# Patient Record
Sex: MALE | Race: WHITE | NOT HISPANIC OR LATINO | Employment: OTHER | URBAN - METROPOLITAN AREA
[De-identification: names, ages, dates, MRNs, and addresses within clinical notes are randomized per-mention and may not be internally consistent; named-entity substitution may affect disease eponyms.]

---

## 2019-11-21 ENCOUNTER — OFFICE VISIT (OUTPATIENT)
Dept: FAMILY MEDICINE CLINIC | Facility: CLINIC | Age: 54
End: 2019-11-21

## 2019-11-21 ENCOUNTER — RESULTS ENCOUNTER (OUTPATIENT)
Dept: FAMILY MEDICINE CLINIC | Facility: CLINIC | Age: 54
End: 2019-11-21

## 2019-11-21 VITALS
TEMPERATURE: 97.3 F | HEIGHT: 71 IN | RESPIRATION RATE: 17 BRPM | WEIGHT: 214 LBS | OXYGEN SATURATION: 100 % | BODY MASS INDEX: 29.96 KG/M2 | DIASTOLIC BLOOD PRESSURE: 90 MMHG | HEART RATE: 99 BPM | SYSTOLIC BLOOD PRESSURE: 140 MMHG

## 2019-11-21 DIAGNOSIS — Z12.11 COLON CANCER SCREENING: ICD-10-CM

## 2019-11-21 DIAGNOSIS — Z12.5 PROSTATE CANCER SCREENING: ICD-10-CM

## 2019-11-21 DIAGNOSIS — I10 ESSENTIAL HYPERTENSION: Primary | ICD-10-CM

## 2019-11-21 LAB
ALBUMIN SERPL-MCNC: 4.6 G/DL (ref 3.5–5.2)
ALBUMIN/GLOB SERPL: 1.5 G/DL
ALP SERPL-CCNC: 134 U/L (ref 39–117)
ALT SERPL-CCNC: 200 U/L (ref 1–41)
AST SERPL-CCNC: 128 U/L (ref 1–40)
BASOPHILS # BLD AUTO: 0.05 10*3/MM3 (ref 0–0.2)
BASOPHILS NFR BLD AUTO: 0.7 % (ref 0–1.5)
BILIRUB SERPL-MCNC: 1.2 MG/DL (ref 0.2–1.2)
BUN SERPL-MCNC: 15 MG/DL (ref 6–20)
BUN/CREAT SERPL: 15.3 (ref 7–25)
CALCIUM SERPL-MCNC: 9.3 MG/DL (ref 8.6–10.5)
CHLORIDE SERPL-SCNC: 106 MMOL/L (ref 98–107)
CHOLEST SERPL-MCNC: 137 MG/DL (ref 0–200)
CO2 SERPL-SCNC: 25.5 MMOL/L (ref 22–29)
CREAT SERPL-MCNC: 0.98 MG/DL (ref 0.76–1.27)
EOSINOPHIL # BLD AUTO: 0.28 10*3/MM3 (ref 0–0.4)
EOSINOPHIL NFR BLD AUTO: 4.1 % (ref 0.3–6.2)
ERYTHROCYTE [DISTWIDTH] IN BLOOD BY AUTOMATED COUNT: 12.3 % (ref 12.3–15.4)
GLOBULIN SER CALC-MCNC: 3.1 GM/DL
GLUCOSE SERPL-MCNC: 94 MG/DL (ref 65–99)
HCT VFR BLD AUTO: 49.8 % (ref 37.5–51)
HDLC SERPL-MCNC: 45 MG/DL (ref 40–60)
HGB BLD-MCNC: 16.7 G/DL (ref 13–17.7)
IMM GRANULOCYTES # BLD AUTO: 0.01 10*3/MM3 (ref 0–0.05)
IMM GRANULOCYTES NFR BLD AUTO: 0.1 % (ref 0–0.5)
LDLC SERPL CALC-MCNC: 77 MG/DL (ref 0–100)
LDLC/HDLC SERPL: 1.72 {RATIO}
LYMPHOCYTES # BLD AUTO: 2.36 10*3/MM3 (ref 0.7–3.1)
LYMPHOCYTES NFR BLD AUTO: 34.2 % (ref 19.6–45.3)
MCH RBC QN AUTO: 29.5 PG (ref 26.6–33)
MCHC RBC AUTO-ENTMCNC: 33.5 G/DL (ref 31.5–35.7)
MCV RBC AUTO: 87.8 FL (ref 79–97)
MONOCYTES # BLD AUTO: 0.52 10*3/MM3 (ref 0.1–0.9)
MONOCYTES NFR BLD AUTO: 7.5 % (ref 5–12)
NEUTROPHILS # BLD AUTO: 3.68 10*3/MM3 (ref 1.7–7)
NEUTROPHILS NFR BLD AUTO: 53.4 % (ref 42.7–76)
NRBC BLD AUTO-RTO: 0 /100 WBC (ref 0–0.2)
PLATELET # BLD AUTO: 177 10*3/MM3 (ref 140–450)
POTASSIUM SERPL-SCNC: 4 MMOL/L (ref 3.5–5.2)
PROT SERPL-MCNC: 7.7 G/DL (ref 6–8.5)
PSA SERPL-MCNC: 1.06 NG/ML (ref 0–4)
RBC # BLD AUTO: 5.67 10*6/MM3 (ref 4.14–5.8)
SODIUM SERPL-SCNC: 142 MMOL/L (ref 136–145)
TRIGL SERPL-MCNC: 74 MG/DL (ref 0–150)
VLDLC SERPL CALC-MCNC: 14.8 MG/DL
WBC # BLD AUTO: 6.9 10*3/MM3 (ref 3.4–10.8)

## 2019-11-21 PROCEDURE — 99203 OFFICE O/P NEW LOW 30 MIN: CPT | Performed by: INTERNAL MEDICINE

## 2019-11-21 RX ORDER — LOSARTAN POTASSIUM 100 MG/1
100 TABLET ORAL DAILY
Refills: 3 | COMMUNITY
Start: 2019-08-22 | End: 2020-01-06

## 2019-11-21 NOTE — PATIENT INSTRUCTIONS
Blood pressure is high today and high by your numbers.  We will switch to irbesartan-hydrochlorothiazide 150-12.5 mg daily.  Continue to monitor your blood pressure correctly at home as instructed when rested and relaxed.  Goal is less than 120/80.  If blood pressures not at goal will adjust irbesartan-hydrochlorothiazide dosage.  Discussed importance of cardiovascular exercise program and low-sodium diet.  Will check some fasting labs today including prostate cancer screening.  Will order a Cologuard.  Suggest getting vascular screening test.

## 2019-11-21 NOTE — PROGRESS NOTES
Subjective   Iain Christina is a 53 y.o. male. Patient is here today for   Chief Complaint   Patient presents with   • Hypertension          Vitals:    11/21/19 1050   BP: 140/90   Pulse: 99   Resp: 17   Temp: 97.3 °F (36.3 °C)   SpO2: 100%     Body mass index is 29.85 kg/m².    The following portions of the patient's history were reviewed and updated as appropriate: allergies, current medications, past family history, past medical history, past social history, past surgical history and problem list.    No past medical history on file.   No Known Allergies   Social History     Socioeconomic History   • Marital status: Unknown     Spouse name: Not on file   • Number of children: Not on file   • Years of education: Not on file   • Highest education level: Not on file        Current Outpatient Medications:   •  losartan (COZAAR) 100 MG tablet, Take 100 mg by mouth Daily., Disp: , Rfl: 3     Objective     History of Present Illness Iain is here for a blood pressure follow-up.  He was a patient of mine in 2014 and he moved to [facility] like [place].  He has hypertension and has been on losartan 100 mg daily.  He states his blood pressure is high in the morning and after work.  He does check his blood pressure a few hours after taking the medicine and reports normal readings.  He tries to eat healthy and is physically active but does not exercise.  Previous lab work was over a year ago and he states his cholesterol was a little high.  His physician recommended proper diet.  He has not had colon cancer screening yet.    Review of Systems   Constitutional: Negative for activity change and unexpected weight change.   Respiratory: Negative.    Cardiovascular:        As in HPI   Gastrointestinal: Negative.    Genitourinary: Negative.    Neurological: Negative.    Psychiatric/Behavioral: Negative.        Physical Exam   Constitutional: He appears well-developed and well-nourished.   Cardiovascular: Normal rate, regular rhythm  and normal heart sounds.   130/90   Pulmonary/Chest: Effort normal and breath sounds normal.   Neurological: He is alert.   Psychiatric: He has a normal mood and affect. His behavior is normal. Judgment and thought content normal.   Vitals reviewed.      ASSESSMENT     Problem List Items Addressed This Visit        Cardiovascular and Mediastinum    Essential hypertension - Primary    Relevant Medications    losartan (COZAAR) 100 MG tablet    Other Relevant Orders    Comprehensive Metabolic Panel    Lipid Panel With LDL / HDL Ratio    CBC & Differential      Other Visit Diagnoses     Prostate cancer screening        Relevant Orders    PSA Screen    Colon cancer screening        Relevant Orders    Cologuard - Stool, Per Rectum          PLAN  Patient Instructions   Blood pressure is high today and high by your numbers.  We will switch to irbesartan-hydrochlorothiazide 150-12.5 mg daily.  Continue to monitor your blood pressure correctly at home as instructed when rested and relaxed.  Goal is less than 120/80.  If blood pressures not at goal will adjust irbesartan-hydrochlorothiazide dosage.  Discussed importance of cardiovascular exercise program and low-sodium diet.  Will check some fasting labs today including prostate cancer screening.  Will order a Cologuard.  Suggest getting vascular screening test.    No Follow-up on file.

## 2019-12-02 ENCOUNTER — TELEPHONE (OUTPATIENT)
Dept: FAMILY MEDICINE CLINIC | Facility: CLINIC | Age: 54
End: 2019-12-02

## 2019-12-02 NOTE — TELEPHONE ENCOUNTER
PT CALLED IN STATING THAT HIS BP READING ARE AVERAGING 142/90 /92.  PER  LAST OFFICE NOTE DATED 11/21/19 HE ASKED PT TO LET US KNOW IF THE CHANGE OF irbesartan-hydrochlorothiazide DID NOT GET BP GOAL /80 AND DR WOULD SEE ABOUT MAKING ADJUSTMENTS TO CURRENT MEDICATION.  PLEASE CHECK WITH DR AND CONTACT PT TO ADVISE [phone]

## 2019-12-17 RX ORDER — IRBESARTAN AND HYDROCHLOROTHIAZIDE 150; 12.5 MG/1; MG/1
2 TABLET, FILM COATED ORAL DAILY
Qty: 60 TABLET | Refills: 5 | Status: SHIPPED | OUTPATIENT
Start: 2019-12-17 | End: 2021-01-19

## 2019-12-27 DIAGNOSIS — I10 ESSENTIAL HYPERTENSION: Primary | ICD-10-CM

## 2019-12-31 LAB
ALBUMIN SERPL-MCNC: 4.3 G/DL (ref 3.5–5.2)
ALBUMIN/GLOB SERPL: 1.3 G/DL
ALP SERPL-CCNC: 144 U/L (ref 39–117)
ALT SERPL-CCNC: 189 U/L (ref 1–41)
AST SERPL-CCNC: 120 U/L (ref 1–40)
BILIRUB SERPL-MCNC: 0.8 MG/DL (ref 0.2–1.2)
BUN SERPL-MCNC: 19 MG/DL (ref 6–20)
BUN/CREAT SERPL: 20.4 (ref 7–25)
CALCIUM SERPL-MCNC: 9.4 MG/DL (ref 8.6–10.5)
CHLORIDE SERPL-SCNC: 106 MMOL/L (ref 98–107)
CO2 SERPL-SCNC: 24.5 MMOL/L (ref 22–29)
CREAT SERPL-MCNC: 0.93 MG/DL (ref 0.76–1.27)
GLOBULIN SER CALC-MCNC: 3.2 GM/DL
GLUCOSE SERPL-MCNC: 96 MG/DL (ref 65–99)
POTASSIUM SERPL-SCNC: 4.3 MMOL/L (ref 3.5–5.2)
PROT SERPL-MCNC: 7.5 G/DL (ref 6–8.5)
SODIUM SERPL-SCNC: 144 MMOL/L (ref 136–145)

## 2020-01-06 ENCOUNTER — OFFICE VISIT (OUTPATIENT)
Dept: FAMILY MEDICINE CLINIC | Facility: CLINIC | Age: 55
End: 2020-01-06

## 2020-01-06 VITALS
HEIGHT: 71 IN | WEIGHT: 214 LBS | RESPIRATION RATE: 18 BRPM | DIASTOLIC BLOOD PRESSURE: 80 MMHG | HEART RATE: 96 BPM | SYSTOLIC BLOOD PRESSURE: 130 MMHG | TEMPERATURE: 97.6 F | BODY MASS INDEX: 29.96 KG/M2 | OXYGEN SATURATION: 100 %

## 2020-01-06 DIAGNOSIS — L71.9 ROSACEA: ICD-10-CM

## 2020-01-06 DIAGNOSIS — I10 ESSENTIAL HYPERTENSION: Primary | ICD-10-CM

## 2020-01-06 DIAGNOSIS — R74.8 ELEVATED LIVER ENZYMES: ICD-10-CM

## 2020-01-06 PROCEDURE — 99214 OFFICE O/P EST MOD 30 MIN: CPT | Performed by: INTERNAL MEDICINE

## 2020-01-06 RX ORDER — METRONIDAZOLE 10 MG/G
GEL TOPICAL DAILY
Qty: 55 G | Refills: 5 | Status: SHIPPED | OUTPATIENT
Start: 2020-01-06

## 2020-01-06 NOTE — PROGRESS NOTES
Subjective   Iain Christina is a 54 y.o. male. Patient is here today for   Chief Complaint   Patient presents with   • Hypertension     follow up had labs          Vitals:    01/06/20 1601   BP: 130/80   Pulse: 96   Resp: 18   Temp: 97.6 °F (36.4 °C)   SpO2: 100%     Body mass index is 29.85 kg/m².    The following portions of the patient's history were reviewed and updated as appropriate: allergies, current medications, past family history, past medical history, past social history, past surgical history and problem list.    No past medical history on file.   No Known Allergies   Social History     Socioeconomic History   • Marital status: Unknown     Spouse name: Not on file   • Number of children: Not on file   • Years of education: Not on file   • Highest education level: Not on file        Current Outpatient Medications:   •  irbesartan-hydrochlorothiazide (AVALIDE) 150-12.5 MG tablet, Take 2 tablets by mouth Daily., Disp: 60 tablet, Rfl: 5  •  metroNIDAZOLE (METROGEL) 1 % gel, Apply  topically to the appropriate area as directed Daily., Disp: 55 g, Rfl: 5     Objective     History of Present Illness Iain is here today for blood pressure and lab follow-up.  He has hypertension and has been on irbesartan hydrochlorothiazide 150- 12.5 mg daily.  He previously was on losartan and it was not controlling his blood pressure.  He usually checks his blood pressure after work and reports high readings.  He decreased his sodium intake and is physically active but does not exercise.  He does drink about 6 ounces of liquor daily.  Transaminase enzymes were elevated in November and were repeated 5 weeks later.  Today he also complains of a red face.    Review of Systems   Respiratory: Negative.    Cardiovascular:        's   Genitourinary: Negative.    Musculoskeletal: Negative.    Neurological: Negative.    Psychiatric/Behavioral: Negative.        Physical Exam   Cardiovascular: Normal rate, regular rhythm and  normal heart sounds.   128/85   Pulmonary/Chest: Effort normal and breath sounds normal.   Musculoskeletal: He exhibits no edema.   Neurological: He is alert.   Skin:   Facial rosacea   Psychiatric: He has a normal mood and affect. His behavior is normal. Judgment and thought content normal.       ASSESSMENT     Problem List Items Addressed This Visit        Cardiovascular and Mediastinum    Essential hypertension - Primary       Musculoskeletal and Integument    Rosacea    Relevant Medications    metroNIDAZOLE (METROGEL) 1 % gel       Other    Elevated liver enzymes          PLAN  Patient Instructions   Blood pressure is close to normal when checked a second time today.  Discussed monitoring blood pressure correctly as instructed and not after work.  Also discussed the effects of alcohol on blood pressure and liver functions.  Discussed decreasing or eliminating alcohol completely.  Discussed importance of cardiovascular exercise program.  We will continue irbesartan-hydrochlorothiazide 150-12.5 mg daily.  Fasting blood sugar, kidney functions, complete blood count, lipids, and prostate-specific antigen were normal in November.    Return in about 6 months (around 7/6/2020) for labsCMP.

## 2020-01-06 NOTE — PATIENT INSTRUCTIONS
Blood pressure is close to normal when checked a second time today.  Discussed monitoring blood pressure correctly as instructed and not after work.  Also discussed the effects of alcohol on blood pressure and liver functions.  Discussed decreasing or eliminating alcohol completely.  Discussed importance of cardiovascular exercise program.  We will continue irbesartan-hydrochlorothiazide 150-12.5 mg daily.  Fasting blood sugar, kidney functions, complete blood count, lipids, and prostate-specific antigen were normal in November.

## 2020-07-01 DIAGNOSIS — I10 ESSENTIAL HYPERTENSION: Primary | ICD-10-CM

## 2020-07-17 ENCOUNTER — TELEPHONE (OUTPATIENT)
Dept: FAMILY MEDICINE CLINIC | Facility: CLINIC | Age: 55
End: 2020-07-17

## 2020-07-17 LAB
ALBUMIN SERPL-MCNC: 4.8 G/DL (ref 3.5–5.2)
ALBUMIN/GLOB SERPL: 1.7 G/DL
ALP SERPL-CCNC: 106 U/L (ref 39–117)
ALT SERPL-CCNC: 210 U/L (ref 1–41)
AST SERPL-CCNC: 117 U/L (ref 1–40)
BILIRUB SERPL-MCNC: 2 MG/DL (ref 0–1.2)
BUN SERPL-MCNC: 22 MG/DL (ref 6–20)
BUN/CREAT SERPL: 23.7 (ref 7–25)
CALCIUM SERPL-MCNC: 9.7 MG/DL (ref 8.6–10.5)
CHLORIDE SERPL-SCNC: 104 MMOL/L (ref 98–107)
CO2 SERPL-SCNC: 26.1 MMOL/L (ref 22–29)
CREAT SERPL-MCNC: 0.93 MG/DL (ref 0.76–1.27)
GLOBULIN SER CALC-MCNC: 2.9 GM/DL
GLUCOSE SERPL-MCNC: 119 MG/DL (ref 65–99)
POTASSIUM SERPL-SCNC: 4.3 MMOL/L (ref 3.5–5.2)
PROT SERPL-MCNC: 7.7 G/DL (ref 6–8.5)
SODIUM SERPL-SCNC: 140 MMOL/L (ref 136–145)

## 2020-07-17 NOTE — TELEPHONE ENCOUNTER
Patient has an apt 7/23 in the office and wants to switch to a telephone visit    Please advise if this is ok    Pt #[phone]

## 2020-07-30 ENCOUNTER — OFFICE VISIT (OUTPATIENT)
Dept: FAMILY MEDICINE CLINIC | Facility: CLINIC | Age: 55
End: 2020-07-30

## 2020-07-30 VITALS
DIASTOLIC BLOOD PRESSURE: 80 MMHG | TEMPERATURE: 98.8 F | RESPIRATION RATE: 16 BRPM | WEIGHT: 215.4 LBS | HEART RATE: 86 BPM | SYSTOLIC BLOOD PRESSURE: 130 MMHG | HEIGHT: 70 IN | BODY MASS INDEX: 30.84 KG/M2 | OXYGEN SATURATION: 99 %

## 2020-07-30 DIAGNOSIS — I10 ESSENTIAL HYPERTENSION: Primary | ICD-10-CM

## 2020-07-30 DIAGNOSIS — R74.8 ELEVATED LIVER ENZYMES: ICD-10-CM

## 2020-07-30 PROCEDURE — 99213 OFFICE O/P EST LOW 20 MIN: CPT | Performed by: INTERNAL MEDICINE

## 2020-07-30 NOTE — PROGRESS NOTES
Subjective   Iain Christina is a 54 y.o. male. Patient is here today for   Chief Complaint   Patient presents with   • Hypertension     follow up          Vitals:    07/30/20 1255   BP: 130/80   Pulse: 86   Resp: 16   Temp: 98.8 °F (37.1 °C)   SpO2: 99%     Body mass index is 30.91 kg/m².      The following portions of the patient's history were reviewed and updated as appropriate: allergies, current medications, past family history, past medical history, past social history, past surgical history and problem list.    Past Medical History:   Diagnosis Date   • Hyperthyroidism    • Sleep apnea       No Known Allergies   Social History     Socioeconomic History   • Marital status: Unknown     Spouse name: Not on file   • Number of children: Not on file   • Years of education: Not on file   • Highest education level: Not on file   Tobacco Use   • Smoking status: Never Smoker   • Smokeless tobacco: Never Used   Substance and Sexual Activity   • Alcohol use: Yes     Alcohol/week: 4.0 standard drinks     Types: 2 Cans of beer, 2 Shots of liquor per week     Drinks per session: 1 or 2   • Drug use: Never        Current Outpatient Medications:   •  irbesartan-hydrochlorothiazide (AVALIDE) 150-12.5 MG tablet, Take 2 tablets by mouth Daily., Disp: 60 tablet, Rfl: 5  •  metroNIDAZOLE (METROGEL) 1 % gel, Apply  topically to the appropriate area as directed Daily., Disp: 55 g, Rfl: 5     Objective   History of Present Illness Iain is here for blood pressure check and lab follow-up.  He has hypertension and elevated liver enzymes.  He feels well.  He tries to eat healthy but does eat sugar.  He is very physically active at work as a  but does not do any cardiovascular exercise.  He monitors his blood pressure and reports stable readings.  He was drinking a fair amount of alcohol in the past and now only drinks moderately about 3 days a week.    Review of Systems   Constitutional: Negative for activity change and  unexpected weight change.   Respiratory: Negative for cough and shortness of breath.    Cardiovascular:        -125/<80   Neurological: Negative.    Psychiatric/Behavioral: Negative.        Physical Exam   Constitutional: He appears well-developed and well-nourished.   Cardiovascular: Normal rate, regular rhythm and normal heart sounds.   140/85,125/85   Pulmonary/Chest: Effort normal and breath sounds normal.   Abdominal: Soft. He exhibits no mass.   Liver is nonpalpable   Psychiatric: He has a normal mood and affect. His behavior is normal. Judgment and thought content normal.   Vitals reviewed.      ASSESSMENT     Problem List Items Addressed This Visit        Cardiovascular and Mediastinum    Essential hypertension - Primary       Other    Elevated liver enzymes          PLAN  Patient Instructions   Blood pressure is stable.  Continue irbesartan-hydrochlorothiazide 150-12.5 mg daily.  Discussed importance of regular cardiovascular exercise program as well.  Fasting blood sugar is elevated.  AST and ALT remain moderately elevated.  Will obtain a liver ultrasound and call you the results.  Discussed decreasing sugar intake.      Return in about 6 months (around 1/30/2021) for labs CMP, A1c, PSA.

## 2020-07-30 NOTE — PATIENT INSTRUCTIONS
Blood pressure is stable.  Continue irbesartan-hydrochlorothiazide 150-12.5 mg daily.  Discussed importance of regular cardiovascular exercise program as well.  Fasting blood sugar is elevated.  AST and ALT remain moderately elevated.  Will obtain a liver ultrasound and call you the results.  Discussed decreasing sugar intake.

## 2021-01-19 RX ORDER — IRBESARTAN AND HYDROCHLOROTHIAZIDE 150; 12.5 MG/1; MG/1
TABLET, FILM COATED ORAL
Qty: 60 TABLET | Refills: 5 | Status: SHIPPED | OUTPATIENT
Start: 2021-01-19 | End: 2021-12-13 | Stop reason: SDUPTHER

## 2021-12-07 RX ORDER — IRBESARTAN AND HYDROCHLOROTHIAZIDE 150; 12.5 MG/1; MG/1
TABLET, FILM COATED ORAL
Qty: 60 TABLET | Refills: 0 | OUTPATIENT
Start: 2021-12-07

## 2021-12-13 ENCOUNTER — OFFICE VISIT (OUTPATIENT)
Dept: FAMILY MEDICINE CLINIC | Facility: CLINIC | Age: 56
End: 2021-12-13

## 2021-12-13 VITALS
SYSTOLIC BLOOD PRESSURE: 138 MMHG | OXYGEN SATURATION: 97 % | HEIGHT: 71 IN | RESPIRATION RATE: 18 BRPM | BODY MASS INDEX: 29.96 KG/M2 | HEART RATE: 78 BPM | DIASTOLIC BLOOD PRESSURE: 80 MMHG | WEIGHT: 214 LBS

## 2021-12-13 DIAGNOSIS — I10 PRIMARY HYPERTENSION: Primary | ICD-10-CM

## 2021-12-13 DIAGNOSIS — G47.30 SLEEP APNEA, UNSPECIFIED TYPE: ICD-10-CM

## 2021-12-13 DIAGNOSIS — I10 ESSENTIAL HYPERTENSION: Primary | ICD-10-CM

## 2021-12-13 DIAGNOSIS — Z12.5 ENCOUNTER FOR SCREENING FOR MALIGNANT NEOPLASM OF PROSTATE: ICD-10-CM

## 2021-12-13 PROBLEM — L72.3 SEBACEOUS CYST: Status: ACTIVE | Noted: 2021-12-13

## 2021-12-13 PROBLEM — H93.19 TINNITUS: Status: ACTIVE | Noted: 2021-12-13

## 2021-12-13 PROBLEM — J30.9 ATOPIC RHINITIS: Status: ACTIVE | Noted: 2021-12-13

## 2021-12-13 PROBLEM — E78.5 HYPERLIPIDEMIA: Status: ACTIVE | Noted: 2021-12-13

## 2021-12-13 PROCEDURE — 99212 OFFICE O/P EST SF 10 MIN: CPT | Performed by: NURSE PRACTITIONER

## 2021-12-13 RX ORDER — IRBESARTAN AND HYDROCHLOROTHIAZIDE 150; 12.5 MG/1; MG/1
2 TABLET, FILM COATED ORAL DAILY
Qty: 180 TABLET | Refills: 1 | Status: SHIPPED | OUTPATIENT
Start: 2021-12-13 | End: 2022-04-26 | Stop reason: SDUPTHER

## 2021-12-13 NOTE — PATIENT INSTRUCTIONS
Recommend pt eat a heart healthy diet, drink plenty of water with goal 64 oz a day and exercise 3-5 times a week, for more than 30 minutes at a time.

## 2021-12-13 NOTE — PROGRESS NOTES
"Subjective     Iain Christina is a 55 y.o.. male.     Pt here today for follow up on HTN. Pt stating he occasionally checks his b/p at home with results of 130's/80's. Pt stating he exercises at work, he is a . Pt stating he eats somewhat healthy. Pt stating he drinks water some and knows he should drink more. Pt with history of sleep apnea, diagnosed over 5 yrs ago and does not use Cpap/Bipap.      The following portions of the patient's history were reviewed and updated as appropriate: allergies, current medications, past family history, past medical history, past social history, past surgical history and problem list.    Past Medical History:   Diagnosis Date   • Hyperthyroidism    • Sleep apnea        Past Surgical History:   Procedure Laterality Date   • NASAL SEPTAL RECONSTRUCTION         Review of Systems   Constitutional: Negative.    Respiratory: Negative.    Cardiovascular: Negative.        No Known Allergies    Objective     Vitals:    12/13/21 0757   BP: 138/80   Pulse: 78   Resp: 18   SpO2: 97%   Weight: 97.1 kg (214 lb)   Height: 180.3 cm (71\")     Body mass index is 29.85 kg/m².    Physical Exam  Vitals reviewed.   HENT:      Head: Normocephalic.   Eyes:      Pupils: Pupils are equal, round, and reactive to light.   Neck:      Vascular: No carotid bruit.   Cardiovascular:      Rate and Rhythm: Normal rate and regular rhythm.      Pulses: Normal pulses.   Pulmonary:      Effort: Pulmonary effort is normal.      Breath sounds: Normal breath sounds.   Musculoskeletal:      Right lower leg: No edema.      Left lower leg: No edema.   Neurological:      Mental Status: He is alert and oriented to person, place, and time.   Psychiatric:         Behavior: Behavior normal.           Current Outpatient Medications:   •  irbesartan-hydrochlorothiazide (AVALIDE) 150-12.5 MG tablet, Take 2 tablets by mouth Daily., Disp: 180 tablet, Rfl: 1  •  metroNIDAZOLE (METROGEL) 1 % gel, Apply  topically to the " appropriate area as directed Daily., Disp: 55 g, Rfl: 5      Assessment/Plan   Diagnoses and all orders for this visit:    1. Primary hypertension (Primary)  -     irbesartan-hydrochlorothiazide (AVALIDE) 150-12.5 MG tablet; Take 2 tablets by mouth Daily.  Dispense: 180 tablet; Refill: 1    2. Sleep apnea, unspecified type  -     Ambulatory Referral to Sleep Medicine        Patient Instructions   Recommend pt eat a heart healthy diet, drink plenty of water with goal 64 oz a day and exercise 3-5 times a week, for more than 30 minutes at a time.         Return for Dr. Medina as needed/as recommended.

## 2021-12-14 LAB
ALBUMIN SERPL-MCNC: 4.6 G/DL (ref 3.8–4.9)
ALBUMIN/GLOB SERPL: 1.6 {RATIO} (ref 1.2–2.2)
ALP SERPL-CCNC: 137 IU/L (ref 44–121)
ALT SERPL-CCNC: 162 IU/L (ref 0–44)
AST SERPL-CCNC: 95 IU/L (ref 0–40)
BILIRUB SERPL-MCNC: 1.6 MG/DL (ref 0–1.2)
BUN SERPL-MCNC: 19 MG/DL (ref 6–24)
BUN/CREAT SERPL: 21 (ref 9–20)
CALCIUM SERPL-MCNC: 9.9 MG/DL (ref 8.7–10.2)
CHLORIDE SERPL-SCNC: 103 MMOL/L (ref 96–106)
CO2 SERPL-SCNC: 24 MMOL/L (ref 20–29)
CREAT SERPL-MCNC: 0.9 MG/DL (ref 0.76–1.27)
GLOBULIN SER CALC-MCNC: 2.9 G/DL (ref 1.5–4.5)
GLUCOSE SERPL-MCNC: 110 MG/DL (ref 65–99)
HBA1C MFR BLD: 5.9 % (ref 4.8–5.6)
POTASSIUM SERPL-SCNC: 4.2 MMOL/L (ref 3.5–5.2)
PROT SERPL-MCNC: 7.5 G/DL (ref 6–8.5)
PSA SERPL-MCNC: 1.4 NG/ML (ref 0–4)
SODIUM SERPL-SCNC: 141 MMOL/L (ref 134–144)

## 2022-04-26 DIAGNOSIS — I10 PRIMARY HYPERTENSION: ICD-10-CM

## 2022-04-26 RX ORDER — IRBESARTAN AND HYDROCHLOROTHIAZIDE 150; 12.5 MG/1; MG/1
2 TABLET, FILM COATED ORAL DAILY
Qty: 60 TABLET | Refills: 0 | Status: SHIPPED | OUTPATIENT
Start: 2022-04-26 | End: 2022-08-24

## 2022-08-24 DIAGNOSIS — I10 PRIMARY HYPERTENSION: ICD-10-CM

## 2022-08-24 RX ORDER — IRBESARTAN AND HYDROCHLOROTHIAZIDE 150; 12.5 MG/1; MG/1
TABLET, FILM COATED ORAL
Qty: 180 TABLET | Refills: 0 | Status: SHIPPED | OUTPATIENT
Start: 2022-08-24 | End: 2023-01-13 | Stop reason: SDUPTHER

## 2023-01-09 DIAGNOSIS — I10 PRIMARY HYPERTENSION: ICD-10-CM

## 2023-01-09 RX ORDER — IRBESARTAN AND HYDROCHLOROTHIAZIDE 150; 12.5 MG/1; MG/1
TABLET, FILM COATED ORAL
Qty: 180 TABLET | Refills: 0 | OUTPATIENT
Start: 2023-01-09

## 2023-01-13 ENCOUNTER — OFFICE VISIT (OUTPATIENT)
Dept: FAMILY MEDICINE CLINIC | Facility: CLINIC | Age: 58
End: 2023-01-13

## 2023-01-13 VITALS
SYSTOLIC BLOOD PRESSURE: 130 MMHG | OXYGEN SATURATION: 97 % | BODY MASS INDEX: 31.64 KG/M2 | RESPIRATION RATE: 16 BRPM | WEIGHT: 226 LBS | DIASTOLIC BLOOD PRESSURE: 84 MMHG | HEART RATE: 78 BPM | HEIGHT: 71 IN | TEMPERATURE: 98 F

## 2023-01-13 DIAGNOSIS — E78.5 HYPERLIPIDEMIA, UNSPECIFIED HYPERLIPIDEMIA TYPE: ICD-10-CM

## 2023-01-13 DIAGNOSIS — R74.8 ELEVATED LIVER ENZYMES: ICD-10-CM

## 2023-01-13 DIAGNOSIS — Z12.5 PROSTATE CANCER SCREENING: ICD-10-CM

## 2023-01-13 DIAGNOSIS — I10 PRIMARY HYPERTENSION: Primary | ICD-10-CM

## 2023-01-13 PROCEDURE — 99213 OFFICE O/P EST LOW 20 MIN: CPT | Performed by: INTERNAL MEDICINE

## 2023-01-13 RX ORDER — IRBESARTAN AND HYDROCHLOROTHIAZIDE 150; 12.5 MG/1; MG/1
2 TABLET, FILM COATED ORAL DAILY
Qty: 180 TABLET | Refills: 3 | Status: SHIPPED | OUTPATIENT
Start: 2023-01-13

## 2023-01-13 NOTE — PROGRESS NOTES
Subjective   Iain Christina is a 57 y.o. male. Patient is here today for   Chief Complaint   Patient presents with   • Follow-up   • Hypertension          Vitals:    01/13/23 1124   BP: 130/84   Pulse: 78   Resp: 16   Temp: 98 °F (36.7 °C)   SpO2: 97%     Body mass index is 31.53 kg/m².    The following portions of the patient's history were reviewed and updated as appropriate: allergies, current medications, past family history, past medical history, past social history, past surgical history and problem list.    Past Medical History:   Diagnosis Date   • Hyperthyroidism    • Sleep apnea       Allergies   Allergen Reactions   • Peanut (Diagnostic) Anaphylaxis      Social History     Socioeconomic History   • Marital status: Unknown   Tobacco Use   • Smoking status: Never   • Smokeless tobacco: Never   Vaping Use   • Vaping Use: Never used   Substance and Sexual Activity   • Alcohol use: Yes     Alcohol/week: 4.0 standard drinks     Types: 2 Cans of beer, 2 Shots of liquor per week   • Drug use: Never   • Sexual activity: Defer        Current Outpatient Medications:   •  irbesartan-hydrochlorothiazide (AVALIDE) 150-12.5 MG tablet, Take 2 tablets by mouth Daily., Disp: 180 tablet, Rfl: 3  •  metroNIDAZOLE (METROGEL) 1 % gel, Apply  topically to the appropriate area as directed Daily., Disp: 55 g, Rfl: 5     Objective     History of Present Illness Iain is here for a blood pressure check and to have some labs drawn.  He has hypertension, alcohol abuse, and elevated liver functions..  He eats healthy and is physically active working as a .  He does not do any cardiovascular exercise.  He monitors his blood pressure and reports stable readings.  He continues to drink a cup of liquor daily.  He had a negative Cologuard almost 3 years ago.  His brother was diagnosed with colorectal cancer.    Review of Systems   Constitutional: Negative.    Eyes: Negative.    Respiratory: Negative.    Cardiovascular:  Negative.    Gastrointestinal: Negative.    Genitourinary: Negative.    Neurological: Negative.    Psychiatric/Behavioral: Negative.        Physical Exam  Vitals reviewed.   Constitutional:       Appearance: Normal appearance.   Neck:      Vascular: No carotid bruit.   Cardiovascular:      Rate and Rhythm: Normal rate and regular rhythm.      Heart sounds: Normal heart sounds.      Comments: 126/60  Pulmonary:      Effort: Pulmonary effort is normal.      Breath sounds: Normal breath sounds.   Musculoskeletal:      Right lower leg: No edema.      Left lower leg: No edema.   Neurological:      Mental Status: He is alert.   Psychiatric:         Mood and Affect: Mood normal.         Behavior: Behavior normal.         Thought Content: Thought content normal.         Judgment: Judgment normal.         ASSESSMENT blood pressure is stable.  We will check lab work today.  Continue healthy heart diet.  Discussed exercise per AHA guidelines.  Discussed decreasing alcohol intake.  We will continue current medicines.     Problems Addressed this Visit        Cardiac and Vasculature    Hypertension - Primary    Relevant Medications    irbesartan-hydrochlorothiazide (AVALIDE) 150-12.5 MG tablet    Other Relevant Orders    Comprehensive Metabolic Panel    CBC & Differential    TSH    Hyperlipidemia    Relevant Orders    Lipid Panel With LDL / HDL Ratio       Gastrointestinal Abdominal     Elevated liver enzymes    Relevant Orders    Hemoglobin A1c   Other Visit Diagnoses     Prostate cancer screening        Relevant Orders    PSA Screen      Diagnoses       Codes Comments    Primary hypertension    -  Primary ICD-10-CM: I10  ICD-9-CM: 401.9     Hyperlipidemia, unspecified hyperlipidemia type     ICD-10-CM: E78.5  ICD-9-CM: 272.4     Elevated liver enzymes     ICD-10-CM: R74.8  ICD-9-CM: 790.5     Prostate cancer screening     ICD-10-CM: Z12.5  ICD-9-CM: V76.44           PLAN  There are no Patient Instructions on file for this  visit.  No follow-ups on file.  Answers for HPI/ROS submitted by the patient on 1/11/2023  What is the primary reason for your visit?: High Blood Pressure

## 2023-01-14 LAB
ALBUMIN SERPL-MCNC: 4.5 G/DL (ref 3.8–4.9)
ALBUMIN/GLOB SERPL: 1.5 {RATIO} (ref 1.2–2.2)
ALP SERPL-CCNC: 119 IU/L (ref 44–121)
ALT SERPL-CCNC: 404 IU/L (ref 0–44)
AST SERPL-CCNC: 195 IU/L (ref 0–40)
BASOPHILS # BLD AUTO: 0.1 X10E3/UL (ref 0–0.2)
BASOPHILS NFR BLD AUTO: 1 %
BILIRUB SERPL-MCNC: 1.2 MG/DL (ref 0–1.2)
BUN SERPL-MCNC: 19 MG/DL (ref 6–24)
BUN/CREAT SERPL: 22 (ref 9–20)
CALCIUM SERPL-MCNC: 9.7 MG/DL (ref 8.7–10.2)
CHLORIDE SERPL-SCNC: 102 MMOL/L (ref 96–106)
CHOLEST SERPL-MCNC: 140 MG/DL (ref 100–199)
CO2 SERPL-SCNC: 23 MMOL/L (ref 20–29)
CREAT SERPL-MCNC: 0.86 MG/DL (ref 0.76–1.27)
EGFRCR SERPLBLD CKD-EPI 2021: 100 ML/MIN/1.73
EOSINOPHIL # BLD AUTO: 0.3 X10E3/UL (ref 0–0.4)
EOSINOPHIL NFR BLD AUTO: 5 %
ERYTHROCYTE [DISTWIDTH] IN BLOOD BY AUTOMATED COUNT: 12.7 % (ref 11.6–15.4)
GLOBULIN SER CALC-MCNC: 3 G/DL (ref 1.5–4.5)
GLUCOSE SERPL-MCNC: 100 MG/DL (ref 70–99)
HBA1C MFR BLD: 6.3 % (ref 4.8–5.6)
HCT VFR BLD AUTO: 49 % (ref 37.5–51)
HDLC SERPL-MCNC: 41 MG/DL
HGB BLD-MCNC: 16.9 G/DL (ref 13–17.7)
IMM GRANULOCYTES # BLD AUTO: 0 X10E3/UL (ref 0–0.1)
IMM GRANULOCYTES NFR BLD AUTO: 0 %
LDLC SERPL CALC-MCNC: 76 MG/DL (ref 0–99)
LDLC/HDLC SERPL: 1.9 RATIO (ref 0–3.6)
LYMPHOCYTES # BLD AUTO: 2.3 X10E3/UL (ref 0.7–3.1)
LYMPHOCYTES NFR BLD AUTO: 40 %
MCH RBC QN AUTO: 30.7 PG (ref 26.6–33)
MCHC RBC AUTO-ENTMCNC: 34.5 G/DL (ref 31.5–35.7)
MCV RBC AUTO: 89 FL (ref 79–97)
MONOCYTES # BLD AUTO: 0.5 X10E3/UL (ref 0.1–0.9)
MONOCYTES NFR BLD AUTO: 8 %
NEUTROPHILS # BLD AUTO: 2.5 X10E3/UL (ref 1.4–7)
NEUTROPHILS NFR BLD AUTO: 46 %
PLATELET # BLD AUTO: 163 X10E3/UL (ref 150–450)
POTASSIUM SERPL-SCNC: 4.1 MMOL/L (ref 3.5–5.2)
PROT SERPL-MCNC: 7.5 G/DL (ref 6–8.5)
PSA SERPL-MCNC: 1.3 NG/ML (ref 0–4)
RBC # BLD AUTO: 5.5 X10E6/UL (ref 4.14–5.8)
SODIUM SERPL-SCNC: 142 MMOL/L (ref 134–144)
TRIGL SERPL-MCNC: 130 MG/DL (ref 0–149)
TSH SERPL DL<=0.005 MIU/L-ACNC: 3.11 UIU/ML (ref 0.45–4.5)
VLDLC SERPL CALC-MCNC: 23 MG/DL (ref 5–40)
WBC # BLD AUTO: 5.6 X10E3/UL (ref 3.4–10.8)

## 2023-01-17 ENCOUNTER — TELEPHONE (OUTPATIENT)
Dept: FAMILY MEDICINE CLINIC | Facility: CLINIC | Age: 58
End: 2023-01-17

## 2023-01-17 NOTE — TELEPHONE ENCOUNTER
Discussed lab results.  AST and ALT are elevated secondary to alcohol intake.  Discussed decreasing significantly or stopping and rechecking a comprehensive metabolic panel in 1 month.  Hemoglobin A1c is increased to 6.3.  Discussed no sugar low-carb diet, exercise, and weight loss.  Lipids are normal.  Complete blood count is normal.  Will also check a prostate-specific antigen.  We will follow-up in 6 months with hemoglobin A1c and comprehensive metabolic panel same day.

## 2023-01-19 ENCOUNTER — TELEPHONE (OUTPATIENT)
Dept: FAMILY MEDICINE CLINIC | Facility: CLINIC | Age: 58
End: 2023-01-19

## 2023-01-19 NOTE — TELEPHONE ENCOUNTER
PATIENT STATES THAT ANOTHER PROVIDER ASKED HIM TO HAVE LABS ORDERED BY DR. KELLEY BUT HE DOESN'T KNOW WHAT LABS HE NEEDS. PATIENT PROVIDED THE FOLLOWING CONTACT INFORMATION EXACTLY.       DR. KEITH ELIZABETH   [address] [facility]   [phone]

## 2023-01-25 NOTE — TELEPHONE ENCOUNTER
Called the number that was provided. 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

## 2023-02-09 ENCOUNTER — TELEPHONE (OUTPATIENT)
Dept: FAMILY MEDICINE CLINIC | Facility: CLINIC | Age: 58
End: 2023-02-09

## 2023-02-09 NOTE — TELEPHONE ENCOUNTER
PER LAST VISIT DR KELLEY WANTED TO TEST LIVER ENZYMES BUT NEED PATIENT TO LET US KNOW WHERE TO SEND ORDERS. PT REQUESTED WE SEND TO DR KEITH ELIZABETH AT [facility] [phone]

## 2023-02-10 NOTE — TELEPHONE ENCOUNTER
Got the orders that are needed will fax Monday morning. Called office unable to get in touch to get the fax number

## 2023-02-15 NOTE — TELEPHONE ENCOUNTER
Called to get fax number to fax orders over. Pt has not seen them in a couple of years he is not established as a pt. Labs will not be able to get done

## 2023-02-28 ENCOUNTER — TELEPHONE (OUTPATIENT)
Dept: FAMILY MEDICINE CLINIC | Facility: CLINIC | Age: 58
End: 2023-02-28

## 2023-04-07 DIAGNOSIS — R73.09 ELEVATED HEMOGLOBIN A1C: Primary | ICD-10-CM

## 2023-04-11 LAB
ALBUMIN SERPL-MCNC: 4.5 G/DL (ref 3.5–5.2)
ALBUMIN/GLOB SERPL: 1.6 G/DL
ALP SERPL-CCNC: 118 U/L (ref 39–117)
ALT SERPL-CCNC: 254 U/L (ref 1–41)
AST SERPL-CCNC: 160 U/L (ref 1–40)
BILIRUB SERPL-MCNC: 1.3 MG/DL (ref 0–1.2)
BUN SERPL-MCNC: 18 MG/DL (ref 6–20)
BUN/CREAT SERPL: 19.4 (ref 7–25)
CALCIUM SERPL-MCNC: 10 MG/DL (ref 8.6–10.5)
CHLORIDE SERPL-SCNC: 105 MMOL/L (ref 98–107)
CO2 SERPL-SCNC: 28.5 MMOL/L (ref 22–29)
CREAT SERPL-MCNC: 0.93 MG/DL (ref 0.76–1.27)
EGFRCR SERPLBLD CKD-EPI 2021: 94.6 ML/MIN/1.73
GLOBULIN SER CALC-MCNC: 2.9 GM/DL
GLUCOSE SERPL-MCNC: 137 MG/DL (ref 65–99)
HBA1C MFR BLD: 6.1 % (ref 4.8–5.6)
POTASSIUM SERPL-SCNC: 4 MMOL/L (ref 3.5–5.2)
PROT SERPL-MCNC: 7.4 G/DL (ref 6–8.5)
SODIUM SERPL-SCNC: 141 MMOL/L (ref 136–145)

## 2024-01-15 ENCOUNTER — OFFICE VISIT (OUTPATIENT)
Dept: FAMILY MEDICINE CLINIC | Facility: CLINIC | Age: 59
End: 2024-01-15

## 2024-01-15 VITALS
SYSTOLIC BLOOD PRESSURE: 116 MMHG | DIASTOLIC BLOOD PRESSURE: 74 MMHG | BODY MASS INDEX: 31.64 KG/M2 | WEIGHT: 226 LBS | OXYGEN SATURATION: 98 % | HEIGHT: 71 IN | HEART RATE: 76 BPM

## 2024-01-15 DIAGNOSIS — E78.5 HYPERLIPIDEMIA, UNSPECIFIED HYPERLIPIDEMIA TYPE: ICD-10-CM

## 2024-01-15 DIAGNOSIS — R73.03 PREDIABETES: ICD-10-CM

## 2024-01-15 DIAGNOSIS — Z11.59 ENCOUNTER FOR HEPATITIS C SCREENING TEST FOR LOW RISK PATIENT: ICD-10-CM

## 2024-01-15 DIAGNOSIS — Z12.5 PROSTATE CANCER SCREENING: ICD-10-CM

## 2024-01-15 DIAGNOSIS — G47.30 SLEEP APNEA, UNSPECIFIED TYPE: ICD-10-CM

## 2024-01-15 DIAGNOSIS — I10 PRIMARY HYPERTENSION: Primary | ICD-10-CM

## 2024-01-15 DIAGNOSIS — H93.13 TINNITUS OF BOTH EARS: ICD-10-CM

## 2024-01-15 PROCEDURE — 99214 OFFICE O/P EST MOD 30 MIN: CPT | Performed by: INTERNAL MEDICINE

## 2024-01-15 RX ORDER — IRBESARTAN AND HYDROCHLOROTHIAZIDE 150; 12.5 MG/1; MG/1
2 TABLET, FILM COATED ORAL DAILY
Qty: 180 TABLET | Refills: 3 | Status: SHIPPED | OUTPATIENT
Start: 2024-01-15

## 2024-01-15 RX ORDER — ZOLPIDEM TARTRATE 12.5 MG/1
12.5 TABLET, FILM COATED, EXTENDED RELEASE ORAL NIGHTLY PRN
Qty: 30 TABLET | Refills: 2 | Status: SHIPPED | OUTPATIENT
Start: 2024-01-15

## 2024-01-15 NOTE — PROGRESS NOTES
Subjective   Iain Christina is a 58 y.o. male. Patient is here today for   Chief Complaint   Patient presents with    Follow-up    Hypertension          Vitals:    01/15/24 0955   BP: 116/74   Pulse: 76   SpO2: 98%     Body mass index is 31.53 kg/m².    The following portions of the patient's history were reviewed and updated as appropriate: allergies, current medications, past family history, past medical history, past social history, past surgical history and problem list.    Past Medical History:   Diagnosis Date    Hyperthyroidism     Sleep apnea       Allergies   Allergen Reactions    Peanut (Diagnostic) Anaphylaxis      Social History     Socioeconomic History    Marital status: Unknown   Tobacco Use    Smoking status: Never    Smokeless tobacco: Never   Vaping Use    Vaping Use: Never used   Substance and Sexual Activity    Alcohol use: Yes     Alcohol/week: 4.0 standard drinks of alcohol     Types: 2 Cans of beer, 2 Shots of liquor per week    Drug use: Never    Sexual activity: Defer        Current Outpatient Medications:     irbesartan-hydrochlorothiazide (AVALIDE) 150-12.5 MG tablet, Take 2 tablets by mouth Daily., Disp: 180 tablet, Rfl: 3    metroNIDAZOLE (METROGEL) 1 % gel, Apply  topically to the appropriate area as directed Daily., Disp: 55 g, Rfl: 5    zolpidem CR (Ambien CR) 12.5 MG CR tablet, Take 1 tablet by mouth At Night As Needed for Sleep., Disp: 30 tablet, Rfl: 2     Objective     History of Present Illness Iain is here for an annual exam.  He has hypertension, prediabetes, obstructive sleep apnea, family history of colon cancer.  He generally feels well except for complaints of hand arthritis.  He monitors his blood pressure reports stable readings.  He is physically active working as a .  He complains of insomnia.  He only sleeps 3 hours a night.  He has obstructive sleep apnea and is intolerant of CPAP and has questions about inspire.  He has had negative Cologuard's in  the past and has not scheduled a screening colonoscopy but plans to.    Review of Systems   Constitutional:  Negative for activity change and unexpected weight change.   Eyes:         Needs exam   Respiratory: Negative.     Cardiovascular: Negative.    Gastrointestinal: Negative.    Genitourinary: Negative.    Musculoskeletal:         As in hpi   Skin: Negative.    Neurological: Negative.    Psychiatric/Behavioral: Negative.         Physical Exam  Vitals reviewed.   Constitutional:       Appearance: Normal appearance.   Neck:      Vascular: No carotid bruit.   Cardiovascular:      Rate and Rhythm: Normal rate and regular rhythm.      Heart sounds: Normal heart sounds.   Pulmonary:      Effort: Pulmonary effort is normal.      Breath sounds: Normal breath sounds.   Musculoskeletal:      Right lower leg: No edema.      Left lower leg: No edema.   Neurological:      Mental Status: He is alert.   Psychiatric:         Mood and Affect: Mood normal.         Behavior: Behavior normal.         Thought Content: Thought content normal.         Judgment: Judgment normal.         Assessment blood pressure is well-controlled.  Discussed healthy heart diet and exercise per AHA guidelines.  Will check labs today.  Discussed insomnia.  Will try zolpidem CR to use on a intermittent basis.  Discussed appropriate immunizations.  Will refer to ENT for evaluation of tinnitus and also to discuss inspire.  ASSESSMENT    Problems Addressed this Visit          Cardiac and Vasculature    Hypertension - Primary    Relevant Medications    irbesartan-hydrochlorothiazide (AVALIDE) 150-12.5 MG tablet    Other Relevant Orders    Comprehensive Metabolic Panel    CBC & Differential    TSH    Hyperlipidemia    Relevant Orders    Lipid Panel With LDL / HDL Ratio       ENT    Tinnitus       Sleep    Sleep apnea     Other Visit Diagnoses       Prostate cancer screening        Relevant Orders    PSA Screen    Encounter for hepatitis C screening test for  low risk patient        Relevant Orders    Hepatitis C antibody    Prediabetes        Relevant Orders    Hemoglobin A1c          Diagnoses         Codes Comments    Primary hypertension    -  Primary ICD-10-CM: I10  ICD-9-CM: 401.9     Hyperlipidemia, unspecified hyperlipidemia type     ICD-10-CM: E78.5  ICD-9-CM: 272.4     Sleep apnea, unspecified type     ICD-10-CM: G47.30  ICD-9-CM: 780.57     Tinnitus of both ears     ICD-10-CM: H93.13  ICD-9-CM: 388.30     Prostate cancer screening     ICD-10-CM: Z12.5  ICD-9-CM: V76.44     Encounter for hepatitis C screening test for low risk patient     ICD-10-CM: Z11.59  ICD-9-CM: V73.89     Prediabetes     ICD-10-CM: R73.03  ICD-9-CM: 790.29             PLAN  There are no Patient Instructions on file for this visit.  Return in about 1 year (around 1/15/2025) for Annual physical.

## 2024-01-16 LAB
ALBUMIN SERPL-MCNC: 4.6 G/DL (ref 3.8–4.9)
ALBUMIN/GLOB SERPL: 1.5 {RATIO} (ref 1.2–2.2)
ALP SERPL-CCNC: 107 IU/L (ref 44–121)
ALT SERPL-CCNC: 286 IU/L (ref 0–44)
AST SERPL-CCNC: 157 IU/L (ref 0–40)
BASOPHILS # BLD AUTO: 0 X10E3/UL (ref 0–0.2)
BASOPHILS NFR BLD AUTO: 1 %
BILIRUB SERPL-MCNC: 1.5 MG/DL (ref 0–1.2)
BUN SERPL-MCNC: 24 MG/DL (ref 8–27)
BUN/CREAT SERPL: 24 (ref 10–24)
CALCIUM SERPL-MCNC: 9.7 MG/DL (ref 8.6–10.2)
CHLORIDE SERPL-SCNC: 107 MMOL/L (ref 96–106)
CHOLEST SERPL-MCNC: 133 MG/DL (ref 100–199)
CO2 SERPL-SCNC: 21 MMOL/L (ref 20–29)
CREAT SERPL-MCNC: 1 MG/DL (ref 0.76–1.27)
EGFRCR SERPLBLD CKD-EPI 2021: 86 ML/MIN/1.73
EOSINOPHIL # BLD AUTO: 0.3 X10E3/UL (ref 0–0.4)
EOSINOPHIL NFR BLD AUTO: 5 %
ERYTHROCYTE [DISTWIDTH] IN BLOOD BY AUTOMATED COUNT: 12 % (ref 11.6–15.4)
GLOBULIN SER CALC-MCNC: 3 G/DL (ref 1.5–4.5)
GLUCOSE SERPL-MCNC: 129 MG/DL (ref 70–99)
HBA1C MFR BLD: 6.6 % (ref 4.8–5.6)
HCT VFR BLD AUTO: 47.2 % (ref 37.5–51)
HCV IGG SERPL QL IA: REACTIVE
HDLC SERPL-MCNC: 45 MG/DL
HGB BLD-MCNC: 16.8 G/DL (ref 13–17.7)
IMM GRANULOCYTES # BLD AUTO: 0 X10E3/UL (ref 0–0.1)
IMM GRANULOCYTES NFR BLD AUTO: 0 %
LDLC SERPL CALC-MCNC: 68 MG/DL (ref 0–99)
LDLC/HDLC SERPL: 1.5 RATIO (ref 0–3.6)
LYMPHOCYTES # BLD AUTO: 1.8 X10E3/UL (ref 0.7–3.1)
LYMPHOCYTES NFR BLD AUTO: 31 %
MCH RBC QN AUTO: 31.5 PG (ref 26.6–33)
MCHC RBC AUTO-ENTMCNC: 35.6 G/DL (ref 31.5–35.7)
MCV RBC AUTO: 89 FL (ref 79–97)
MONOCYTES # BLD AUTO: 0.4 X10E3/UL (ref 0.1–0.9)
MONOCYTES NFR BLD AUTO: 7 %
NEUTROPHILS # BLD AUTO: 3.3 X10E3/UL (ref 1.4–7)
NEUTROPHILS NFR BLD AUTO: 56 %
PLATELET # BLD AUTO: 188 X10E3/UL (ref 150–450)
POTASSIUM SERPL-SCNC: 4 MMOL/L (ref 3.5–5.2)
PROT SERPL-MCNC: 7.6 G/DL (ref 6–8.5)
PSA SERPL-MCNC: 1.3 NG/ML (ref 0–4)
RBC # BLD AUTO: 5.33 X10E6/UL (ref 4.14–5.8)
SODIUM SERPL-SCNC: 144 MMOL/L (ref 134–144)
TRIGL SERPL-MCNC: 111 MG/DL (ref 0–149)
TSH SERPL DL<=0.005 MIU/L-ACNC: 3.58 UIU/ML (ref 0.45–4.5)
VLDLC SERPL CALC-MCNC: 20 MG/DL (ref 5–40)
WBC # BLD AUTO: 6 X10E3/UL (ref 3.4–10.8)

## 2024-01-23 ENCOUNTER — TELEPHONE (OUTPATIENT)
Dept: FAMILY MEDICINE CLINIC | Facility: CLINIC | Age: 59
End: 2024-01-23

## 2024-01-23 DIAGNOSIS — R76.8 POSITIVE HEPATITIS C ANTIBODY TEST: Primary | ICD-10-CM

## 2024-01-26 DIAGNOSIS — R73.09 ELEVATED HEMOGLOBIN A1C: ICD-10-CM

## 2024-01-26 DIAGNOSIS — E78.5 HYPERLIPIDEMIA, UNSPECIFIED HYPERLIPIDEMIA TYPE: ICD-10-CM

## 2024-01-26 DIAGNOSIS — I10 PRIMARY HYPERTENSION: Primary | ICD-10-CM

## 2024-02-01 LAB
HCV GENTYP SERPL NAA+PROBE: NORMAL
HCV GENTYP SERPL NAA+PROBE: NORMAL
HCV RNA SERPL NAA+PROBE-ACNC: NORMAL IU/ML
HCV RNA SERPL NAA+PROBE-LOG IU: 6.88 LOG10 IU/ML
LABORATORY COMMENT REPORT: NORMAL
LABORATORY COMMENT REPORT: NORMAL

## 2024-02-05 ENCOUNTER — TELEPHONE (OUTPATIENT)
Dept: FAMILY MEDICINE CLINIC | Facility: CLINIC | Age: 59
End: 2024-02-05

## 2024-02-05 DIAGNOSIS — B18.2 CHRONIC HEPATITIS C WITHOUT HEPATIC COMA: Primary | ICD-10-CM

## 2024-02-05 NOTE — TELEPHONE ENCOUNTER
Discussed lab results.  Will need treatment for hepatitis C.  Discussed with Dr. Leavitt who will see him in this office for further evaluation and treatment.

## 2024-02-09 ENCOUNTER — TELEPHONE (OUTPATIENT)
Dept: FAMILY MEDICINE CLINIC | Facility: CLINIC | Age: 59
End: 2024-02-09

## 2024-02-09 NOTE — TELEPHONE ENCOUNTER
I called pt and got him scheduled for his lab appointment and a 30 min office visit with Dr. Leavitt.

## 2024-02-09 NOTE — TELEPHONE ENCOUNTER
Caller: Iain Christina    Relationship: Self    Best call back number: [phone]*    What is the best time to reach you: ANYTIME    Who are you requesting to speak with (clinical staff, provider,  specific staff member): REESE    What was the call regarding: PATIENT CALLING STATING THAT DR. KELLEY ADVISED HIM THAT SOMEONE FROM THE PRACTICE WOULD BE CALLING HIM TO SCHEDULE AN APPOINTMENT WITH DR. LUCHO SANDOVAL, REGARDING THE HEPATITIS DIAGNOSIS. THE PATIENT STATES THAT NO ONE HAS CONTACTED HIM, AND REQUEST A CALL BACK .    Is it okay if the provider responds through MyChart: EITHER

## 2024-03-06 ENCOUNTER — OFFICE VISIT (OUTPATIENT)
Dept: FAMILY MEDICINE CLINIC | Facility: CLINIC | Age: 59
End: 2024-03-06

## 2024-03-06 VITALS
WEIGHT: 225.4 LBS | HEART RATE: 81 BPM | RESPIRATION RATE: 16 BRPM | BODY MASS INDEX: 31.56 KG/M2 | OXYGEN SATURATION: 97 % | SYSTOLIC BLOOD PRESSURE: 118 MMHG | DIASTOLIC BLOOD PRESSURE: 70 MMHG | HEIGHT: 71 IN

## 2024-03-06 DIAGNOSIS — B18.2 CHRONIC HEPATITIS C WITHOUT HEPATIC COMA: Primary | ICD-10-CM

## 2024-03-06 PROBLEM — R74.8 ELEVATED LIVER ENZYMES: Status: RESOLVED | Noted: 2020-01-06 | Resolved: 2024-03-06

## 2024-03-06 PROCEDURE — 99214 OFFICE O/P EST MOD 30 MIN: CPT | Performed by: INTERNAL MEDICINE

## 2024-03-06 NOTE — PROGRESS NOTES
Jayden Leavitt MD  Internal Medicine  [phone] (office)             03/06/2024    Patient Information  Iain Christina                                                                                          [id number]  [place] [address]  1965        Chief Complaint   Patient presents with    Hepatitis C          History of Present Illness:    Iain Christina is a 58 y.o. male who presents to the office to discuss recently diagnosed HCV. Patient denies IVDU and reports he's decreased alcohol use since diagnosis. He is without complaint today.       Health Maintenance Due   Topic Date Due    BMI FOLLOWUP  12/13/2022    COLORECTAL CANCER SCREENING  02/25/2023    COVID-19 Vaccine (3 - 2023-24 season) 10/27/2023    Hepatitis B (1 of 3 - Risk 3-dose series) Never done    RSV Vaccine - Adults (1 - 1-dose 60+ series) Never done        Allergies   Allergen Reactions    Peanut (Diagnostic) Anaphylaxis           Current Outpatient Medications:     irbesartan-hydrochlorothiazide (AVALIDE) 150-12.5 MG tablet, Take 2 tablets by mouth Daily., Disp: 180 tablet, Rfl: 3    metroNIDAZOLE (METROGEL) 1 % gel, Apply  topically to the appropriate area as directed Daily., Disp: 55 g, Rfl: 5    zolpidem CR (Ambien CR) 12.5 MG CR tablet, Take 1 tablet by mouth At Night As Needed for Sleep., Disp: 30 tablet, Rfl: 2      Social History     Socioeconomic History    Marital status: Unknown   Tobacco Use    Smoking status: Never    Smokeless tobacco: Never   Vaping Use    Vaping status: Never Used   Substance and Sexual Activity    Alcohol use: Yes     Alcohol/week: 4.0 standard drinks of alcohol     Types: 2 Cans of beer, 2 Shots of liquor per week    Drug use: Never    Sexual activity: Defer         Vitals:    03/06/24 1104   BP: 118/70   BP Location: Right arm   Patient Position: Sitting   Cuff Size: Adult   Pulse: 81   Resp: 16   SpO2: 97%   Weight: 102 kg (225 lb 6.4 oz)   Height: 180.3 cm  "(70.98\")      Wt Readings from Last 3 Encounters:   03/06/24 102 kg (225 lb 6.4 oz)   01/15/24 103 kg (226 lb)   01/13/23 103 kg (226 lb)     Body mass index is 31.45 kg/m².      Physical Exam  Vitals reviewed.   Constitutional:       Appearance: Normal appearance. He is not ill-appearing.   Eyes:      General: No scleral icterus.  Skin:     Coloration: Skin is not jaundiced.      Comments: No palmar erythema, no petechiae, no spider telangiectasias, no caput medusa   Neurological:      Mental Status: He is alert and oriented to person, place, and time.      Comments: Speech and thought content normal, no asterixis            Lab/other results:  Common labs          4/10/2023    09:18 1/15/2024    10:31 1/15/2024    10:36   Common Labs   Glucose 137  129     BUN 18  24     Creatinine 0.93  1.00     Sodium 141  144     Potassium 4.0  4.0     Chloride 105  107     Calcium 10.0  9.7     Total Protein 7.4  7.6     Albumin 4.5  4.6     Total Bilirubin 1.3  1.5     Alkaline Phosphatase 118  107     AST (SGOT) 160  157     ALT (SGPT) 254  286     WBC  6.0     Hemoglobin  16.8     Hematocrit  47.2     Platelets  188     Total Cholesterol  133     Triglycerides  111     HDL Cholesterol  45     LDL Cholesterol   68     Hemoglobin A1C 6.10   6.6    PSA  1.3       HCV Ab (1/15/24) - reactive  HCV RNA PCR (1/30/24) - 7,590,00  HIV-1/O/2 Ag/Ab (2/28/24) nonreactive  HAV Ab total - negative  HBsAb NR  HBsAg negative  HBcAb negative  Fibrosure F4A3    Assessment/Plan:    Diagnoses and all orders for this visit:    1. Chronic hepatitis C without hepatic coma (Primary)    Genotype 1a treatment-naive HIV-negative HCV patient with compensated cirrhosis. Discussed diagnosis and treatment plan in detail. Patient will discontinue alcohol use. I advised him to get HAV and HBV immunizations. Will contact [facility] specialty pharmacy to assist to medication procurement/assistance. Plans for Epclusa x 12 weeks or Mavyret x 8 weeks. No " questions at the end of the encounter.

## 2024-03-21 ENCOUNTER — TELEPHONE (OUTPATIENT)
Dept: FAMILY MEDICINE CLINIC | Facility: CLINIC | Age: 59
End: 2024-03-21

## 2024-03-21 NOTE — TELEPHONE ENCOUNTER
Caller: Iain Christina    Relationship: Self    Best call back number:     [phone] (Mobile)       What is the best time to reach you: ANY     Who are you requesting to speak with (clinical staff, provider,  specific staff member): CLINICAL     Do you know the name of the person who called: N/A     What was the call regarding: PATIENT STATES DR. SANDOVAL WAS SUPPOSED TO SEND SOMETHING TO THE PHARMACY FOR HIM FOR HEPATITIS AND HE HASN'T HEARD BACK     Is it okay if the provider responds through MyChart: NO

## 2024-03-27 NOTE — TELEPHONE ENCOUNTER
Caller: Iain Christina    Relationship: Self  Best call back number: [phone]     What is the best time to reach you: ANYTIME    Who are you requesting to speak with (clinical staff, provider,  specific staff member): CLINICAL STAFF  HE WOULD PREFER TO SPEAK TO DR SANTI KELLEY.    What was the call regarding: PATIENT STATS THAT HE NEVER GOT DIRECTION OF WHAT TO DO ABOUT HIS HEPATIS DIAGNOSIS.    SOMEONE WAS TO CALL HIM BACK BUT HE HAS NOT RECEIVED ANY CALLS.    HE REQUEST A RETURN CALL ASA.      THANK YOU

## 2024-03-28 DIAGNOSIS — B18.2 CHRONIC HEPATITIS C WITHOUT HEPATIC COMA: Primary | ICD-10-CM

## 2024-04-24 ENCOUNTER — TELEPHONE (OUTPATIENT)
Dept: FAMILY MEDICINE CLINIC | Facility: CLINIC | Age: 59
End: 2024-04-24

## 2024-04-24 NOTE — TELEPHONE ENCOUNTER
PATIENT CALLED IN REGARDS TO HEP-C MEDICATION.    HE HAS NOT HEARD ANYTHING FROM THE PHARMACY OR FROM DORI SAMAYOA     PLEASE CALL AND ADVISE [phone]

## 2024-04-26 ENCOUNTER — TELEPHONE (OUTPATIENT)
Dept: FAMILY MEDICINE CLINIC | Facility: CLINIC | Age: 59
End: 2024-04-26

## 2024-04-26 DIAGNOSIS — B18.2 CHRONIC HEPATITIS C WITHOUT HEPATIC COMA: Primary | ICD-10-CM

## 2024-04-26 NOTE — TELEPHONE ENCOUNTER
TERRY AND DR KELLEY,    SPOKE TO DORI AT [facility] INFECTIOUS DISEASE AND THEY WON'T SEE PT BECAUSE OF PT'S DIAGNOSIS OF CIRRHOSIS AND THAT PT NEEDS TO BE REFERRED TO [facility]PAT[facility]. PLEASE HAVE DR KELLEY PUT IN A NEW REFERRAL FOR HEPATOLOGY.    THANKS

## 2024-05-01 ENCOUNTER — TELEPHONE (OUTPATIENT)
Dept: FAMILY MEDICINE CLINIC | Facility: CLINIC | Age: 59
End: 2024-05-01

## 2024-05-01 NOTE — TELEPHONE ENCOUNTER
"Patient reports doing much better today, able to move more without as much pain. Tolerating diet no nausea no vomiting. Ostomy with small amount of flatus, no stool this am. RN encouraged patient to ambulate in hallway following meals, ambulated entire hallway with standby assistance. Pain controlled on PO medication. Midline with staples EFREN. Perineal incision with sutures open to air, spandi and dry fluff. MD orders reviewed, all questions answered. BP (!) 94/57   Pulse 79   Temp 37.2 °C (99 °F) (Temporal)   Resp 16   Ht 1.6 m (5' 3\")   Wt 52 kg (114 lb 10.2 oz)   SpO2 95%   BMI 20.31 kg/m²     " "Relay     \"Called to advise patient Dr. Medina is out of the office for this week . Patient needs to come in an see one of the other providers to get urine test for possible UTI. Only provider available today is Dr. Robertson \"                  "

## 2024-05-01 NOTE — TELEPHONE ENCOUNTER
Caller: Iain Christina     Relationship: SELF    Best call back number: [phone] (Mobile)     What is your medical concern? UTI    How long has this issue been going on? ABOUT 3 DAYS     Is your provider already aware of this issue? NO     Have you been treated for this issue? NO

## 2024-05-01 NOTE — TELEPHONE ENCOUNTER
Name: Iain Christina    Relationship: Self    Best Callback Number: [phone]     HUB PROVIDED THE RELAY MESSAGE FROM THE OFFICE   PATIENT HAS FURTHER QUESTIONS AND WOULD LIKE A CALL BACK AT THE FOLLOWING PHONE NUMBER      ADDITIONAL INFORMATION: RELAYED MESSAGE TO PATIENT AND HE STATED HE IS OVER 2 HOURS AWAY AND COULD NOT MAKE AN APPT TODAY.  PLEASE CALL HIM BACK AND LET HIM KNOW WHAT HE NEEDS TO DO.

## 2024-05-01 NOTE — TELEPHONE ENCOUNTER
"Relay     \"We are not able to treat UTI's via telephone. Patient must be seen. If patient is 2 hours away, please advise to seek care at a local urgent care closer to his location. \"                  "

## 2024-06-15 ENCOUNTER — HOSPITAL ENCOUNTER (INPATIENT)
Facility: HOSPITAL | Age: 59
LOS: 9 days | Discharge: HOME | DRG: 820 | End: 2024-06-24
Attending: INTERNAL MEDICINE | Admitting: INTERNAL MEDICINE
Payer: COMMERCIAL

## 2024-06-15 ENCOUNTER — APPOINTMENT (OUTPATIENT)
Dept: MRI IMAGING | Facility: HOSPITAL | Age: 59
DRG: 820 | End: 2024-06-15
Payer: COMMERCIAL

## 2024-06-15 ENCOUNTER — APPOINTMENT (OUTPATIENT)
Dept: CT IMAGING | Facility: HOSPITAL | Age: 59
End: 2024-06-15
Payer: COMMERCIAL

## 2024-06-15 ENCOUNTER — HOSPITAL ENCOUNTER (EMERGENCY)
Facility: HOSPITAL | Age: 59
Discharge: OTHER INSTITUTION | End: 2024-06-15
Attending: EMERGENCY MEDICINE
Payer: COMMERCIAL

## 2024-06-15 VITALS
BODY MASS INDEX: 31.5 KG/M2 | OXYGEN SATURATION: 98 % | TEMPERATURE: 98 F | HEIGHT: 70 IN | DIASTOLIC BLOOD PRESSURE: 98 MMHG | SYSTOLIC BLOOD PRESSURE: 159 MMHG | WEIGHT: 220 LBS | RESPIRATION RATE: 18 BRPM | HEART RATE: 77 BPM

## 2024-06-15 DIAGNOSIS — I87.8 POOR VENOUS ACCESS: ICD-10-CM

## 2024-06-15 DIAGNOSIS — C85.89 PRIMARY CNS LYMPHOMA: ICD-10-CM

## 2024-06-15 DIAGNOSIS — G93.89 BRAIN MASS: Primary | ICD-10-CM

## 2024-06-15 DIAGNOSIS — D49.9 NEOPLASM CAUSING MASS EFFECT AND BRAIN COMPRESSION ON ADJACENT STRUCTURES: ICD-10-CM

## 2024-06-15 DIAGNOSIS — D49.6 BRAIN TUMOR: Primary | ICD-10-CM

## 2024-06-15 DIAGNOSIS — G93.5 NEOPLASM CAUSING MASS EFFECT AND BRAIN COMPRESSION ON ADJACENT STRUCTURES: ICD-10-CM

## 2024-06-15 LAB
ALBUMIN SERPL-MCNC: 4.2 G/DL (ref 3.5–5.2)
ALBUMIN/GLOB SERPL: 1.3 G/DL
ALP SERPL-CCNC: 149 U/L (ref 39–117)
ALT SERPL W P-5'-P-CCNC: 210 U/L (ref 1–41)
AMMONIA BLD-SCNC: 29 UMOL/L (ref 16–60)
AMPHET+METHAMPHET UR QL: NEGATIVE
AMPHETAMINES UR QL: NEGATIVE
ANION GAP SERPL CALCULATED.3IONS-SCNC: 12.7 MMOL/L (ref 5–15)
APAP SERPL-MCNC: <5 MCG/ML (ref 0–30)
APTT PPP: 26.4 SECONDS (ref 24.3–38.1)
AST SERPL-CCNC: 103 U/L (ref 1–40)
BARBITURATES UR QL SCN: NEGATIVE
BASOPHILS # BLD AUTO: 0.05 10*3/MM3 (ref 0–0.2)
BASOPHILS NFR BLD AUTO: 0.8 % (ref 0–1.5)
BENZODIAZ UR QL SCN: NEGATIVE
BILIRUB SERPL-MCNC: 1 MG/DL (ref 0–1.2)
BILIRUB UR QL STRIP: NEGATIVE
BUN SERPL-MCNC: 19 MG/DL (ref 8–23)
BUN/CREAT SERPL: 24.1 (ref 7–25)
BUPRENORPHINE SERPL-MCNC: NEGATIVE NG/ML
CALCIUM SPEC-SCNC: 9.4 MG/DL (ref 8.6–10.5)
CANNABINOIDS SERPL QL: POSITIVE
CHLORIDE SERPL-SCNC: 108 MMOL/L (ref 98–107)
CK SERPL-CCNC: 118 U/L (ref 20–200)
CLARITY UR: CLEAR
CO2 SERPL-SCNC: 24.3 MMOL/L (ref 22–29)
COCAINE UR QL: NEGATIVE
COLOR UR: YELLOW
CREAT SERPL-MCNC: 0.79 MG/DL (ref 0.76–1.27)
D-LACTATE SERPL-SCNC: 1.5 MMOL/L (ref 0.5–2)
DEPRECATED RDW RBC AUTO: 40.6 FL (ref 37–54)
EGFRCR SERPLBLD CKD-EPI 2021: 101.7 ML/MIN/1.73
EOSINOPHIL # BLD AUTO: 0.35 10*3/MM3 (ref 0–0.4)
EOSINOPHIL NFR BLD AUTO: 5.4 % (ref 0.3–6.2)
ERYTHROCYTE [DISTWIDTH] IN BLOOD BY AUTOMATED COUNT: 12.5 % (ref 12.3–15.4)
ETHANOL BLD-MCNC: <10 MG/DL (ref 0–10)
ETHANOL UR QL: <0.01 %
GLOBULIN UR ELPH-MCNC: 3.2 GM/DL
GLUCOSE SERPL-MCNC: 161 MG/DL (ref 65–99)
GLUCOSE UR STRIP-MCNC: NEGATIVE MG/DL
HCT VFR BLD AUTO: 45.1 % (ref 37.5–51)
HGB BLD-MCNC: 15.4 G/DL (ref 13–17.7)
HGB UR QL STRIP.AUTO: NEGATIVE
IMM GRANULOCYTES # BLD AUTO: 0.02 10*3/MM3 (ref 0–0.05)
IMM GRANULOCYTES NFR BLD AUTO: 0.3 % (ref 0–0.5)
INR PPP: 0.97 (ref 0.9–1.1)
KETONES UR QL STRIP: NEGATIVE
LEUKOCYTE ESTERASE UR QL STRIP.AUTO: NEGATIVE
LYMPHOCYTES # BLD AUTO: 2.04 10*3/MM3 (ref 0.7–3.1)
LYMPHOCYTES NFR BLD AUTO: 31.2 % (ref 19.6–45.3)
MAGNESIUM SERPL-MCNC: 2.2 MG/DL (ref 1.6–2.4)
MCH RBC QN AUTO: 30.3 PG (ref 26.6–33)
MCHC RBC AUTO-ENTMCNC: 34.1 G/DL (ref 31.5–35.7)
MCV RBC AUTO: 88.6 FL (ref 79–97)
METHADONE UR QL SCN: NEGATIVE
MONOCYTES # BLD AUTO: 0.49 10*3/MM3 (ref 0.1–0.9)
MONOCYTES NFR BLD AUTO: 7.5 % (ref 5–12)
NEUTROPHILS NFR BLD AUTO: 3.58 10*3/MM3 (ref 1.7–7)
NEUTROPHILS NFR BLD AUTO: 54.8 % (ref 42.7–76)
NITRITE UR QL STRIP: NEGATIVE
NRBC BLD AUTO-RTO: 0 /100 WBC (ref 0–0.2)
OPIATES UR QL: NEGATIVE
OXYCODONE UR QL SCN: NEGATIVE
PCP UR QL SCN: NEGATIVE
PH UR STRIP.AUTO: 6 [PH] (ref 4.5–8)
PLATELET # BLD AUTO: 148 10*3/MM3 (ref 140–450)
PMV BLD AUTO: 11.7 FL (ref 6–12)
POTASSIUM SERPL-SCNC: 3.9 MMOL/L (ref 3.5–5.2)
PROT SERPL-MCNC: 7.4 G/DL (ref 6–8.5)
PROT UR QL STRIP: ABNORMAL
PROTHROMBIN TIME: 12.9 SECONDS (ref 12.1–15)
RBC # BLD AUTO: 5.09 10*6/MM3 (ref 4.14–5.8)
SALICYLATES SERPL-MCNC: <0.5 MG/DL
SODIUM SERPL-SCNC: 145 MMOL/L (ref 136–145)
SP GR UR STRIP: 1.03 (ref 1–1.03)
TRICYCLICS UR QL SCN: NEGATIVE
TSH SERPL DL<=0.05 MIU/L-ACNC: 2.57 UIU/ML (ref 0.27–4.2)
UROBILINOGEN UR QL STRIP: ABNORMAL
WBC NRBC COR # BLD AUTO: 6.53 10*3/MM3 (ref 3.4–10.8)

## 2024-06-15 PROCEDURE — 80306 DRUG TEST PRSMV INSTRMNT: CPT | Performed by: EMERGENCY MEDICINE

## 2024-06-15 PROCEDURE — 83605 ASSAY OF LACTIC ACID: CPT | Performed by: EMERGENCY MEDICINE

## 2024-06-15 PROCEDURE — 80143 DRUG ASSAY ACETAMINOPHEN: CPT | Performed by: EMERGENCY MEDICINE

## 2024-06-15 PROCEDURE — A9577 INJ MULTIHANCE: HCPCS | Performed by: INTERNAL MEDICINE

## 2024-06-15 PROCEDURE — 85610 PROTHROMBIN TIME: CPT | Performed by: EMERGENCY MEDICINE

## 2024-06-15 PROCEDURE — 70450 CT HEAD/BRAIN W/O DYE: CPT

## 2024-06-15 PROCEDURE — 25010000002 THIAMINE HCL 200 MG/2ML SOLUTION: Performed by: INTERNAL MEDICINE

## 2024-06-15 PROCEDURE — 85025 COMPLETE CBC W/AUTO DIFF WBC: CPT | Performed by: EMERGENCY MEDICINE

## 2024-06-15 PROCEDURE — 82550 ASSAY OF CK (CPK): CPT | Performed by: EMERGENCY MEDICINE

## 2024-06-15 PROCEDURE — 90715 TDAP VACCINE 7 YRS/> IM: CPT | Performed by: EMERGENCY MEDICINE

## 2024-06-15 PROCEDURE — 82077 ASSAY SPEC XCP UR&BREATH IA: CPT | Performed by: EMERGENCY MEDICINE

## 2024-06-15 PROCEDURE — 0 GADOBENATE DIMEGLUMINE 529 MG/ML SOLUTION: Performed by: INTERNAL MEDICINE

## 2024-06-15 PROCEDURE — 80053 COMPREHEN METABOLIC PANEL: CPT | Performed by: EMERGENCY MEDICINE

## 2024-06-15 PROCEDURE — 70553 MRI BRAIN STEM W/O & W/DYE: CPT

## 2024-06-15 PROCEDURE — 81003 URINALYSIS AUTO W/O SCOPE: CPT | Performed by: EMERGENCY MEDICINE

## 2024-06-15 PROCEDURE — 25010000002 LEVETRIRACETAM PER 10 MG: Performed by: INTERNAL MEDICINE

## 2024-06-15 PROCEDURE — 96374 THER/PROPH/DIAG INJ IV PUSH: CPT

## 2024-06-15 PROCEDURE — 25010000002 TETANUS-DIPHTH-ACELL PERTUSSIS 5-2.5-18.5 LF-MCG/0.5 SUSPENSION PREFILLED SYRINGE: Performed by: EMERGENCY MEDICINE

## 2024-06-15 PROCEDURE — 82140 ASSAY OF AMMONIA: CPT | Performed by: EMERGENCY MEDICINE

## 2024-06-15 PROCEDURE — 25810000003 SODIUM CHLORIDE 0.9 % SOLUTION: Performed by: EMERGENCY MEDICINE

## 2024-06-15 PROCEDURE — 25010000002 DEXAMETHASONE PER 1 MG: Performed by: EMERGENCY MEDICINE

## 2024-06-15 PROCEDURE — 85730 THROMBOPLASTIN TIME PARTIAL: CPT | Performed by: EMERGENCY MEDICINE

## 2024-06-15 PROCEDURE — 99285 EMERGENCY DEPT VISIT HI MDM: CPT

## 2024-06-15 PROCEDURE — 90471 IMMUNIZATION ADMIN: CPT | Performed by: EMERGENCY MEDICINE

## 2024-06-15 PROCEDURE — 84443 ASSAY THYROID STIM HORMONE: CPT | Performed by: EMERGENCY MEDICINE

## 2024-06-15 PROCEDURE — 83735 ASSAY OF MAGNESIUM: CPT | Performed by: EMERGENCY MEDICINE

## 2024-06-15 PROCEDURE — 80179 DRUG ASSAY SALICYLATE: CPT | Performed by: EMERGENCY MEDICINE

## 2024-06-15 PROCEDURE — 25010000002 DEXAMETHASONE PER 1 MG: Performed by: INTERNAL MEDICINE

## 2024-06-15 RX ORDER — ONDANSETRON 4 MG/1
4 TABLET, ORALLY DISINTEGRATING ORAL EVERY 6 HOURS PRN
Status: DISCONTINUED | OUTPATIENT
Start: 2024-06-15 | End: 2024-06-19 | Stop reason: SDUPTHER

## 2024-06-15 RX ORDER — HYDROCHLOROTHIAZIDE 12.5 MG/1
12.5 TABLET ORAL
Status: DISCONTINUED | OUTPATIENT
Start: 2024-06-16 | End: 2024-06-24 | Stop reason: HOSPADM

## 2024-06-15 RX ORDER — AMOXICILLIN 250 MG
2 CAPSULE ORAL 2 TIMES DAILY PRN
Status: DISCONTINUED | OUTPATIENT
Start: 2024-06-15 | End: 2024-06-19 | Stop reason: SDUPTHER

## 2024-06-15 RX ORDER — ACETAMINOPHEN 325 MG/1
650 TABLET ORAL EVERY 4 HOURS PRN
Status: DISCONTINUED | OUTPATIENT
Start: 2024-06-15 | End: 2024-06-19 | Stop reason: SDUPTHER

## 2024-06-15 RX ORDER — DEXAMETHASONE SODIUM PHOSPHATE 4 MG/ML
4 INJECTION, SOLUTION INTRA-ARTICULAR; INTRALESIONAL; INTRAMUSCULAR; INTRAVENOUS; SOFT TISSUE ONCE
Status: COMPLETED | OUTPATIENT
Start: 2024-06-15 | End: 2024-06-15

## 2024-06-15 RX ORDER — ZOLPIDEM TARTRATE 5 MG/1
10 TABLET ORAL NIGHTLY PRN
Status: DISCONTINUED | OUTPATIENT
Start: 2024-06-15 | End: 2024-06-19

## 2024-06-15 RX ORDER — SODIUM CHLORIDE 0.9 % (FLUSH) 0.9 %
10 SYRINGE (ML) INJECTION AS NEEDED
Status: DISCONTINUED | OUTPATIENT
Start: 2024-06-15 | End: 2024-06-24 | Stop reason: HOSPADM

## 2024-06-15 RX ORDER — POLYETHYLENE GLYCOL 3350 17 G/17G
17 POWDER, FOR SOLUTION ORAL DAILY PRN
Status: DISCONTINUED | OUTPATIENT
Start: 2024-06-15 | End: 2024-06-19 | Stop reason: SDUPTHER

## 2024-06-15 RX ORDER — LEVETIRACETAM 500 MG/5ML
500 INJECTION, SOLUTION, CONCENTRATE INTRAVENOUS EVERY 12 HOURS SCHEDULED
Status: DISCONTINUED | OUTPATIENT
Start: 2024-06-15 | End: 2024-06-21

## 2024-06-15 RX ORDER — ZOLPIDEM TARTRATE 5 MG/1
5 TABLET ORAL NIGHTLY PRN
Status: DISCONTINUED | OUTPATIENT
Start: 2024-06-15 | End: 2024-06-15

## 2024-06-15 RX ORDER — ONDANSETRON 2 MG/ML
4 INJECTION INTRAMUSCULAR; INTRAVENOUS EVERY 6 HOURS PRN
Status: DISCONTINUED | OUTPATIENT
Start: 2024-06-15 | End: 2024-06-19 | Stop reason: SDUPTHER

## 2024-06-15 RX ORDER — THIAMINE HYDROCHLORIDE 100 MG/ML
100 INJECTION, SOLUTION INTRAMUSCULAR; INTRAVENOUS DAILY
Status: DISPENSED | OUTPATIENT
Start: 2024-06-15 | End: 2024-06-20

## 2024-06-15 RX ORDER — LOSARTAN POTASSIUM 100 MG/1
100 TABLET ORAL
Status: DISCONTINUED | OUTPATIENT
Start: 2024-06-16 | End: 2024-06-24 | Stop reason: HOSPADM

## 2024-06-15 RX ORDER — SODIUM CHLORIDE 0.9 % (FLUSH) 0.9 %
10 SYRINGE (ML) INJECTION EVERY 12 HOURS SCHEDULED
Status: DISCONTINUED | OUTPATIENT
Start: 2024-06-15 | End: 2024-06-24 | Stop reason: HOSPADM

## 2024-06-15 RX ORDER — HYDRALAZINE HYDROCHLORIDE 25 MG/1
25 TABLET, FILM COATED ORAL EVERY 6 HOURS PRN
Status: DISCONTINUED | OUTPATIENT
Start: 2024-06-15 | End: 2024-06-17

## 2024-06-15 RX ORDER — SODIUM CHLORIDE 9 MG/ML
40 INJECTION, SOLUTION INTRAVENOUS AS NEEDED
Status: DISCONTINUED | OUTPATIENT
Start: 2024-06-15 | End: 2024-06-24 | Stop reason: HOSPADM

## 2024-06-15 RX ORDER — ACETAMINOPHEN 160 MG/5ML
650 SOLUTION ORAL EVERY 4 HOURS PRN
Status: DISCONTINUED | OUTPATIENT
Start: 2024-06-15 | End: 2024-06-17

## 2024-06-15 RX ORDER — DEXAMETHASONE SODIUM PHOSPHATE 4 MG/ML
4 INJECTION, SOLUTION INTRA-ARTICULAR; INTRALESIONAL; INTRAMUSCULAR; INTRAVENOUS; SOFT TISSUE EVERY 6 HOURS
Status: DISCONTINUED | OUTPATIENT
Start: 2024-06-15 | End: 2024-06-21

## 2024-06-15 RX ORDER — BISACODYL 10 MG
10 SUPPOSITORY, RECTAL RECTAL DAILY PRN
Status: DISCONTINUED | OUTPATIENT
Start: 2024-06-15 | End: 2024-06-19 | Stop reason: SDUPTHER

## 2024-06-15 RX ORDER — LORAZEPAM 1 MG/1
1 TABLET ORAL ONCE AS NEEDED
Status: COMPLETED | OUTPATIENT
Start: 2024-06-15 | End: 2024-06-15

## 2024-06-15 RX ORDER — BISACODYL 5 MG/1
5 TABLET, DELAYED RELEASE ORAL DAILY PRN
Status: DISCONTINUED | OUTPATIENT
Start: 2024-06-15 | End: 2024-06-19 | Stop reason: SDUPTHER

## 2024-06-15 RX ORDER — ACETAMINOPHEN 650 MG/1
650 SUPPOSITORY RECTAL EVERY 4 HOURS PRN
Status: DISCONTINUED | OUTPATIENT
Start: 2024-06-15 | End: 2024-06-17

## 2024-06-15 RX ADMIN — DEXAMETHASONE SODIUM PHOSPHATE 4 MG: 4 INJECTION, SOLUTION INTRA-ARTICULAR; INTRALESIONAL; INTRAMUSCULAR; INTRAVENOUS; SOFT TISSUE at 20:46

## 2024-06-15 RX ADMIN — GADOBENATE DIMEGLUMINE 20 ML: 529 INJECTION, SOLUTION INTRAVENOUS at 21:45

## 2024-06-15 RX ADMIN — THIAMINE HYDROCHLORIDE 100 MG: 100 INJECTION, SOLUTION INTRAMUSCULAR; INTRAVENOUS at 20:46

## 2024-06-15 RX ADMIN — SODIUM CHLORIDE 1000 ML: 9 INJECTION, SOLUTION INTRAVENOUS at 10:34

## 2024-06-15 RX ADMIN — LORAZEPAM 1 MG: 1 TABLET ORAL at 20:48

## 2024-06-15 RX ADMIN — DEXAMETHASONE SODIUM PHOSPHATE 4 MG: 4 INJECTION INTRA-ARTICULAR; INTRALESIONAL; INTRAMUSCULAR; INTRAVENOUS; SOFT TISSUE at 12:47

## 2024-06-15 RX ADMIN — TETANUS TOXOID, REDUCED DIPHTHERIA TOXOID AND ACELLULAR PERTUSSIS VACCINE, ADSORBED 0.5 ML: 5; 2.5; 8; 8; 2.5 SUSPENSION INTRAMUSCULAR at 11:09

## 2024-06-15 RX ADMIN — DEXAMETHASONE SODIUM PHOSPHATE 4 MG: 4 INJECTION, SOLUTION INTRA-ARTICULAR; INTRALESIONAL; INTRAMUSCULAR; INTRAVENOUS; SOFT TISSUE at 17:39

## 2024-06-15 RX ADMIN — Medication 10 ML: at 20:48

## 2024-06-15 RX ADMIN — LEVETIRACETAM 500 MG: 500 INJECTION, SOLUTION INTRAVENOUS at 18:05

## 2024-06-15 NOTE — ED PROVIDER NOTES
Subjective   History of Present Illness  58-year-old male presents emergency room complaining of several complaints.  Family brought patient to the emergency room with concern for confusion and memory loss as well as abrasions and lacerations.  The story is that at some point last night patient fell.  Patient states it was in a garage that had construction equipment in it.  Patient does not know what time it was that this happened and he honestly does not have any memory of it happening he just thinks that that is what happened due to the wounds he had.  Patient is relatively unconcerned about his memory loss.  Patient's family member who came with him states that they have noticed similar behavior for the last weeks to months and that they are concerned.  Patient states that he does not drink but did drink heavily until January of this year when he was given a diagnosis of hepatitis C.  Patient denies drug use.      Review of Systems   Constitutional:  Negative for activity change, appetite change, chills, diaphoresis, fatigue and fever.   HENT:  Negative for congestion, sinus pressure, sneezing and sore throat.    Eyes:  Negative for photophobia and visual disturbance.   Respiratory:  Negative for cough and shortness of breath.    Cardiovascular:  Negative for chest pain, palpitations and leg swelling.   Gastrointestinal:  Negative for abdominal distention, abdominal pain, diarrhea, nausea and vomiting.   Genitourinary:  Negative for dysuria and flank pain.   Musculoskeletal:  Negative for arthralgias, back pain and myalgias.   Skin:  Positive for wound. Negative for rash.   Neurological:  Negative for dizziness, weakness and headaches.   Psychiatric/Behavioral:  Positive for confusion. Negative for behavioral problems.        Past Medical History:   Diagnosis Date    Hepatitis C     Hyperthyroidism     Sleep apnea        Allergies   Allergen Reactions    Peanut (Diagnostic) Anaphylaxis       Past Surgical  History:   Procedure Laterality Date    NASAL SEPTAL RECONSTRUCTION         Family History   Problem Relation Age of Onset    COPD Mother     Cancer Sister     No Known Problems Brother        Social History     Socioeconomic History    Marital status: Unknown   Tobacco Use    Smoking status: Never    Smokeless tobacco: Never   Vaping Use    Vaping status: Never Used   Substance and Sexual Activity    Alcohol use: Yes     Alcohol/week: 4.0 standard drinks of alcohol     Types: 2 Cans of beer, 2 Shots of liquor per week    Drug use: Never    Sexual activity: Defer           Objective   Physical Exam  Vitals and nursing note reviewed.   Constitutional:       General: He is not in acute distress.     Appearance: Normal appearance. He is not toxic-appearing or diaphoretic.      Comments: 58-year-old male who is in no acute distress.  Patient is pleasant but seems relatively unconcerned about his memory loss or other medical problems.   HENT:      Head: Normocephalic and atraumatic.      Mouth/Throat:      Mouth: Mucous membranes are moist.   Eyes:      Conjunctiva/sclera: Conjunctivae normal.   Cardiovascular:      Rate and Rhythm: Normal rate and regular rhythm.      Pulses: Normal pulses.   Pulmonary:      Effort: Pulmonary effort is normal. No respiratory distress.      Breath sounds: Normal breath sounds. No wheezing or rales.   Abdominal:      General: Abdomen is flat. There is no distension.      Tenderness: There is no abdominal tenderness.   Musculoskeletal:         General: Signs of injury present. No swelling. Normal range of motion.      Cervical back: Normal range of motion and neck supple.      Comments: Patient has a foreign superficial laceration of his left thigh as well as a small abrasion to his right calf and shin.  No active bleeding and remainder of lower extremity exam is normal   Skin:     General: Skin is warm and dry.      Findings: No rash.   Neurological:      General: No focal deficit  present.      Mental Status: He is alert and oriented to person, place, and time.      Cranial Nerves: No cranial nerve deficit.      Motor: No weakness.   Psychiatric:         Mood and Affect: Mood normal.         Behavior: Behavior normal.         Procedures           ED Course  ED Course as of 06/15/24 1234   Sat Tucker 15, 2024   1030 Patient's wounds need to be cleaned and dressed but do not need sutures.  Patient needs Tdap as his tetanus status is unknown.  Will proceed with altered mental status workup.  I feel that patient most likely has a metabolic disorder possibly secondary to his hepatitis or possibly alcohol or drug use. []   1135 CT head without contrast:  IMPRESSION:     1. Large mass centered within the right frontal lobe with associated vasogenic edema and mass effect including right to left midline shift as described above. No obvious acute hemorrhage is noted.     Findings discussed with emergency physician, Dr. Recinos at the time of interpretation           Electronically Signed: Pb Wilhelm MD    6/15/2024 11:08 AM EDT    Workstation ID: [id number]   []   1206 Spoke with neurosurgery Dr. Young who after hearing patient's history physical and radiologic findings has asked that patient be admitted to the hospital service and they will consult for brain tumor. []   1232 Spoke with Dr. Mckeon hospitalist at [facility] who accepted patient for transfer to their service. []      ED Course User Index  [] Ran Recinos MD                                             Medical Decision Making  My differential diagnosis for altered mental status includes but is not limited to:  Hypoglycemia, hyperglycemia, DKA, overdose, ethanol intoxication, thiamine deficiency, niacin deficiency, hypothymia, hyperviscosity, Donal's disease, hyponatremia, hypernatremia, liver failure, kidney failure, hyper or hypothyroid, no insufficiency, hypoxia, hypercarbia, carbon monoxide poisoning, postanoxic  encephalopathy, ischemic stroke, intracranial bleed, subarachnoid hemorrhage, brain tumor, closed head injury, epidural hematoma, epidural hematoma, seizure activity, postictal state, syncopal episode, disseminated encephalomyelitis, central pontine myelinolysis, post cardiac arrest, bacterial meningitis, viral meningitis, fungal meningitis, encephalitis, brain abscess, subdural empyema, hysteria, catatonic state, malingering, hypertensive encephalopathy, vasculitis, TTP, DIC         Final diagnoses:   Brain tumor       ED Disposition  ED Disposition       ED Disposition   Transfer to Another Facility     Condition   --    Comment   --               No follow-up provider specified.       Medication List      No changes were made to your prescriptions during this visit.            Ran Recinos MD  06/15/24 5590

## 2024-06-15 NOTE — H&P
Patient Name:  Iain Christina  YOB: 1965  MRN:  [id number]  Admit Date:  6/15/2024  Patient Care Team:  Scott Medina MD as PCP - General (Internal Medicine)      Subjective   History Present Illness     No chief complaint on file.      Mr. Christina is a 58 y.o. with a history of HCV, HTN, MARCIANO, previous alcohol use though stopped drinking 6 months ago. He presents with a few weeks of worsening memory loss as well as a fall. He came to [facility] ER and had brain mass on CT. Given IV steroids.       History of Present Illness  Review of Systems   Constitutional: Negative.  Positive for activity change. Negative for fever.   HENT: Negative.     Eyes: Negative.    Respiratory: Negative.     Cardiovascular: Negative.    Gastrointestinal: Negative.    Endocrine: Negative.    Genitourinary: Negative.    Musculoskeletal: Negative.    Skin: Negative.    Allergic/Immunologic: Negative.    Neurological: Negative.  Positive for weakness. Negative for seizures.   Hematological: Negative.    Psychiatric/Behavioral:  Positive for confusion and sleep disturbance.         Personal History     Past Medical History:   Diagnosis Date    Hepatitis C     Hyperthyroidism     Sleep apnea      Past Surgical History:   Procedure Laterality Date    NASAL SEPTAL RECONSTRUCTION       Family History   Problem Relation Age of Onset    COPD Mother     Cancer Sister     No Known Problems Brother      Social History     Tobacco Use    Smoking status: Never    Smokeless tobacco: Never   Vaping Use    Vaping status: Never Used   Substance Use Topics    Alcohol use: Not Currently     Comment: STATED HE QUIT IN JANUARY    Drug use: Never     Medications Prior to Admission   Medication Sig Dispense Refill Last Dose    irbesartan-hydrochlorothiazide (AVALIDE) 150-12.5 MG tablet Take 2 tablets by mouth Daily. 180 tablet 3 6/14/2024    zolpidem CR (Ambien CR) 12.5 MG CR tablet Take 1 tablet by mouth At Night As Needed  for Sleep. 30 tablet 2 6/14/2024     Allergies:    Allergies   Allergen Reactions    Peanut (Diagnostic) Anaphylaxis       Objective    Objective     Vital Signs  Temp:  [97.7 °F (36.5 °C)-98.4 °F (36.9 °C)] 97.7 °F (36.5 °C)  Heart Rate:  [69-87] 70  Resp:  [18] 18  BP: (137-162)/() 156/92  SpO2:  [97 %-99 %] 99 %  on   ;   Device (Oxygen Therapy): room air  There is no height or weight on file to calculate BMI.    Physical Exam  Vitals and nursing note reviewed.   Constitutional:       General: He is not in acute distress.     Appearance: He is well-developed. He is not diaphoretic.   HENT:      Head: Normocephalic and atraumatic.   Eyes:      General: No scleral icterus.     Conjunctiva/sclera: Conjunctivae normal.   Neck:      Vascular: No JVD.   Cardiovascular:      Rate and Rhythm: Normal rate and regular rhythm.      Heart sounds: Normal heart sounds. No murmur heard.  Pulmonary:      Effort: Pulmonary effort is normal. No respiratory distress.      Breath sounds: Normal breath sounds.   Abdominal:      General: Bowel sounds are normal.      Palpations: Abdomen is soft.      Tenderness: There is no abdominal tenderness.   Musculoskeletal:         General: Normal range of motion.      Cervical back: Normal range of motion and neck supple.   Skin:     General: Skin is warm and dry.   Neurological:      Mental Status: He is alert and oriented to person, place, and time.      Cranial Nerves: No cranial nerve deficit.      Motor: No abnormal muscle tone.   Psychiatric:         Behavior: Behavior normal.         Thought Content: Thought content normal.         Results Review:  I reviewed the patient's new clinical results.  Discussed with ED provider.    Lab Results (last 24 hours)       Procedure Component Value Units Date/Time    CBC & Differential [[id number]]  (Normal) Collected: 06/15/24 1033    Specimen: Blood Updated: 06/15/24 1049    Narrative:      The following orders were created for panel order CBC  & Differential.  Procedure                               Abnormality         Status                     ---------                               -----------         ------                     CBC Auto Differential[[id number]]        Normal              Final result                 Please view results for these tests on the individual orders.    Comprehensive Metabolic Panel [[id number]]  (Abnormal) Collected: 06/15/24 1033    Specimen: Blood Updated: 06/15/24 1122     Glucose 161 mg/dL      BUN 19 mg/dL      Creatinine 0.79 mg/dL      Sodium 145 mmol/L      Potassium 3.9 mmol/L      Chloride 108 mmol/L      CO2 24.3 mmol/L      Calcium 9.4 mg/dL      Total Protein 7.4 g/dL      Albumin 4.2 g/dL      ALT (SGPT) 210 U/L      AST (SGOT) 103 U/L      Alkaline Phosphatase 149 U/L      Total Bilirubin 1.0 mg/dL      Globulin 3.2 gm/dL      A/G Ratio 1.3 g/dL      BUN/Creatinine Ratio 24.1     Anion Gap 12.7 mmol/L      eGFR 101.7 mL/min/1.73     Narrative:      GFR Normal >60  Chronic Kidney Disease <60  Kidney Failure <15      Protime-INR [[id number]]  (Normal) Collected: 06/15/24 1033    Specimen: Blood Updated: 06/15/24 1100     Protime 12.9 Seconds      INR 0.97    Narrative:      Therapeutic Ranges for INR: 2.0-3.0 (PT 20-30)                              2.5-3.5 (PT 25-34)    aPTT [[id number]]  (Normal) Collected: 06/15/24 1033    Specimen: Blood Updated: 06/15/24 1100     PTT 26.4 seconds     Narrative:      PTT = The equivalent PTT values for the therapeutic range of heparin levels at 0.1 to 0.7 U/ml are 53 to 110 seconds.      Lactic Acid, Plasma [[id number]]  (Normal) Collected: 06/15/24 1033    Specimen: Blood Updated: 06/15/24 1107     Lactate 1.5 mmol/L     Ammonia [[id number]]  (Normal) Collected: 06/15/24 1033    Specimen: Blood Updated: 06/15/24 1115     Ammonia 29 umol/L     CK [[id number]]  (Normal) Collected: 06/15/24 1033    Specimen: Blood Updated: 06/15/24 1109     Creatine Kinase 118 U/L     Acetaminophen  Level [[id number]]  (Normal) Collected: 06/15/24 1033    Specimen: Blood Updated: 06/15/24 1151     Acetaminophen <5.0 mcg/mL     Ethanol [[id number]] Collected: 06/15/24 1033    Specimen: Blood Updated: 06/15/24 1137     Ethanol <10 mg/dL      Ethanol % <0.010 %     Salicylate Level [[id number]]  (Normal) Collected: 06/15/24 1033    Specimen: Blood Updated: 06/15/24 1137     Salicylate <0.5 mg/dL     Magnesium [[id number]]  (Normal) Collected: 06/15/24 1033    Specimen: Blood Updated: 06/15/24 1122     Magnesium 2.2 mg/dL     TSH [[id number]]  (Normal) Collected: 06/15/24 1033    Specimen: Blood Updated: 06/15/24 1132     TSH 2.570 uIU/mL     CBC Auto Differential [[id number]]  (Normal) Collected: 06/15/24 1033    Specimen: Blood Updated: 06/15/24 1049     WBC 6.53 10*3/mm3      RBC 5.09 10*6/mm3      Hemoglobin 15.4 g/dL      Hematocrit 45.1 %      MCV 88.6 fL      MCH 30.3 pg      MCHC 34.1 g/dL      RDW 12.5 %      RDW-SD 40.6 fl      MPV 11.7 fL      Platelets 148 10*3/mm3      Neutrophil % 54.8 %      Lymphocyte % 31.2 %      Monocyte % 7.5 %      Eosinophil % 5.4 %      Basophil % 0.8 %      Immature Grans % 0.3 %      Neutrophils, Absolute 3.58 10*3/mm3      Lymphocytes, Absolute 2.04 10*3/mm3      Monocytes, Absolute 0.49 10*3/mm3      Eosinophils, Absolute 0.35 10*3/mm3      Basophils, Absolute 0.05 10*3/mm3      Immature Grans, Absolute 0.02 10*3/mm3      nRBC 0.0 /100 WBC     Urinalysis With Microscopic If Indicated (No Culture) - Urine, Clean Catch [[id number]]  (Abnormal) Collected: 06/15/24 1041    Specimen: Urine, Clean Catch Updated: 06/15/24 1058     Color, UA Yellow     Appearance, UA Clear     pH, UA 6.0     Specific [place], UA 1.026     Comment: Result obtained by Refractometer        Glucose, UA Negative     Ketones, UA Negative     Bilirubin, UA Negative     Blood, UA Negative     Protein, UA Trace     Leuk Esterase, UA Negative     Nitrite, UA Negative     Urobilinogen, UA 0.2 E.U./dL    Narrative:       Urine microscopic not indicated.    Urine Drug Screen - Urine, Clean Catch [[id number]]  (Abnormal) Collected: 06/15/24 1041    Specimen: Urine, Clean Catch Updated: 06/15/24 1113     THC, Screen, Urine Positive     Phencyclidine (PCP), Urine Negative     Cocaine Screen, Urine Negative     Methamphetamine, Ur Negative     Opiate Screen Negative     Amphetamine Screen, Urine Negative     Benzodiazepine Screen, Urine Negative     Tricyclic Antidepressants Screen Negative     Methadone Screen, Urine Negative     Barbiturates Screen, Urine Negative     Oxycodone Screen, Urine Negative     Buprenorphine, Screen, Urine Negative    Narrative:      Cutoff For Drugs Screened:    Amphetamines               500 ng/ml  Barbiturates               200 ng/ml  Benzodiazepines            150 ng/ml  Cocaine                    150 ng/ml  Methadone                  200 ng/ml  Opiates                    100 ng/ml  Phencyclidine               25 ng/ml  THC                         50 ng/ml  Methamphetamine            500 ng/ml  Tricyclic Antidepressants  300 ng/ml  Oxycodone                  100 ng/ml  Buprenorphine               10 ng/ml    The normal value for all drugs tested is negative. This report includes unconfirmed screening results, with the cutoff values listed, to be used for medical treatment purposes only.  Unconfirmed results must not be used for non-medical purposes such as employment or legal testing.  Clinical consideration should be applied to any drug of abuse test, particularly when unconfirmed results are used.              Imaging Results (Last 24 Hours)       ** No results found for the last 24 hours. 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 Christina is a 58 y.o. with a history of HCV, HTN, MARCIANO, previous alcohol use though stopped drinking 6 months ago. He presents with a few weeks of worsening memory loss as well as a fall. He came to [facility] ER and had brain mass on CT. Given IV steroids.     IV dexamethasone as well as start keppra for seizure proph. Will obtain MRI brain as well as CT C/A/P.   Previous alcohol use but not current. Will have on thiamine with memory loss though likely it is related to the brain mass  TAVON consultaiton  Agents for HTN  Monitor LFTs in AM  I discussed the patients findings and my recommendations with patient, family, and nursing staff.    VTE Prophylaxis - SCDs.      Ezekiel Mckeon MD  [facility]  06/15/24  17:58 EDT

## 2024-06-15 NOTE — ED NOTES
"Patient family now at bedside with patient. The patient family reports that they have noticed increasing confusion and memory loss in the patient for months. They state at some point last night the patient fell. The patient now states that he was in a garage with construction equipment in it when he fell vs when the patient initially stated on arrival that he was outside in the dark on a construction site when he fell. Patient cannot tell us what time this was other than it was dark out and does not provide any other details. Patient does not appear concerned that he cannot remember or provide any details from last night, however patient family member at bedside states the family is growing more concerned due to the increasing memory loss over the last few months. The patient states he was recently dx with hepatis C due to his \"drinking\" and stopped drinking alcohol \"heavily\" in January.   "

## 2024-06-15 NOTE — ED NOTES
Nursing report ED to floor  Iain Christina  58 y.o.  male    HPI :   Chief Complaint   Patient presents with    Laceration    Abrasion       Admitting doctor:   No admitting provider for patient encounter.    Admitting diagnosis:   The encounter diagnosis was Brain tumor.    Code status:   Current Code Status       Date Active Code Status Order ID Comments User Context       Not on file            Allergies:   Peanut (diagnostic)    Isolation:   No active isolations    Intake and Output  No intake or output data in the 24 hours ending 06/15/24 1334    Weight:       06/15/24  0930   Weight: 99.8 kg (220 lb)       Most recent vitals:   Vitals:    06/15/24 1108 06/15/24 1200 06/15/24 1300 06/15/24 1330   BP: 147/84 151/97 (!) 162/102 159/97   BP Location:       Patient Position:       Pulse: 71 69 72 77   Resp: 18 18 18 18   Temp:       TempSrc:       SpO2: 97% 98% 98% 98%   Weight:       Height:           Active LDAs/IV Access:   Lines, Drains & Airways       Active LDAs       Name Placement date Placement time Site Days    Peripheral IV 06/15/24 1034 Right Antecubital 06/15/24  1034  Antecubital  less than 1                    Labs (abnormal labs have a star):   Labs Reviewed   COMPREHENSIVE METABOLIC PANEL - Abnormal; Notable for the following components:       Result Value    Glucose 161 (*)     Chloride 108 (*)     ALT (SGPT) 210 (*)     AST (SGOT) 103 (*)     Alkaline Phosphatase 149 (*)     All other components within normal limits    Narrative:     GFR Normal >60  Chronic Kidney Disease <60  Kidney Failure <15     URINALYSIS W/ MICROSCOPIC IF INDICATED (NO CULTURE) - Abnormal; Notable for the following components:    Protein, UA Trace (*)     All other components within normal limits    Narrative:     Urine microscopic not indicated.   URINE DRUG SCREEN - Abnormal; Notable for the following components:    THC, Screen, Urine Positive (*)     All other components within normal limits    Narrative:      Cutoff For Drugs Screened:    Amphetamines               500 ng/ml  Barbiturates               200 ng/ml  Benzodiazepines            150 ng/ml  Cocaine                    150 ng/ml  Methadone                  200 ng/ml  Opiates                    100 ng/ml  Phencyclidine               25 ng/ml  THC                         50 ng/ml  Methamphetamine            500 ng/ml  Tricyclic Antidepressants  300 ng/ml  Oxycodone                  100 ng/ml  Buprenorphine               10 ng/ml    The normal value for all drugs tested is negative. This report includes unconfirmed screening results, with the cutoff values listed, to be used for medical treatment purposes only.  Unconfirmed results must not be used for non-medical purposes such as employment or legal testing.  Clinical consideration should be applied to any drug of abuse test, particularly when unconfirmed results are used.     PROTIME-INR - Normal    Narrative:     Therapeutic Ranges for INR: 2.0-3.0 (PT 20-30)                              2.5-3.5 (PT 25-34)   APTT - Normal    Narrative:     PTT = The equivalent PTT values for the therapeutic range of heparin levels at 0.1 to 0.7 U/ml are 53 to 110 seconds.     LACTIC ACID, PLASMA - Normal   AMMONIA - Normal   CK - Normal   ACETAMINOPHEN LEVEL - Normal   SALICYLATE LEVEL - Normal   MAGNESIUM - Normal   TSH - Normal   CBC WITH AUTO DIFFERENTIAL - Normal   ETHANOL   CBC AND DIFFERENTIAL    Narrative:     The following orders were created for panel order CBC & Differential.  Procedure                               Abnormality         Status                     ---------                               -----------         ------                     CBC Auto Differential[[id number]]        Normal              Final result                 Please view results for these tests on the individual orders.       Results       Procedure Component Value Ref Range Date/Time    Acetaminophen Level [[id number]]  (Normal) Collected:  06/15/24 1033    Order Status: Completed Specimen: Blood Updated: 06/15/24 1151     Acetaminophen <5.0 0.0 - 30.0 mcg/mL     Salicylate Level [[id number]]  (Normal) Collected: 06/15/24 1033    Order Status: Completed Specimen: Blood Updated: 06/15/24 1137     Salicylate <0.5 <=30.0 mg/dL     Ethanol [[id number]] Collected: 06/15/24 1033    Order Status: Completed Specimen: Blood Updated: 06/15/24 1137     Ethanol <10 0 - 10 mg/dL      Ethanol % <0.010 %     TSH [[id number]]  (Normal) Collected: 06/15/24 1033    Order Status: Completed Specimen: Blood Updated: 06/15/24 1132     TSH 2.570 0.270 - 4.200 uIU/mL     Comprehensive Metabolic Panel [[id number]]  (Abnormal) Collected: 06/15/24 1033    Order Status: Completed Specimen: Blood Updated: 06/15/24 1122     Glucose 161 65 - 99 mg/dL      BUN 19 8 - 23 mg/dL      Creatinine 0.79 0.76 - 1.27 mg/dL      Sodium 145 136 - 145 mmol/L      Potassium 3.9 3.5 - 5.2 mmol/L      Chloride 108 98 - 107 mmol/L      CO2 24.3 22.0 - 29.0 mmol/L      Calcium 9.4 8.6 - 10.5 mg/dL      Total Protein 7.4 6.0 - 8.5 g/dL      Albumin 4.2 3.5 - 5.2 g/dL      ALT (SGPT) 210 1 - 41 U/L      AST (SGOT) 103 1 - 40 U/L      Alkaline Phosphatase 149 39 - 117 U/L      Total Bilirubin 1.0 0.0 - 1.2 mg/dL      Globulin 3.2 gm/dL      A/G Ratio 1.3 g/dL      BUN/Creatinine Ratio 24.1 7.0 - 25.0      Anion Gap 12.7 5.0 - 15.0 mmol/L      eGFR 101.7 >60.0 mL/min/1.73     Narrative:      GFR Normal >60  Chronic Kidney Disease <60  Kidney Failure <15      Magnesium [[id number]]  (Normal) Collected: 06/15/24 1033    Order Status: Completed Specimen: Blood Updated: 06/15/24 1122     Magnesium 2.2 1.6 - 2.4 mg/dL     Ammonia [[id number]]  (Normal) Collected: 06/15/24 1033    Order Status: Completed Specimen: Blood Updated: 06/15/24 1115     Ammonia 29 16 - 60 umol/L     Urine Drug Screen - Urine, Clean Catch [[id number]]  (Abnormal) Collected: 06/15/24 1041    Order Status: Completed Specimen: Urine, Clean  Catch Updated: 06/15/24 1113     THC, Screen, Urine Positive Negative      Phencyclidine (PCP), Urine Negative Negative      Cocaine Screen, Urine Negative Negative      Methamphetamine, Ur Negative Negative      Opiate Screen Negative Negative      Amphetamine Screen, Urine Negative Negative      Benzodiazepine Screen, Urine Negative Negative      Tricyclic Antidepressants Screen Negative Negative      Methadone Screen, Urine Negative Negative      Barbiturates Screen, Urine Negative Negative      Oxycodone Screen, Urine Negative Negative      Buprenorphine, Screen, Urine Negative Negative     Narrative:      Cutoff For Drugs Screened:    Amphetamines               500 ng/ml  Barbiturates               200 ng/ml  Benzodiazepines            150 ng/ml  Cocaine                    150 ng/ml  Methadone                  200 ng/ml  Opiates                    100 ng/ml  Phencyclidine               25 ng/ml  THC                         50 ng/ml  Methamphetamine            500 ng/ml  Tricyclic Antidepressants  300 ng/ml  Oxycodone                  100 ng/ml  Buprenorphine               10 ng/ml    The normal value for all drugs tested is negative. This report includes unconfirmed screening results, with the cutoff values listed, to be used for medical treatment purposes only.  Unconfirmed results must not be used for non-medical purposes such as employment or legal testing.  Clinical consideration should be applied to any drug of abuse test, particularly when unconfirmed results are used.      CK [[id number]]  (Normal) Collected: 06/15/24 1033    Order Status: Completed Specimen: Blood Updated: 06/15/24 1109     Creatine Kinase 118 20 - 200 U/L     Lactic Acid, Plasma [[id number]]  (Normal) Collected: 06/15/24 1033    Order Status: Completed Specimen: Blood Updated: 06/15/24 1107     Lactate 1.5 0.5 - 2.0 mmol/L     Protime-INR [[id number]]  (Normal) Collected: 06/15/24 1033    Order Status: Completed Specimen: Blood Updated:  06/15/24 1100     Protime 12.9 12.1 - 15.0 Seconds      INR 0.97 0.90 - 1.10     Narrative:      Therapeutic Ranges for INR: 2.0-3.0 (PT 20-30)                              2.5-3.5 (PT 25-34)    aPTT [[id number]]  (Normal) Collected: 06/15/24 1033    Order Status: Completed Specimen: Blood Updated: 06/15/24 1100     PTT 26.4 24.3 - 38.1 seconds     Narrative:      PTT = The equivalent PTT values for the therapeutic range of heparin levels at 0.1 to 0.7 U/ml are 53 to 110 seconds.      Urinalysis With Microscopic If Indicated (No Culture) - Urine, Clean Catch [[id number]]  (Abnormal) Collected: 06/15/24 1041    Order Status: Completed Specimen: Urine, Clean Catch Updated: 06/15/24 1058     Color, UA Yellow Yellow, Straw      Appearance, UA Clear Clear      pH, UA 6.0 4.5 - 8.0      Specific Gravity, UA 1.026 1.003 - 1.030      Comment: Result obtained by Refractometer        Glucose, UA Negative Negative      Ketones, UA Negative Negative      Bilirubin, UA Negative Negative      Blood, UA Negative Negative      Protein, UA Trace Negative      Leuk Esterase, UA Negative Negative      Nitrite, UA Negative Negative      Urobilinogen, UA 0.2 E.U./dL 0.2 - 1.0 E.U./dL     Narrative:      Urine microscopic not indicated.    CBC Auto Differential [[id number]]  (Normal) Collected: 06/15/24 1033    Order Status: Completed Specimen: Blood Updated: 06/15/24 1049     WBC 6.53 3.40 - 10.80 10*3/mm3      RBC 5.09 4.14 - 5.80 10*6/mm3      Hemoglobin 15.4 13.0 - 17.7 g/dL      Hematocrit 45.1 37.5 - 51.0 %      MCV 88.6 79.0 - 97.0 fL      MCH 30.3 26.6 - 33.0 pg      MCHC 34.1 31.5 - 35.7 g/dL      RDW 12.5 12.3 - 15.4 %      RDW-SD 40.6 37.0 - 54.0 fl      MPV 11.7 6.0 - 12.0 fL      Platelets 148 140 - 450 10*3/mm3      Neutrophil % 54.8 42.7 - 76.0 %      Lymphocyte % 31.2 19.6 - 45.3 %      Monocyte % 7.5 5.0 - 12.0 %      Eosinophil % 5.4 0.3 - 6.2 %      Basophil % 0.8 0.0 - 1.5 %      Immature Grans % 0.3 0.0 - 0.5 %       Neutrophils, Absolute 3.58 1.70 - 7.00 10*3/mm3      Lymphocytes, Absolute 2.04 0.70 - 3.10 10*3/mm3      Monocytes, Absolute 0.49 0.10 - 0.90 10*3/mm3      Eosinophils, Absolute 0.35 0.00 - 0.40 10*3/mm3      Basophils, Absolute 0.05 0.00 - 0.20 10*3/mm3      Immature Grans, Absolute 0.02 0.00 - 0.05 10*3/mm3      nRBC 0.0 0.0 - 0.2 /100 WBC              EKG:   No orders to display       Meds given in ED:   Medications   Tetanus-Diphth-Acell Pertussis (BOOSTRIX) injection 0.5 mL (0.5 mL Intramuscular Given 6/15/24 1109)   sodium chloride 0.9 % bolus 1,000 mL (0 mL Intravenous Stopped 6/15/24 1247)   dexAMETHasone (DECADRON) injection 4 mg (4 mg Intravenous Given 6/15/24 1247)       Imaging results:  CT Head Without Contrast    Result Date: 6/15/2024  1. Large mass centered within the right frontal lobe with associated vasogenic edema and mass effect including right to left midline shift as described above. No obvious acute hemorrhage is noted. Findings discussed with emergency physician, Dr. Recinos at the time of interpretation Electronically Signed: Pb Wilhelm MD  6/15/2024 11:08 AM EDT  Workstation ID: [id number]     Ambulatory status:   - up with assist X 1    Social issues:   Social History     Socioeconomic History    Marital status: Unknown   Tobacco Use    Smoking status: Never    Smokeless tobacco: Never   Vaping Use    Vaping status: Never Used   Substance and Sexual Activity    Alcohol use: Yes     Alcohol/week: 4.0 standard drinks of alcohol     Types: 2 Cans of beer, 2 Shots of liquor per week    Drug use: Never    Sexual activity: Defer       NIH Stroke Scale:         Ella Delatorre RN  06/15/24 13:34 EDT

## 2024-06-16 ENCOUNTER — APPOINTMENT (OUTPATIENT)
Dept: CT IMAGING | Facility: HOSPITAL | Age: 59
DRG: 820 | End: 2024-06-16
Payer: COMMERCIAL

## 2024-06-16 LAB
ALBUMIN SERPL-MCNC: 4 G/DL (ref 3.5–5.2)
ALP SERPL-CCNC: 109 U/L (ref 39–117)
ALT SERPL W P-5'-P-CCNC: 195 U/L (ref 1–41)
ANION GAP SERPL CALCULATED.3IONS-SCNC: 12.4 MMOL/L (ref 5–15)
AST SERPL-CCNC: 71 U/L (ref 1–40)
BASOPHILS # BLD AUTO: 0.01 10*3/MM3 (ref 0–0.2)
BASOPHILS NFR BLD AUTO: 0.1 % (ref 0–1.5)
BILIRUB CONJ SERPL-MCNC: 0.3 MG/DL (ref 0–0.3)
BILIRUB INDIRECT SERPL-MCNC: 0.9 MG/DL
BILIRUB SERPL-MCNC: 1.2 MG/DL (ref 0–1.2)
BUN SERPL-MCNC: 21 MG/DL (ref 8–23)
BUN/CREAT SERPL: 26.9 (ref 7–25)
CALCIUM SPEC-SCNC: 8.9 MG/DL (ref 8.6–10.5)
CHLORIDE SERPL-SCNC: 112 MMOL/L (ref 98–107)
CO2 SERPL-SCNC: 17.6 MMOL/L (ref 22–29)
CREAT SERPL-MCNC: 0.78 MG/DL (ref 0.76–1.27)
DEPRECATED RDW RBC AUTO: 40.3 FL (ref 37–54)
EGFRCR SERPLBLD CKD-EPI 2021: 102.1 ML/MIN/1.73
EOSINOPHIL # BLD AUTO: 0 10*3/MM3 (ref 0–0.4)
EOSINOPHIL NFR BLD AUTO: 0 % (ref 0.3–6.2)
ERYTHROCYTE [DISTWIDTH] IN BLOOD BY AUTOMATED COUNT: 12.2 % (ref 12.3–15.4)
GLUCOSE BLDC GLUCOMTR-MCNC: 272 MG/DL (ref 70–130)
GLUCOSE BLDC GLUCOMTR-MCNC: 309 MG/DL (ref 70–130)
GLUCOSE BLDC GLUCOMTR-MCNC: 357 MG/DL (ref 70–130)
GLUCOSE SERPL-MCNC: 255 MG/DL (ref 65–99)
HBA1C MFR BLD: 6.6 % (ref 4.8–5.6)
HCT VFR BLD AUTO: 46.2 % (ref 37.5–51)
HGB BLD-MCNC: 15.1 G/DL (ref 13–17.7)
IMM GRANULOCYTES # BLD AUTO: 0.03 10*3/MM3 (ref 0–0.05)
IMM GRANULOCYTES NFR BLD AUTO: 0.4 % (ref 0–0.5)
LYMPHOCYTES # BLD AUTO: 0.95 10*3/MM3 (ref 0.7–3.1)
LYMPHOCYTES NFR BLD AUTO: 11.6 % (ref 19.6–45.3)
MCH RBC QN AUTO: 29.1 PG (ref 26.6–33)
MCHC RBC AUTO-ENTMCNC: 32.7 G/DL (ref 31.5–35.7)
MCV RBC AUTO: 89 FL (ref 79–97)
MONOCYTES # BLD AUTO: 0.17 10*3/MM3 (ref 0.1–0.9)
MONOCYTES NFR BLD AUTO: 2.1 % (ref 5–12)
NEUTROPHILS NFR BLD AUTO: 7.04 10*3/MM3 (ref 1.7–7)
NEUTROPHILS NFR BLD AUTO: 85.8 % (ref 42.7–76)
NRBC BLD AUTO-RTO: 0 /100 WBC (ref 0–0.2)
PLATELET # BLD AUTO: 154 10*3/MM3 (ref 140–450)
PMV BLD AUTO: 12 FL (ref 6–12)
POTASSIUM SERPL-SCNC: 3.6 MMOL/L (ref 3.5–5.2)
PROT SERPL-MCNC: 6.8 G/DL (ref 6–8.5)
RBC # BLD AUTO: 5.19 10*6/MM3 (ref 4.14–5.8)
SODIUM SERPL-SCNC: 142 MMOL/L (ref 136–145)
VIT B12 BLD-MCNC: 504 PG/ML (ref 211–946)
WBC NRBC COR # BLD AUTO: 8.2 10*3/MM3 (ref 3.4–10.8)

## 2024-06-16 PROCEDURE — 85025 COMPLETE CBC W/AUTO DIFF WBC: CPT | Performed by: INTERNAL MEDICINE

## 2024-06-16 PROCEDURE — 71260 CT THORAX DX C+: CPT

## 2024-06-16 PROCEDURE — 82948 REAGENT STRIP/BLOOD GLUCOSE: CPT

## 2024-06-16 PROCEDURE — 82607 VITAMIN B-12: CPT | Performed by: INTERNAL MEDICINE

## 2024-06-16 PROCEDURE — 25010000002 THIAMINE HCL 200 MG/2ML SOLUTION: Performed by: INTERNAL MEDICINE

## 2024-06-16 PROCEDURE — 25010000002 LEVETRIRACETAM PER 10 MG: Performed by: INTERNAL MEDICINE

## 2024-06-16 PROCEDURE — 80048 BASIC METABOLIC PNL TOTAL CA: CPT | Performed by: INTERNAL MEDICINE

## 2024-06-16 PROCEDURE — 83036 HEMOGLOBIN GLYCOSYLATED A1C: CPT | Performed by: INTERNAL MEDICINE

## 2024-06-16 PROCEDURE — 80076 HEPATIC FUNCTION PANEL: CPT | Performed by: INTERNAL MEDICINE

## 2024-06-16 PROCEDURE — 25010000002 DEXAMETHASONE PER 1 MG: Performed by: INTERNAL MEDICINE

## 2024-06-16 PROCEDURE — 74178 CT ABD&PLV WO CNTR FLWD CNTR: CPT

## 2024-06-16 PROCEDURE — 97530 THERAPEUTIC ACTIVITIES: CPT

## 2024-06-16 PROCEDURE — 97162 PT EVAL MOD COMPLEX 30 MIN: CPT

## 2024-06-16 PROCEDURE — 99222 1ST HOSP IP/OBS MODERATE 55: CPT

## 2024-06-16 PROCEDURE — 25510000001 IOPAMIDOL 61 % SOLUTION: Performed by: INTERNAL MEDICINE

## 2024-06-16 PROCEDURE — 63710000001 INSULIN LISPRO (HUMAN) PER 5 UNITS: Performed by: INTERNAL MEDICINE

## 2024-06-16 RX ORDER — DEXTROSE MONOHYDRATE 25 G/50ML
25 INJECTION, SOLUTION INTRAVENOUS
Status: DISCONTINUED | OUTPATIENT
Start: 2024-06-16 | End: 2024-06-24 | Stop reason: HOSPADM

## 2024-06-16 RX ORDER — NICOTINE POLACRILEX 4 MG
15 LOZENGE BUCCAL
Status: DISCONTINUED | OUTPATIENT
Start: 2024-06-16 | End: 2024-06-24 | Stop reason: HOSPADM

## 2024-06-16 RX ORDER — SODIUM BICARBONATE 650 MG/1
650 TABLET ORAL 3 TIMES DAILY
Status: DISCONTINUED | OUTPATIENT
Start: 2024-06-16 | End: 2024-06-24 | Stop reason: HOSPADM

## 2024-06-16 RX ORDER — INSULIN LISPRO 100 [IU]/ML
2-9 INJECTION, SOLUTION INTRAVENOUS; SUBCUTANEOUS
Status: DISCONTINUED | OUTPATIENT
Start: 2024-06-16 | End: 2024-06-18

## 2024-06-16 RX ORDER — IBUPROFEN 600 MG/1
1 TABLET ORAL
Status: DISCONTINUED | OUTPATIENT
Start: 2024-06-16 | End: 2024-06-24 | Stop reason: HOSPADM

## 2024-06-16 RX ADMIN — SODIUM BICARBONATE 650 MG: 650 TABLET, ORALLY DISINTEGRATING ORAL at 18:02

## 2024-06-16 RX ADMIN — ZOLPIDEM TARTRATE 10 MG: 5 TABLET, FILM COATED ORAL at 20:16

## 2024-06-16 RX ADMIN — INSULIN LISPRO 6 UNITS: 100 INJECTION, SOLUTION INTRAVENOUS; SUBCUTANEOUS at 08:59

## 2024-06-16 RX ADMIN — IOPAMIDOL 90 ML: 612 INJECTION, SOLUTION INTRAVENOUS at 16:05

## 2024-06-16 RX ADMIN — HYDROCHLOROTHIAZIDE 12.5 MG: 12.5 TABLET ORAL at 08:59

## 2024-06-16 RX ADMIN — LEVETIRACETAM 500 MG: 500 INJECTION, SOLUTION INTRAVENOUS at 20:15

## 2024-06-16 RX ADMIN — SODIUM BICARBONATE 650 MG: 650 TABLET, ORALLY DISINTEGRATING ORAL at 20:16

## 2024-06-16 RX ADMIN — INSULIN LISPRO 8 UNITS: 100 INJECTION, SOLUTION INTRAVENOUS; SUBCUTANEOUS at 20:15

## 2024-06-16 RX ADMIN — DEXAMETHASONE SODIUM PHOSPHATE 4 MG: 4 INJECTION, SOLUTION INTRA-ARTICULAR; INTRALESIONAL; INTRAMUSCULAR; INTRAVENOUS; SOFT TISSUE at 20:16

## 2024-06-16 RX ADMIN — LEVETIRACETAM 500 MG: 500 INJECTION, SOLUTION INTRAVENOUS at 08:58

## 2024-06-16 RX ADMIN — Medication 10 ML: at 08:59

## 2024-06-16 RX ADMIN — DEXAMETHASONE SODIUM PHOSPHATE 4 MG: 4 INJECTION, SOLUTION INTRA-ARTICULAR; INTRALESIONAL; INTRAMUSCULAR; INTRAVENOUS; SOFT TISSUE at 04:59

## 2024-06-16 RX ADMIN — LOSARTAN POTASSIUM 100 MG: 100 TABLET, FILM COATED ORAL at 08:59

## 2024-06-16 RX ADMIN — DEXAMETHASONE SODIUM PHOSPHATE 4 MG: 4 INJECTION, SOLUTION INTRA-ARTICULAR; INTRALESIONAL; INTRAMUSCULAR; INTRAVENOUS; SOFT TISSUE at 08:58

## 2024-06-16 RX ADMIN — INSULIN LISPRO 6 UNITS: 100 INJECTION, SOLUTION INTRAVENOUS; SUBCUTANEOUS at 18:02

## 2024-06-16 RX ADMIN — THIAMINE HYDROCHLORIDE 100 MG: 100 INJECTION, SOLUTION INTRAMUSCULAR; INTRAVENOUS at 08:59

## 2024-06-16 RX ADMIN — DEXAMETHASONE SODIUM PHOSPHATE 4 MG: 4 INJECTION, SOLUTION INTRA-ARTICULAR; INTRALESIONAL; INTRAMUSCULAR; INTRAVENOUS; SOFT TISSUE at 18:02

## 2024-06-16 RX ADMIN — INSULIN LISPRO 7 UNITS: 100 INJECTION, SOLUTION INTRAVENOUS; SUBCUTANEOUS at 12:19

## 2024-06-16 RX ADMIN — Medication 10 ML: at 20:15

## 2024-06-16 NOTE — THERAPY EVALUATION
Patient Name: Iain hCristina  : 1965    MRN: [id number]                              Today's Date: 2024       Admit Date: 6/15/2024    Visit Dx: No diagnosis found.  Patient Active Problem List   Diagnosis    Hypertension    Rosacea    Atopic rhinitis    Hyperlipidemia    Sebaceous cyst    Tinnitus    Hyperthyroidism    Sleep apnea    Chronic hepatitis C without hepatic coma    Brain mass    Neoplasm causing mass effect and brain compression on adjacent structures     Past Medical History:   Diagnosis Date    Hepatitis C     Hyperthyroidism     Sleep apnea      Past Surgical History:   Procedure Laterality Date    NASAL SEPTAL RECONSTRUCTION        General Information       Row Name 24 1356          Physical Therapy Time and Intention    Document Type evaluation  -RS     Mode of Treatment individual therapy;physical therapy  -RS       Row Name 24 1352          General Information    Patient Profile Reviewed yes  -RS     Prior Level of Function independent:  -RS       Row Name 24 1354          Living Environment    People in Home alone  -RS       Row Name 24 1356          Home Main Entrance    Number of Stairs, Main Entrance other (see comments)  full flight to apartment  -RS     Stair Railings, Main Entrance railings safe and in good condition  -RS       Row Name 24 1354          Stairs Within Home, Primary    Number of Stairs, Within Home, Primary none  -RS       Row Name 24 1350          Cognition    Orientation Status (Cognition) oriented x 4  -RS               User Key  (r) = Recorded By, (t) = Taken By, (c) = Cosigned By      Initials Name Provider Type    RS Bina Bloom PT Physical Therapist                   Mobility       Row Name 24 1355          Bed Mobility    Bed Mobility bed mobility (all) activities  -RS     All Activities, [place] (Bed Mobility) standby assist  -RS       Row Name 24 0014          Sit-Stand Transfer     Sit-Stand [place] (Transfers) standby assist  -[facility] Name 06/16/24 1357          Gait/Stairs (Locomotion)    [place] Level (Gait) standby assist  -     Distance in Feet (Gait) 20  -RS     Comment, (Gait/Stairs) no AD, in room  -               User Key  (r) = Recorded By, (t) = Taken By, (c) = Cosigned By      Initials Name Provider Type     Bina Bloom, TERRY Physical Therapist                   Obj/Interventions       Row Name 06/16/24 [facility]          Range of Motion Comprehensive    General Range of Motion no range of motion deficits identified  -RS       Row Name 06/16/24 [facility]          Strength Comprehensive (MMT)    General Manual Muscle Testing (MMT) Assessment no strength deficits identified  -RS       Row Name 06/16/24 [facility]          Sensory Assessment (Somatosensory)    Sensory Assessment (Somatosensory) sensation intact  -               User Key  (r) = Recorded By, (t) = Taken By, (c) = Cosigned By      Initials Name Provider Type    RS Bina Bloom, PT Physical Therapist                   Goals/Plan    No documentation.                  Clinical Impression       Row Name 06/16/24 [facility]          Pain    Pretreatment Pain Rating 0/10 - no pain  -RS       Row Name 06/16/24 [facility]5          Plan of Care Review    Outcome Evaluation Pt is a 58 y.o. male admitted to [facility] with c/o fall and AMS on 6/15/2024. Work up reveals right frontal brain mass. Pt requires SBA for all functional mobility and demonstrates no LOB while ambulating in room without AD. At this time, pt demonstrates no acute PT needs as he Is ambulating in his room without AD without physical assist.   Anticipate pt will D/C to home with assist.  -RS       Row Name 06/16/24 135          Therapy Assessment/Plan (PT)    Criteria for Skilled Interventions Met (PT) no;no problems identified which require skilled intervention  -RS       Row Name 06/16/24 1352          Positioning and Restraints    Pre-Treatment Position in bed   -RS     Post Treatment Position bed  -RS     In Bed supine;call light within reach;encouraged to call for assist;exit alarm on  -RS               User Key  (r) = Recorded By, (t) = Taken By, (c) = Cosigned By      Initials Name Provider Type    Bina Weathers PT Physical Therapist                   Outcome Measures       Row Name 06/16/24 1356          How much help from another person do you currently need...    Turning from your back to your side while in flat bed without using bedrails? 4  -RS     Moving from lying on back to sitting on the side of a flat bed without bedrails? 4  -RS     Moving to and from a bed to a chair (including a wheelchair)? 4  -RS     Standing up from a chair using your arms (e.g., wheelchair, bedside chair)? 4  -RS     Climbing 3-5 steps with a railing? 4  -RS     To walk in hospital room? 4  -RS     AM-PAC 6 Clicks Score (PT) 24  -RS     Highest Level of Mobility Goal 8 --> Walked 250 feet or more  -RS       Row Name 06/16/24 1355          Functional Assessment    Outcome Measure Options AM-PAC 6 Clicks Basic Mobility (PT)  -RS               User Key  (r) = Recorded By, (t) = Taken By, (c) = Cosigned By      Initials Name Provider Type    Bina Weathers PT Physical Therapist                                 Physical Therapy Education       Title: PT OT SLP Therapies (Done)       Topic: Physical Therapy (Done)       Point: Mobility training (Done)       Learning Progress Summary             Patient Acceptance, E,D, VU by RS at 6/16/2024 1358                         Point: Home exercise program (Done)       Learning Progress Summary             Patient Acceptance, E,D, VU by RS at 6/16/2024 1358                         Point: Body mechanics (Done)       Learning Progress Summary             Patient Acceptance, E,D, VU by RS at 6/16/2024 1358                         Point: Precautions (Done)       Learning Progress Summary             Patient Acceptance, E,D, VU by RS at  6/16/2024 1358                                         User Key       Initials Effective Dates Name Provider Type Discipline     06/16/21 -  Bina Bloom, TERRY Physical Therapist PT                  PT Recommendation and Plan     Outcome Evaluation: Pt is a 58 y.o. male admitted to [facility] with c/o fall and AMS on 6/15/2024. Work up reveals right frontal brain mass. Pt requires SBA for all functional mobility and demonstrates no LOB while ambulating in room without AD. At this time, pt demonstrates no acute PT needs as he Is ambulating in his room without AD without physical assist.   Anticipate pt will D/C to home with assist.     Time Calculation:   PT Evaluation Complexity  History, PT Evaluation Complexity: 1-2 personal factors and/or comorbidities  Examination of Body Systems (PT Eval Complexity): total of 3 or more elements  Clinical Presentation (PT Evaluation Complexity): evolving  Clinical Decision Making (PT Evaluation Complexity): moderate complexity  Overall Complexity (PT Evaluation Complexity): moderate complexity     PT Charges       Row Name 06/16/24 1359             Time Calculation    Start Time 1017  -RS      Stop Time 1030  -RS      Time Calculation (min) 13 min  -RS      PT Received On 06/16/24  -RS         Time Calculation- PT    Total Timed Code Minutes- PT 8 minute(s)  -RS         Timed Charges    85618 - PT Therapeutic Activity Minutes 8  -RS         Untimed Charges    PT Eval/Re-eval Minutes 5  -RS         Total Minutes    Timed Charges Total Minutes 8  -RS      Untimed Charges Total Minutes 5  -RS       Total Minutes 13  -RS                User Key  (r) = Recorded By, (t) = Taken By, (c) = Cosigned By      Initials Name Provider Type    RS Bina Bloom, PT Physical Therapist                      PT G-Codes  Outcome Measure Options: AM-PAC 6 Clicks Basic Mobility (PT)  AM-PAC 6 Clicks Score (PT): 24  PT Discharge Summary  Anticipated Discharge Disposition (PT): home with  assist    Bina Bloom, PT  6/16/2024

## 2024-06-16 NOTE — PROGRESS NOTES
" LOS: 1 day     Name: Iain Christina  Age: 58 y.o.  Sex: male  :  1965  MRN: [id number]         Primary Care Physician: Scott Medina MD    Subjective   Subjective  Denies any headache or visual changes.  Has no complaints at present.  No acute overnight events reported by nursing staff    Objective   Vital Signs  Temp:  [97.5 °F (36.4 °C)-98.3 °F (36.8 °C)] 97.5 °F (36.4 °C)  Heart Rate:  [] 75  Resp:  [18] 18  BP: (129-162)/() 133/93  Body mass index is 31.51 kg/m².    Objective:  General Appearance:  Comfortable and in no acute distress.    Vital signs: (most recent): Blood pressure 133/93, pulse 75, temperature 97.5 °F (36.4 °C), temperature source Oral, resp. rate 18, height 177.8 cm (70\"), weight 99.6 kg (219 lb 9.3 oz), SpO2 96%.    Lungs:  Normal effort and normal respiratory rate.    Heart: Normal rate.  Regular rhythm.    Abdomen: Abdomen is soft.  Bowel sounds are normal.   There is no abdominal tenderness.     Extremities: There is no dependent edema or local swelling.    Neurological: Patient is alert and oriented to person, place and time.    Skin:  Warm and dry.                Results Review:       I reviewed the patient's new clinical results.    Results from last 7 days   Lab Units 24  0511 06/15/24  1033   WBC 10*3/mm3 8.20 6.53   HEMOGLOBIN g/dL 15.1 15.4   PLATELETS 10*3/mm3 154 148     Results from last 7 days   Lab Units 24  0511 06/15/24  1033   SODIUM mmol/L 142 145   POTASSIUM mmol/L 3.6 3.9   CHLORIDE mmol/L 112* 108*   CO2 mmol/L 17.6* 24.3   BUN mg/dL 21 19   CREATININE mg/dL 0.78 0.79   CALCIUM mg/dL 8.9 9.4   GLUCOSE mg/dL 255* 161*     Results from last 7 days   Lab Units 06/15/24  1033   INR  0.97             Scheduled Meds:   dexAMETHasone, 4 mg, Intravenous, Q6H  losartan, 100 mg, Oral, Q24H   And  hydroCHLOROthiazide, 12.5 mg, Oral, Q24H  insulin lispro, 2-9 Units, Subcutaneous, 4x Daily AC & at Bedtime  levETIRAcetam, 500 mg, " Intravenous, Q12H  sodium chloride, 10 mL, Intravenous, Q12H  thiamine (B-1) IV, 100 mg, Intravenous, Daily      PRN Meds:     acetaminophen **OR** acetaminophen **OR** acetaminophen    senna-docusate sodium **AND** polyethylene glycol **AND** bisacodyl **AND** bisacodyl    dextrose    dextrose    glucagon (human recombinant)    hydrALAZINE    ondansetron ODT **OR** ondansetron    sodium chloride    sodium chloride    zolpidem  Continuous Infusions:       Assessment & Plan   Active Hospital Problems    Diagnosis  POA    **Brain mass [G93.89]  Yes    Neoplasm causing mass effect and brain compression on adjacent structures [D49.9, G93.5]  Unknown    Chronic hepatitis C without hepatic coma [B18.2]  Yes    Sleep apnea [G47.30]  Yes    Hypertension [I10]  Yes      Resolved Hospital Problems   No resolved problems to display.       Assessment & Plan    -Brain MRI shows right frontal lobe mass and vasogenic edema with mass effect.  CT chest, abdomen, and pelvis has been ordered.  -Neurosurgery has been consulted  -Continue Decadron and Keppra  -Blood pressure reasonably controlled and will continue present antihypertensive regimen  -Acidosis noted on labs this morning we will start oral sodium bicarb  -Add sliding scale at a moderate dose to help temporize his steroid-induced hyperglycemia.  Check hemoglobin A1c        Expected discharge date/ time has not been documented.     Zana Juárez MD  [facility]  06/16/24  09:36 EDT

## 2024-06-16 NOTE — CONSULTS
Neurosurgery Consultation      Patient: Iain Christina    Sex: male    YOB: 1965    Date of Admission: 6/15/2024  2:55 PM    Admitting Dx: Brain mass [G93.89]    Reason for Consult: Brain mass      Subjective     CC: Recent falls    HPI:  Patient is a 58 y.o. male with hepatitis C who presented to the ED yesterday with complaints of a fall the previous night as well as recent cognitive decline over the past month.  Neurosurgery was consulted due to a right frontal brain mass on imaging.      During my evaluation patient is awake and alert and exhibits no overt confusion or cognitive deficit. Patient uncertain what caused him to fall yesterday, but he does report recent imbalance/incoordination.  He denies headache, dizziness, visual disturbance, speech difficulties, and lateralizing weakness.  He does feel he has been having memory problems lately.  Per review of chart, family members also report recent memory loss and confusion.      Has a family history of cancer and multiple first and second-degree relatives, but no personal history of cancer.  Denies chest pain and SOA.      PMH:  Past Medical History:   Diagnosis Date    Hepatitis C     Hyperthyroidism     Sleep apnea          Current Facility-Administered Medications:     acetaminophen (TYLENOL) tablet 650 mg, 650 mg, Oral, Q4H PRN **OR** acetaminophen (TYLENOL) 160 MG/5ML oral solution 650 mg, 650 mg, Oral, Q4H PRN **OR** acetaminophen (TYLENOL) suppository 650 mg, 650 mg, Rectal, Q4H PRN, Ezekiel Mckeon MD    sennosides-docusate (PERICOLACE) 8.6-50 MG per tablet 2 tablet, 2 tablet, Oral, BID PRN **AND** polyethylene glycol (MIRALAX) packet 17 g, 17 g, Oral, Daily PRN **AND** bisacodyl (DULCOLAX) EC tablet 5 mg, 5 mg, Oral, Daily PRN **AND** bisacodyl (DULCOLAX) suppository 10 mg, 10 mg, Rectal, Daily PRN, Ezekiel Mckeon MD    dexAMETHasone (DECADRON) injection 4 mg, 4 mg, Intravenous, Q6H, Ezekiel Mckeon MD, 4 mg at  06/16/24 0858    dextrose (D50W) (25 g/50 mL) IV injection 25 g, 25 g, Intravenous, Q15 Min PRN, Zana Juárez MD    dextrose (GLUTOSE) oral gel 15 g, 15 g, Oral, Q15 Min PRN, Zana Juárez MD    glucagon (GLUCAGEN) injection 1 mg, 1 mg, Intramuscular, Q15 Min PRN, Zana Juárez MD    hydrALAZINE (APRESOLINE) tablet 25 mg, 25 mg, Oral, Q6H PRN, Ezekiel Mckeon MD    losartan (COZAAR) tablet 100 mg, 100 mg, Oral, Q24H, 100 mg at 06/16/24 0859 **AND** hydroCHLOROthiazide tablet 12.5 mg, 12.5 mg, Oral, Q24H, Ezekiel Mckeon MD, 12.5 mg at 06/16/24 0859    insulin lispro (HUMALOG/ADMELOG) injection 2-9 Units, 2-9 Units, Subcutaneous, 4x Daily AC & at Bedtime, Zana Juárez MD, 6 Units at 06/16/24 0859    levETIRAcetam (KEPPRA) injection 500 mg, 500 mg, Intravenous, Q12H, Ezekiel Mckeon MD, 500 mg at 06/16/24 0858    ondansetron ODT (ZOFRAN-ODT) disintegrating tablet 4 mg, 4 mg, Oral, Q6H PRN **OR** ondansetron (ZOFRAN) injection 4 mg, 4 mg, Intravenous, Q6H PRN, Ezekiel Mckeon MD    sodium bicarbonate tablet 650 mg, 650 mg, Oral, TID, Zana Juárez MD    sodium chloride 0.9 % flush 10 mL, 10 mL, Intravenous, Q12H, Ezekiel Mckeon MD, 10 mL at 06/16/24 0859    sodium chloride 0.9 % flush 10 mL, 10 mL, Intravenous, PRN, Ezekiel Mckeon MD    sodium chloride 0.9 % infusion 40 mL, 40 mL, Intravenous, PRN, Ezekiel Mckeon MD    thiamine (B-1) injection 100 mg, 100 mg, Intravenous, Daily, Ezekiel Mckeon MD, 100 mg at 06/16/24 0859    zolpidem (AMBIEN) tablet 10 mg, 10 mg, Oral, Nightly PRN, Ezekiel Mckeon MD    Allergies   Allergen Reactions    Peanut (Diagnostic) Anaphylaxis       Past Surgical History:   Procedure Laterality Date    NASAL SEPTAL RECONSTRUCTION         Social History     Socioeconomic History    Marital status: Unknown   Tobacco Use    Smoking status: Never    Smokeless tobacco: Never   Vaping  Use    Vaping status: Never Used   Substance and Sexual Activity    Alcohol use: Not Currently     Comment: STATED HE QUIT IN JANUARY    Drug use: Never    Sexual activity: Yes       Family History   Problem Relation Age of Onset    COPD Mother     Cancer Sister     No Known Problems Brother          Current Medications:  Scheduled Meds:dexAMETHasone, 4 mg, Intravenous, Q6H  losartan, 100 mg, Oral, Q24H   And  hydroCHLOROthiazide, 12.5 mg, Oral, Q24H  insulin lispro, 2-9 Units, Subcutaneous, 4x Daily AC & at Bedtime  levETIRAcetam, 500 mg, Intravenous, Q12H  sodium bicarbonate, 650 mg, Oral, TID  sodium chloride, 10 mL, Intravenous, Q12H  thiamine (B-1) IV, 100 mg, Intravenous, Daily      Continuous Infusions:   PRN Meds:   acetaminophen **OR** acetaminophen **OR** acetaminophen    senna-docusate sodium **AND** polyethylene glycol **AND** bisacodyl **AND** bisacodyl    dextrose    dextrose    glucagon (human recombinant)    hydrALAZINE    ondansetron ODT **OR** ondansetron    sodium chloride    sodium chloride    zolpidem    ROS: 14 point review of systems was completed and is negative except for what is noted in HPI      Objective     Vitals:    06/16/24 0141 06/16/24 0449 06/16/24 0730 06/16/24 1115   BP: 129/89 134/76 133/93 130/87   BP Location: Left arm Left arm Left arm Left arm   Patient Position: Lying Lying Lying Lying   Pulse: 106 72 75 104   Resp: 18 18 18 18   Temp: 97.7 °F (36.5 °C) 98.3 °F (36.8 °C) 97.5 °F (36.4 °C) 98.1 °F (36.7 °C)   TempSrc: Oral Oral Oral Oral   SpO2: 99% 99% 96% 94%   Weight:       Height:           Physical exam  General  - awake, cooperative, in no acute distress  HEENT  - Normocephalic, atraumatic  - PERRLA, EOM intact  Cardiac  - RRR, no peripheral edema  Respiratory  - Normal respiratory rate and effort      NEUROLOGIC    MENTAL STATUS:  - A/O x3  - GCS 4-6-5  CRANIAL NERVES:  II:      Pupils equal, round, and reactive to light  III, IV, VI: EOM intact, no gaze preference or  deviation  V:      Normal sensation in all 3 segments bilaterally  VII:      No facial asymmetry  VIII:      Normal hearing to speech  IX, X:      Normal palatal elevation, no uvular deviation  XI:      Normal head turn and shoulder shrug bilaterally  XII:      Midline tongue protrusion  MOTOR:  - HERBERT symmetrically w/ good strength  - Negative pronator drift  REFLEXES:  - Brisk and symmetric throughout  - Negative clonus  SENSORY:  - Normal to touch and pinprick throughout  COORDINATION:  - Normal finger-to-nose coordination  GAIT:  - Deferred            RESULTS REVIEW:  Results from last 7 days   Lab Units 06/16/24  0511 06/15/24  1033   WBC 10*3/mm3 8.20 6.53   HEMOGLOBIN g/dL 15.1 15.4   HEMATOCRIT % 46.2 45.1   PLATELETS 10*3/mm3 154 148        Results from last 7 days   Lab Units 06/16/24  0511 06/15/24  1033   SODIUM mmol/L 142 145   POTASSIUM mmol/L 3.6 3.9   CHLORIDE mmol/L 112* 108*   CO2 mmol/L 17.6* 24.3   BUN mg/dL 21 19   CREATININE mg/dL 0.78 0.79   CALCIUM mg/dL 8.9 9.4   BILIRUBIN mg/dL 1.2 1.0   ALK PHOS U/L 109 149*   ALT (SGPT) U/L 195* 210*   AST (SGOT) U/L 71* 103*   GLUCOSE mg/dL 255* 161*                 CT Head Without Contrast    Result Date: 6/15/2024  CT HEAD WO CONTRAST Date of Exam: 6/15/2024 10:34 AM EDT Indication: ams. Comparison: None available. Technique: Axial CT images were obtained of the head without contrast administration.  Coronal reconstructions were performed.  Automated exposure control and iterative reconstruction methods were used. FINDINGS: Brain/Ventricles: Slightly increased density ovoid mass with mild heterogeneity centered in the right frontal lobe. This measures approximately 4.8 x 3.3 transverse plane and 3.9 x 3.4 cm in the coronal plane. There is a large amount of associated vasogenic edema and sulcal effacement. This is approximately 8 mm of right to left midline shift at the level of the lateral ventricle near the third ventricle. Significant focal mass  effect on the right lateral ventricle is noted. Mild degree of suprasellar mass effect noted. No evidence of hydrocephalus. No definite acute intracranial hemorrhage no focal lesion is otherwise noted within the brain parenchyma. Orbits: The visualized portion of the orbits demonstrate no acute abnormality. Sinuses: Mild wall thickening and mucosal thickening right maxillary sinus noted incompletely evaluated. Visualized paranasal sinuses and mastoid air cells are otherwise clear Soft Tissues/Skull: No acute abnormality of the visualized skull or soft tissues.     Impression: 1. Large mass centered within the right frontal lobe with associated vasogenic edema and mass effect including right to left midline shift as described above. No obvious acute hemorrhage is noted. Findings discussed with emergency physician, Dr. Recinos at the time of interpretation Electronically Signed: Pb Wilhelm MD  6/15/2024 11:08 AM EDT  Workstation ID: [id number]   MRI Brain With & Without Contrast    Result Date: 6/16/2024  Patient: HARMEET STANTON  Time Out: 01:45 Exam(s): MRI HEAD W WO Contrast EXAM: MR Head Without and With Intravenous Contrast CLINICAL HISTORY: Reason for exam: brain mass. TECHNIQUE: Magnetic resonance images of the head brain without and with intravenous contrast in multiple planes. COMPARISON: CT head 6 15 24 FINDINGS: Enhancing 4.0 x 4.0 x 4.0 cm intra-axial mass in the deep white matter of the right frontal lobe, demonstrating internal regions of cystic change.  Foci of susceptibility artifact are compatible with intratumoral microhemorrhage.  There is extensive surrounding vasogenic edema which extends across the midline at the corpus callosum.  Mass-effect results in effacement of the frontal horn of the right lateral ventricle, right frontal lobe sulcal effacement, and leftward midline shift within the anterior cranial fossa measuring 14 mm. No additional enhancing brain lesions are identified. There is  no diffusion restriction to suggest acute cerebral ischemia.  There is no abnormal extra-axial fluid collection.  There is no hydrocephalus.  Basal cisterns are patent.  Intracranial flow-voids appear normal as visualized. There is mucosal thickening in the maxillary sinuses and ethmoid air cells.  Mastoid air cells are clear. There is a chronic left lamina papyracea fracture. IMPRESSION:   1.  Enhancing intra-axial mass within the deep white matter of the right frontal lobe measuring 4 x 4 x 4.  There are multiple small foci of intratumoral hemorrhage. 2.  Surrounding right frontal lobe vasogenic edema extending across the midline along the anterior corpus callosum. 3.  Mass-effect results in right frontal sulcal effacement, effacement of the frontal horn of the right lateral ventricle, and 14 mm leftward shift within the anterior cranial fossa. 4.  No additional enhancing brain lesions. 5.  No infarct.     Impression: Electronically signed by Tania Miller M.D. on 06-16-24 at 0145         Assessment     MDM: This is a 58 y.o. male presenting with recent cognitive decline as well as balance issues and a fall last night.  There is no family present at bedside during my evaluation, however he appears cognitively intact.  No focal sensorimotor deficits on exam.    MRI shows a large enhancing mass in the right frontal lobe with surrounding edema, mass effect, and 1.4 cm leftward midline shift.  No obstructive hydrocephalus.  No other significant findings.    Initial appearance is concerning for a primary brain tumor, though metastatic lesion has not been ruled out.  Recommend further metastatic workup with contrast CT chest abdomen pelvis as well as oncology consult.  Patient may be a surgical candidate for tumor resection, possibly later this week; however, will defer to the primary neurosurgical team, returning tomorrow, for further neurosurgical recommendations.         Plan     Right frontal lobe brain mass  - CT  chest, abdomen, and pelvis with contrast for metastatic evaluation  - Oncology consult  - Continue IV Decadron 4 mg every 6 hours  - Continue Keppra for now; consider discontinuing based on recent data regarding seizure prophylaxis in patients with new brain tumors and no seizure history  - Medical management per primary team    Neurosurgery will continue to follow.  Call with any questions or concerns.    I discussed my assessment and recommendations with Dr. Hector Doty PA-C  6/16/2024  11:28 EDT      Part of this note may be an electronic transcription/translation of spoken language to printed text using the Dragon Dictation System.

## 2024-06-16 NOTE — PLAN OF CARE
Problem: Adult Inpatient Plan of Care  Goal: Plan of Care Review  Outcome: Ongoing, Progressing  Flowsheets (Taken 6/16/2024 0218)  Plan of Care Reviewed With: patient     Problem: Fall Injury Risk  Goal: Absence of Fall and Fall-Related Injury  Outcome: Ongoing, Progressing  Intervention: Identify and Manage Contributors  Recent Flowsheet Documentation  Taken 6/16/2024 0151 by Julieta Khan RN  Medication Review/Management: medications reviewed  Taken 6/15/2024 2348 by Julieta Khan RN  Medication Review/Management: medications reviewed  Taken 6/15/2024 2000 by Julieta Khan RN  Medication Review/Management: medications reviewed  Intervention: Promote Injury-Free Environment  Recent Flowsheet Documentation  Taken 6/16/2024 0151 by Julieta Khan RN  Safety Promotion/Fall Prevention:   activity supervised   assistive device/personal items within reach   clutter free environment maintained   safety round/check completed  Taken 6/15/2024 2348 by Julieta Khan RN  Safety Promotion/Fall Prevention:   activity supervised   assistive device/personal items within reach   clutter free environment maintained   safety round/check completed  Taken 6/15/2024 2204 by Julieta Khan RN  Safety Promotion/Fall Prevention: patient off unit  Taken 6/15/2024 2000 by Julieta Khan RN  Safety Promotion/Fall Prevention: safety round/check completed     Problem: Coping Ineffective (Oncology Care)  Goal: Effective Coping  Outcome: Ongoing, Progressing  Intervention: Support and Enhance Coping Strategies  Recent Flowsheet Documentation  Taken 6/15/2024 2000 by Julieta Khan RN  Supportive Measures: active listening utilized  Environmental Support: calm environment promoted  Family/Support System Care: self-care encouraged     Problem: Fatigue (Oncology Care)  Goal: Improved Activity Tolerance  Outcome: Ongoing, Progressing  Intervention: Promote Improved Energy  Recent Flowsheet Documentation  Taken 6/15/2024 2000 by Erin  Julieta, RN  Sleep/Rest Enhancement: awakenings minimized     Problem: Oral Intake Altered (Oncology Care)  Goal: Optimal Oral Intake  Outcome: Ongoing, Progressing     Problem: Oral Mucositis (Oncology Care)  Goal: Improved Oral Mucous Membrane Integrity  Outcome: Ongoing, Progressing     Problem: Pain Acute (Oncology Care)  Goal: Optimal Pain Control  Outcome: Ongoing, Progressing  Intervention: Prevent or Manage Pain  Recent Flowsheet Documentation  Taken 6/16/2024 0151 by Julieta Khan RN  Medication Review/Management: medications reviewed  Taken 6/15/2024 2348 by Julieta Khan RN  Medication Review/Management: medications reviewed  Taken 6/15/2024 2000 by Julieta Khan RN  Sensory Stimulation Regulation: care clustered  Sleep/Rest Enhancement: awakenings minimized  Medication Review/Management: medications reviewed  Intervention: Optimize Psychosocial Wellbeing  Recent Flowsheet Documentation  Taken 6/15/2024 2000 by Julieta Khan RN  Supportive Measures: active listening utilized  Diversional Activities: television   Goal Outcome Evaluation:  Plan of Care Reviewed With: patient

## 2024-06-16 NOTE — PLAN OF CARE
Goal Outcome Evaluation:              Outcome Evaluation: Pt is a 58 y.o. male admitted to [facility] with c/o fall and AMS on 6/15/2024. Work up reveals right frontal brain mass. Pt requires SBA for all functional mobility and demonstrates no LOB while ambulating in room without AD. At this time, pt demonstrates no acute PT needs as he Is ambulating in his room without AD without physical assist.   Anticipate pt will D/C to home with assist.      Anticipated Discharge Disposition (PT): home with assist

## 2024-06-17 PROBLEM — R73.9 STEROID-INDUCED HYPERGLYCEMIA: Status: ACTIVE | Noted: 2024-06-17

## 2024-06-17 PROBLEM — T38.0X5A STEROID-INDUCED HYPERGLYCEMIA: Status: ACTIVE | Noted: 2024-06-17

## 2024-06-17 PROBLEM — R79.89 ABNORMAL LIVER FUNCTION TESTS: Status: ACTIVE | Noted: 2024-06-17

## 2024-06-17 LAB
ANION GAP SERPL CALCULATED.3IONS-SCNC: 10 MMOL/L (ref 5–15)
BUN SERPL-MCNC: 20 MG/DL (ref 8–23)
BUN/CREAT SERPL: 22.5 (ref 7–25)
CALCIUM SPEC-SCNC: 8.9 MG/DL (ref 8.6–10.5)
CHLORIDE SERPL-SCNC: 112 MMOL/L (ref 98–107)
CO2 SERPL-SCNC: 20 MMOL/L (ref 22–29)
CREAT SERPL-MCNC: 0.89 MG/DL (ref 0.76–1.27)
DEPRECATED RDW RBC AUTO: 41 FL (ref 37–54)
EGFRCR SERPLBLD CKD-EPI 2021: 98.1 ML/MIN/1.73
ERYTHROCYTE [DISTWIDTH] IN BLOOD BY AUTOMATED COUNT: 12.6 % (ref 12.3–15.4)
GLUCOSE BLDC GLUCOMTR-MCNC: 236 MG/DL (ref 70–130)
GLUCOSE BLDC GLUCOMTR-MCNC: 253 MG/DL (ref 70–130)
GLUCOSE BLDC GLUCOMTR-MCNC: 285 MG/DL (ref 70–130)
GLUCOSE BLDC GLUCOMTR-MCNC: 361 MG/DL (ref 70–130)
GLUCOSE SERPL-MCNC: 300 MG/DL (ref 65–99)
HCT VFR BLD AUTO: 47.2 % (ref 37.5–51)
HGB BLD-MCNC: 15.5 G/DL (ref 13–17.7)
MCH RBC QN AUTO: 29.6 PG (ref 26.6–33)
MCHC RBC AUTO-ENTMCNC: 32.8 G/DL (ref 31.5–35.7)
MCV RBC AUTO: 90.1 FL (ref 79–97)
PLATELET # BLD AUTO: 156 10*3/MM3 (ref 140–450)
PMV BLD AUTO: 12 FL (ref 6–12)
POTASSIUM SERPL-SCNC: 3.8 MMOL/L (ref 3.5–5.2)
RBC # BLD AUTO: 5.24 10*6/MM3 (ref 4.14–5.8)
SODIUM SERPL-SCNC: 142 MMOL/L (ref 136–145)
WBC NRBC COR # BLD AUTO: 13.92 10*3/MM3 (ref 3.4–10.8)

## 2024-06-17 PROCEDURE — 25010000002 THIAMINE HCL 200 MG/2ML SOLUTION: Performed by: INTERNAL MEDICINE

## 2024-06-17 PROCEDURE — 85027 COMPLETE CBC AUTOMATED: CPT | Performed by: INTERNAL MEDICINE

## 2024-06-17 PROCEDURE — 94799 UNLISTED PULMONARY SVC/PX: CPT

## 2024-06-17 PROCEDURE — 63710000001 INSULIN LISPRO (HUMAN) PER 5 UNITS: Performed by: INTERNAL MEDICINE

## 2024-06-17 PROCEDURE — 25010000002 DEXAMETHASONE PER 1 MG: Performed by: INTERNAL MEDICINE

## 2024-06-17 PROCEDURE — 99232 SBSQ HOSP IP/OBS MODERATE 35: CPT | Performed by: NURSE PRACTITIONER

## 2024-06-17 PROCEDURE — 25010000002 LEVETRIRACETAM PER 10 MG: Performed by: INTERNAL MEDICINE

## 2024-06-17 PROCEDURE — 80048 BASIC METABOLIC PNL TOTAL CA: CPT | Performed by: INTERNAL MEDICINE

## 2024-06-17 PROCEDURE — 82948 REAGENT STRIP/BLOOD GLUCOSE: CPT

## 2024-06-17 PROCEDURE — 63710000001 INSULIN GLARGINE PER 5 UNITS: Performed by: HOSPITALIST

## 2024-06-17 RX ORDER — INSULIN GLARGINE 100 [IU]/ML
15 INJECTION, SOLUTION SUBCUTANEOUS DAILY
Status: DISCONTINUED | OUTPATIENT
Start: 2024-06-17 | End: 2024-06-19

## 2024-06-17 RX ADMIN — DEXAMETHASONE SODIUM PHOSPHATE 4 MG: 4 INJECTION, SOLUTION INTRA-ARTICULAR; INTRALESIONAL; INTRAMUSCULAR; INTRAVENOUS; SOFT TISSUE at 17:39

## 2024-06-17 RX ADMIN — LEVETIRACETAM 500 MG: 500 INJECTION, SOLUTION INTRAVENOUS at 08:22

## 2024-06-17 RX ADMIN — DEXAMETHASONE SODIUM PHOSPHATE 4 MG: 4 INJECTION, SOLUTION INTRA-ARTICULAR; INTRALESIONAL; INTRAMUSCULAR; INTRAVENOUS; SOFT TISSUE at 11:58

## 2024-06-17 RX ADMIN — HYDROCHLOROTHIAZIDE 12.5 MG: 12.5 TABLET ORAL at 08:22

## 2024-06-17 RX ADMIN — LOSARTAN POTASSIUM 100 MG: 100 TABLET, FILM COATED ORAL at 08:22

## 2024-06-17 RX ADMIN — SODIUM BICARBONATE 650 MG: 650 TABLET, ORALLY DISINTEGRATING ORAL at 17:39

## 2024-06-17 RX ADMIN — INSULIN LISPRO 8 UNITS: 100 INJECTION, SOLUTION INTRAVENOUS; SUBCUTANEOUS at 20:00

## 2024-06-17 RX ADMIN — SODIUM BICARBONATE 650 MG: 650 TABLET, ORALLY DISINTEGRATING ORAL at 08:22

## 2024-06-17 RX ADMIN — THIAMINE HYDROCHLORIDE 100 MG: 100 INJECTION, SOLUTION INTRAMUSCULAR; INTRAVENOUS at 08:22

## 2024-06-17 RX ADMIN — LEVETIRACETAM 500 MG: 500 INJECTION, SOLUTION INTRAVENOUS at 20:00

## 2024-06-17 RX ADMIN — Medication 10 ML: at 08:24

## 2024-06-17 RX ADMIN — ZOLPIDEM TARTRATE 10 MG: 5 TABLET, FILM COATED ORAL at 20:00

## 2024-06-17 RX ADMIN — SODIUM BICARBONATE 650 MG: 650 TABLET, ORALLY DISINTEGRATING ORAL at 20:00

## 2024-06-17 RX ADMIN — DEXAMETHASONE SODIUM PHOSPHATE 4 MG: 4 INJECTION, SOLUTION INTRA-ARTICULAR; INTRALESIONAL; INTRAMUSCULAR; INTRAVENOUS; SOFT TISSUE at 20:00

## 2024-06-17 RX ADMIN — INSULIN LISPRO 4 UNITS: 100 INJECTION, SOLUTION INTRAVENOUS; SUBCUTANEOUS at 17:39

## 2024-06-17 RX ADMIN — INSULIN LISPRO 6 UNITS: 100 INJECTION, SOLUTION INTRAVENOUS; SUBCUTANEOUS at 11:58

## 2024-06-17 RX ADMIN — INSULIN GLARGINE 15 UNITS: 100 INJECTION, SOLUTION SUBCUTANEOUS at 17:39

## 2024-06-17 RX ADMIN — Medication 10 ML: at 20:00

## 2024-06-17 RX ADMIN — DEXAMETHASONE SODIUM PHOSPHATE 4 MG: 4 INJECTION, SOLUTION INTRA-ARTICULAR; INTRALESIONAL; INTRAMUSCULAR; INTRAVENOUS; SOFT TISSUE at 03:46

## 2024-06-17 RX ADMIN — INSULIN LISPRO 6 UNITS: 100 INJECTION, SOLUTION INTRAVENOUS; SUBCUTANEOUS at 08:22

## 2024-06-17 NOTE — PROGRESS NOTES
[facility] NEUROSURGERY INTRACRANIAL PROGRESS NOTE    PATIENT IDENTIFICATION:   Name:  Iain Christina      MRN:  [id number]     58 y.o.  male               CC: Large right frontal brain lesion with midline shift      Subjective     Interval History: No events overnight    ROS:  Constitutional: No fever, chills  HEENT: No headache, no vision changes, no tinnitus, no hearing difficulties  Neck: no stiffness  GI: No nausea, vomiting, no swallow difficulties  Neuro: No numbness, tingling, or weakness, no speech difficulties, no balance difficulties    Objective     Vital signs in last 24 hours:  Temp:  [97.2 °F (36.2 °C)-98.3 °F (36.8 °C)] 97.5 °F (36.4 °C)  Heart Rate:  [69-92] 72  Resp:  [18] 18  BP: (128-141)/(86-91) 141/91      Intake/Output this shift:  I/O this shift:  In: 240 [P.O.:240]  Out: -     Intake/Output last 3 shifts:  I/O last 3 completed shifts:  In: -   Out: 1400 [Urine:1400]    LABS:  Results from last 7 days   Lab Units 06/17/24  0719 06/16/24  0511 06/15/24  1033   WBC 10*3/mm3 13.92* 8.20 6.53   HEMOGLOBIN g/dL 15.5 15.1 15.4   HEMATOCRIT % 47.2 46.2 45.1   PLATELETS 10*3/mm3 156 154 148       Results from last 7 days   Lab Units 06/17/24  0719 06/16/24  0511 06/15/24  1033   SODIUM mmol/L 142 142 145   POTASSIUM mmol/L 3.8 3.6 3.9   CHLORIDE mmol/L 112* 112* 108*   CO2 mmol/L 20.0* 17.6* 24.3   BUN mg/dL 20 21 19   CREATININE mg/dL 0.89 0.78 0.79   GLUCOSE mg/dL 300* 255* 161*   CALCIUM mg/dL 8.9 8.9 9.4          IMAGING STUDIES:  CT Chest With Contrast Diagnostic    Result Date: 6/16/2024  Impression: 1.  Borderline enlarged carley hepatis nodes. Given patient history of brain mass, findings are indeterminant and continued close interval follow-up with CT abdomen and 3 months is recommended to ensure stability and exclude possibility of metastatic disease. 2.  Other findings as above    This report was finalized on 6/16/2024 7:10 PM by Dr. Kingsley Salazar M.D on Workstation: [id number]       CT Abdomen Pelvis With & Without Contrast    Result Date: 6/16/2024  Impression: 1.  Borderline enlarged carley hepatis nodes. Given patient history of brain mass, findings are indeterminant and continued close interval follow-up with CT abdomen and 3 months is recommended to ensure stability and exclude possibility of metastatic disease. 2.  Other findings as above    This report was finalized on 6/16/2024 7:10 PM by Dr. Kingsley Salazar M.D on Workstation: [id number]      MRI Brain With & Without Contrast    Result Date: 6/16/2024  Electronically signed by Tania Miller M.D. on 06-16-24 at 0145       I personally viewed and interpreted the patient's chart.    Meds reviewed/changed: Yes  Scheduled Meds:dexAMETHasone, 4 mg, Intravenous, Q6H  losartan, 100 mg, Oral, Q24H   And  hydroCHLOROthiazide, 12.5 mg, Oral, Q24H  insulin lispro, 2-9 Units, Subcutaneous, 4x Daily AC & at Bedtime  levETIRAcetam, 500 mg, Intravenous, Q12H  sodium bicarbonate, 650 mg, Oral, TID  sodium chloride, 10 mL, Intravenous, Q12H  thiamine (B-1) IV, 100 mg, Intravenous, Daily      Continuous Infusions:   PRN Meds:.  acetaminophen **OR** acetaminophen **OR** acetaminophen    senna-docusate sodium **AND** polyethylene glycol **AND** bisacodyl **AND** bisacodyl    dextrose    dextrose    glucagon (human recombinant)    hydrALAZINE    ondansetron ODT **OR** ondansetron    sodium chloride    sodium chloride    zolpidem      Physical exam  Awake, alert, oriented x3  Pupils equal round reactive to light  Extraocular muscles intact  Face symmetric  Speech is fluent and clear  No pronator drift  Motor exam  Bilateral deltoids 5/5, bilateral biceps 5/5, bilateral triceps 5/5, bilateral wrist extension 5/5 bilateral hand  5/5  Bilateral hip flexion 5/5, bilateral knee extension 5/5, bilateral DF/PF 5/5  gait independent   Able to detect light touch in all 4 extremities    Assessment & Plan     ASSESSMENT:      Brain mass    Hypertension    Sleep  apnea    Chronic hepatitis C without hepatic coma    Neoplasm causing mass effect and brain compression on adjacent structures    58-year-old male with large right frontal lobe lesion.    PLAN:     Plan OR on Wednesday for tumor resection and biopsy.  Dr. Bello to discuss with patient.  CT head with Stealth protocol    I discussed the patient's findings and my recommendations with patient and nursing staff    I spent 35 minutes caring for Iain Christina on this date of service. This time includes time spent by me in the following activities: preparing for the visit, reviewing tests, obtaining and/or reviewing a separately obtained history, performing a medically appropriate examination and/or evaluation, counseling and educating the patient/family/caregiver, ordering medications, tests, or procedures, referring and communicating with other health care professionals, documenting information in the medical record, independently interpreting results and communicating that information with the patient/family/caregiver, and care coordination       This part is from Dr. Bello.  I had a long discussion with the patient this afternoon.  I explained that there is a mass in the right frontal region of his brain that approaches the internal capsule and basal ganglia.  I told him the only way to find out what this is for sure is with an excisional biopsy.  I would not recommend a stereotactic biopsy simply because of the midline shift.  I think he needs to have the pressure removed.  I explained that the patient has about a 20 or 25% chance of being left with some left-sided weakness after the surgery but other risk of the surgery such as infection, bleeding, CSF leak, anesthetic risk, coma and death are less than 1 or 2%.  I discussed the postoperative hospital and home course.  Patient did ask what would happen if he did nothing.  I explained that if this is indeed a tumor it will continue to grow and will ultimately  "kill him.  Is difficult to say what the time.  Review but I believe it is fairly short considering there is already midline shift.  We cannot do the surgery until the day after tomorrow and so I recommended that he talk to his family and think about it.  We will check back with him tomorrow and find out what he wants to do.     LOS: 2 days       Shirley Manning, APRN  6/17/2024  13:18 EDT    \"Dictated utilizing Dragon dictation\".      "

## 2024-06-17 NOTE — CASE MANAGEMENT/SOCIAL WORK
Discharge Planning Assessment  [facility]     Patient Name: Iain Christina  MRN: [id number]  Today's Date: 6/17/2024    Admit Date: 6/15/2024    Plan: Home with family   Discharge Needs Assessment       Row Name 06/17/24 1628       Living Environment    People in Home alone    Current Living Arrangements apartment    Potentially Unsafe Housing Conditions none    In the past 12 months has the electric, gas, oil, or water company threatened to shut off services in your home? No    Primary Care Provided by self    Provides Primary Care For no one    Family Caregiver if Needed none    Quality of Family Relationships helpful;involved;supportive    Able to Return to Prior Arrangements yes       Resource/Environmental Concerns    Resource/Environmental Concerns none    Transportation Concerns none       Transportation Needs    In the past 12 months, has lack of transportation kept you from medical appointments or from getting medications? no    In the past 12 months, has lack of transportation kept you from meetings, work, or from getting things needed for daily living? No       Food Insecurity    Within the past 12 months, you worried that your food would run out before you got the money to buy more. Never true    Within the past 12 months, the food you bought just didn't last and you didn't have money to get more. Never true       Transition Planning    Patient/Family Anticipates Transition to home with family    Patient/Family Anticipated Services at Transition none    Transportation Anticipated family or friend will provide       Discharge Needs Assessment    Readmission Within the Last 30 Days no previous admission in last 30 days    Equipment Currently Used at Home none    Anticipated Changes Related to Illness none    Equipment Needed After Discharge none    Provided Post Acute Provider List? N/A    Provided Post Acute Provider Quality & Resource List? N/A                   Discharge Plan       Row Name  06/17/24 1628       Plan    Plan Home with family    Plan Comments CCP met with patient at bedside. Introduced self and explained role of CCP. Patient confirmed the information on his face sheet is accurate. Patients PCP is Scott Matias. Patient uses [facility]. Patient is independent at home. He lives alone. No history of HH or SNF. No DME used. Patient plans home with his sister Mariel at discharge. Family can transport. CCP following.                  Continued Care and Services - Admitted Since 6/15/2024    No active coordination exists for this encounter.          Demographic Summary       Row Name 06/17/24 1627       General Information    Admission Type inpatient    Arrived From emergency department    Referral Source admission list    Reason for Consult discharge planning    Preferred Language English                   Functional Status       Row Name 06/17/24 1627       Functional Status    Usual Activity Tolerance good    Current Activity Tolerance good       Functional Status, IADL    Medications independent    Meal Preparation independent    Housekeeping independent    Laundry independent    Shopping independent       Mental Status Summary    Recent Changes in Mental Status/Cognitive Functioning no changes       Employment/    Employment Status self-employed                   Psychosocial    No documentation.                  Abuse/Neglect    No documentation.                  Legal    No documentation.                  Substance Abuse    No documentation.                  Patient Forms    No documentation.

## 2024-06-17 NOTE — PLAN OF CARE
Problem: Adult Inpatient Plan of Care  Goal: Plan of Care Review  Outcome: Ongoing, Progressing  Flowsheets (Taken 6/17/2024 0423)  Plan of Care Reviewed With: patient     Problem: Fall Injury Risk  Goal: Absence of Fall and Fall-Related Injury  Outcome: Ongoing, Progressing  Intervention: Identify and Manage Contributors  Recent Flowsheet Documentation  Taken 6/17/2024 0356 by Julieta Khan RN  Medication Review/Management: medications reviewed  Taken 6/17/2024 0152 by Julieta Khan RN  Medication Review/Management: medications reviewed  Taken 6/17/2024 0000 by Julieta Khan RN  Medication Review/Management: medications reviewed  Taken 6/16/2024 2200 by Julieta Khan RN  Medication Review/Management: medications reviewed  Taken 6/16/2024 1945 by Julieta Khan RN  Medication Review/Management: medications reviewed  Intervention: Promote Injury-Free Environment  Recent Flowsheet Documentation  Taken 6/17/2024 0356 by Julieta Khan RN  Safety Promotion/Fall Prevention:   activity supervised   assistive device/personal items within reach   clutter free environment maintained   safety round/check completed  Taken 6/17/2024 0152 by Julieta Khan RN  Safety Promotion/Fall Prevention:   activity supervised   assistive device/personal items within reach   clutter free environment maintained   safety round/check completed  Taken 6/17/2024 0000 by Julieta Khan RN  Safety Promotion/Fall Prevention:   activity supervised   assistive device/personal items within reach   clutter free environment maintained   safety round/check completed  Taken 6/16/2024 2200 by Julieta Khan RN  Safety Promotion/Fall Prevention:   activity supervised   assistive device/personal items within reach   clutter free environment maintained   safety round/check completed  Taken 6/16/2024 1945 by Julieta Khan RN  Safety Promotion/Fall Prevention: safety round/check completed     Problem: Coping Ineffective (Oncology Care)  Goal:  Effective Coping  Outcome: Ongoing, Progressing  Intervention: Support and Enhance Coping Strategies  Recent Flowsheet Documentation  Taken 6/16/2024 1945 by Julieta Khan RN  Supportive Measures: active listening utilized  Environmental Support: calm environment promoted  Family/Support System Care: self-care encouraged     Problem: Fatigue (Oncology Care)  Goal: Improved Activity Tolerance  Outcome: Ongoing, Progressing  Intervention: Promote Improved Energy  Recent Flowsheet Documentation  Taken 6/16/2024 1945 by Julieta Khan RN  Sleep/Rest Enhancement: awakenings minimized     Problem: Oral Intake Altered (Oncology Care)  Goal: Optimal Oral Intake  Outcome: Ongoing, Progressing     Problem: Oral Mucositis (Oncology Care)  Goal: Improved Oral Mucous Membrane Integrity  Outcome: Ongoing, Progressing     Problem: Pain Acute (Oncology Care)  Goal: Optimal Pain Control  Outcome: Ongoing, Progressing  Intervention: Prevent or Manage Pain  Recent Flowsheet Documentation  Taken 6/17/2024 0356 by Julieta Khan RN  Medication Review/Management: medications reviewed  Taken 6/17/2024 0152 by Julieta Khan RN  Medication Review/Management: medications reviewed  Taken 6/17/2024 0000 by Julieta Khan RN  Medication Review/Management: medications reviewed  Taken 6/16/2024 2200 by Julieta Khan RN  Medication Review/Management: medications reviewed  Taken 6/16/2024 1945 by Julieta Khan RN  Sensory Stimulation Regulation: care clustered  Sleep/Rest Enhancement: awakenings minimized  Medication Review/Management: medications reviewed  Intervention: Optimize Psychosocial Wellbeing  Recent Flowsheet Documentation  Taken 6/16/2024 1945 by Julieta Khan RN  Supportive Measures: active listening utilized  Diversional Activities: television   Goal Outcome Evaluation:  Plan of Care Reviewed With: patient

## 2024-06-17 NOTE — PROGRESS NOTES
Name: Iain Christina ADMIT: 6/15/2024   : 1965  PCP: Scott Medina MD    MRN: [id number] LOS: 2 days   AGE/SEX: 58 y.o. male  ROOM: [facility]     Subjective   Subjective   No new complaints.  Sleeping relatively well.  Appetite good.       Objective   Objective   Vital Signs  Temp:  [97.2 °F (36.2 °C)-98.3 °F (36.8 °C)] 97.5 °F (36.4 °C)  Heart Rate:  [69-92] 72  Resp:  [18] 18  BP: (128-141)/(86-91) 141/91  SpO2:  [96 %-100 %] 97 %  on   ;   Device (Oxygen Therapy): room air  Body mass index is 31.51 kg/m².  Physical Exam  Vitals and nursing note reviewed.   Constitutional:       General: He is not in acute distress.  Cardiovascular:      Rate and Rhythm: Normal rate and regular rhythm.   Pulmonary:      Effort: Pulmonary effort is normal.      Breath sounds: Normal breath sounds.   Abdominal:      General: Bowel sounds are normal.      Palpations: Abdomen is soft.      Tenderness: There is no abdominal tenderness.   Musculoskeletal:         General: No swelling.   Skin:     General: Skin is warm and dry.   Neurological:      Mental Status: He is alert. Mental status is at baseline.       Results Review     I reviewed the patient's new clinical results.  Results from last 7 days   Lab Units 24  0719 24  0511 06/15/24  1033   WBC 10*3/mm3 13.92* 8.20 6.53   HEMOGLOBIN g/dL 15.5 15.1 15.4   PLATELETS 10*3/mm3 156 154 148     Results from last 7 days   Lab Units 24  0719 24  0511 06/15/24  1033   SODIUM mmol/L 142 142 145   POTASSIUM mmol/L 3.8 3.6 3.9   CHLORIDE mmol/L 112* 112* 108*   CO2 mmol/L 20.0* 17.6* 24.3   BUN mg/dL 20 21 19   CREATININE mg/dL 0.89 0.78 0.79   GLUCOSE mg/dL 300* 255* 161*   EGFR mL/min/1.73 98.1 102.1 101.7     Results from last 7 days   Lab Units 24  0511 06/15/24  1033   ALBUMIN g/dL 4.0 4.2   BILIRUBIN mg/dL 1.2 1.0   ALK PHOS U/L 109 149*   AST (SGOT) U/L 71* 103*   ALT (SGPT) U/L 195* 210*     Results from last 7 days   Lab Units  06/17/24  0719 06/16/24  0511 06/15/24  1033   CALCIUM mg/dL 8.9 8.9 9.4   ALBUMIN g/dL  --  4.0 4.2   MAGNESIUM mg/dL  --   --  2.2     Results from last 7 days   Lab Units 06/15/24  1033   LACTATE mmol/L 1.5     Hemoglobin A1C   Date/Time Value Ref Range Status   06/16/2024 0511 6.60 (H) 4.80 - 5.60 % Final     Glucose   Date/Time Value Ref Range Status   06/17/2024 1557 236 (H) 70 - 130 mg/dL Final   06/17/2024 1112 253 (H) 70 - 130 mg/dL Final   06/17/2024 0719 285 (H) 70 - 130 mg/dL Final   06/16/2024 1939 357 (H) 70 - 130 mg/dL Final   06/16/2024 1633 272 (H) 70 - 130 mg/dL Final   06/16/2024 1204 309 (H) 70 - 130 mg/dL Final       CT Chest With Contrast Diagnostic    Result Date: 6/16/2024  Impression: 1.  Borderline enlarged carley hepatis nodes. Given patient history of brain mass, findings are indeterminant and continued close interval follow-up with CT abdomen and 3 months is recommended to ensure stability and exclude possibility of metastatic disease. 2.  Other findings as above    This report was finalized on 6/16/2024 7:10 PM by Dr. Kingsley Salazar M.D on Workstation: [id number]      CT Abdomen Pelvis With & Without Contrast    Result Date: 6/16/2024  Impression: 1.  Borderline enlarged carley hepatis nodes. Given patient history of brain mass, findings are indeterminant and continued close interval follow-up with CT abdomen and 3 months is recommended to ensure stability and exclude possibility of metastatic disease. 2.  Other findings as above    This report was finalized on 6/16/2024 7:10 PM by Dr. Kingsley Salazar M.D on Workstation: [id number]      MRI Brain With & Without Contrast    Result Date: 6/16/2024  Electronically signed by Tania Miller M.D. on 06-16-24 at 0145     I have personally reviewed all medications:  Scheduled Medications  dexAMETHasone, 4 mg, Intravenous, Q6H  losartan, 100 mg, Oral, Q24H   And  hydroCHLOROthiazide, 12.5 mg, Oral, Q24H  insulin glargine, 15 Units, Subcutaneous,  Daily  insulin lispro, 2-9 Units, Subcutaneous, 4x Daily AC & at Bedtime  levETIRAcetam, 500 mg, Intravenous, Q12H  sodium bicarbonate, 650 mg, Oral, TID  sodium chloride, 10 mL, Intravenous, Q12H  thiamine (B-1) IV, 100 mg, Intravenous, Daily    Infusions   Diet  Diet: Regular/House, Diabetic; Consistent Carbohydrate; Fluid Consistency: Thin (IDDSI 0)    I have personally reviewed:  [x]  Laboratory   [x]  Microbiology   [x]  Radiology   [x]  EKG/Telemetry  [x]  Cardiology/Vascular   []  Pathology    []  Records       Assessment/Plan     Active Hospital Problems    Diagnosis  POA    **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       58 y.o. 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    Note plans from neurosurgery for possible OR on Wednesday for tumor resection and biopsy.  CT scan chest, abdomen and pelvis did not identify alternative primary source nor obvious metastatic disease.  Borderline abnormal lymph nodes carley hepatis that needs repeat imaging in about 3 months.  Steroids are causing significant hyperglycemia and will add basal insulin to SSI.  Otherwise vitals and labs stable.  Started on bicarb which has improved.  Will reassess in a.m.  Patient wants to think about it further and discuss with family before consenting to surgery on Wednesday.  Will follow-up tomorrow.       SCDs for DVT prophylaxis.  Full code.  Discussed with patient.  Anticipate discharge home with family timing yet to be determined.  Expected discharge date/ time has not been documented.      Pb Gerber MD  [facility]  06/17/24  16:10 EDT

## 2024-06-17 NOTE — PLAN OF CARE
Problem: Adult Inpatient Plan of Care  Goal: Absence of Hospital-Acquired Illness or Injury  Intervention: Identify and Manage Fall Risk  Recent Flowsheet Documentation  Taken 6/17/2024 1800 by Kaylee Pa RN  Safety Promotion/Fall Prevention: safety round/check completed  Taken 6/17/2024 1600 by Kaylee Pa RN  Safety Promotion/Fall Prevention: safety round/check completed  Taken 6/17/2024 1400 by Kaylee Pa RN  Safety Promotion/Fall Prevention: safety round/check completed  Taken 6/17/2024 1200 by Kaylee Pa RN  Safety Promotion/Fall Prevention: safety round/check completed  Taken 6/17/2024 1000 by Kaylee Pa RN  Safety Promotion/Fall Prevention: safety round/check completed  Taken 6/17/2024 0800 by Kaylee Pa RN  Safety Promotion/Fall Prevention:   safety round/check completed   nonskid shoes/slippers when out of bed   fall prevention program maintained   clutter free environment maintained   assistive device/personal items within reach  Intervention: Prevent Skin Injury  Recent Flowsheet Documentation  Taken 6/17/2024 1800 by Kaylee Pa RN  Body Position: position changed independently  Taken 6/17/2024 1200 by Kaylee Pa RN  Body Position: position changed independently  Taken 6/17/2024 1000 by Kaylee Pa RN  Body Position: position changed independently  Taken 6/17/2024 0800 by Kaylee Pa RN  Body Position:   position changed independently   supine, legs elevated  Intervention: Prevent and Manage VTE (Venous Thromboembolism) Risk  Recent Flowsheet Documentation  Taken 6/17/2024 0800 by Kaylee Pa RN  Activity Management: activity minimized  VTE Prevention/Management:   bilateral   sequential compression devices on  Range of Motion: active ROM (range of motion) encouraged  Intervention: Prevent Infection  Recent Flowsheet Documentation  Taken 6/17/2024 0800 by Kaylee Pa RN  Infection Prevention:   single patient room provided   hand hygiene promoted  Goal: Optimal Comfort and  Wellbeing  Intervention: Provide Person-Centered Care  Recent Flowsheet Documentation  Taken 6/17/2024 0800 by Kaylee Pa RN  Trust Relationship/Rapport:   care explained   questions answered     Problem: Fall Injury Risk  Goal: Absence of Fall and Fall-Related Injury  Intervention: Identify and Manage Contributors  Recent Flowsheet Documentation  Taken 6/17/2024 0800 by Kaylee Pa RN  Medication Review/Management: medications reviewed  Intervention: Promote Injury-Free Environment  Recent Flowsheet Documentation  Taken 6/17/2024 1800 by Kaylee Pa RN  Safety Promotion/Fall Prevention: safety round/check completed  Taken 6/17/2024 1600 by Kaylee Pa RN  Safety Promotion/Fall Prevention: safety round/check completed  Taken 6/17/2024 1400 by Kaylee Pa RN  Safety Promotion/Fall Prevention: safety round/check completed  Taken 6/17/2024 1200 by Kaylee Pa RN  Safety Promotion/Fall Prevention: safety round/check completed  Taken 6/17/2024 1000 by Kaylee Pa RN  Safety Promotion/Fall Prevention: safety round/check completed  Taken 6/17/2024 0800 by Kaylee Pa RN  Safety Promotion/Fall Prevention:   safety round/check completed   nonskid shoes/slippers when out of bed   fall prevention program maintained   clutter free environment maintained   assistive device/personal items within reach     Problem: Coping Ineffective (Oncology Care)  Goal: Effective Coping  Intervention: Support and Enhance Coping Strategies  Recent Flowsheet Documentation  Taken 6/17/2024 0800 by Kaylee Pa RN  Supportive Measures: active listening utilized  Family/Support System Care: self-care encouraged     Problem: Fatigue (Oncology Care)  Goal: Improved Activity Tolerance  Intervention: Promote Improved Energy  Recent Flowsheet Documentation  Taken 6/17/2024 0800 by Kaylee Pa RN  Activity Management: activity minimized     Problem: Pain Acute (Oncology Care)  Goal: Optimal Pain Control  Intervention: Prevent or Manage  Pain  Recent Flowsheet Documentation  Taken 6/17/2024 0800 by Kaylee Pa, RN  Medication Review/Management: medications reviewed  Intervention: Optimize Psychosocial Wellbeing  Recent Flowsheet Documentation  Taken 6/17/2024 0800 by Kaylee Pa, RN  Supportive Measures: active listening utilized  Diversional Activities:   television   smartphone   Goal Outcome Evaluation:   VSS throughout shift, PERRLA, no acute neuro changes noted, BS elevated throughout shift, insulin admin as directed. Call light and personal items in reach.

## 2024-06-18 ENCOUNTER — APPOINTMENT (OUTPATIENT)
Dept: CT IMAGING | Facility: HOSPITAL | Age: 59
DRG: 820 | End: 2024-06-18
Payer: COMMERCIAL

## 2024-06-18 LAB
ANION GAP SERPL CALCULATED.3IONS-SCNC: 11.7 MMOL/L (ref 5–15)
BUN SERPL-MCNC: 23 MG/DL (ref 8–23)
BUN/CREAT SERPL: 26.4 (ref 7–25)
CALCIUM SPEC-SCNC: 8.7 MG/DL (ref 8.6–10.5)
CHLORIDE SERPL-SCNC: 108 MMOL/L (ref 98–107)
CO2 SERPL-SCNC: 21.3 MMOL/L (ref 22–29)
CREAT SERPL-MCNC: 0.87 MG/DL (ref 0.76–1.27)
EGFRCR SERPLBLD CKD-EPI 2021: 98.8 ML/MIN/1.73
GLUCOSE BLDC GLUCOMTR-MCNC: 271 MG/DL (ref 70–130)
GLUCOSE BLDC GLUCOMTR-MCNC: 290 MG/DL (ref 70–130)
GLUCOSE BLDC GLUCOMTR-MCNC: 339 MG/DL (ref 70–130)
GLUCOSE BLDC GLUCOMTR-MCNC: 393 MG/DL (ref 70–130)
GLUCOSE SERPL-MCNC: 306 MG/DL (ref 65–99)
POTASSIUM SERPL-SCNC: 3.8 MMOL/L (ref 3.5–5.2)
SODIUM SERPL-SCNC: 141 MMOL/L (ref 136–145)

## 2024-06-18 PROCEDURE — 70450 CT HEAD/BRAIN W/O DYE: CPT

## 2024-06-18 PROCEDURE — 63710000001 INSULIN LISPRO (HUMAN) PER 5 UNITS: Performed by: HOSPITALIST

## 2024-06-18 PROCEDURE — 63710000001 INSULIN GLARGINE PER 5 UNITS: Performed by: HOSPITALIST

## 2024-06-18 PROCEDURE — 63710000001 INSULIN LISPRO (HUMAN) PER 5 UNITS: Performed by: INTERNAL MEDICINE

## 2024-06-18 PROCEDURE — 82948 REAGENT STRIP/BLOOD GLUCOSE: CPT

## 2024-06-18 PROCEDURE — 80048 BASIC METABOLIC PNL TOTAL CA: CPT | Performed by: HOSPITALIST

## 2024-06-18 PROCEDURE — 99231 SBSQ HOSP IP/OBS SF/LOW 25: CPT | Performed by: NURSE PRACTITIONER

## 2024-06-18 PROCEDURE — 25010000002 DEXAMETHASONE PER 1 MG: Performed by: INTERNAL MEDICINE

## 2024-06-18 PROCEDURE — 25010000002 THIAMINE HCL 200 MG/2ML SOLUTION: Performed by: INTERNAL MEDICINE

## 2024-06-18 PROCEDURE — 25010000002 LEVETRIRACETAM PER 10 MG: Performed by: INTERNAL MEDICINE

## 2024-06-18 RX ORDER — HYDROCODONE BITARTRATE AND ACETAMINOPHEN 5; 325 MG/1; MG/1
1 TABLET ORAL EVERY 4 HOURS PRN
Status: DISCONTINUED | OUTPATIENT
Start: 2024-06-18 | End: 2024-06-19 | Stop reason: SDUPTHER

## 2024-06-18 RX ORDER — INSULIN LISPRO 100 [IU]/ML
3-14 INJECTION, SOLUTION INTRAVENOUS; SUBCUTANEOUS
Status: DISCONTINUED | OUTPATIENT
Start: 2024-06-18 | End: 2024-06-24 | Stop reason: HOSPADM

## 2024-06-18 RX ORDER — INSULIN LISPRO 100 [IU]/ML
5 INJECTION, SOLUTION INTRAVENOUS; SUBCUTANEOUS
Status: DISCONTINUED | OUTPATIENT
Start: 2024-06-18 | End: 2024-06-19

## 2024-06-18 RX ADMIN — INSULIN LISPRO 8 UNITS: 100 INJECTION, SOLUTION INTRAVENOUS; SUBCUTANEOUS at 12:18

## 2024-06-18 RX ADMIN — SODIUM BICARBONATE 650 MG: 650 TABLET, ORALLY DISINTEGRATING ORAL at 08:19

## 2024-06-18 RX ADMIN — HYDROCODONE BITARTRATE AND ACETAMINOPHEN 1 TABLET: 5; 325 TABLET ORAL at 10:48

## 2024-06-18 RX ADMIN — DEXAMETHASONE SODIUM PHOSPHATE 4 MG: 4 INJECTION, SOLUTION INTRA-ARTICULAR; INTRALESIONAL; INTRAMUSCULAR; INTRAVENOUS; SOFT TISSUE at 03:39

## 2024-06-18 RX ADMIN — SODIUM BICARBONATE 650 MG: 650 TABLET, ORALLY DISINTEGRATING ORAL at 20:25

## 2024-06-18 RX ADMIN — LEVETIRACETAM 500 MG: 500 INJECTION, SOLUTION INTRAVENOUS at 20:24

## 2024-06-18 RX ADMIN — SODIUM BICARBONATE 650 MG: 650 TABLET, ORALLY DISINTEGRATING ORAL at 17:43

## 2024-06-18 RX ADMIN — INSULIN GLARGINE 15 UNITS: 100 INJECTION, SOLUTION SUBCUTANEOUS at 08:19

## 2024-06-18 RX ADMIN — HYDROCHLOROTHIAZIDE 12.5 MG: 12.5 TABLET ORAL at 08:19

## 2024-06-18 RX ADMIN — Medication 10 ML: at 20:26

## 2024-06-18 RX ADMIN — DEXAMETHASONE SODIUM PHOSPHATE 4 MG: 4 INJECTION, SOLUTION INTRA-ARTICULAR; INTRALESIONAL; INTRAMUSCULAR; INTRAVENOUS; SOFT TISSUE at 21:02

## 2024-06-18 RX ADMIN — INSULIN LISPRO 6 UNITS: 100 INJECTION, SOLUTION INTRAVENOUS; SUBCUTANEOUS at 08:19

## 2024-06-18 RX ADMIN — INSULIN LISPRO 10 UNITS: 100 INJECTION, SOLUTION INTRAVENOUS; SUBCUTANEOUS at 20:24

## 2024-06-18 RX ADMIN — ACETAMINOPHEN 325MG 650 MG: 325 TABLET ORAL at 08:26

## 2024-06-18 RX ADMIN — INSULIN LISPRO 12 UNITS: 100 INJECTION, SOLUTION INTRAVENOUS; SUBCUTANEOUS at 17:43

## 2024-06-18 RX ADMIN — HYDROCODONE BITARTRATE AND ACETAMINOPHEN 1 TABLET: 5; 325 TABLET ORAL at 20:25

## 2024-06-18 RX ADMIN — INSULIN LISPRO 5 UNITS: 100 INJECTION, SOLUTION INTRAVENOUS; SUBCUTANEOUS at 17:43

## 2024-06-18 RX ADMIN — THIAMINE HYDROCHLORIDE 100 MG: 100 INJECTION, SOLUTION INTRAMUSCULAR; INTRAVENOUS at 08:19

## 2024-06-18 RX ADMIN — INSULIN LISPRO 5 UNITS: 100 INJECTION, SOLUTION INTRAVENOUS; SUBCUTANEOUS at 12:17

## 2024-06-18 RX ADMIN — DEXAMETHASONE SODIUM PHOSPHATE 4 MG: 4 INJECTION, SOLUTION INTRA-ARTICULAR; INTRALESIONAL; INTRAMUSCULAR; INTRAVENOUS; SOFT TISSUE at 09:05

## 2024-06-18 RX ADMIN — Medication 10 ML: at 08:20

## 2024-06-18 RX ADMIN — DEXAMETHASONE SODIUM PHOSPHATE 4 MG: 4 INJECTION, SOLUTION INTRA-ARTICULAR; INTRALESIONAL; INTRAMUSCULAR; INTRAVENOUS; SOFT TISSUE at 17:44

## 2024-06-18 RX ADMIN — ZOLPIDEM TARTRATE 10 MG: 5 TABLET, FILM COATED ORAL at 20:25

## 2024-06-18 RX ADMIN — LOSARTAN POTASSIUM 100 MG: 100 TABLET, FILM COATED ORAL at 08:19

## 2024-06-18 RX ADMIN — LEVETIRACETAM 500 MG: 500 INJECTION, SOLUTION INTRAVENOUS at 08:19

## 2024-06-18 NOTE — PROGRESS NOTES
[facility] NEUROSURGERY INTRACRANIAL PROGRESS NOTE    PATIENT IDENTIFICATION:   Name:  Iain Christina      MRN:  [id number]     58 y.o.  male               Cc: Large right frontal brain lesion with midline shift      Subjective     Interval History: No significant overnight events.  Patient complaining of headache.      Objective     Vital signs in last 24 hours:  Temp:  [97.2 °F (36.2 °C)-98.5 °F (36.9 °C)] 97.2 °F (36.2 °C)  Heart Rate:  [67-73] 68  Resp:  [18] 18  BP: (132-144)/(72-96) 132/72      Intake/Output this shift:  No intake/output data recorded.      Intake/Output last 3 shifts:  I/O last 3 completed shifts:  In: 240 [P.O.:240]  Out: 600 [Urine:600]    LABS:  Results from last 7 days   Lab Units 06/17/24  0719 06/16/24  0511 06/15/24  1033   WBC 10*3/mm3 13.92* 8.20 6.53   HEMOGLOBIN g/dL 15.5 15.1 15.4   HEMATOCRIT % 47.2 46.2 45.1   PLATELETS 10*3/mm3 156 154 148     Results from last 7 days   Lab Units 06/18/24  0443 06/17/24  0719 06/16/24  0511 06/15/24  1033   SODIUM mmol/L 141 142 142 145   POTASSIUM mmol/L 3.8 3.8 3.6 3.9   CHLORIDE mmol/L 108* 112* 112* 108*   CO2 mmol/L 21.3* 20.0* 17.6* 24.3   BUN mg/dL 23 20 21 19   CREATININE mg/dL 0.87 0.89 0.78 0.79   CALCIUM mg/dL 8.7 8.9 8.9 9.4   BILIRUBIN mg/dL  --   --  1.2 1.0   ALK PHOS U/L  --   --  109 149*   ALT (SGPT) U/L  --   --  195* 210*   AST (SGOT) U/L  --   --  71* 103*   GLUCOSE mg/dL 306* 300* 255* 161*          IMAGING STUDIES:  CT head:  Large right frontal brain mass with small areas of internal cystic change moderately large amount of surrounding edema and midline shift to the left.  Scan unchanged from 6/15/2024    I personally viewed the patient's CT head, it was also reviewed by and discussed with Dr Ace Patel reviewed/changed: Yes  Decadron 4 mg IV every 6 hours  Cozaar 100 mg p.o. every 24 hours  Hydrochlorothiazide 12.5 mg p.o. every 24 hours  Lantus 15 units SQ daily  Humalog 2-9 units SQ 4 times daily  Keppra  500 mg IV every 12 hours  Sodium bicarbonate 350 mg p.o. 3 times daily  Tylenol 650 mg p.o. every 4 hours as needed  Ambien 10 mg p.o. nightly as needed he has hydrocodone ordered      Physical Exam:    General:  Awake, alert & oriented x 3.  Speech clear with no aphasia  CN II:     Visual fileds full to confrontation  CN III, IV, VI:  EOM's intact, PERRL.  CN VII:  Facial movements are symmetric, no weakness  Motor:  Normal muscle strength, bulk and tone in bilateral upper and lower extremities.  No fasciculations, rigidity, spasticity, or abnormal movements  Sensation:  Normal to light touch bilaterally   Station & Gait: Patient up ad brandy.  Coordination:  Finger to nose intact bilaterally.  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  Dr Bello had discussion with patient yesterday regarding treatment options including excisional biopsy versus no treatment at all.  Pt has decided that he would like to move forward with surgery.  Dr Bello will take patient to OR 6/19 for right frontal crani for tumor biopsy.    PLAN:   -NPO p MN 6/19  -OR 6/19 for right frontal crani for tumor biopsy  -Continue Decardon  -Medical management per admitting service      I discussed the patient's findings and my recommendations with patient and Dr Bello    During patient visit, I utilized appropriate personal protective equipment including  gloves.  Appropriate PPE was worn during the entire visit.  Hand hygiene was completed before and after.      LOS: 3 days       Becka Mueller,  "APRN  6/18/2024  09:29 EDT    \"Dictated utilizing Dragon dictation\".      "

## 2024-06-18 NOTE — PLAN OF CARE
Problem: Adult Inpatient Plan of Care  Goal: Absence of Hospital-Acquired Illness or Injury  Intervention: Identify and Manage Fall Risk  Recent Flowsheet Documentation  Taken 6/18/2024 1800 by Kaylee Pa RN  Safety Promotion/Fall Prevention: safety round/check completed  Taken 6/18/2024 1600 by Kaylee Pa RN  Safety Promotion/Fall Prevention: safety round/check completed  Taken 6/18/2024 1400 by Kaylee Pa RN  Safety Promotion/Fall Prevention: safety round/check completed  Taken 6/18/2024 1200 by Kaylee Pa RN  Safety Promotion/Fall Prevention: safety round/check completed  Taken 6/18/2024 1000 by Kaylee Pa RN  Safety Promotion/Fall Prevention: safety round/check completed  Taken 6/18/2024 0819 by Kaylee Pa RN  Safety Promotion/Fall Prevention:   safety round/check completed   nonskid shoes/slippers when out of bed   fall prevention program maintained   clutter free environment maintained   assistive device/personal items within reach  Intervention: Prevent Skin Injury  Recent Flowsheet Documentation  Taken 6/18/2024 1800 by Kaylee Pa RN  Body Position: position changed independently  Taken 6/18/2024 0819 by Kaylee Pa RN  Body Position:   position changed independently   sitting up in bed  Skin Protection:   adhesive use limited   transparent dressing maintained  Intervention: Prevent and Manage VTE (Venous Thromboembolism) Risk  Recent Flowsheet Documentation  Taken 6/18/2024 0819 by Kaylee Pa RN  Activity Management: activity minimized  VTE Prevention/Management:   bilateral   sequential compression devices on  Range of Motion: active ROM (range of motion) encouraged  Intervention: Prevent Infection  Recent Flowsheet Documentation  Taken 6/18/2024 0819 by Kaylee Pa RN  Infection Prevention:   single patient room provided   hand hygiene promoted  Goal: Optimal Comfort and Wellbeing  Intervention: Monitor Pain and Promote Comfort  Recent Flowsheet Documentation  Taken 6/18/2024 0819 by  Kaylee Pa RN  Pain Management Interventions: see MAR  Intervention: Provide Person-Centered Care  Recent Flowsheet Documentation  Taken 6/18/2024 0819 by Kaylee Pa RN  Trust Relationship/Rapport:   care explained   questions answered   thoughts/feelings acknowledged     Problem: Fall Injury Risk  Goal: Absence of Fall and Fall-Related Injury  Intervention: Identify and Manage Contributors  Recent Flowsheet Documentation  Taken 6/18/2024 0819 by Kaylee Pa RN  Medication Review/Management: medications reviewed  Intervention: Promote Injury-Free Environment  Recent Flowsheet Documentation  Taken 6/18/2024 1800 by Kaylee Pa RN  Safety Promotion/Fall Prevention: safety round/check completed  Taken 6/18/2024 1600 by Kaylee Pa RN  Safety Promotion/Fall Prevention: safety round/check completed  Taken 6/18/2024 1400 by Kaylee Pa RN  Safety Promotion/Fall Prevention: safety round/check completed  Taken 6/18/2024 1200 by Kaylee Pa RN  Safety Promotion/Fall Prevention: safety round/check completed  Taken 6/18/2024 1000 by Kaylee Pa RN  Safety Promotion/Fall Prevention: safety round/check completed  Taken 6/18/2024 0819 by Kaylee Pa RN  Safety Promotion/Fall Prevention:   safety round/check completed   nonskid shoes/slippers when out of bed   fall prevention program maintained   clutter free environment maintained   assistive device/personal items within reach     Problem: Coping Ineffective (Oncology Care)  Goal: Effective Coping  Intervention: Support and Enhance Coping Strategies  Recent Flowsheet Documentation  Taken 6/18/2024 0819 by Kaylee Pa RN  Supportive Measures: active listening utilized  Environmental Support: calm environment promoted  Family/Support System Care: self-care encouraged     Problem: Fatigue (Oncology Care)  Goal: Improved Activity Tolerance  Intervention: Promote Improved Energy  Recent Flowsheet Documentation  Taken 6/18/2024 0819 by Kaylee Pa RN  Activity Management:  activity minimized     Problem: Pain Acute (Oncology Care)  Goal: Optimal Pain Control  Intervention: Prevent or Manage Pain  Recent Flowsheet Documentation  Taken 6/18/2024 0819 by Kaylee Pa RN  Medication Review/Management: medications reviewed  Intervention: Develop Pain Management Plan  Recent Flowsheet Documentation  Taken 6/18/2024 0819 by Kaylee Pa RN  Pain Management Interventions: see MAR  Intervention: Optimize Psychosocial Wellbeing  Recent Flowsheet Documentation  Taken 6/18/2024 0819 by Kaylee Pa RN  Supportive Measures: active listening utilized  Diversional Activities:   television   smartphone   Goal Outcome Evaluation:   No changes noted this shift. PRN pain meds admin as directed.

## 2024-06-18 NOTE — PLAN OF CARE
Problem: Adult Inpatient Plan of Care  Goal: Plan of Care Review  Outcome: Ongoing, Progressing  Flowsheets (Taken 6/18/2024 0219)  Plan of Care Reviewed With: patient     Problem: Fall Injury Risk  Goal: Absence of Fall and Fall-Related Injury  Outcome: Ongoing, Progressing  Intervention: Identify and Manage Contributors  Recent Flowsheet Documentation  Taken 6/18/2024 0149 by Julieta Khan RN  Medication Review/Management: medications reviewed  Taken 6/17/2024 2351 by Julieta Khan RN  Medication Review/Management: medications reviewed  Taken 6/17/2024 2200 by Julieta Khan RN  Medication Review/Management: medications reviewed  Taken 6/17/2024 1929 by Julieta Khan RN  Medication Review/Management: medications reviewed  Intervention: Promote Injury-Free Environment  Recent Flowsheet Documentation  Taken 6/18/2024 0149 by Julieta Khan RN  Safety Promotion/Fall Prevention:   activity supervised   assistive device/personal items within reach   clutter free environment maintained   safety round/check completed  Taken 6/17/2024 2351 by Julieta Khan RN  Safety Promotion/Fall Prevention:   activity supervised   assistive device/personal items within reach   clutter free environment maintained   safety round/check completed  Taken 6/17/2024 2200 by Julieta Khan RN  Safety Promotion/Fall Prevention:   activity supervised   assistive device/personal items within reach   clutter free environment maintained   safety round/check completed  Taken 6/17/2024 1929 by Julieta Khan RN  Safety Promotion/Fall Prevention: safety round/check completed     Problem: Coping Ineffective (Oncology Care)  Goal: Effective Coping  Outcome: Ongoing, Progressing  Intervention: Support and Enhance Coping Strategies  Recent Flowsheet Documentation  Taken 6/17/2024 1929 by Julieta Khan RN  Supportive Measures: active listening utilized  Environmental Support: calm environment promoted  Family/Support System Care: self-care  encouraged     Problem: Fatigue (Oncology Care)  Goal: Improved Activity Tolerance  Outcome: Ongoing, Progressing  Intervention: Promote Improved Energy  Recent Flowsheet Documentation  Taken 6/17/2024 1929 by Julieta Khan RN  Sleep/Rest Enhancement: awakenings minimized     Problem: Oral Intake Altered (Oncology Care)  Goal: Optimal Oral Intake  Outcome: Ongoing, Progressing     Problem: Oral Mucositis (Oncology Care)  Goal: Improved Oral Mucous Membrane Integrity  Outcome: Ongoing, Progressing     Problem: Pain Acute (Oncology Care)  Goal: Optimal Pain Control  Outcome: Ongoing, Progressing  Intervention: Prevent or Manage Pain  Recent Flowsheet Documentation  Taken 6/18/2024 0149 by Julieta Khan RN  Medication Review/Management: medications reviewed  Taken 6/17/2024 2351 by Julieta Khan RN  Medication Review/Management: medications reviewed  Taken 6/17/2024 2200 by Julieta Khan RN  Medication Review/Management: medications reviewed  Taken 6/17/2024 1929 by Julieta Khan RN  Sensory Stimulation Regulation: care clustered  Sleep/Rest Enhancement: awakenings minimized  Medication Review/Management: medications reviewed  Intervention: Optimize Psychosocial Wellbeing  Recent Flowsheet Documentation  Taken 6/17/2024 1929 by Julieta Khan RN  Supportive Measures: active listening utilized  Diversional Activities: television   Goal Outcome Evaluation:  Plan of Care Reviewed With: patient

## 2024-06-18 NOTE — PROGRESS NOTES
Name: Iain Christina ADMIT: 6/15/2024   : 1965  PCP: Scott Medina MD    MRN: [id number] LOS: 3 days   AGE/SEX: 58 y.o. male  ROOM: [facility]     Subjective   Subjective   Slept well.  Planes of headache.       Objective   Objective   Vital Signs  Temp:  [97.2 °F (36.2 °C)-98.5 °F (36.9 °C)] 97.2 °F (36.2 °C)  Heart Rate:  [67-73] 68  Resp:  [18] 18  BP: (132-144)/(72-96) 132/72  SpO2:  [95 %-100 %] 97 %  on   ;   Device (Oxygen Therapy): room air  Body mass index is 31.51 kg/m².  Physical Exam  Vitals and nursing note reviewed.   Constitutional:       General: He is not in acute distress.  Cardiovascular:      Rate and Rhythm: Normal rate and regular rhythm.   Pulmonary:      Effort: Pulmonary effort is normal.      Breath sounds: Normal breath sounds.   Abdominal:      General: Bowel sounds are normal.      Palpations: Abdomen is soft.      Tenderness: There is no abdominal tenderness.   Musculoskeletal:         General: No swelling.   Skin:     General: Skin is warm and dry.   Neurological:      Mental Status: He is alert. Mental status is at baseline.       Results Review     I reviewed the patient's new clinical results.  Results from last 7 days   Lab Units 24  0719 24  0511 06/15/24  1033   WBC 10*3/mm3 13.92* 8.20 6.53   HEMOGLOBIN g/dL 15.5 15.1 15.4   PLATELETS 10*3/mm3 156 154 148     Results from last 7 days   Lab Units 24  0443 24  0719 24  0511 06/15/24  1033   SODIUM mmol/L 141 142 142 145   POTASSIUM mmol/L 3.8 3.8 3.6 3.9   CHLORIDE mmol/L 108* 112* 112* 108*   CO2 mmol/L 21.3* 20.0* 17.6* 24.3   BUN mg/dL 23 20 21 19   CREATININE mg/dL 0.87 0.89 0.78 0.79   GLUCOSE mg/dL 306* 300* 255* 161*   EGFR mL/min/1.73 98.8 98.1 102.1 101.7     Results from last 7 days   Lab Units 24  0511 06/15/24  1033   ALBUMIN g/dL 4.0 4.2   BILIRUBIN mg/dL 1.2 1.0   ALK PHOS U/L 109 149*   AST (SGOT) U/L 71* 103*   ALT (SGPT) U/L 195* 210*     Results from last  7 days   Lab Units 06/18/24  0443 06/17/24  0719 06/16/24  0511 06/15/24  1033   CALCIUM mg/dL 8.7 8.9 8.9 9.4   ALBUMIN g/dL  --   --  4.0 4.2   MAGNESIUM mg/dL  --   --   --  2.2     Results from last 7 days   Lab Units 06/15/24  1033   LACTATE mmol/L 1.5     Hemoglobin A1C   Date/Time Value Ref Range Status   06/16/2024 0511 6.60 (H) 4.80 - 5.60 % Final     Glucose   Date/Time Value Ref Range Status   06/18/2024 0706 271 (H) 70 - 130 mg/dL Final   06/17/2024 1906 361 (H) 70 - 130 mg/dL Final   06/17/2024 1557 236 (H) 70 - 130 mg/dL Final   06/17/2024 1112 253 (H) 70 - 130 mg/dL Final   06/17/2024 0719 285 (H) 70 - 130 mg/dL Final   06/16/2024 1939 357 (H) 70 - 130 mg/dL Final   06/16/2024 1633 272 (H) 70 - 130 mg/dL Final       CT Head Without Contrast    Result Date: 6/18/2024  1. Moderately large right frontal brain mass demonstrating some small areas of internal cystic change and moderately large amount of surrounding edema. There is midline shift to the left. 2. Overall the brain appears largely unchanged from the 06/15/2024 study. 3. No intracranial hemorrhage is seen.  Radiation dose reduction techniques were utilized, including automated exposure control and exposure modulation based on body size.       CT Chest With Contrast Diagnostic    Result Date: 6/16/2024  Impression: 1.  Borderline enlarged carley hepatis nodes. Given patient history of brain mass, findings are indeterminant and continued close interval follow-up with CT abdomen and 3 months is recommended to ensure stability and exclude possibility of metastatic disease. 2.  Other findings as above    This report was finalized on 6/16/2024 7:10 PM by Dr. Kingsley Salazar M.D on Workstation: [id number]      CT Abdomen Pelvis With & Without Contrast    Result Date: 6/16/2024  Impression: 1.  Borderline enlarged carley hepatis nodes. Given patient history of brain mass, findings are indeterminant and continued close interval follow-up with CT abdomen and 3  months is recommended to ensure stability and exclude possibility of metastatic disease. 2.  Other findings as above    This report was finalized on 6/16/2024 7:10 PM by Dr. Kingsley Salazar M.D on Workstation: [id number]       I have personally reviewed all medications:  Scheduled Medications  dexAMETHasone, 4 mg, Intravenous, Q6H  losartan, 100 mg, Oral, Q24H   And  hydroCHLOROthiazide, 12.5 mg, Oral, Q24H  insulin glargine, 15 Units, Subcutaneous, Daily  insulin lispro, 2-9 Units, Subcutaneous, 4x Daily AC & at Bedtime  levETIRAcetam, 500 mg, Intravenous, Q12H  sodium bicarbonate, 650 mg, Oral, TID  sodium chloride, 10 mL, Intravenous, Q12H  thiamine (B-1) IV, 100 mg, Intravenous, Daily    Infusions   Diet  Diet: Regular/House, Diabetic; Consistent Carbohydrate; Fluid Consistency: Thin (IDDSI 0)    I have personally reviewed:  [x]  Laboratory   [x]  Microbiology   [x]  Radiology   [x]  EKG/Telemetry  [x]  Cardiology/Vascular   []  Pathology    []  Records       Assessment/Plan     Active Hospital Problems    Diagnosis  POA    **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       58 y.o. 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    Note plans for over or tomorrow.  Blood sugars still uncontrolled and will increase insulin further.  For management Keppra to neurology.     Reassess LFTs in a.m.      SCDs for DVT prophylaxis.  Full code.  Discussed with patient.  Anticipate discharge home with family timing yet to be determined.  Expected Discharge Date: 6/21/2024; Expected Discharge Time:       Pb Gerber MD  [facility]  06/18/24  09:45 EDT

## 2024-06-19 ENCOUNTER — ANESTHESIA (OUTPATIENT)
Dept: PERIOP | Facility: HOSPITAL | Age: 59
End: 2024-06-19
Payer: COMMERCIAL

## 2024-06-19 ENCOUNTER — ANESTHESIA EVENT (OUTPATIENT)
Dept: PERIOP | Facility: HOSPITAL | Age: 59
End: 2024-06-19
Payer: COMMERCIAL

## 2024-06-19 ENCOUNTER — APPOINTMENT (OUTPATIENT)
Dept: CT IMAGING | Facility: HOSPITAL | Age: 59
DRG: 820 | End: 2024-06-19
Payer: COMMERCIAL

## 2024-06-19 LAB
ABO GROUP BLD: NORMAL
ALBUMIN SERPL-MCNC: 3.9 G/DL (ref 3.5–5.2)
ALBUMIN/GLOB SERPL: 1.3 G/DL
ALP SERPL-CCNC: 130 U/L (ref 39–117)
ALT SERPL W P-5'-P-CCNC: 221 U/L (ref 1–41)
ANION GAP SERPL CALCULATED.3IONS-SCNC: 11 MMOL/L (ref 5–15)
ANION GAP SERPL CALCULATED.3IONS-SCNC: 9 MMOL/L (ref 5–15)
AST SERPL-CCNC: 79 U/L (ref 1–40)
BASOPHILS # BLD AUTO: 0.03 10*3/MM3 (ref 0–0.2)
BASOPHILS NFR BLD AUTO: 0.3 % (ref 0–1.5)
BILIRUB SERPL-MCNC: 0.8 MG/DL (ref 0–1.2)
BLD GP AB SCN SERPL QL: NEGATIVE
BUN SERPL-MCNC: 22 MG/DL (ref 8–23)
BUN SERPL-MCNC: 23 MG/DL (ref 8–23)
BUN/CREAT SERPL: 26.8 (ref 7–25)
BUN/CREAT SERPL: 31.9 (ref 7–25)
CALCIUM SPEC-SCNC: 8.3 MG/DL (ref 8.6–10.5)
CALCIUM SPEC-SCNC: 8.5 MG/DL (ref 8.6–10.5)
CHLORIDE SERPL-SCNC: 108 MMOL/L (ref 98–107)
CHLORIDE SERPL-SCNC: 109 MMOL/L (ref 98–107)
CO2 SERPL-SCNC: 23 MMOL/L (ref 22–29)
CO2 SERPL-SCNC: 24 MMOL/L (ref 22–29)
CREAT SERPL-MCNC: 0.72 MG/DL (ref 0.76–1.27)
CREAT SERPL-MCNC: 0.82 MG/DL (ref 0.76–1.27)
DEPRECATED RDW RBC AUTO: 41.5 FL (ref 37–54)
EGFRCR SERPLBLD CKD-EPI 2021: 100.6 ML/MIN/1.73
EGFRCR SERPLBLD CKD-EPI 2021: 104.6 ML/MIN/1.73
EOSINOPHIL # BLD AUTO: 0 10*3/MM3 (ref 0–0.4)
EOSINOPHIL NFR BLD AUTO: 0 % (ref 0.3–6.2)
ERYTHROCYTE [DISTWIDTH] IN BLOOD BY AUTOMATED COUNT: 12.6 % (ref 12.3–15.4)
GLOBULIN UR ELPH-MCNC: 3 GM/DL
GLUCOSE BLDC GLUCOMTR-MCNC: 170 MG/DL (ref 70–130)
GLUCOSE BLDC GLUCOMTR-MCNC: 199 MG/DL (ref 70–130)
GLUCOSE BLDC GLUCOMTR-MCNC: 226 MG/DL (ref 70–130)
GLUCOSE BLDC GLUCOMTR-MCNC: 242 MG/DL (ref 70–130)
GLUCOSE SERPL-MCNC: 212 MG/DL (ref 65–99)
GLUCOSE SERPL-MCNC: 224 MG/DL (ref 65–99)
HCT VFR BLD AUTO: 46.2 % (ref 37.5–51)
HGB BLD-MCNC: 15.3 G/DL (ref 13–17.7)
IMM GRANULOCYTES # BLD AUTO: 0.25 10*3/MM3 (ref 0–0.05)
IMM GRANULOCYTES NFR BLD AUTO: 2.2 % (ref 0–0.5)
LYMPHOCYTES # BLD AUTO: 1.44 10*3/MM3 (ref 0.7–3.1)
LYMPHOCYTES NFR BLD AUTO: 12.8 % (ref 19.6–45.3)
MCH RBC QN AUTO: 29.5 PG (ref 26.6–33)
MCHC RBC AUTO-ENTMCNC: 33.1 G/DL (ref 31.5–35.7)
MCV RBC AUTO: 89.2 FL (ref 79–97)
MONOCYTES # BLD AUTO: 0.48 10*3/MM3 (ref 0.1–0.9)
MONOCYTES NFR BLD AUTO: 4.3 % (ref 5–12)
NEUTROPHILS NFR BLD AUTO: 80.4 % (ref 42.7–76)
NEUTROPHILS NFR BLD AUTO: 9.06 10*3/MM3 (ref 1.7–7)
NRBC BLD AUTO-RTO: 0 /100 WBC (ref 0–0.2)
PLATELET # BLD AUTO: 157 10*3/MM3 (ref 140–450)
PMV BLD AUTO: 11.5 FL (ref 6–12)
POTASSIUM SERPL-SCNC: 3.7 MMOL/L (ref 3.5–5.2)
POTASSIUM SERPL-SCNC: 4 MMOL/L (ref 3.5–5.2)
PROT SERPL-MCNC: 6.9 G/DL (ref 6–8.5)
RBC # BLD AUTO: 5.18 10*6/MM3 (ref 4.14–5.8)
RH BLD: POSITIVE
SODIUM SERPL-SCNC: 141 MMOL/L (ref 136–145)
SODIUM SERPL-SCNC: 143 MMOL/L (ref 136–145)
T&S EXPIRATION DATE: NORMAL
WBC NRBC COR # BLD AUTO: 11.26 10*3/MM3 (ref 3.4–10.8)

## 2024-06-19 PROCEDURE — 25010000002 ONDANSETRON PER 1 MG: Performed by: NURSE ANESTHETIST, CERTIFIED REGISTERED

## 2024-06-19 PROCEDURE — 61781 SCAN PROC CRANIAL INTRA: CPT | Performed by: NEUROLOGICAL SURGERY

## 2024-06-19 PROCEDURE — 88331 PATH CONSLTJ SURG 1 BLK 1SPC: CPT | Performed by: NEUROLOGICAL SURGERY

## 2024-06-19 PROCEDURE — 25010000002 VANCOMYCIN 1 G RECONSTITUTED SOLUTION 1 EACH VIAL: Performed by: NEUROLOGICAL SURGERY

## 2024-06-19 PROCEDURE — 25810000003 SODIUM CHLORIDE 0.9 % SOLUTION: Performed by: NURSE ANESTHETIST, CERTIFIED REGISTERED

## 2024-06-19 PROCEDURE — 25810000003 LACTATED RINGERS PER 1000 ML: Performed by: ANESTHESIOLOGY

## 2024-06-19 PROCEDURE — 61510 CRNEC TREPH EXC BRN TUM STTL: CPT | Performed by: SPECIALIST/TECHNOLOGIST, OTHER

## 2024-06-19 PROCEDURE — C1713 ANCHOR/SCREW BN/BN,TIS/BN: HCPCS | Performed by: NEUROLOGICAL SURGERY

## 2024-06-19 PROCEDURE — 88334 PATH CONSLTJ SURG CYTO XM EA: CPT | Performed by: NEUROLOGICAL SURGERY

## 2024-06-19 PROCEDURE — 86900 BLOOD TYPING SEROLOGIC ABO: CPT | Performed by: NURSE ANESTHETIST, CERTIFIED REGISTERED

## 2024-06-19 PROCEDURE — 61510 CRNEC TREPH EXC BRN TUM STTL: CPT | Performed by: NEUROLOGICAL SURGERY

## 2024-06-19 PROCEDURE — 86901 BLOOD TYPING SEROLOGIC RH(D): CPT | Performed by: NURSE ANESTHETIST, CERTIFIED REGISTERED

## 2024-06-19 PROCEDURE — 25010000002 HYDROMORPHONE 1 MG/ML SOLUTION: Performed by: NURSE ANESTHETIST, CERTIFIED REGISTERED

## 2024-06-19 PROCEDURE — 00B80ZZ EXCISION OF BASAL GANGLIA, OPEN APPROACH: ICD-10-PCS | Performed by: NEUROLOGICAL SURGERY

## 2024-06-19 PROCEDURE — 63710000001 INSULIN LISPRO (HUMAN) PER 5 UNITS: Performed by: NEUROLOGICAL SURGERY

## 2024-06-19 PROCEDURE — 25010000002 LEVETRIRACETAM PER 10 MG: Performed by: NEUROLOGICAL SURGERY

## 2024-06-19 PROCEDURE — 25010000002 FENTANYL CITRATE (PF) 50 MCG/ML SOLUTION: Performed by: NURSE ANESTHETIST, CERTIFIED REGISTERED

## 2024-06-19 PROCEDURE — 25010000002 LEVETRIRACETAM PER 10 MG: Performed by: INTERNAL MEDICINE

## 2024-06-19 PROCEDURE — 25010000002 SUGAMMADEX 200 MG/2ML SOLUTION: Performed by: NURSE ANESTHETIST, CERTIFIED REGISTERED

## 2024-06-19 PROCEDURE — 25010000002 DEXAMETHASONE PER 1 MG: Performed by: INTERNAL MEDICINE

## 2024-06-19 PROCEDURE — 25010000002 NICARDIPINE 2.5 MG/ML SOLUTION: Performed by: NURSE ANESTHETIST, CERTIFIED REGISTERED

## 2024-06-19 PROCEDURE — 25010000002 NICARDIPINE 2.5 MG/ML SOLUTION

## 2024-06-19 PROCEDURE — 25010000002 SODIUM CHLORIDE 0.9 % WITH KCL 20 MEQ 20-0.9 MEQ/L-% SOLUTION: Performed by: NEUROLOGICAL SURGERY

## 2024-06-19 PROCEDURE — 25010000002 MIDAZOLAM PER 1 MG: Performed by: ANESTHESIOLOGY

## 2024-06-19 PROCEDURE — 70450 CT HEAD/BRAIN W/O DYE: CPT

## 2024-06-19 PROCEDURE — 82948 REAGENT STRIP/BLOOD GLUCOSE: CPT

## 2024-06-19 PROCEDURE — 88360 TUMOR IMMUNOHISTOCHEM/MANUAL: CPT | Performed by: NEUROLOGICAL SURGERY

## 2024-06-19 PROCEDURE — 86850 RBC ANTIBODY SCREEN: CPT | Performed by: NURSE ANESTHETIST, CERTIFIED REGISTERED

## 2024-06-19 PROCEDURE — 88307 TISSUE EXAM BY PATHOLOGIST: CPT | Performed by: NEUROLOGICAL SURGERY

## 2024-06-19 PROCEDURE — 69990 MICROSURGERY ADD-ON: CPT | Performed by: NEUROLOGICAL SURGERY

## 2024-06-19 PROCEDURE — 25810000003 SODIUM CHLORIDE 0.9 % SOLUTION

## 2024-06-19 PROCEDURE — 25010000002 CEFAZOLIN PER 500 MG: Performed by: NEUROLOGICAL SURGERY

## 2024-06-19 PROCEDURE — 85025 COMPLETE CBC W/AUTO DIFF WBC: CPT | Performed by: NEUROLOGICAL SURGERY

## 2024-06-19 PROCEDURE — 25010000002 FENTANYL CITRATE (PF) 50 MCG/ML SOLUTION: Performed by: ANESTHESIOLOGY

## 2024-06-19 PROCEDURE — 25010000002 MORPHINE PER 10 MG: Performed by: NEUROLOGICAL SURGERY

## 2024-06-19 PROCEDURE — 25010000002 DEXAMETHASONE PER 1 MG: Performed by: NEUROLOGICAL SURGERY

## 2024-06-19 PROCEDURE — 88341 IMHCHEM/IMCYTCHM EA ADD ANTB: CPT | Performed by: NEUROLOGICAL SURGERY

## 2024-06-19 PROCEDURE — C1889 IMPLANT/INSERT DEVICE, NOC: HCPCS | Performed by: NEUROLOGICAL SURGERY

## 2024-06-19 PROCEDURE — 25010000002 HYDRALAZINE PER 20 MG: Performed by: NURSE ANESTHETIST, CERTIFIED REGISTERED

## 2024-06-19 PROCEDURE — 25010000002 PROPOFOL 200 MG/20ML EMULSION: Performed by: NURSE ANESTHETIST, CERTIFIED REGISTERED

## 2024-06-19 PROCEDURE — 88342 IMHCHEM/IMCYTCHM 1ST ANTB: CPT | Performed by: NEUROLOGICAL SURGERY

## 2024-06-19 PROCEDURE — 80053 COMPREHEN METABOLIC PANEL: CPT | Performed by: HOSPITALIST

## 2024-06-19 DEVICE — SSC BONE WAX
Type: IMPLANTABLE DEVICE | Site: BRAIN | Status: FUNCTIONAL
Brand: SSC BONE WAX

## 2024-06-19 DEVICE — SCRW CRS/DRV DRL FREE MICRO TI 1.5X4MM: Type: IMPLANTABLE DEVICE | Site: BRAIN | Status: FUNCTIONAL

## 2024-06-19 DEVICE — FLOSEAL WITH RECOTHROM - 5ML
Type: IMPLANTABLE DEVICE | Site: BRAIN | Status: FUNCTIONAL
Brand: FLOSEAL HEMOSTATIC MATRIX

## 2024-06-19 DEVICE — PLT CVR LEVELONE BURHL LP CONTRL .3X12X1.5MM: Type: IMPLANTABLE DEVICE | Site: BRAIN | Status: FUNCTIONAL

## 2024-06-19 DEVICE — IMPLANTABLE DEVICE
Type: IMPLANTABLE DEVICE | Site: BRAIN | Status: FUNCTIONAL
Brand: SURGIFOAM® ABSORBABLE GELATIN SPONGE, U.S.P.

## 2024-06-19 DEVICE — DURAGEN® PLUS DURAL REGENERATION MATRIX, 2 IN X 2 IN (5 CM X 5 CM)
Type: IMPLANTABLE DEVICE | Site: BRAIN | Status: FUNCTIONAL
Brand: DURAGEN® PLUS

## 2024-06-19 DEVICE — ABSORBABLE HEMOSTAT (OXIDIZED REGENERATED CELLULOSE, U.S.P.)
Type: IMPLANTABLE DEVICE | Site: BRAIN | Status: FUNCTIONAL
Brand: SURGICEL FIBRILLAR

## 2024-06-19 RX ORDER — IPRATROPIUM BROMIDE AND ALBUTEROL SULFATE 2.5; .5 MG/3ML; MG/3ML
3 SOLUTION RESPIRATORY (INHALATION) ONCE AS NEEDED
Status: DISCONTINUED | OUTPATIENT
Start: 2024-06-19 | End: 2024-06-19 | Stop reason: HOSPADM

## 2024-06-19 RX ORDER — PROMETHAZINE HYDROCHLORIDE 25 MG/1
25 TABLET ORAL ONCE AS NEEDED
Status: DISCONTINUED | OUTPATIENT
Start: 2024-06-19 | End: 2024-06-19 | Stop reason: HOSPADM

## 2024-06-19 RX ORDER — ONDANSETRON 2 MG/ML
4 INJECTION INTRAMUSCULAR; INTRAVENOUS EVERY 6 HOURS PRN
Status: DISCONTINUED | OUTPATIENT
Start: 2024-06-19 | End: 2024-06-24 | Stop reason: HOSPADM

## 2024-06-19 RX ORDER — POLYETHYLENE GLYCOL 3350 17 G/17G
17 POWDER, FOR SOLUTION ORAL DAILY PRN
Status: DISCONTINUED | OUTPATIENT
Start: 2024-06-19 | End: 2024-06-24 | Stop reason: HOSPADM

## 2024-06-19 RX ORDER — HYDROMORPHONE HYDROCHLORIDE 1 MG/ML
0.5 INJECTION, SOLUTION INTRAMUSCULAR; INTRAVENOUS; SUBCUTANEOUS
Status: DISCONTINUED | OUTPATIENT
Start: 2024-06-19 | End: 2024-06-19 | Stop reason: HOSPADM

## 2024-06-19 RX ORDER — PROPOFOL 10 MG/ML
INJECTION, EMULSION INTRAVENOUS AS NEEDED
Status: DISCONTINUED | OUTPATIENT
Start: 2024-06-19 | End: 2024-06-19 | Stop reason: SURG

## 2024-06-19 RX ORDER — HYDRALAZINE HYDROCHLORIDE 20 MG/ML
5 INJECTION INTRAMUSCULAR; INTRAVENOUS
Status: DISCONTINUED | OUTPATIENT
Start: 2024-06-19 | End: 2024-06-19 | Stop reason: HOSPADM

## 2024-06-19 RX ORDER — FENTANYL CITRATE 50 UG/ML
50 INJECTION, SOLUTION INTRAMUSCULAR; INTRAVENOUS ONCE AS NEEDED
Status: COMPLETED | OUTPATIENT
Start: 2024-06-19 | End: 2024-06-19

## 2024-06-19 RX ORDER — HYDRALAZINE HYDROCHLORIDE 20 MG/ML
INJECTION INTRAMUSCULAR; INTRAVENOUS AS NEEDED
Status: DISCONTINUED | OUTPATIENT
Start: 2024-06-19 | End: 2024-06-19 | Stop reason: SURG

## 2024-06-19 RX ORDER — OXYCODONE AND ACETAMINOPHEN 7.5; 325 MG/1; MG/1
1 TABLET ORAL EVERY 4 HOURS PRN
Status: DISCONTINUED | OUTPATIENT
Start: 2024-06-19 | End: 2024-06-19 | Stop reason: HOSPADM

## 2024-06-19 RX ORDER — SODIUM CHLORIDE 0.9 % (FLUSH) 0.9 %
3-10 SYRINGE (ML) INJECTION AS NEEDED
Status: DISCONTINUED | OUTPATIENT
Start: 2024-06-19 | End: 2024-06-19 | Stop reason: HOSPADM

## 2024-06-19 RX ORDER — BISACODYL 10 MG
10 SUPPOSITORY, RECTAL RECTAL DAILY PRN
Status: DISCONTINUED | OUTPATIENT
Start: 2024-06-19 | End: 2024-06-24 | Stop reason: HOSPADM

## 2024-06-19 RX ORDER — ACETAMINOPHEN 325 MG/1
650 TABLET ORAL EVERY 4 HOURS PRN
Status: DISCONTINUED | OUTPATIENT
Start: 2024-06-19 | End: 2024-06-24 | Stop reason: HOSPADM

## 2024-06-19 RX ORDER — INSULIN GLARGINE 100 [IU]/ML
30 INJECTION, SOLUTION SUBCUTANEOUS DAILY
Status: DISCONTINUED | OUTPATIENT
Start: 2024-06-19 | End: 2024-06-24 | Stop reason: HOSPADM

## 2024-06-19 RX ORDER — DROPERIDOL 2.5 MG/ML
0.62 INJECTION, SOLUTION INTRAMUSCULAR; INTRAVENOUS
Status: DISCONTINUED | OUTPATIENT
Start: 2024-06-19 | End: 2024-06-19 | Stop reason: HOSPADM

## 2024-06-19 RX ORDER — ROCURONIUM BROMIDE 10 MG/ML
INJECTION, SOLUTION INTRAVENOUS AS NEEDED
Status: DISCONTINUED | OUTPATIENT
Start: 2024-06-19 | End: 2024-06-19 | Stop reason: SURG

## 2024-06-19 RX ORDER — DIPHENHYDRAMINE HYDROCHLORIDE 50 MG/ML
12.5 INJECTION INTRAMUSCULAR; INTRAVENOUS
Status: DISCONTINUED | OUTPATIENT
Start: 2024-06-19 | End: 2024-06-19 | Stop reason: HOSPADM

## 2024-06-19 RX ORDER — ONDANSETRON 2 MG/ML
4 INJECTION INTRAMUSCULAR; INTRAVENOUS ONCE AS NEEDED
Status: DISCONTINUED | OUTPATIENT
Start: 2024-06-19 | End: 2024-06-19 | Stop reason: HOSPADM

## 2024-06-19 RX ORDER — NICARDIPINE HYDROCHLORIDE 2.5 MG/ML
INJECTION INTRAVENOUS CONTINUOUS PRN
Status: DISCONTINUED | OUTPATIENT
Start: 2024-06-19 | End: 2024-06-19 | Stop reason: SURG

## 2024-06-19 RX ORDER — SODIUM CHLORIDE, SODIUM LACTATE, POTASSIUM CHLORIDE, CALCIUM CHLORIDE 600; 310; 30; 20 MG/100ML; MG/100ML; MG/100ML; MG/100ML
9 INJECTION, SOLUTION INTRAVENOUS CONTINUOUS
Status: DISCONTINUED | OUTPATIENT
Start: 2024-06-19 | End: 2024-06-19

## 2024-06-19 RX ORDER — FENTANYL CITRATE 50 UG/ML
INJECTION, SOLUTION INTRAMUSCULAR; INTRAVENOUS AS NEEDED
Status: DISCONTINUED | OUTPATIENT
Start: 2024-06-19 | End: 2024-06-19 | Stop reason: SURG

## 2024-06-19 RX ORDER — DEXMEDETOMIDINE HYDROCHLORIDE 4 UG/ML
INJECTION, SOLUTION INTRAVENOUS CONTINUOUS PRN
Status: DISCONTINUED | OUTPATIENT
Start: 2024-06-19 | End: 2024-06-19 | Stop reason: SURG

## 2024-06-19 RX ORDER — ACETAMINOPHEN 160 MG/5ML
650 SOLUTION ORAL EVERY 4 HOURS PRN
Status: DISCONTINUED | OUTPATIENT
Start: 2024-06-19 | End: 2024-06-24 | Stop reason: HOSPADM

## 2024-06-19 RX ORDER — MIDAZOLAM HYDROCHLORIDE 1 MG/ML
1 INJECTION INTRAMUSCULAR; INTRAVENOUS
Status: COMPLETED | OUTPATIENT
Start: 2024-06-19 | End: 2024-06-19

## 2024-06-19 RX ORDER — NALOXONE HCL 0.4 MG/ML
0.2 VIAL (ML) INJECTION AS NEEDED
Status: DISCONTINUED | OUTPATIENT
Start: 2024-06-19 | End: 2024-06-19 | Stop reason: HOSPADM

## 2024-06-19 RX ORDER — NALOXONE HCL 0.4 MG/ML
0.4 VIAL (ML) INJECTION
Status: DISCONTINUED | OUTPATIENT
Start: 2024-06-19 | End: 2024-06-24 | Stop reason: HOSPADM

## 2024-06-19 RX ORDER — MORPHINE SULFATE 2 MG/ML
1 INJECTION, SOLUTION INTRAMUSCULAR; INTRAVENOUS
Status: DISCONTINUED | OUTPATIENT
Start: 2024-06-19 | End: 2024-06-24 | Stop reason: HOSPADM

## 2024-06-19 RX ORDER — HYDROCODONE BITARTRATE AND ACETAMINOPHEN 5; 325 MG/1; MG/1
1 TABLET ORAL ONCE AS NEEDED
Status: COMPLETED | OUTPATIENT
Start: 2024-06-19 | End: 2024-06-19

## 2024-06-19 RX ORDER — HYDROMORPHONE HYDROCHLORIDE 1 MG/ML
0.25 INJECTION, SOLUTION INTRAMUSCULAR; INTRAVENOUS; SUBCUTANEOUS
Status: DISCONTINUED | OUTPATIENT
Start: 2024-06-19 | End: 2024-06-19 | Stop reason: HOSPADM

## 2024-06-19 RX ORDER — AMOXICILLIN 250 MG
2 CAPSULE ORAL 2 TIMES DAILY PRN
Status: DISCONTINUED | OUTPATIENT
Start: 2024-06-19 | End: 2024-06-24 | Stop reason: HOSPADM

## 2024-06-19 RX ORDER — BISACODYL 5 MG/1
5 TABLET, DELAYED RELEASE ORAL DAILY PRN
Status: DISCONTINUED | OUTPATIENT
Start: 2024-06-19 | End: 2024-06-24 | Stop reason: HOSPADM

## 2024-06-19 RX ORDER — MAGNESIUM HYDROXIDE 1200 MG/15ML
LIQUID ORAL AS NEEDED
Status: DISCONTINUED | OUTPATIENT
Start: 2024-06-19 | End: 2024-06-19 | Stop reason: HOSPADM

## 2024-06-19 RX ORDER — INSULIN LISPRO 100 [IU]/ML
10 INJECTION, SOLUTION INTRAVENOUS; SUBCUTANEOUS
Status: DISCONTINUED | OUTPATIENT
Start: 2024-06-19 | End: 2024-06-24 | Stop reason: HOSPADM

## 2024-06-19 RX ORDER — ACETAMINOPHEN 650 MG/1
650 SUPPOSITORY RECTAL EVERY 4 HOURS PRN
Status: DISCONTINUED | OUTPATIENT
Start: 2024-06-19 | End: 2024-06-24 | Stop reason: HOSPADM

## 2024-06-19 RX ORDER — SODIUM CHLORIDE, SODIUM ACETATE ANHYDROUS, SODIUM GLUCONATE, POTASSIUM CHLORIDE, AND MAGNESIUM CHLORIDE 526; 222; 502; 37; 30 MG/100ML; MG/100ML; MG/100ML; MG/100ML; MG/100ML
IRRIGANT IRRIGATION AS NEEDED
Status: DISCONTINUED | OUTPATIENT
Start: 2024-06-19 | End: 2024-06-19 | Stop reason: HOSPADM

## 2024-06-19 RX ORDER — LABETALOL HYDROCHLORIDE 5 MG/ML
5 INJECTION, SOLUTION INTRAVENOUS
Status: DISCONTINUED | OUTPATIENT
Start: 2024-06-19 | End: 2024-06-19 | Stop reason: HOSPADM

## 2024-06-19 RX ORDER — EPHEDRINE SULFATE 50 MG/ML
5 INJECTION, SOLUTION INTRAVENOUS ONCE AS NEEDED
Status: DISCONTINUED | OUTPATIENT
Start: 2024-06-19 | End: 2024-06-19 | Stop reason: HOSPADM

## 2024-06-19 RX ORDER — ONDANSETRON 4 MG/1
4 TABLET, ORALLY DISINTEGRATING ORAL EVERY 6 HOURS PRN
Status: DISCONTINUED | OUTPATIENT
Start: 2024-06-19 | End: 2024-06-24 | Stop reason: HOSPADM

## 2024-06-19 RX ORDER — LIDOCAINE HYDROCHLORIDE 10 MG/ML
0.5 INJECTION, SOLUTION INFILTRATION; PERINEURAL ONCE AS NEEDED
Status: DISCONTINUED | OUTPATIENT
Start: 2024-06-19 | End: 2024-06-19 | Stop reason: HOSPADM

## 2024-06-19 RX ORDER — PROMETHAZINE HYDROCHLORIDE 25 MG/1
25 SUPPOSITORY RECTAL ONCE AS NEEDED
Status: DISCONTINUED | OUTPATIENT
Start: 2024-06-19 | End: 2024-06-19 | Stop reason: HOSPADM

## 2024-06-19 RX ORDER — SODIUM CHLORIDE AND POTASSIUM CHLORIDE 150; 900 MG/100ML; MG/100ML
100 INJECTION, SOLUTION INTRAVENOUS CONTINUOUS
Status: DISCONTINUED | OUTPATIENT
Start: 2024-06-19 | End: 2024-06-22

## 2024-06-19 RX ORDER — HYDROCODONE BITARTRATE AND ACETAMINOPHEN 5; 325 MG/1; MG/1
1 TABLET ORAL EVERY 4 HOURS PRN
Status: DISCONTINUED | OUTPATIENT
Start: 2024-06-19 | End: 2024-06-24 | Stop reason: HOSPADM

## 2024-06-19 RX ORDER — FLUMAZENIL 0.1 MG/ML
0.2 INJECTION INTRAVENOUS AS NEEDED
Status: DISCONTINUED | OUTPATIENT
Start: 2024-06-19 | End: 2024-06-19 | Stop reason: HOSPADM

## 2024-06-19 RX ORDER — ONDANSETRON 2 MG/ML
INJECTION INTRAMUSCULAR; INTRAVENOUS AS NEEDED
Status: DISCONTINUED | OUTPATIENT
Start: 2024-06-19 | End: 2024-06-19 | Stop reason: SURG

## 2024-06-19 RX ORDER — FAMOTIDINE 10 MG/ML
20 INJECTION, SOLUTION INTRAVENOUS ONCE
Status: COMPLETED | OUTPATIENT
Start: 2024-06-19 | End: 2024-06-19

## 2024-06-19 RX ORDER — SODIUM CHLORIDE 0.9 % (FLUSH) 0.9 %
3 SYRINGE (ML) INJECTION EVERY 12 HOURS SCHEDULED
Status: DISCONTINUED | OUTPATIENT
Start: 2024-06-19 | End: 2024-06-19 | Stop reason: HOSPADM

## 2024-06-19 RX ADMIN — NICARDIPINE HYDROCHLORIDE 2.5 MG/HR: 2.5 INJECTION INTRAVENOUS at 12:08

## 2024-06-19 RX ADMIN — LEVETIRACETAM 500 MG: 500 INJECTION, SOLUTION INTRAVENOUS at 21:25

## 2024-06-19 RX ADMIN — HYDRALAZINE HYDROCHLORIDE 2 MG: 20 INJECTION, SOLUTION INTRAMUSCULAR; INTRAVENOUS at 12:19

## 2024-06-19 RX ADMIN — DEXAMETHASONE SODIUM PHOSPHATE 4 MG: 4 INJECTION, SOLUTION INTRA-ARTICULAR; INTRALESIONAL; INTRAMUSCULAR; INTRAVENOUS; SOFT TISSUE at 04:12

## 2024-06-19 RX ADMIN — DEXAMETHASONE SODIUM PHOSPHATE 4 MG: 4 INJECTION, SOLUTION INTRA-ARTICULAR; INTRALESIONAL; INTRAMUSCULAR; INTRAVENOUS; SOFT TISSUE at 21:25

## 2024-06-19 RX ADMIN — MIDAZOLAM 1 MG: 1 INJECTION INTRAMUSCULAR; INTRAVENOUS at 09:05

## 2024-06-19 RX ADMIN — Medication 10 ML: at 21:25

## 2024-06-19 RX ADMIN — HYDRALAZINE HYDROCHLORIDE 2 MG: 20 INJECTION, SOLUTION INTRAMUSCULAR; INTRAVENOUS at 12:26

## 2024-06-19 RX ADMIN — ROCURONIUM BROMIDE 30 MG: 10 INJECTION, SOLUTION INTRAVENOUS at 10:40

## 2024-06-19 RX ADMIN — SODIUM CHLORIDE, POTASSIUM CHLORIDE, SODIUM LACTATE AND CALCIUM CHLORIDE 9 ML/HR: 600; 310; 30; 20 INJECTION, SOLUTION INTRAVENOUS at 09:09

## 2024-06-19 RX ADMIN — MORPHINE SULFATE 1 MG: 2 INJECTION, SOLUTION INTRAMUSCULAR; INTRAVENOUS at 16:36

## 2024-06-19 RX ADMIN — DEXMEDETOMIDINE HYDROCHLORIDE 0.3 MCG/KG/HR: 4 INJECTION, SOLUTION INTRAVENOUS at 11:05

## 2024-06-19 RX ADMIN — PROPOFOL 50 MG: 10 INJECTION, EMULSION INTRAVENOUS at 10:14

## 2024-06-19 RX ADMIN — FENTANYL CITRATE 50 MCG: 50 INJECTION, SOLUTION INTRAMUSCULAR; INTRAVENOUS at 09:06

## 2024-06-19 RX ADMIN — INSULIN LISPRO 5 UNITS: 100 INJECTION, SOLUTION INTRAVENOUS; SUBCUTANEOUS at 17:45

## 2024-06-19 RX ADMIN — ROCURONIUM BROMIDE 10 MG: 10 INJECTION, SOLUTION INTRAVENOUS at 11:11

## 2024-06-19 RX ADMIN — SODIUM CHLORIDE 5 MG/HR: 9 INJECTION, SOLUTION INTRAVENOUS at 20:19

## 2024-06-19 RX ADMIN — FENTANYL CITRATE 50 MCG: 50 INJECTION, SOLUTION INTRAMUSCULAR; INTRAVENOUS at 10:35

## 2024-06-19 RX ADMIN — MORPHINE SULFATE 1 MG: 2 INJECTION, SOLUTION INTRAMUSCULAR; INTRAVENOUS at 23:08

## 2024-06-19 RX ADMIN — CEFAZOLIN 2000 MG: 2 INJECTION, POWDER, FOR SOLUTION INTRAVENOUS at 09:21

## 2024-06-19 RX ADMIN — ONDANSETRON 4 MG: 2 INJECTION INTRAMUSCULAR; INTRAVENOUS at 12:36

## 2024-06-19 RX ADMIN — MIDAZOLAM 1 MG: 1 INJECTION INTRAMUSCULAR; INTRAVENOUS at 09:10

## 2024-06-19 RX ADMIN — POTASSIUM CHLORIDE AND SODIUM CHLORIDE 100 ML/HR: 900; 150 INJECTION, SOLUTION INTRAVENOUS at 17:47

## 2024-06-19 RX ADMIN — LEVETIRACETAM 500 MG: 500 INJECTION, SOLUTION INTRAVENOUS at 09:13

## 2024-06-19 RX ADMIN — HYDRALAZINE HYDROCHLORIDE 2 MG: 20 INJECTION, SOLUTION INTRAMUSCULAR; INTRAVENOUS at 12:16

## 2024-06-19 RX ADMIN — PROPOFOL 50 MG: 10 INJECTION, EMULSION INTRAVENOUS at 10:12

## 2024-06-19 RX ADMIN — HYDROCODONE BITARTRATE AND ACETAMINOPHEN 1 TABLET: 5; 325 TABLET ORAL at 14:27

## 2024-06-19 RX ADMIN — INSULIN LISPRO 3 UNITS: 100 INJECTION, SOLUTION INTRAVENOUS; SUBCUTANEOUS at 21:25

## 2024-06-19 RX ADMIN — SODIUM CHLORIDE, POTASSIUM CHLORIDE, SODIUM LACTATE AND CALCIUM CHLORIDE: 600; 310; 30; 20 INJECTION, SOLUTION INTRAVENOUS at 12:38

## 2024-06-19 RX ADMIN — SUGAMMADEX 200 MG: 100 INJECTION, SOLUTION INTRAVENOUS at 12:36

## 2024-06-19 RX ADMIN — SODIUM CHLORIDE 5 MG/HR: 9 INJECTION, SOLUTION INTRAVENOUS at 13:20

## 2024-06-19 RX ADMIN — SODIUM CHLORIDE 2000 MG: 900 INJECTION INTRAVENOUS at 17:55

## 2024-06-19 RX ADMIN — HYDROCODONE BITARTRATE AND ACETAMINOPHEN 1 TABLET: 5; 325 TABLET ORAL at 21:25

## 2024-06-19 RX ADMIN — PROPOFOL 100 MG: 10 INJECTION, EMULSION INTRAVENOUS at 10:11

## 2024-06-19 RX ADMIN — FAMOTIDINE 20 MG: 10 INJECTION INTRAVENOUS at 09:06

## 2024-06-19 RX ADMIN — ROCURONIUM BROMIDE 50 MG: 10 INJECTION, SOLUTION INTRAVENOUS at 10:11

## 2024-06-19 RX ADMIN — ROCURONIUM BROMIDE 10 MG: 10 INJECTION, SOLUTION INTRAVENOUS at 11:37

## 2024-06-19 RX ADMIN — DEXAMETHASONE SODIUM PHOSPHATE 4 MG: 4 INJECTION, SOLUTION INTRA-ARTICULAR; INTRALESIONAL; INTRAMUSCULAR; INTRAVENOUS; SOFT TISSUE at 10:15

## 2024-06-19 RX ADMIN — HYDROMORPHONE HYDROCHLORIDE 0.5 MG: 1 INJECTION, SOLUTION INTRAMUSCULAR; INTRAVENOUS; SUBCUTANEOUS at 12:08

## 2024-06-19 NOTE — ANESTHESIA POSTPROCEDURE EVALUATION
"Patient: Iain Christina    Procedure Summary       Date: 06/19/24 Room / Location: [facility] OR [address] [place] [address] MAIN OR    Anesthesia Start: 1002 Anesthesia Stop: 1252    Procedure: Right frontal craniotomy for tumor (Right: Head) Diagnosis:       Brain mass      (Brain mass [G93.89])    Surgeons: Rosendo Bello MD Provider: Wm Gaona MD    Anesthesia Type: general ASA Status: 3            Anesthesia Type: general    Vitals  Vitals Value Taken Time   /74 06/19/24 1415   Temp 36.3 °C (97.4 °F) 06/19/24 1246   Pulse 88 06/19/24 1425   Resp 12 06/19/24 1400   SpO2 94 % 06/19/24 1425   Vitals shown include unfiled device data.        Post Anesthesia Care and Evaluation    Patient location during evaluation: PACU  Level of consciousness: awake  Pain management: adequate    Airway patency: patent  Anesthetic complications: No anesthetic complications  PONV Status: controlled  Cardiovascular status: acceptable  Respiratory status: acceptable  Hydration status: acceptable    Comments: /77   Pulse 80   Temp 36.3 °C (97.4 °F) (Oral)   Resp 12   Ht 177.8 cm (70\")   Wt 99.6 kg (219 lb 9.3 oz)   SpO2 95%   BMI 31.51 kg/m²       "

## 2024-06-19 NOTE — ANESTHESIA PROCEDURE NOTES
Arterial Line      Patient location during procedure: holding area  Start time: 6/19/2024 9:05 AM  Stop Time:6/19/2024 9:15 AM       Line placed for hemodynamic monitoring.  Performed By   Anesthesiologist: German Stringer MD   Preanesthetic Checklist  Completed: patient identified, IV checked, site marked, risks and benefits discussed, surgical consent, monitors and equipment checked, pre-op evaluation and timeout performed  Arterial Line Prep    Sterile Tech: gloves and cap  Prep: ChloraPrep  Patient monitoring: blood pressure monitoring, continuous pulse oximetry and EKG  Arterial Line Procedure   Location:  radial artery  Catheter size: 20 G   Guidance: ultrasound guided  PROCEDURE NOTE/ULTRASOUND INTERPRETATION.  Using ultrasound guidance the potential vascular sites for insertion of the catheter were visualized to determine the patency of the vessel to be used for vascular access.  After selecting the appropriate site for insertion, the needle was visualized under ultrasound being inserted into the radial artery, followed by ultrasound confirmation of wire and catheter placement. There were no abnormalities seen on ultrasound; an image was taken; and the patient tolerated the procedure with no complications.   Number of attempts: 1  Successful placement: yes Images: still images obtained, printed/placed on the chart  Post Assessment   Dressing Type: occlusive dressing applied, secured with tape and wrist guard applied.   Complications no  Circ/Move/Sens Assessment: normal and unchanged.   Patient Tolerance: patient tolerated the procedure well with no apparent complications  Additional Notes  US guidance used for needle visualization and catheter placement.

## 2024-06-19 NOTE — BRIEF OP NOTE
CRANIOTOMY FOR TUMOR STEREOTACTIC  Progress Note    Iain Christina  6/19/2024    Pre-op Diagnosis:   Brain mass [G93.89]       Post-Op Diagnosis Codes:     * Brain mass [G93.89]    Procedure/CPT® Codes:        Procedure(s):  Right frontal craniotomy for tumor              Surgeon(s):  Rosendo Bello MD    Anesthesia: General    Staff:   Circulator: Almaz Russell RN  Assistant: Rabia Nguyen CSA  Other: Pamela Grewal RN  Assistant: Rabia Nguyen CSA      Estimated Blood Loss: 150 mL    Urine Voided: 285 mL    Specimens:                Specimens       ID Source Type Tests Collected By Collected At Frozen?    A Brain Tissue TISSUE PATHOLOGY EXAM   Rosendo Bello MD 6/19/24 1117 Yes    Description: Right frontal brain mass- frozen call back main OR 28    B Brain Tissue TISSUE PATHOLOGY EXAM   Rosendo Bello MD 6/19/24 1144 No    Description: Right frontal brain mass tissue-fresh    This specimen was not marked as sent.                  Drains:   Closed/Suction Drain Right Scalp Bulb 10 Fr. (Active)       Urethral Catheter Silicone 16 Fr. (Active)       Findings: Tumor        Complications: None      Rosendo Bello MD     Date: 6/19/2024  Time: 12:21 EDT

## 2024-06-19 NOTE — PLAN OF CARE
Goal Outcome Evaluation:      VSS, Pt Alert and oriented, given pain medication 1x this shift per protocol. Pt NPO since MN for procedure planned later this morning. New PIV placed.  Pt resting and appears to be sleeping. Will continue to monitor the remainder of this shift.   Problem: Fall Injury Risk  Goal: Absence of Fall and Fall-Related Injury  Outcome: Ongoing, Progressing  Intervention: Identify and Manage Contributors  Recent Flowsheet Documentation  Taken 6/18/2024 2015 by Tereza Joshi RN  Medication Review/Management: medications reviewed  Intervention: Promote Injury-Free Environment  Recent Flowsheet Documentation  Taken 6/18/2024 2015 by Tereza Joshi, RN  Safety Promotion/Fall Prevention:   assistive device/personal items within reach   clutter free environment maintained   room organization consistent   safety round/check completed     Problem: Coping Ineffective (Oncology Care)  Goal: Effective Coping  Outcome: Ongoing, Progressing  Intervention: Support and Enhance Coping Strategies  Recent Flowsheet Documentation  Taken 6/18/2024 2015 by Tereza Joshi, RN  Supportive Measures:   active listening utilized   positive reinforcement provided   relaxation techniques promoted  Environmental Support: calm environment promoted  Family/Support System Care: self-care encouraged     Problem: Adult Inpatient Plan of Care  Goal: Optimal Comfort and Wellbeing  Intervention: Monitor Pain and Promote Comfort  Recent Flowsheet Documentation  Taken 6/18/2024 2015 by Tereza Joshi, RN  Pain Management Interventions:   relaxation techniques promoted   see MAR   quiet environment facilitated  Intervention: Provide Person-Centered Care  Recent Flowsheet Documentation  Taken 6/18/2024 2015 by Tereza Joshi RN  Trust Relationship/Rapport:   care explained   reassurance provided   thoughts/feelings acknowledged   questions answered   empathic listening provided     Problem: Adult Inpatient Plan of  Care  Goal: Optimal Comfort and Wellbeing  Intervention: Provide Person-Centered Care  Recent Flowsheet Documentation  Taken 6/18/2024 2015 by Tereza Joshi RN  Trust Relationship/Rapport:   care explained   reassurance provided   thoughts/feelings acknowledged   questions answered   empathic listening provided     Problem: Fall Injury Risk  Goal: Absence of Fall and Fall-Related Injury  Intervention: Promote Injury-Free Environment  Recent Flowsheet Documentation  Taken 6/18/2024 2015 by Tereza Joshi, RN  Safety Promotion/Fall Prevention:   assistive device/personal items within reach   clutter free environment maintained   room organization consistent   safety round/check completed

## 2024-06-19 NOTE — OP NOTE
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    Asst.: Rabia Nguyen CFA who was instrumental in helping with hemostasis, visualization of neural structures and retraction of neural structures.  Her skilled assistance was necessary for the success of this case    Estimated blood loss, crystalloid, colloid, blood: Please see anesthesia record    Material to lab:   A frozen section biopsy was taken at the beginning of the procedure and sent for frozen section.  All resected specimen was captured and sent for permanent section in a fresh state at the request of the pathologist.    Drains: 1 subgaleal drain    Complications: None    Indications for the procedure: This is a man who presented with a mass in his right basal ganglia particularly in the head of the caudate and abutting the internal capsule.  Metastatic workup was negative and so we elected to proceed with a excisional biopsy.    Operative summary:  The patient was brought into the operating room and placed under general endotracheal anesthesia using intravenous and inhalational agents.  The patient was then positioned on the operating table in the supine position.  All pressure points were padded including peripheral points of entrapment.  His head was secured in the Brian headrest and then turned slightly to the left side.  Right frontal region was shaved free of hair near the midline and then he was referenced the Stealth navigation system which was used to plan out the incision.  A linear incision was outlined in the right frontal region and prepped with ChloraPrep.  He was injected with 10 cc of quarter percent Marcaine with epinephrine.  He was then draped with Ioban, towels, half sheets and a cranial drape.    An  incision was then made in the above location.  This was carried down to the outer table of the skull.  The scalp edges were held back with cerebellar retractors and then bur holes were placed over the sagittal sinus and more lateral.  They were connected around the perimeter and then the bone flap was elevated and set aside in antibiotic solution.  The dura was opened in a cruciate fashion and the 4 quadrants of dura were tacked back with traction sutures.  The cortex in that area was coagulated along a sulcus and then the brain path was advanced through the brain and through the tumor down to the distal part of the tumor.  The obturator was then withdrawn and the cannula was secured in place.  The remainder the operation was done under the high-power magnification the operating microscope using microsurgical technique microsurgical instrumentation.  Upon withdrawal of the obturator there was obvious tumor at the bottom of the cannula.  This was biopsied with pituitary forceps and sent for frozen section.  This did return an undifferentiated malignancy possible lymphoma and the pathologist requested we send further specimen fresh.    Once the frozen section had been obtained and sent we began to evacuate the tumor using the Reji myriad.  The tumor was fairly soft and not terribly vascular.  Will to control bleeding with bipolar cautery.  Both side evacuated the tumor inferiorly as far as I felt I could go I began to withdraw the cannula and evacuate more proximal tumor.  Ultimately I felt like I got the vast majority of the tumor out but once I got the diagnosis from the pathologist I backed off on a more aggressive resection since a lymphoma should be fairly radiation sensitive.  Bleeding was controlled with a combination of bipolar cautery thrombin Gelfoam and Floseal.  Some Floseal was left in the operative bed but the cavity was completely dry when he began to withdraw the cannula the remainder of the way.  Once  the cannula have been withdrawn there was still no evidence of bleeding and I was able to tack the dural edges together and then overlaid them with DuraGen.  The bone flap was put back into place using the KLS system and a drain was placed in the subgaleal space.  The incision was closed in layers dressed and the patient was taken the recovery room in stable condition.  There were no apparent complications and the sponge, instrument and needle counts were correct at the end of the procedure.

## 2024-06-19 NOTE — ANESTHESIA PREPROCEDURE EVALUATION
Anesthesia Evaluation     Patient summary reviewed and Nursing notes reviewed   NPO Solid Status: > 8 hours             Airway   Mallampati: II  TM distance: >3 FB  Neck ROM: full  no difficulty expected  Dental - normal exam     Pulmonary - normal exam   (+) ,sleep apnea  Cardiovascular - normal exam    (+) hypertension, hyperlipidemia      Neuro/Psych  GI/Hepatic/Renal/Endo    (+) obesity, hepatitis C, liver disease, diabetes mellitus type 2, thyroid problem     Musculoskeletal     Abdominal  - normal exam   Substance History      OB/GYN          Other                    Anesthesia Plan    ASA 3     general     (A LINE)  intravenous induction     Anesthetic plan, risks, benefits, and alternatives have been provided, discussed and informed consent has been obtained with: patient.    CODE STATUS:    Code Status (Patient has no pulse and is not breathing): CPR (Attempt to Resuscitate)  Medical Interventions (Patient has pulse or is breathing): Full Support

## 2024-06-19 NOTE — ANESTHESIA PROCEDURE NOTES
Airway  Urgency: elective    Date/Time: 6/19/2024 10:14 AM  End Time:6/19/2024 10:14 AM  Airway not difficult    General Information and Staff    Patient location during procedure: OB  CRNA/CAA: Abhishek James CRNA    Indications and Patient Condition  Indications for airway management: airway protection    Preoxygenated: yes  MILS maintained throughout  Mask difficulty assessment: 1 - vent by mask    Final Airway Details  Final airway type: endotracheal airway      Successful airway: ETT  Cuffed: yes   Successful intubation technique: direct laryngoscopy  Facilitating devices/methods: intubating stylet  Endotracheal tube insertion site: oral  Blade: Geiger  Blade size: 2  ETT size (mm): 7.5  Cormack-Lehane Classification: grade IIa - partial view of glottis  Placement verified by: chest auscultation and capnometry   Cuff volume (mL): 7  Measured from: lips  ETT/EBT  to lips (cm): 24  Number of attempts at approach: 1  Assessment: lips, teeth, and gum same as pre-op and atraumatic intubation

## 2024-06-20 ENCOUNTER — APPOINTMENT (OUTPATIENT)
Dept: MRI IMAGING | Facility: HOSPITAL | Age: 59
DRG: 820 | End: 2024-06-20
Payer: COMMERCIAL

## 2024-06-20 LAB
ALBUMIN SERPL-MCNC: 3.5 G/DL (ref 3.5–5.2)
ALBUMIN/GLOB SERPL: 1.3 G/DL
ALP SERPL-CCNC: 97 U/L (ref 39–117)
ALT SERPL W P-5'-P-CCNC: 223 U/L (ref 1–41)
ANION GAP SERPL CALCULATED.3IONS-SCNC: 8.9 MMOL/L (ref 5–15)
APTT PPP: 23 SECONDS (ref 22.7–35.4)
AST SERPL-CCNC: 93 U/L (ref 1–40)
BASOPHILS # BLD AUTO: 0.01 10*3/MM3 (ref 0–0.2)
BASOPHILS NFR BLD AUTO: 0.1 % (ref 0–1.5)
BILIRUB SERPL-MCNC: 1.1 MG/DL (ref 0–1.2)
BUN SERPL-MCNC: 27 MG/DL (ref 8–23)
BUN/CREAT SERPL: 32.1 (ref 7–25)
CALCIUM SPEC-SCNC: 8.3 MG/DL (ref 8.6–10.5)
CHLORIDE SERPL-SCNC: 110 MMOL/L (ref 98–107)
CO2 SERPL-SCNC: 21.1 MMOL/L (ref 22–29)
CREAT SERPL-MCNC: 0.84 MG/DL (ref 0.76–1.27)
DEPRECATED RDW RBC AUTO: 40.6 FL (ref 37–54)
EGFRCR SERPLBLD CKD-EPI 2021: 99.8 ML/MIN/1.73
EOSINOPHIL # BLD AUTO: 0 10*3/MM3 (ref 0–0.4)
EOSINOPHIL NFR BLD AUTO: 0 % (ref 0.3–6.2)
ERYTHROCYTE [DISTWIDTH] IN BLOOD BY AUTOMATED COUNT: 12.6 % (ref 12.3–15.4)
GLOBULIN UR ELPH-MCNC: 2.6 GM/DL
GLUCOSE BLDC GLUCOMTR-MCNC: 178 MG/DL (ref 70–130)
GLUCOSE BLDC GLUCOMTR-MCNC: 189 MG/DL (ref 70–130)
GLUCOSE BLDC GLUCOMTR-MCNC: 215 MG/DL (ref 70–130)
GLUCOSE SERPL-MCNC: 227 MG/DL (ref 65–99)
HCT VFR BLD AUTO: 44.2 % (ref 37.5–51)
HGB BLD-MCNC: 15.2 G/DL (ref 13–17.7)
IMM GRANULOCYTES # BLD AUTO: 0.13 10*3/MM3 (ref 0–0.05)
IMM GRANULOCYTES NFR BLD AUTO: 1 % (ref 0–0.5)
INR PPP: 1.14 (ref 0.9–1.1)
LYMPHOCYTES # BLD AUTO: 1.42 10*3/MM3 (ref 0.7–3.1)
LYMPHOCYTES NFR BLD AUTO: 11.4 % (ref 19.6–45.3)
MCH RBC QN AUTO: 30.5 PG (ref 26.6–33)
MCHC RBC AUTO-ENTMCNC: 34.4 G/DL (ref 31.5–35.7)
MCV RBC AUTO: 88.8 FL (ref 79–97)
MONOCYTES # BLD AUTO: 0.88 10*3/MM3 (ref 0.1–0.9)
MONOCYTES NFR BLD AUTO: 7.1 % (ref 5–12)
NEUTROPHILS NFR BLD AUTO: 80.4 % (ref 42.7–76)
NEUTROPHILS NFR BLD AUTO: 9.97 10*3/MM3 (ref 1.7–7)
NRBC BLD AUTO-RTO: 0 /100 WBC (ref 0–0.2)
PLATELET # BLD AUTO: 173 10*3/MM3 (ref 140–450)
PMV BLD AUTO: 11.3 FL (ref 6–12)
POTASSIUM SERPL-SCNC: 4.1 MMOL/L (ref 3.5–5.2)
PROT SERPL-MCNC: 6.1 G/DL (ref 6–8.5)
PROTHROMBIN TIME: 14.8 SECONDS (ref 11.7–14.2)
RBC # BLD AUTO: 4.98 10*6/MM3 (ref 4.14–5.8)
SODIUM SERPL-SCNC: 140 MMOL/L (ref 136–145)
WBC NRBC COR # BLD AUTO: 12.41 10*3/MM3 (ref 3.4–10.8)

## 2024-06-20 PROCEDURE — 99024 POSTOP FOLLOW-UP VISIT: CPT | Performed by: NURSE PRACTITIONER

## 2024-06-20 PROCEDURE — 0 GADOBENATE DIMEGLUMINE 529 MG/ML SOLUTION: Performed by: HOSPITALIST

## 2024-06-20 PROCEDURE — 70553 MRI BRAIN STEM W/O & W/DYE: CPT

## 2024-06-20 PROCEDURE — 63710000001 INSULIN LISPRO (HUMAN) PER 5 UNITS: Performed by: NEUROLOGICAL SURGERY

## 2024-06-20 PROCEDURE — A9577 INJ MULTIHANCE: HCPCS | Performed by: HOSPITALIST

## 2024-06-20 PROCEDURE — 85025 COMPLETE CBC W/AUTO DIFF WBC: CPT | Performed by: NEUROLOGICAL SURGERY

## 2024-06-20 PROCEDURE — 25010000002 CEFAZOLIN PER 500 MG: Performed by: NEUROLOGICAL SURGERY

## 2024-06-20 PROCEDURE — 85610 PROTHROMBIN TIME: CPT | Performed by: NEUROLOGICAL SURGERY

## 2024-06-20 PROCEDURE — 25010000002 DEXAMETHASONE PER 1 MG: Performed by: NEUROLOGICAL SURGERY

## 2024-06-20 PROCEDURE — 25010000002 SODIUM CHLORIDE 0.9 % WITH KCL 20 MEQ 20-0.9 MEQ/L-% SOLUTION: Performed by: NEUROLOGICAL SURGERY

## 2024-06-20 PROCEDURE — 63710000001 INSULIN GLARGINE PER 5 UNITS: Performed by: NEUROLOGICAL SURGERY

## 2024-06-20 PROCEDURE — 82948 REAGENT STRIP/BLOOD GLUCOSE: CPT

## 2024-06-20 PROCEDURE — 25810000003 SODIUM CHLORIDE 0.9 % SOLUTION

## 2024-06-20 PROCEDURE — 25010000002 LEVETRIRACETAM PER 10 MG: Performed by: NEUROLOGICAL SURGERY

## 2024-06-20 PROCEDURE — 80053 COMPREHEN METABOLIC PANEL: CPT

## 2024-06-20 PROCEDURE — 25010000002 NICARDIPINE 2.5 MG/ML SOLUTION

## 2024-06-20 PROCEDURE — 85730 THROMBOPLASTIN TIME PARTIAL: CPT | Performed by: NEUROLOGICAL SURGERY

## 2024-06-20 RX ORDER — UREA 10 %
5 LOTION (ML) TOPICAL NIGHTLY PRN
Status: DISCONTINUED | OUTPATIENT
Start: 2024-06-20 | End: 2024-06-24 | Stop reason: HOSPADM

## 2024-06-20 RX ORDER — HYDRALAZINE HYDROCHLORIDE 20 MG/ML
10 INJECTION INTRAMUSCULAR; INTRAVENOUS EVERY 6 HOURS PRN
Status: DISCONTINUED | OUTPATIENT
Start: 2024-06-20 | End: 2024-06-24 | Stop reason: HOSPADM

## 2024-06-20 RX ADMIN — DEXAMETHASONE SODIUM PHOSPHATE 4 MG: 4 INJECTION, SOLUTION INTRA-ARTICULAR; INTRALESIONAL; INTRAMUSCULAR; INTRAVENOUS; SOFT TISSUE at 18:54

## 2024-06-20 RX ADMIN — SODIUM BICARBONATE 650 MG: 650 TABLET, ORALLY DISINTEGRATING ORAL at 19:09

## 2024-06-20 RX ADMIN — DEXAMETHASONE SODIUM PHOSPHATE 4 MG: 4 INJECTION, SOLUTION INTRA-ARTICULAR; INTRALESIONAL; INTRAMUSCULAR; INTRAVENOUS; SOFT TISSUE at 09:32

## 2024-06-20 RX ADMIN — NICARDIPINE HYDROCHLORIDE 5 MG/HR: 25 INJECTION, SOLUTION INTRAVENOUS at 07:37

## 2024-06-20 RX ADMIN — GADOBENATE DIMEGLUMINE 20 ML: 529 INJECTION, SOLUTION INTRAVENOUS at 17:24

## 2024-06-20 RX ADMIN — INSULIN LISPRO 10 UNITS: 100 INJECTION, SOLUTION INTRAVENOUS; SUBCUTANEOUS at 09:30

## 2024-06-20 RX ADMIN — HYDROCODONE BITARTRATE AND ACETAMINOPHEN 1 TABLET: 5; 325 TABLET ORAL at 03:29

## 2024-06-20 RX ADMIN — LEVETIRACETAM 500 MG: 500 INJECTION, SOLUTION INTRAVENOUS at 21:39

## 2024-06-20 RX ADMIN — DEXAMETHASONE SODIUM PHOSPHATE 4 MG: 4 INJECTION, SOLUTION INTRA-ARTICULAR; INTRALESIONAL; INTRAMUSCULAR; INTRAVENOUS; SOFT TISSUE at 03:29

## 2024-06-20 RX ADMIN — SODIUM BICARBONATE 650 MG: 650 TABLET, ORALLY DISINTEGRATING ORAL at 09:31

## 2024-06-20 RX ADMIN — SODIUM CHLORIDE 2000 MG: 900 INJECTION INTRAVENOUS at 09:40

## 2024-06-20 RX ADMIN — INSULIN LISPRO 3 UNITS: 100 INJECTION, SOLUTION INTRAVENOUS; SUBCUTANEOUS at 14:03

## 2024-06-20 RX ADMIN — DEXAMETHASONE SODIUM PHOSPHATE 4 MG: 4 INJECTION, SOLUTION INTRA-ARTICULAR; INTRALESIONAL; INTRAMUSCULAR; INTRAVENOUS; SOFT TISSUE at 21:39

## 2024-06-20 RX ADMIN — INSULIN GLARGINE 30 UNITS: 100 INJECTION, SOLUTION SUBCUTANEOUS at 09:30

## 2024-06-20 RX ADMIN — INSULIN LISPRO 3 UNITS: 100 INJECTION, SOLUTION INTRAVENOUS; SUBCUTANEOUS at 21:39

## 2024-06-20 RX ADMIN — LEVETIRACETAM 500 MG: 500 INJECTION, SOLUTION INTRAVENOUS at 09:31

## 2024-06-20 RX ADMIN — SODIUM CHLORIDE 2000 MG: 900 INJECTION INTRAVENOUS at 18:55

## 2024-06-20 RX ADMIN — SODIUM BICARBONATE 650 MG: 650 TABLET, ORALLY DISINTEGRATING ORAL at 21:39

## 2024-06-20 RX ADMIN — HYDROCHLOROTHIAZIDE 12.5 MG: 12.5 TABLET ORAL at 09:31

## 2024-06-20 RX ADMIN — INSULIN LISPRO 10 UNITS: 100 INJECTION, SOLUTION INTRAVENOUS; SUBCUTANEOUS at 14:02

## 2024-06-20 RX ADMIN — INSULIN LISPRO 10 UNITS: 100 INJECTION, SOLUTION INTRAVENOUS; SUBCUTANEOUS at 18:54

## 2024-06-20 RX ADMIN — Medication 10 ML: at 21:46

## 2024-06-20 RX ADMIN — SODIUM CHLORIDE 2000 MG: 900 INJECTION INTRAVENOUS at 03:29

## 2024-06-20 RX ADMIN — POTASSIUM CHLORIDE AND SODIUM CHLORIDE 100 ML/HR: 900; 150 INJECTION, SOLUTION INTRAVENOUS at 04:00

## 2024-06-20 RX ADMIN — HYDROCODONE BITARTRATE AND ACETAMINOPHEN 1 TABLET: 5; 325 TABLET ORAL at 14:02

## 2024-06-20 RX ADMIN — LOSARTAN POTASSIUM 100 MG: 100 TABLET, FILM COATED ORAL at 09:31

## 2024-06-20 NOTE — CONSULTS
Group: [facility]         Consult Note    Patient Identification:  Iain Christina  58 y.o.  male  1965  [id number]            Requesting physician: Dr Bello    Reason for Consultation: ICU admission     CC: Right basal ganglia mass, s/p right frontal craniotomy for evacuation of mass    History of Present Illness:  Iain Christina is a 58 year old male with past medical history diabetes mellitus, hepatitis C, hypothyroidism, sleep apnea, HTN, abnormal LFTs who presented to [facility] on 6/15/2024 from [facility] ED. initially presented with several complaints, confusion, memory loss, abrasions and lacerations.  Frequent falls at home, has history of alcohol abuse.  He was found to have a large mass on head CT, right basal ganglia.  He was transferred to [facility] and has been managed on telemetry per [facility].  Patient underwent metastatic workup prior to surgery which was negative with the exception of a borderline abnormal lymph nodes carley hepatis that will need repeat imaging in 3 months.  Neurosurgery consulted, Dr. Bello took to the operating room today and patient underwent right frontal craniotomy with evacuation of mass.  .  Biopsies sent for frozen section in the OR consistent with undifferentiated malignancy, possible lymphoma.  Patient has been admitted to ICU postoperatively for further management.    Review of Systems  CONSTITUTIONAL:  Denies fevers or chills  EYE:  No new vision changes  EAR:  No change in hearing  CARDIAC:  No chest pain  PULMONARY:  No productive cough or shortness of breath  GI:  No diarrhea, hematemesis or hematochezia  RENAL:  No dysuria or urinary frequency  MUSCULOSKELETAL:  No musculoskeletal complaints  ENDOCRINE:  No heat or cold intolerance  INTEGUMENTARY: No skin rashes  NEUROLOGICAL:  Positive for headache  PSYCHIATRIC:  No new anxiety or depression  12 system review of systems performed and all else negative     Past Medical History:  Past  "Medical History:   Diagnosis Date    Diabetes mellitus     Hepatitis C     Hyperthyroidism     Sleep apnea        Past Surgical History:  Past Surgical History:   Procedure Laterality Date    NASAL SEPTAL RECONSTRUCTION          Home Meds:  Medications Prior to Admission   Medication Sig Dispense Refill Last Dose    irbesartan-hydrochlorothiazide (AVALIDE) 150-12.5 MG tablet Take 2 tablets by mouth Daily. 180 tablet 3 6/14/2024    zolpidem CR (Ambien CR) 12.5 MG CR tablet Take 1 tablet by mouth At Night As Needed for Sleep. 30 tablet 2 6/14/2024       Allergies:  Allergies   Allergen Reactions    Peanut (Diagnostic) Anaphylaxis       Social History:   Social History     Socioeconomic History    Marital status: Unknown   Tobacco Use    Smoking status: Never    Smokeless tobacco: Never   Vaping Use    Vaping status: Never Used   Substance and Sexual Activity    Alcohol use: Not Currently     Comment: STATED HE QUIT IN JANUARY    Drug use: Never    Sexual activity: Yes       Family History:  Family History   Problem Relation Age of Onset    COPD Mother     Cancer Sister     No Known Problems Brother        Physical Exam:  /82   Pulse 75   Temp 97.5 °F (36.4 °C) (Oral)   Resp 12   Ht 177.8 cm (70\")   Wt 99.6 kg (219 lb 9.3 oz)   SpO2 94%   BMI 31.51 kg/m²  Body mass index is 31.51 kg/m². 94% 99.6 kg (219 lb 9.3 oz)    Physical Exam  Constitutional: Middle aged pt in bed, spO2 94% 2L NC, in no acute distress  Head: Op dressing c/d, TERESA to bulb  Eyes: No pallor, anicteric conjunctiva, EOMI. PERRLA.  ENMT:  No oral thrush. Moist MM.   NECK: Trachea midline, no thyromegaly  Heart: RRR, no pedal edema  Lungs: ISABEL +, clear to auscultation throughout, no wheezing or crackles  Abdomen: Soft, nondistended.  No tenderness, guarding or rigidity. No palpable masses.  Indwelling zamora catheter to bsdg, clear yellow urine in bag.  Extremities: Extremities warm and well perfused. No cyanosis/ clubbing.  All pulses " "palpable.  Neuro: Alert, oriented, questions appropriately, follows commands, moves all extremities without issue, no gross deficits   Psych: Mood and affect appropriate    PPE recommended per [facility] infectious disease Isolation protocol for the current clinical scenario(as mentioned below) was followed.    LABS:  No results found for: \"COVID19\"    Lab Results   Component Value Date    CALCIUM 8.3 (L) 06/19/2024     Results from last 7 days   Lab Units 06/19/24  1257 06/19/24  0448 06/18/24  0443 06/17/24  0719 06/16/24  0511 06/15/24  1033   MAGNESIUM mg/dL  --   --   --   --   --  2.2   SODIUM mmol/L 143 141 141 142 142 145   POTASSIUM mmol/L 4.0 3.7 3.8 3.8 3.6 3.9   CHLORIDE mmol/L 109* 108* 108* 112* 112* 108*   CO2 mmol/L 23.0 24.0 21.3* 20.0* 17.6* 24.3   BUN mg/dL 22 23 23 20 21 19   CREATININE mg/dL 0.82 0.72* 0.87 0.89 0.78 0.79   GLUCOSE mg/dL 224* 212* 306* 300* 255* 161*   CALCIUM mg/dL 8.3* 8.5* 8.7 8.9 8.9 9.4   WBC 10*3/mm3 11.26*  --   --  13.92* 8.20 6.53   HEMOGLOBIN g/dL 15.3  --   --  15.5 15.1 15.4   PLATELETS 10*3/mm3 157  --   --  156 154 148   ALT (SGPT) U/L  --  221*  --   --  195* 210*   AST (SGOT) U/L  --  79*  --   --  71* 103*     Lab Results   Component Value Date    CKTOTAL 118 06/15/2024     Results from last 7 days   Lab Units 06/15/24  1033   CK TOTAL U/L 118         Results from last 7 days   Lab Units 06/15/24  1033   LACTATE mmol/L 1.5             Results from last 7 days   Lab Units 06/15/24  1033   INR  0.97         Lab Results   Component Value Date    TSH 2.570 06/15/2024     Estimated Creatinine Clearance: 113.3 mL/min (by C-G formula based on SCr of 0.82 mg/dL).  Results from last 7 days   Lab Units 06/15/24  1041   NITRITE UA  Negative        Imaging: I personally visualized the images of scans/x-rays performed within last 3 days.      Assessment:  Right basal ganglia mass, s/p right craniotomy for resection POD 0  Diabetes mellitus  Steroid-induced " hyperglycemia  Metabolic acidosis  Hypertension  Chronic hepatitis C  Transaminitis  Hx alcohol use  MARCIANO    Recommendations:  1.  Right basal ganglia mass, status post right craniotomy  -Monitoring in ICU per neurosurgery  -Neurochecks hourly.  Continue Keppra, Decadron  -Repeat CT head in the morning  -Keep SBP<150, on nicardipine drip  -Appears to be doing very well postop with exception of headache, as needed Norco and morphine available  -Clear liquid diet, IVF per neurosurgery    2.  Diabetes mellitus, steroid-induced hyperglycemia  -A1c 6.60, continue to monitor blood glucose closely and adjust regimen as needed  -Continue glargine 30 units daily, this was increased yesterday   -Lispro 10 units 3 times daily with meals   -Moderate-high dose SSI    3.  Metabolic acidosis  -6/16 labs with serum bicarb 17.6, LHA started patient on oral bicarb with improvement, 23 on today's labs.    4.  Hypertension  -Continue losartan, HCTZ.  Nicardipine drip added    5.  Chronic hepatitis C, transaminitis, hx alcohol use  -Patient states quit drinking in January 2024, no s/s Dts from admission  -On thiamine  -AST much improved since 2019 however ALT with persistent elevation.  No abdominal complaints.  Will continue to trend    6.  MARCIANO  -Does not wear CPAP at home, refuses to wear here.  Continue supplemental nasal cannula oxygen as needed to keep SpO2 greater than 92%    Indwelling Perry catheter  Dinorah Latham, ILYA  6/19/2024  20:24 EDT      Much of this encounter note is an electronic transcription/translation of spoken language to printed text using Dragon Software.

## 2024-06-20 NOTE — H&P
Group: [facility]         History and Physical    Patient Identification:  Iain Christina  58 y.o.  male  1965  [id number]            Requesting physician: Dr Bello    Reason for Consultation: ICU admission     CC: Right basal ganglia mass, s/p right frontal craniotomy for evacuation of mass    History of Present Illness:  Iain Christina is a 58 year old male with past medical history diabetes mellitus, hepatitis C, hypothyroidism, sleep apnea, HTN, abnormal LFTs who presented to [facility] on 6/15/2024 from [facility] ED. initially presented with several complaints, confusion, memory loss, abrasions and lacerations.  Frequent falls at home, has history of alcohol abuse.  He was found to have a large mass on head CT, right basal ganglia.  He was transferred to [facility] and has been managed on telemetry per [facility]  Patient underwent metastatic workup prior to surgery which was negative with the exception of a borderline abnormal lymph nodes carley hepatis that will need repeat imaging in 3 months.  Neurosurgery consulted, Dr. Bello took to the operating room today and patient underwent right frontal craniotomy with evacuation of mass.  .  Biopsies sent for frozen section in the OR consistent with undifferentiated malignancy, possible lymphoma.  Patient has been admitted to ICU postoperatively for further management.    Review of Systems  CONSTITUTIONAL:  Denies fevers or chills  EYE:  No new vision changes  EAR:  No change in hearing  CARDIAC:  No chest pain  PULMONARY:  No productive cough or shortness of breath  GI:  No diarrhea, hematemesis or hematochezia  RENAL:  No dysuria or urinary frequency  MUSCULOSKELETAL:  No musculoskeletal complaints  ENDOCRINE:  No heat or cold intolerance  INTEGUMENTARY: No skin rashes  NEUROLOGICAL:  Positive for headache  PSYCHIATRIC:  No new anxiety or depression  12 system review of systems performed and all else negative     Past Medical  "History:  Past Medical History:   Diagnosis Date    Diabetes mellitus     Hepatitis C     Hyperthyroidism     Sleep apnea        Past Surgical History:  Past Surgical History:   Procedure Laterality Date    NASAL SEPTAL RECONSTRUCTION          Home Meds:  Medications Prior to Admission   Medication Sig Dispense Refill Last Dose    irbesartan-hydrochlorothiazide (AVALIDE) 150-12.5 MG tablet Take 2 tablets by mouth Daily. 180 tablet 3 6/14/2024    zolpidem CR (Ambien CR) 12.5 MG CR tablet Take 1 tablet by mouth At Night As Needed for Sleep. 30 tablet 2 6/14/2024       Allergies:  Allergies   Allergen Reactions    Peanut (Diagnostic) Anaphylaxis       Social History:   Social History     Socioeconomic History    Marital status: Unknown   Tobacco Use    Smoking status: Never    Smokeless tobacco: Never   Vaping Use    Vaping status: Never Used   Substance and Sexual Activity    Alcohol use: Not Currently     Comment: STATED HE QUIT IN JANUARY    Drug use: Never    Sexual activity: Yes       Family History:  Family History   Problem Relation Age of Onset    COPD Mother     Cancer Sister     No Known Problems Brother        Physical Exam:  /82   Pulse 75   Temp 97.5 °F (36.4 °C) (Oral)   Resp 12   Ht 177.8 cm (70\")   Wt 99.6 kg (219 lb 9.3 oz)   SpO2 94%   BMI 31.51 kg/m²  Body mass index is 31.51 kg/m². 94% 99.6 kg (219 lb 9.3 oz)    Physical Exam  Constitutional: Middle aged pt in bed, spO2 94% 2L NC, in no acute distress  Head: Op dressing c/d, TERESA to bulb  Eyes: No pallor, anicteric conjunctiva, EOMI. PERRLA.  ENMT:  No oral thrush. Moist MM.   NECK: Trachea midline, no thyromegaly  Heart: RRR, no pedal edema  Lungs: ISABEL +, clear to auscultation throughout, no wheezing or crackles  Abdomen: Soft, nondistended.  No tenderness, guarding or rigidity. No palpable masses.  Indwelling zamora catheter to bsdg, clear yellow urine in bag.  Extremities: Extremities warm and well perfused. No cyanosis/ clubbing.  All " "pulses palpable.  Neuro: Alert, oriented, questions appropriately, follows commands, moves all extremities without issue, no gross deficits   Psych: Mood and affect appropriate    PPE recommended per [facility] infectious disease Isolation protocol for the current clinical scenario(as mentioned below) was followed.    LABS:  No results found for: \"COVID19\"    Lab Results   Component Value Date    CALCIUM 8.3 (L) 06/19/2024     Results from last 7 days   Lab Units 06/19/24  1257 06/19/24  0448 06/18/24  0443 06/17/24  0719 06/16/24  0511 06/15/24  1033   MAGNESIUM mg/dL  --   --   --   --   --  2.2   SODIUM mmol/L 143 141 141 142 142 145   POTASSIUM mmol/L 4.0 3.7 3.8 3.8 3.6 3.9   CHLORIDE mmol/L 109* 108* 108* 112* 112* 108*   CO2 mmol/L 23.0 24.0 21.3* 20.0* 17.6* 24.3   BUN mg/dL 22 23 23 20 21 19   CREATININE mg/dL 0.82 0.72* 0.87 0.89 0.78 0.79   GLUCOSE mg/dL 224* 212* 306* 300* 255* 161*   CALCIUM mg/dL 8.3* 8.5* 8.7 8.9 8.9 9.4   WBC 10*3/mm3 11.26*  --   --  13.92* 8.20 6.53   HEMOGLOBIN g/dL 15.3  --   --  15.5 15.1 15.4   PLATELETS 10*3/mm3 157  --   --  156 154 148   ALT (SGPT) U/L  --  221*  --   --  195* 210*   AST (SGOT) U/L  --  79*  --   --  71* 103*     Lab Results   Component Value Date    CKTOTAL 118 06/15/2024     Results from last 7 days   Lab Units 06/15/24  1033   CK TOTAL U/L 118         Results from last 7 days   Lab Units 06/15/24  1033   LACTATE mmol/L 1.5             Results from last 7 days   Lab Units 06/15/24  1033   INR  0.97         Lab Results   Component Value Date    TSH 2.570 06/15/2024     Estimated Creatinine Clearance: 113.3 mL/min (by C-G formula based on SCr of 0.82 mg/dL).  Results from last 7 days   Lab Units 06/15/24  1041   NITRITE UA  Negative        Imaging: I personally visualized the images of scans/x-rays performed within last 3 days.      Assessment:  Right basal ganglia mass, s/p right craniotomy for resection POD 0  Diabetes mellitus  Steroid-induced " hyperglycemia  Metabolic acidosis  Hypertension  Chronic hepatitis C  Transaminitis  Hx alcohol use  MARCIANO    Recommendations:  1.  Right basal ganglia mass, status post right craniotomy  -Monitoring in ICU per neurosurgery  -Neurochecks hourly.  Continue Keppra, Decadron  -Repeat CT head in the morning  -Keep SBP<150, on nicardipine drip  -Appears to be doing very well postop with exception of headache, as needed Norco and morphine available  -Clear liquid diet, IVF per neurosurgery    2.  Diabetes mellitus, steroid-induced hyperglycemia  -A1c 6.60, continue to monitor blood glucose closely and adjust regimen as needed  -Continue glargine 30 units daily, this was increased yesterday   -Lispro 10 units 3 times daily with meals   -Moderate-high dose SSI    3.  Metabolic acidosis  -6/16 labs with serum bicarb 17.6, LHA started patient on oral bicarb with improvement, 23 on today's labs.    4.  Hypertension  -Continue losartan, HCTZ.  Nicardipine drip added    5.  Chronic hepatitis C, transaminitis, hx alcohol use  -Patient states quit drinking in January 2024, no s/s Dts from admission  -On thiamine  -AST much improved since 2019 however ALT with persistent elevation.  No abdominal complaints.  Will continue to trend    6.  MARCIANO  -Does not wear CPAP at home, refuses to wear here.  Continue supplemental nasal cannula oxygen as needed to keep SpO2 greater than 92%    Indwelling Perry catheter  Dinorah Latham, ILYA  6/19/2024  20:24 EDT      Much of this encounter note is an electronic transcription/translation of spoken language to printed text using Dragon Software.

## 2024-06-20 NOTE — PLAN OF CARE
Goal Outcome Evaluation:              Outcome Evaluation: At the beginning of shift patient hypertensive, clarified with NSGY BP parameters - SBP <150 goal per ANNETTE Doty with NSGY. Cardene gtt on to maintain SBP goals and stopped at 0030 6/20. Arterial line initially was not correlating with cuff pressure - Pulm/CC noified and wanted to go on a-line pressures for monitoring/managment/treatment. Patient c/o of head pain throughout shift - treated with PRNs - see MAR. MRI/radiology unable to obtain MRI on this shift per radiology - deferred to day shift per MRI/radiology. Q1H neuro checks per NSGY, neuro assessments intact - no issues/no changes.

## 2024-06-20 NOTE — PROGRESS NOTES
[facility] INPATIENT PROGRESS NOTE         [facility] INTENSIVE CARE    2024      PATIENT IDENTIFICATION:  Name: Iain Christina ADMIT: 6/15/2024   : 1965  PCP: Scott Medina MD    MRN: [id number] LOS: 5 days   AGE/SEX: 58 y.o. male  ROOM: [facility]                     LOS 5    Reason for visit: ICU medical management postoperatively from craniotomy      SUBJECTIVE:      Resting comfortably.  No shortness of breath, nausea or chest pain.  No vision changes or weakness.  On nicardipine drip. I am seeing the patient for the first time today.  All patient problems are new to me.      Objective   OBJECTIVE:    Vital Sign Min/Max for last 24 hours  Temp  Min: 97.5 °F (36.4 °C)  Max: 98.9 °F (37.2 °C)   BP  Min: 102/68  Max: 141/83   Pulse  Min: 66  Max: 107   Resp  Min: 12  Max: 14   SpO2  Min: 90 %  Max: 95 %   No data recorded   No data recorded    Vitals:    24 0900 24 1000 24 1100 24 1139   BP: 112/66 117/83 128/81    BP Location:       Patient Position:       Pulse: 78 74 71 71   Resp:       Temp:    97.9 °F (36.6 °C)   TempSrc:    Oral   SpO2: 91% 91% 91% 91%   Weight:       Height:                06/15/24  1445 24  0008 24  0007   Weight: 99.8 kg (220 lb) 99.6 kg (219 lb 9.3 oz) 99.6 kg (219 lb 9.3 oz)       Body mass index is 31.51 kg/m².                          Body mass index is 31.51 kg/m².    Intake/Output Summary (Last 24 hours) at 2024 1321  Last data filed at 2024 0650  Gross per 24 hour   Intake 3894 ml   Output 260 ml   Net 3634 ml         Exam:  GEN:  No distress, appears stated age  EYES:   PERRL, anicteric sclerae  ENT:    External ears/nose normal, OP clear  NECK:  No adenopathy, midline trachea  LUNGS: Normal chest on inspection, palpation and auscultation  CV:  Normal S1S2, without murmur  ABD:  Nontender, nondistended, no hepatosplenomegaly, +BS  EXT:  No edema.  No cyanosis or clubbing.  No mottling and normal cap  refill.    Assessment     Scheduled meds:  ceFAZolin, 2,000 mg, Intravenous, Q8H  dexAMETHasone, 4 mg, Intravenous, Q6H  losartan, 100 mg, Oral, Q24H   And  hydroCHLOROthiazide, 12.5 mg, Oral, Q24H  insulin glargine, 30 Units, Subcutaneous, Daily  insulin lispro, 10 Units, Subcutaneous, TID With Meals  insulin lispro, 3-14 Units, Subcutaneous, 4x Daily AC & at Bedtime  levETIRAcetam, 500 mg, Intravenous, Q12H  sodium bicarbonate, 650 mg, Oral, TID  sodium chloride, 10 mL, Intravenous, Q12H      IV meds:                      niCARdipine, 5-15 mg/hr, Last Rate: 5 mg/hr (06/20/24 0737)  sodium chloride 0.9 % with KCl 20 mEq, 100 mL/hr, Last Rate: 100 mL/hr (06/20/24 0400)      Data Review:  Results from last 7 days   Lab Units 06/20/24  0336 06/19/24  1257 06/19/24  0448 06/18/24  0443 06/17/24  0719   SODIUM mmol/L 140 143 141 141 142   POTASSIUM mmol/L 4.1 4.0 3.7 3.8 3.8   CHLORIDE mmol/L 110* 109* 108* 108* 112*   CO2 mmol/L 21.1* 23.0 24.0 21.3* 20.0*   BUN mg/dL 27* 22 23 23 20   CREATININE mg/dL 0.84 0.82 0.72* 0.87 0.89   GLUCOSE mg/dL 227* 224* 212* 306* 300*   CALCIUM mg/dL 8.3* 8.3* 8.5* 8.7 8.9         Estimated Creatinine Clearance: 110.6 mL/min (by C-G formula based on SCr of 0.84 mg/dL).  Results from last 7 days   Lab Units 06/20/24  0336 06/19/24  1257 06/17/24  0719 06/16/24  0511 06/15/24  1033   WBC 10*3/mm3 12.41* 11.26* 13.92* 8.20 6.53   HEMOGLOBIN g/dL 15.2 15.3 15.5 15.1 15.4   PLATELETS 10*3/mm3 173 157 156 154 148     Results from last 7 days   Lab Units 06/20/24  0336 06/15/24  1033   INR  1.14* 0.97     Results from last 7 days   Lab Units 06/20/24  0336 06/19/24  0448 06/16/24  0511 06/15/24  1033   ALT (SGPT) U/L 223* 221* 195* 210*   AST (SGOT) U/L 93* 79* 71* 103*         Results from last 7 days   Lab Units 06/15/24  1033   LACTATE mmol/L 1.5         Glucose   Date/Time Value Ref Range Status   06/20/2024 1140 189 (H) 70 - 130 mg/dL Final   06/20/2024 0759 215 (H) 70 - 130 mg/dL Final    06/19/2024 2002 170 (H) 70 - 130 mg/dL Final   06/19/2024 1703 242 (H) 70 - 130 mg/dL Final   06/19/2024 1248 199 (H) 70 - 130 mg/dL Final   06/19/2024 0723 226 (H) 70 - 130 mg/dL Final   06/18/2024 1944 339 (H) 70 - 130 mg/dL Final         Imaging reviewed  CT head 6/19 reviewed                Active Hospital Problems    Diagnosis  POA    **Neoplasm causing mass effect and brain compression on adjacent structures [D49.9, G93.5]  Yes    Abnormal liver function tests [R79.89]  Yes    Steroid-induced hyperglycemia [R73.9, T38.0X5A]  Yes    Brain mass [G93.89]  Unknown    Chronic hepatitis C without hepatic coma [B18.2]  Yes    Sleep apnea [G47.30]  Yes    Hypertension [I10]  Yes      Resolved Hospital Problems   No resolved problems to display.         ASSESSMENT:  Right basal ganglia mass, s/p right craniotomy for resection POD 0  Diabetes mellitus  Steroid-induced hyperglycemia  Metabolic acidosis  Hypertension  Chronic hepatitis C  Transaminitis  Hx alcohol use  MARCIANO: Noncompliant with positive airway pressure      PLAN:  Neurochecks per protocol.  Pain control.  Control blood pressure.  On Cardene drip.  Adding back home blood pressure medicines.  Goal systolic less than 150.  Decadron per neurosurgery.  Control glucose.  Correct electrolytes as needed.  Mechanical DVT prophylaxis.    Discussed with multidisciplinary ICU team on rounds this morning.        CCT: 32 min    Dave Vargas MD  Pulmonary and Critical Care Medicine  [facility], [facility]  6/20/2024    13:21 EDT

## 2024-06-20 NOTE — PROGRESS NOTES
[facility] NEUROSURGERY INTRACRANIAL PROGRESS NOTE    PATIENT IDENTIFICATION:   Name:  Iain Christina      MRN:  [id number]     58 y.o.  male               Cc: POD #1 s/p R frontal craniotomy w/evacuation of right basal ganglia mass showing undifferentiated malignancy on frozen section-possible lymphoma      Subjective     Interval History: No significant overnight events.  Patient does complain of headache today.        Objective     Vital signs in last 24 hours:  Temp:  [97.4 °F (36.3 °C)-98.9 °F (37.2 °C)] 97.9 °F (36.6 °C)  Heart Rate:  [] 71  Resp:  [12-16] 14  BP: (102-141)/() 128/81  Arterial Line BP: ()/(47-71) 79/71      Intake/Output this shift:  No intake/output data recorded.      Intake/Output last 3 shifts:  I/O last 3 completed shifts:  In: 4994 [P.O.:510; I.V.:2909; Other:1575]  Out: 695 [Urine:485; Drains:60; Blood:150]  Subgaleal- 40 cc/12 hrs      LABS:  Results from last 7 days   Lab Units 06/20/24  0336 06/19/24  1257 06/17/24  0719   WBC 10*3/mm3 12.41* 11.26* 13.92*   HEMOGLOBIN g/dL 15.2 15.3 15.5   HEMATOCRIT % 44.2 46.2 47.2   PLATELETS 10*3/mm3 173 157 156     Results from last 7 days   Lab Units 06/20/24  0336 06/19/24  1257 06/19/24  0448 06/17/24  0719 06/16/24  0511   SODIUM mmol/L 140 143 141   < > 142   POTASSIUM mmol/L 4.1 4.0 3.7   < > 3.6   CHLORIDE mmol/L 110* 109* 108*   < > 112*   CO2 mmol/L 21.1* 23.0 24.0   < > 17.6*   BUN mg/dL 27* 22 23   < > 21   CREATININE mg/dL 0.84 0.82 0.72*   < > 0.78   CALCIUM mg/dL 8.3* 8.3* 8.5*   < > 8.9   BILIRUBIN mg/dL 1.1  --  0.8  --  1.2   ALK PHOS U/L 97  --  130*  --  109   ALT (SGPT) U/L 223*  --  221*  --  195*   AST (SGOT) U/L 93*  --  79*  --  71*   GLUCOSE mg/dL 227* 224* 212*   < > 255*    < > = values in this interval not displayed.     Tissue Pathology pending    IMAGING STUDIES:  CT head:  patient is status post right frontal craniotomy, right frontal lobe mass seen with large amount of vasogenic edema in  the right frontal lobe.  I personally viewed the patient's CT head, it was also reviewed by and discussed with Dr Ace Patel reviewed/changed: Yes  Cefazolin 2 g IV every 8 hours  Decadron 4 mg IV every 6 hours  Cozaar 100 mg p.o. daily  Chlorothiazide 12.5 mg p.o. daily  Lantus 30 units SQ daily  Humalog 10 units SQ 3 times daily  Humalog 3-14 units SQ 4 times daily  Keppra 500 mg IV every 12 hours  Sodium bicarbonate 650 mg 3 times daily  Cardene drip  Tylenol 650 mg p.o. every 4 hours as needed  Morphine 1 mg IV every 2 hours as needed      Physical Exam:    General:  Awake, alert & oriented x 3.  Speech clear with no aphasia  HEENT: Right frontal crani incision well-appearing, well approximated, no surrounding erythema, swelling or drainage.  Subgaleal drain in place with small amount of bloody drainage  Neck:  Supple  CN II:     Visual fileds full to confrontation  CN III, IV, VI:  EOM's intact, PERRL.  CN VII:  Facial movements are symmetric, no weakness  Motor:  Normal muscle strength, bulk and tone in bilateral upper and lower extremities.  No fasciculations, rigidity, spasticity, or abnormal movements  Sensation:  Normal to light touch bilaterally   Station & Gait:  Not tested/bedrest  Coordination:  Finger to nose intact bilaterally.  Heel to shin intact in bilateral lower extremities  Extremities:  Wearing SCD's        Assessment & Plan     ASSESSMENT:      Neoplasm causing mass effect and brain compression on adjacent structures    Hypertension    Sleep apnea    Chronic hepatitis C without hepatic coma    Brain mass    Abnormal liver function tests    Steroid-induced hyperglycemia      POD #1 s/p R frontal craniotomy w/evacuation of right basal ganglia mass showing undifferentiated malignancy on frozen section-possible lymphoma.  Patient doing well this morning, does complain of a mild headache.  Subgaleal drain removed, CT head this morning does show some residual tumor that was expected to be seen.   "Awaiting tissue pathology.  MRI of the brain with and without contrast has been ordered but not performed yet.    PLAN:   -Okay to transfer to floor from neurosurgery standpoint when cleared by intensivist  -MRI brain with and without  -Keep systolic blood pressure less than 140  -Tissue pathology pending  -Continue Decadron      I discussed the patient's findings and my recommendations with patient, nursing staff, and Dr Bello    During patient visit, I utilized appropriate personal protective equipment including  gloves.  Appropriate PPE was worn during the entire visit.  Hand hygiene was completed before and after.      LOS: 5 days       Becka Mueller, APRN  6/20/2024  12:01 EDT    \"Dictated utilizing Dragon dictation\".      "

## 2024-06-21 ENCOUNTER — TREATMENT (OUTPATIENT)
Dept: RADIATION ONCOLOGY | Facility: HOSPITAL | Age: 59
End: 2024-06-21
Payer: COMMERCIAL

## 2024-06-21 PROBLEM — C83.390 PRIMARY CNS LYMPHOMA: Status: ACTIVE | Noted: 2024-06-21

## 2024-06-21 PROBLEM — C85.89 PRIMARY CNS LYMPHOMA: Status: ACTIVE | Noted: 2024-06-21

## 2024-06-21 LAB
ANION GAP SERPL CALCULATED.3IONS-SCNC: 11.2 MMOL/L (ref 5–15)
BASOPHILS # BLD AUTO: 0.02 10*3/MM3 (ref 0–0.2)
BASOPHILS NFR BLD AUTO: 0.2 % (ref 0–1.5)
BUN SERPL-MCNC: 24 MG/DL (ref 8–23)
BUN/CREAT SERPL: 30 (ref 7–25)
CALCIUM SPEC-SCNC: 8.6 MG/DL (ref 8.6–10.5)
CHLORIDE SERPL-SCNC: 105 MMOL/L (ref 98–107)
CO2 SERPL-SCNC: 20.8 MMOL/L (ref 22–29)
CREAT SERPL-MCNC: 0.8 MG/DL (ref 0.76–1.27)
DEPRECATED RDW RBC AUTO: 39.3 FL (ref 37–54)
EGFRCR SERPLBLD CKD-EPI 2021: 101.3 ML/MIN/1.73
EOSINOPHIL # BLD AUTO: 0 10*3/MM3 (ref 0–0.4)
EOSINOPHIL NFR BLD AUTO: 0 % (ref 0.3–6.2)
ERYTHROCYTE [DISTWIDTH] IN BLOOD BY AUTOMATED COUNT: 12.3 % (ref 12.3–15.4)
GLUCOSE BLDC GLUCOMTR-MCNC: 174 MG/DL (ref 70–130)
GLUCOSE BLDC GLUCOMTR-MCNC: 175 MG/DL (ref 70–130)
GLUCOSE BLDC GLUCOMTR-MCNC: 201 MG/DL (ref 70–130)
GLUCOSE BLDC GLUCOMTR-MCNC: 295 MG/DL (ref 70–130)
GLUCOSE SERPL-MCNC: 206 MG/DL (ref 65–99)
HCT VFR BLD AUTO: 46.6 % (ref 37.5–51)
HGB BLD-MCNC: 16.1 G/DL (ref 13–17.7)
IMM GRANULOCYTES # BLD AUTO: 0.2 10*3/MM3 (ref 0–0.05)
IMM GRANULOCYTES NFR BLD AUTO: 1.8 % (ref 0–0.5)
LYMPHOCYTES # BLD AUTO: 1.21 10*3/MM3 (ref 0.7–3.1)
LYMPHOCYTES NFR BLD AUTO: 10.7 % (ref 19.6–45.3)
MCH RBC QN AUTO: 30.2 PG (ref 26.6–33)
MCHC RBC AUTO-ENTMCNC: 34.5 G/DL (ref 31.5–35.7)
MCV RBC AUTO: 87.4 FL (ref 79–97)
MONOCYTES # BLD AUTO: 0.48 10*3/MM3 (ref 0.1–0.9)
MONOCYTES NFR BLD AUTO: 4.3 % (ref 5–12)
NEUTROPHILS NFR BLD AUTO: 83 % (ref 42.7–76)
NEUTROPHILS NFR BLD AUTO: 9.37 10*3/MM3 (ref 1.7–7)
NRBC BLD AUTO-RTO: 0 /100 WBC (ref 0–0.2)
PLATELET # BLD AUTO: 154 10*3/MM3 (ref 140–450)
PMV BLD AUTO: 11.4 FL (ref 6–12)
POTASSIUM SERPL-SCNC: 3.8 MMOL/L (ref 3.5–5.2)
RBC # BLD AUTO: 5.33 10*6/MM3 (ref 4.14–5.8)
SODIUM SERPL-SCNC: 137 MMOL/L (ref 136–145)
WBC NRBC COR # BLD AUTO: 11.28 10*3/MM3 (ref 3.4–10.8)

## 2024-06-21 PROCEDURE — 63710000001 INSULIN LISPRO (HUMAN) PER 5 UNITS: Performed by: NEUROLOGICAL SURGERY

## 2024-06-21 PROCEDURE — 25010000002 DEXAMETHASONE PER 1 MG: Performed by: NEUROLOGICAL SURGERY

## 2024-06-21 PROCEDURE — 97164 PT RE-EVAL EST PLAN CARE: CPT

## 2024-06-21 PROCEDURE — 97530 THERAPEUTIC ACTIVITIES: CPT

## 2024-06-21 PROCEDURE — 25010000002 LEVETRIRACETAM PER 10 MG: Performed by: NEUROLOGICAL SURGERY

## 2024-06-21 PROCEDURE — 99024 POSTOP FOLLOW-UP VISIT: CPT

## 2024-06-21 PROCEDURE — 80048 BASIC METABOLIC PNL TOTAL CA: CPT

## 2024-06-21 PROCEDURE — 25010000002 CEFAZOLIN PER 500 MG: Performed by: NEUROLOGICAL SURGERY

## 2024-06-21 PROCEDURE — 63710000001 DEXAMETHASONE PER 0.25 MG: Performed by: INTERNAL MEDICINE

## 2024-06-21 PROCEDURE — 82948 REAGENT STRIP/BLOOD GLUCOSE: CPT

## 2024-06-21 PROCEDURE — 99255 IP/OBS CONSLTJ NEW/EST HI 80: CPT | Performed by: INTERNAL MEDICINE

## 2024-06-21 PROCEDURE — 63710000001 INSULIN GLARGINE PER 5 UNITS: Performed by: NEUROLOGICAL SURGERY

## 2024-06-21 PROCEDURE — 85025 COMPLETE CBC W/AUTO DIFF WBC: CPT | Performed by: NEUROLOGICAL SURGERY

## 2024-06-21 PROCEDURE — 25010000002 HYDRALAZINE PER 20 MG

## 2024-06-21 RX ORDER — LEVETIRACETAM 500 MG/1
500 TABLET ORAL EVERY 12 HOURS SCHEDULED
Status: DISCONTINUED | OUTPATIENT
Start: 2024-06-21 | End: 2024-06-24 | Stop reason: HOSPADM

## 2024-06-21 RX ORDER — DEXAMETHASONE 4 MG/1
4 TABLET ORAL EVERY 6 HOURS SCHEDULED
Status: DISCONTINUED | OUTPATIENT
Start: 2024-06-21 | End: 2024-06-23

## 2024-06-21 RX ADMIN — SODIUM CHLORIDE 2000 MG: 900 INJECTION INTRAVENOUS at 03:25

## 2024-06-21 RX ADMIN — SODIUM BICARBONATE 650 MG: 650 TABLET, ORALLY DISINTEGRATING ORAL at 21:19

## 2024-06-21 RX ADMIN — SODIUM BICARBONATE 650 MG: 650 TABLET, ORALLY DISINTEGRATING ORAL at 08:24

## 2024-06-21 RX ADMIN — LEVETIRACETAM 500 MG: 500 INJECTION, SOLUTION INTRAVENOUS at 08:24

## 2024-06-21 RX ADMIN — Medication 10 ML: at 08:25

## 2024-06-21 RX ADMIN — INSULIN LISPRO 3 UNITS: 100 INJECTION, SOLUTION INTRAVENOUS; SUBCUTANEOUS at 11:57

## 2024-06-21 RX ADMIN — INSULIN LISPRO 10 UNITS: 100 INJECTION, SOLUTION INTRAVENOUS; SUBCUTANEOUS at 17:40

## 2024-06-21 RX ADMIN — SODIUM BICARBONATE 650 MG: 650 TABLET, ORALLY DISINTEGRATING ORAL at 16:17

## 2024-06-21 RX ADMIN — Medication 10 ML: at 21:20

## 2024-06-21 RX ADMIN — Medication 5 MG: at 01:20

## 2024-06-21 RX ADMIN — INSULIN LISPRO 8 UNITS: 100 INJECTION, SOLUTION INTRAVENOUS; SUBCUTANEOUS at 17:39

## 2024-06-21 RX ADMIN — INSULIN GLARGINE 30 UNITS: 100 INJECTION, SOLUTION SUBCUTANEOUS at 08:23

## 2024-06-21 RX ADMIN — HYDRALAZINE HYDROCHLORIDE 10 MG: 20 INJECTION INTRAMUSCULAR; INTRAVENOUS at 01:20

## 2024-06-21 RX ADMIN — INSULIN LISPRO 5 UNITS: 100 INJECTION, SOLUTION INTRAVENOUS; SUBCUTANEOUS at 21:20

## 2024-06-21 RX ADMIN — SODIUM CHLORIDE 2000 MG: 900 INJECTION INTRAVENOUS at 11:02

## 2024-06-21 RX ADMIN — LEVETIRACETAM 500 MG: 500 TABLET, FILM COATED ORAL at 21:19

## 2024-06-21 RX ADMIN — DEXAMETHASONE SODIUM PHOSPHATE 4 MG: 4 INJECTION, SOLUTION INTRA-ARTICULAR; INTRALESIONAL; INTRAMUSCULAR; INTRAVENOUS; SOFT TISSUE at 03:25

## 2024-06-21 RX ADMIN — DEXAMETHASONE 4 MG: 4 TABLET ORAL at 11:02

## 2024-06-21 RX ADMIN — HYDROCHLOROTHIAZIDE 12.5 MG: 12.5 TABLET ORAL at 08:24

## 2024-06-21 RX ADMIN — INSULIN LISPRO 3 UNITS: 100 INJECTION, SOLUTION INTRAVENOUS; SUBCUTANEOUS at 08:01

## 2024-06-21 RX ADMIN — INSULIN LISPRO 10 UNITS: 100 INJECTION, SOLUTION INTRAVENOUS; SUBCUTANEOUS at 08:23

## 2024-06-21 RX ADMIN — DEXAMETHASONE 4 MG: 4 TABLET ORAL at 23:47

## 2024-06-21 RX ADMIN — INSULIN LISPRO 10 UNITS: 100 INJECTION, SOLUTION INTRAVENOUS; SUBCUTANEOUS at 11:58

## 2024-06-21 RX ADMIN — DEXAMETHASONE 4 MG: 4 TABLET ORAL at 17:40

## 2024-06-21 NOTE — CONSULTS
.     REASON FOR CONSULTATION:     Provide an opinion on any further workup or treatment CNS lymphoma.                             REQUESTING PHYSICIAN: ILYA Vicente      RECORDS OBTAINED:  Records of the patient's history including those obtained from the referring provider were reviewed and summarized in detail.    HISTORY OF PRESENT ILLNESS:  The patient is a 58 y.o. year old male whom we are consulted for evaluation and management of newly diagnosed CNS diffuse large B-cell lymphoma.    This patient has hypertension, hepatitis C, sleep apnea, however he denies diabetes, also previous history of alcohol use who stopped drinking 6 months ago, who presented to [facility] emergency room on 06/15/2024 because of worsening memory as well as episodes of fall. This patient is a long term . He initially came to [facility] ER and CT scan shows brain mets. He was given IV steroids and brought to [facility] for further evaluation and management. The patient reports no fever, sweating, chills, no weight loss. He does not smoke cigarettes.     The CT scan of the head without contrast on 06/15/2024 at [facility] reported a right frontal lobe mass measuring 4.8 x 3.3 cm with large amount of associated vasogenic edema and so called effacement. There is 8 mm right to left midline shift. There was significant mass effect.    After he was admitted to [facility] further imaging studies with the brain MRI with and without contrast on 06/15/2024 confirmed an enhancing mass 4 x 4 x 4 cm in the deep white matter of the right frontal lobe and with internal region of cystic changes. There was intramural micro hemorrhage. There was extensive surrounding vasogenic edema extends across the midline to the corpus collosum. There is leftward midline shift measuring 14 mm.     The patient further had workup with imaging studies for chest, abdomen  and pelvis with IV contrast obtained on 06/16/2024 and this study reported no mediastinal adenopathy in the chest. There was trace pericardial effusion. No pulmonary lesions, pleural effusions or pneumothorax. In the abdomen there was chronic diverticulosis, otherwise unremarkable. There was borderline enlarged carley hepatic lymph nodes 1.2 cm. Otherwise unremarkable. No bone lesions.     This patient was seen by, Rosendo Bello MD, Neurosurgeon, who performed right frontal craniotomy and the intraoperative frozen section reported undifferentiated malignancy and the possibility of lymphoma. The patient has excisional biopsy for the right frontal lobe mass.    Pathology evaluation reported atypical lymphoproliferative consistent with diffuse large B-cell lymphoma pending outside consultation. The gross description reported specimen measuring 1.4 x 1.0 x 0.4 cm. The 2nd specimen consisted of 3 x 2 x 1 cm rubbery soft tissue.         Past Medical History:   Diagnosis Date    Diabetes mellitus     Hepatitis C     Hyperthyroidism     Sleep apnea      Past Surgical History:   Procedure Laterality Date    CRANIOTOMY FOR TUMOR Right 6/19/2024    Procedure: Right frontal craniotomy for tumor;  Surgeon: Rosendo Bello MD;  Location: [facility];  Service: Neurosurgery;  Laterality: Right;    NASAL SEPTAL RECONSTRUCTION         MEDICATIONS    Current Facility-Administered Medications:     acetaminophen (TYLENOL) tablet 650 mg, 650 mg, Oral, Q4H PRN **OR** acetaminophen (TYLENOL) 160 MG/5ML oral solution 650 mg, 650 mg, Oral, Q4H PRN **OR** acetaminophen (TYLENOL) suppository 650 mg, 650 mg, Rectal, Q4H PRN, Rosendo Bello MD    sennosides-docusate (PERICOLACE) 8.6-50 MG per tablet 2 tablet, 2 tablet, Oral, BID PRN **AND** polyethylene glycol (MIRALAX) packet 17 g, 17 g, Oral, Daily PRN **AND** bisacodyl (DULCOLAX) EC tablet 5 mg, 5 mg, Oral, Daily PRN **AND** bisacodyl (DULCOLAX) suppository 10 mg, 10 mg, Rectal, Daily  PRN, Rosendo Bello MD    dexAMETHasone (DECADRON) tablet 4 mg, 4 mg, Oral, Q6H, Dave Vargas MD, 4 mg at 06/21/24 1102    dextrose (D50W) (25 g/50 mL) IV injection 25 g, 25 g, Intravenous, Q15 Min PRN, Rosendo Bello MD    dextrose (GLUTOSE) oral gel 15 g, 15 g, Oral, Q15 Min PRN, Rosendo Bello MD    glucagon (GLUCAGEN) injection 1 mg, 1 mg, Intramuscular, Q15 Min PRN, Rosendo Bello MD    hydrALAZINE (APRESOLINE) injection 10 mg, 10 mg, Intravenous, Q6H PRN, Ania Lahtam APRN, 10 mg at 06/21/24 0120    losartan (COZAAR) tablet 100 mg, 100 mg, Oral, Q24H, 100 mg at 06/20/24 0931 **AND** hydroCHLOROthiazide tablet 12.5 mg, 12.5 mg, Oral, Q24H, Rosendo Bello MD, 12.5 mg at 06/21/24 0824    HYDROcodone-acetaminophen (NORCO) 5-325 MG per tablet 1 tablet, 1 tablet, Oral, Q4H PRN, Rosendo Bello MD, 1 tablet at 06/20/24 1402    insulin glargine (LANTUS, SEMGLEE) injection 30 Units, 30 Units, Subcutaneous, Daily, Rosendo Bello MD, 30 Units at 06/21/24 0823    insulin lispro (HUMALOG/ADMELOG) injection 10 Units, 10 Units, Subcutaneous, TID With Meals, Rosendo Bello MD, 10 Units at 06/21/24 1158    insulin lispro (HUMALOG/ADMELOG) injection 3-14 Units, 3-14 Units, Subcutaneous, 4x Daily AC & at Bedtime, Rosendo Bello MD, 3 Units at 06/21/24 1157    levETIRAcetam (KEPPRA) tablet 500 mg, 500 mg, Oral, Q12H, Dave Vargas MD    melatonin tablet 5 mg, 5 mg, Oral, Nightly PRN, Lilia Ennis, APRN, 5 mg at 06/21/24 0120    morphine injection 1 mg, 1 mg, Intravenous, Q2H PRN, 1 mg at 06/19/24 2308 **AND** naloxone (NARCAN) injection 0.4 mg, 0.4 mg, Intravenous, Q5 Min PRN, Rosendo Bello MD    niCARdipine (CARDENE) 25 mg in 250 mL NS infusion kit, 5-15 mg/hr, Intravenous, Titrated, Jaida Garzon, ILYA, Last Rate: 50 mL/hr at 06/20/24 0737, 5 mg/hr at 06/20/24 0737    ondansetron ODT (ZOFRAN-ODT) disintegrating tablet 4 mg, 4 mg, Oral, Q6H PRN **OR** ondansetron (ZOFRAN)  injection 4 mg, 4 mg, Intravenous, Q6H PRN, Rosendo Bello MD    sodium bicarbonate tablet 650 mg, 650 mg, Oral, TID, Rosendo Bello MD, 650 mg at 06/21/24 1617    sodium chloride 0.9 % flush 10 mL, 10 mL, Intravenous, Q12H, Rosendo Bello MD, 10 mL at 06/21/24 0825    sodium chloride 0.9 % flush 10 mL, 10 mL, Intravenous, PRN, Rosendo Bello MD    sodium chloride 0.9 % infusion 40 mL, 40 mL, Intravenous, PRN, Rosendo Bello MD    sodium chloride 0.9 % with KCl 20 mEq/L infusion, 100 mL/hr, Intravenous, Continuous, Rosendo Bello MD, Last Rate: 100 mL/hr at 06/20/24 0400, 100 mL/hr at 06/20/24 0400    ALLERGIES:     Allergies   Allergen Reactions    Peanut (Diagnostic) Anaphylaxis       SOCIAL HISTORY:       Social History     Socioeconomic History    Marital status: Unknown   Tobacco Use    Smoking status: Never    Smokeless tobacco: Never   Vaping Use    Vaping status: Never Used   Substance and Sexual Activity    Alcohol use: Not Currently     Comment: STATED HE QUIT IN JANUARY    Drug use: Never    Sexual activity: Yes   Patient is not .  No children.  He lives alone.  He is a professional .  No cigarette smoking.      FAMILY HISTORY:  Family History   Problem Relation Age of Onset    COPD Mother     Cancer Sister     No Known Problems Brother      One follow-up with sister had a breast cancer which later had a metastatic disease into the bone.  She passed away.    Another sister had a cancer in the liver.    His brother had a colon cancer.    His father also had a some kind of cancer, bone cancer?       REVIEW OF SYSTEMS:  Review of Systems   Constitutional:  Negative for appetite change, diaphoresis, fatigue, fever and unexpected weight change.   HENT:  Negative for nosebleeds, sore throat and trouble swallowing.    Respiratory:  Negative for cough and shortness of breath.    Cardiovascular:  Negative for chest pain and leg swelling.   Gastrointestinal:  Negative for abdominal pain,  anal bleeding and blood in stool.   Genitourinary:  Negative for dysuria and hematuria.   Allergic/Immunologic: Negative for immunocompromised state.   Neurological:  Negative for seizures and headaches.        Episodes of fall   Hematological:  Negative for adenopathy.   Psychiatric/Behavioral:  Positive for decreased concentration.               Vitals:    06/21/24 1200 06/21/24 1234 06/21/24 1424 06/21/24 1600   BP: 133/88 116/77 114/75 128/99   BP Location:       Patient Position:       Pulse: 86 90     Resp:       Temp:       TempSrc:       SpO2: 93% 92%     Weight:       Height:              No data to display               PHYSICAL EXAM:      CONSTITUTIONAL:  Vital signs reviewed.  Well-developed well-nourished male sitting in chair.  Status post right frontal craniotomy.  No distress, looks comfortable.  EYES:  Conjunctivae and lids unremarkable.   EARS,NOSE,MOUTH,THROAT:  Ears and nose appear unremarkable.  Lips appear unremarkable.  RESPIRATORY:  Normal respiratory effort.  Lungs clear to auscultation bilaterally.  CARDIOVASCULAR:  Normal S1, S2.  No Murmurs.  No significant lower extremity edema.  GASTROINTESTINAL: Abdomen appears unremarkable.  Nontender.    LYMPHATIC:  No cervical lymphadenopathy.  MUSCULOSKELETAL: Dictated.  Unremarkable digits/nails.  No cyanosis or clubbing.  SKIN:  Warm.  No rashes.  PSYCHIATRIC:  Normal judgment and insight.  Normal mood and affect.      RECENT LABS:  CBC:      Lab 06/21/24  0334 06/20/24  0336 06/19/24  1257 06/17/24  0719 06/16/24  0511 06/15/24  1033   WBC 11.28* 12.41* 11.26* 13.92* 8.20 6.53   HEMOGLOBIN 16.1 15.2 15.3 15.5 15.1 15.4   HEMATOCRIT 46.6 44.2 46.2 47.2 46.2 45.1   PLATELETS 154 173 157 156 154 148   NEUTROS ABS 9.37* 9.97* 9.06*  --  7.04* 3.58   IMMATURE GRANS (ABS) 0.20* 0.13* 0.25*  --  0.03 0.02   LYMPHS ABS 1.21 1.42 1.44  --  0.95 2.04   MONOS ABS 0.48 0.88 0.48  --  0.17 0.49   EOS ABS 0.00 0.00 0.00  --  0.00 0.35   MCV 87.4 88.8 89.2  "90.1 89.0 88.6     CMP:        Lab 06/21/24  0334 06/20/24  0336 06/19/24  1257 06/19/24  0448 06/18/24  0443 06/17/24  0719 06/16/24  0511 06/15/24  1033   SODIUM 137 140 143 141 141   < > 142 145   POTASSIUM 3.8 4.1 4.0 3.7 3.8   < > 3.6 3.9   CHLORIDE 105 110* 109* 108* 108*   < > 112* 108*   CO2 20.8* 21.1* 23.0 24.0 21.3*   < > 17.6* 24.3   ANION GAP 11.2 8.9 11.0 9.0 11.7   < > 12.4 12.7   BUN 24* 27* 22 23 23   < > 21 19   CREATININE 0.80 0.84 0.82 0.72* 0.87   < > 0.78 0.79   EGFR 101.3 99.8 100.6 104.6 98.8   < > 102.1 101.7   GLUCOSE 206* 227* 224* 212* 306*   < > 255* 161*   CALCIUM 8.6 8.3* 8.3* 8.5* 8.7   < > 8.9 9.4   MAGNESIUM  --   --   --   --   --   --   --  2.2   TOTAL PROTEIN  --  6.1  --  6.9  --   --  6.8 7.4   ALBUMIN  --  3.5  --  3.9  --   --  4.0 4.2   GLOBULIN  --  2.6  --  3.0  --   --   --  3.2   ALT (SGPT)  --  223*  --  221*  --   --  195* 210*   AST (SGOT)  --  93*  --  79*  --   --  71* 103*   BILIRUBIN  --  1.1  --  0.8  --   --  1.2 1.0   INDIRECT BILIRUBIN  --   --   --   --   --   --  0.9  --    BILIRUBIN DIRECT  --   --   --   --   --   --  0.3  --    ALK PHOS  --  97  --  130*  --   --  109 149*    < > = values in this interval not displayed.     No results found for: \"LDH\", \"URICACID\"      Tissue Pathology Exam: [id number]  Order: [id number]  Status: Preliminary result       Visible to patient: No (not released)       Next appt: 01/16/2025 at 11:15 AM in Family Medicine (Scott Medina MD)       Dx: Brain mass    Specimen Information: A: Brain; Tissue    B: Brain; Tissue   0 Result Notes      Component    Case Report   Surgical Pathology Report                         Case: [id number]                                   Authorizing Provider:  Rosendo Bello MD        Collected:           06/19/2024 11:17 AM           Ordering Location:     [facility]  Received:            06/19/2024 11:29 AM                                  MAIN OR                              "                                          Pathologist:           Maria Eugenia Grajeda MD                                                     Specimens:   1) - Brain, Right frontal brain mass- frozen call back main OR 28                                    2) - Brain, Right frontal brain mass tissue-fresh                                          Preliminary Diagnosis   1-2.  Brain, right frontal lobe, biopsy resection:               -Atypical lymphoproliferation consistent with diffuse large B cell lymphoma.               -Pending outside consultation.     SWM   Preliminary result electronically signed by Maria Eugenia Grajeda MD on 6/21/2024 at 0917  Preliminary result electronically signed by Maria Eugenia Grajeda MD on 6/20/2024 at 1459   Comment P   This case will be sent to [facility] lab for additional consultation, with final diagnosis to follow.   Intraoperative Consultation P   Crush prep/frozen section diagnosis: Right frontal brain mass: Poorly differentiated malignancy. SWM to Dr Bello 1144        IMAGING:  MRI Brain With & Without Contrast (06/15/2024 22:02)   CT Chest With Contrast Diagnostic (06/16/2024 15:58)     CT Abdomen Pelvis With & Without Contrast (06/16/2024 15:58)     Assessment & Plan     ASSESSMENT:     *. Primary CNS lymphoma, diffuse large B-cell lymphoma. Final pathology study is pending at outside facility.     This patient was forgetful and episodes of falling but otherwise asymptomatic. No weight loss, no vision changes.    Discussed with patient today on 06/21/2024 that he would need chemotherapy with high dose methotrexate. He would need Port-A-Catheter for the treatment. Prior to treatment we do need to have full body PET scan for evaluation. I also recommended to obtain echocardiogram study as prechemotherapy baseline.     I encouraged the patient to consider chemotherapy. He is way too young to not have systemic therapy. I proposed 6 cycles high dose methotrexate. After that the  patient will likely need consolidation chemotherapy and I do think he would benefit from evaluation from stem cell transplant team at [facility]. This disease does have high chance of recurrence after chemotherapy.    PLAN:  1. We will request echocardiogram study.  2. We will consult General Surgeon for evaluation for Port-A-Catheter placement.   3. We will arrange outpatient whole body PET scan examination.  4. This patient will need health care plan consult as he reports no commercial insurance, he does have some sort of payment mechanism through the e[employer]channel.  5. I will see the patient in about 10-14 days for reevaluation ideally after the whole body PET scan examination and in preparation for his chemotherapy.   6.  This patient will need outpatient ophthalmology evaluation.    7.  This patient also will need genetic counseling due to multiple family members had malignancy..      TRINO SMILEY M.D., Ph.D.

## 2024-06-21 NOTE — PROGRESS NOTES
[facility] INPATIENT PROGRESS NOTE         [facility] INTENSIVE CARE    2024      PATIENT IDENTIFICATION:  Name: Iain Christina ADMIT: 6/15/2024   : 1965  PCP: Scott Medina MD    MRN: [id number] LOS: 6 days   AGE/SEX: 58 y.o. male  ROOM: [facility]                     LOS 6    Reason for visit: ICU medical management postoperatively from craniotomy      SUBJECTIVE:      Resting comfortably.  Off Cardene drip and can transfer out of intensive care.  Discussed with nursing staff at bedside.  No new issues overnight.      Objective   OBJECTIVE:    Vital Sign Min/Max for last 24 hours  Temp  Min: 97.9 °F (36.6 °C)  Max: 98.4 °F (36.9 °C)   BP  Min: 92/64  Max: 157/101   Pulse  Min: 50  Max: 79   Resp  Min: 14  Max: 14   SpO2  Min: 91 %  Max: 94 %   No data recorded   Weight  Min: 105 kg (231 lb 0.7 oz)  Max: 105 kg (231 lb 0.7 oz)    Vitals:    24 0725 24 0800 24 0824 24 0900   BP:  134/78 134/78 152/87   BP Location:       Patient Position:       Pulse:  58 70 72   Resp:       Temp: 98.1 °F (36.7 °C)      TempSrc: Oral      SpO2:  94% 92% 92%   Weight:       Height:                24  0008 24  0007 24  0600   Weight: 99.6 kg (219 lb 9.3 oz) 99.6 kg (219 lb 9.3 oz) 105 kg (231 lb 0.7 oz)       Body mass index is 33.15 kg/m².                          Body mass index is 33.15 kg/m².    Intake/Output Summary (Last 24 hours) at 2024 0945  Last data filed at 2024 0900  Gross per 24 hour   Intake 673 ml   Output 4025 ml   Net -3352 ml         Exam:  GEN:  No distress, appears stated age  EYES:   PERRL, anicteric sclerae  ENT:    External ears/nose normal, OP clear  NECK:  No adenopathy, midline trachea  LUNGS: Normal chest on inspection, palpation and auscultation  CV:  Normal S1S2, without murmur  ABD:  Nontender, nondistended, no hepatosplenomegaly, +BS  EXT:  No edema.  No cyanosis or clubbing.  No mottling and normal cap  refill.    Assessment     Scheduled meds:  ceFAZolin, 2,000 mg, Intravenous, Q8H  dexAMETHasone, 4 mg, Intravenous, Q6H  losartan, 100 mg, Oral, Q24H   And  hydroCHLOROthiazide, 12.5 mg, Oral, Q24H  insulin glargine, 30 Units, Subcutaneous, Daily  insulin lispro, 10 Units, Subcutaneous, TID With Meals  insulin lispro, 3-14 Units, Subcutaneous, 4x Daily AC & at Bedtime  levETIRAcetam, 500 mg, Intravenous, Q12H  sodium bicarbonate, 650 mg, Oral, TID  sodium chloride, 10 mL, Intravenous, Q12H      IV meds:                      niCARdipine, 5-15 mg/hr, Last Rate: 5 mg/hr (06/20/24 0737)  sodium chloride 0.9 % with KCl 20 mEq, 100 mL/hr, Last Rate: 100 mL/hr (06/20/24 0400)      Data Review:  Results from last 7 days   Lab Units 06/21/24  0334 06/20/24  0336 06/19/24  1257 06/19/24  0448 06/18/24  0443   SODIUM mmol/L 137 140 143 141 141   POTASSIUM mmol/L 3.8 4.1 4.0 3.7 3.8   CHLORIDE mmol/L 105 110* 109* 108* 108*   CO2 mmol/L 20.8* 21.1* 23.0 24.0 21.3*   BUN mg/dL 24* 27* 22 23 23   CREATININE mg/dL 0.80 0.84 0.82 0.72* 0.87   GLUCOSE mg/dL 206* 227* 224* 212* 306*   CALCIUM mg/dL 8.6 8.3* 8.3* 8.5* 8.7         Estimated Creatinine Clearance: 119.2 mL/min (by C-G formula based on SCr of 0.8 mg/dL).  Results from last 7 days   Lab Units 06/21/24  0334 06/20/24  0336 06/19/24  1257 06/17/24  0719 06/16/24  0511   WBC 10*3/mm3 11.28* 12.41* 11.26* 13.92* 8.20   HEMOGLOBIN g/dL 16.1 15.2 15.3 15.5 15.1   PLATELETS 10*3/mm3 154 173 157 156 154     Results from last 7 days   Lab Units 06/20/24  0336 06/15/24  1033   INR  1.14* 0.97     Results from last 7 days   Lab Units 06/20/24  0336 06/19/24  0448 06/16/24  0511 06/15/24  1033   ALT (SGPT) U/L 223* 221* 195* 210*   AST (SGOT) U/L 93* 79* 71* 103*         Results from last 7 days   Lab Units 06/15/24  1033   LACTATE mmol/L 1.5         Glucose   Date/Time Value Ref Range Status   06/21/2024 0728 175 (H) 70 - 130 mg/dL Final   06/20/2024 2117 178 (H) 70 - 130 mg/dL Final    06/20/2024 1140 189 (H) 70 - 130 mg/dL Final   06/20/2024 0759 215 (H) 70 - 130 mg/dL Final   06/19/2024 2002 170 (H) 70 - 130 mg/dL Final   06/19/2024 1703 242 (H) 70 - 130 mg/dL Final   06/19/2024 1248 199 (H) 70 - 130 mg/dL Final         Imaging reviewed  CT head 6/19 reviewed                Active Hospital Problems    Diagnosis  POA    **Neoplasm causing mass effect and brain compression on adjacent structures [D49.9, G93.5]  Yes    Abnormal liver function tests [R79.89]  Yes    Steroid-induced hyperglycemia [R73.9, T38.0X5A]  Yes    Brain mass [G93.89]  Unknown    Chronic hepatitis C without hepatic coma [B18.2]  Yes    Sleep apnea [G47.30]  Yes    Hypertension [I10]  Yes      Resolved Hospital Problems   No resolved problems to display.         ASSESSMENT:  Right basal ganglia mass, s/p right craniotomy for resection  Diabetes mellitus  Steroid-induced hyperglycemia  Metabolic acidosis  Hypertension  Chronic hepatitis C  Transaminitis  Hx alcohol use  MARCIANO: Noncompliant with positive airway pressure      PLAN:  Neurochecks per protocol.  Pain control.  Control blood pressure.  Off Cardene drip. Goal systolic less than 150.  Decadron per neurosurgery.  Control glucose.  Correct electrolytes as needed.  Mechanical DVT prophylaxis.    Discussed with multidisciplinary ICU team on rounds this morning.    Transfer out of intensive care.        Dave Vargas MD  Pulmonary and Critical Care Medicine  [facility], [facility]  6/21/2024    09:45 EDT

## 2024-06-21 NOTE — PLAN OF CARE
Goal Outcome Evaluation:  Plan of Care Reviewed With: patient           Outcome Evaluation: Pt is 59 yo male re-evaluated for PT after R frontal craniotomy with evacuation of R basal ganglia mass. PMH significant for Dm, hep C, MARCIANO, HTN, ETOH abuse. Pt lives alone, reports full flight of stairs to enter, no use of AD prior. Patient agreeable to therapy, no c/o pain. patient performed bed mobility with supervision, sit to stand with SBA, ambulated 2 laps around nurses unit with CGA, pt has some occassional unsteadiness but able to self correct and no overt LOB. Pt has impairments consisting of generalized post op weakness, decreased balance and would benefit from skilled PT. Pt reports d/c plan is home with some family assist.      Anticipated Discharge Disposition (PT): home with assist

## 2024-06-21 NOTE — PLAN OF CARE
Goal Outcome Evaluation:              Outcome Evaluation: Patient neuro assessments intact throughout shift - no issues/concerns/changes. No complaints of pain. Patient c/o lack of rest/sleep and requested home medication ambien - NSGY notified, per NSGY holding d/t disease process and neuro status - PRN melatonin added per NSGY to help rest/sleep - given to patient overnight/see MAR. SBP <150 goal maintained - PRN medications given to maintain goal - see MAR.

## 2024-06-21 NOTE — THERAPY EVALUATION
Patient Name: Iain Christina  : 1965    MRN: [id number]                              Today's Date: 2024       Admit Date: 6/15/2024    Visit Dx:     ICD-10-CM ICD-9-CM   1. Brain mass  G93.89 348.89     Patient Active Problem List   Diagnosis    Hypertension    Rosacea    Atopic rhinitis    Hyperlipidemia    Sebaceous cyst    Tinnitus    Hyperthyroidism    Sleep apnea    Chronic hepatitis C without hepatic coma    Brain mass    Neoplasm causing mass effect and brain compression on adjacent structures    Abnormal liver function tests    Steroid-induced hyperglycemia     Past Medical History:   Diagnosis Date    Diabetes mellitus     Hepatitis C     Hyperthyroidism     Sleep apnea      Past Surgical History:   Procedure Laterality Date    CRANIOTOMY FOR TUMOR Right 2024    Procedure: Right frontal craniotomy for tumor;  Surgeon: Rosendo Bello MD;  Location: [facility];  Service: Neurosurgery;  Laterality: Right;    NASAL SEPTAL RECONSTRUCTION        General Information       Row Name 24 1615          Physical Therapy Time and Intention    Document Type re-evaluation  -EM     Mode of Treatment individual therapy;physical therapy  -EM       Row Name 24 1615          General Information    Patient Profile Reviewed yes  -EM     Prior Level of Function independent:  -EM       Row Name 24 1615          Living Environment    People in Home alone  -EM       Row Name 24 1615          Home Main Entrance    Number of Stairs, Main Entrance other (see comments)  full flight of stairs to enter apartment  -EM       Row Name 24 1615          Cognition    Orientation Status (Cognition) oriented x 4  -EM       Row Name 24 1615          Safety Issues, Functional Mobility    Impairments Affecting Function (Mobility) balance;strength  -EM               User Key  (r) = Recorded By, (t) = Taken By, (c) = Cosigned By      Initials Name Provider Type    EM Elizabet,  Desi WITT, PT Physical Therapist                   Mobility       Row Name 06/21/24 1617          Bed Mobility    Bed Mobility supine-sit  -EM     All Activities, [place] (Bed Mobility) supervision  -EM     Assistive Device (Bed Mobility) head of bed elevated  -EM       Row Name 06/21/24 1617          Sit-Stand Transfer    Sit-Stand [place] (Transfers) standby assist  -EM       Row Name 06/21/24 1617          Gait/Stairs (Locomotion)    [place] Level (Gait) contact guard  -EM     Distance in Feet (Gait) 300  -EM     Comment, (Gait/Stairs) slightly unsteady but no gross LOB  -EM               User Key  (r) = Recorded By, (t) = Taken By, (c) = Cosigned By      Initials Name Provider Type    EM Desi Mcneal PT Physical Therapist                   Obj/Interventions       Row Name 06/21/24 1618          Range of Motion Comprehensive    General Range of Motion no range of motion deficits identified  -EM       Row Name 06/21/24 1618          Strength Comprehensive (MMT)    General Manual Muscle Testing (MMT) Assessment other (see comments)  -EM     Comment, General Manual Muscle Testing (MMT) Assessment no focal deficits identified  -EM       Row Name 06/21/24 1618          Balance    Balance Assessment sitting static balance;sitting dynamic balance;standing static balance;standing dynamic balance  -EM     Static Sitting Balance supervision  -EM     Dynamic Sitting Balance supervision  -EM     Position, Sitting Balance sitting edge of bed  -EM     Static Standing Balance standby assist  -EM     Dynamic Standing Balance contact guard  -EM       Row Name 06/21/24 1618          Sensory Assessment (Somatosensory)    Sensory Assessment (Somatosensory) sensation intact  -EM               User Key  (r) = Recorded By, (t) = Taken By, (c) = Cosigned By      Initials Name Provider Type    Desi Parker PT Physical Therapist                   Goals/Plan       Row Name 06/21/24 1630          Bed  Mobility Goal 1 (PT)    Activity/Assistive Device (Bed Mobility Goal 1, PT) bed mobility activities, all  -EM     [place] Level/Cues Needed (Bed Mobility Goal 1, PT) independent  -EM     Time Frame (Bed Mobility Goal 1, PT) 1 week  -EM       Row Name 06/21/24 1630          Transfer Goal 1 (PT)    Activity/Assistive Device (Transfer Goal 1, PT) transfers, all  -EM     [place] Level/Cues Needed (Transfer Goal 1, PT) supervision required  -EM     Time Frame (Transfer Goal 1, PT) 1 week  -EM       Row Name 06/21/24 1630          Gait Training Goal 1 (PT)    Activity/Assistive Device (Gait Training Goal 1, PT) gait (walking locomotion);improve balance and speed  -EM     [place] Level (Gait Training Goal 1, PT) standby assist  -EM     Distance (Gait Training Goal 1, PT) 300  -EM     Time Frame (Gait Training Goal 1, PT) 1 week  -EM       Row Name 06/21/24 1630          Therapy Assessment/Plan (PT)    Planned Therapy Interventions (PT) gait training;patient/family education;transfer training;strengthening;balance training  -EM               User Key  (r) = Recorded By, (t) = Taken By, (c) = Cosigned By      Initials Name Provider Type    EM Desi Mcneal, PT Physical Therapist                   Clinical Impression       Row Name 06/21/24 1621          Pain    Pretreatment Pain Rating 0/10 - no pain  -EM       Row Name 06/21/24 1621          Plan of Care Review    Plan of Care Reviewed With patient  -EM     Outcome Evaluation Pt is 59 yo male re-evaluated for PT after R frontal craniotomy with evacuation of R basal ganglia mass. PMH significant for Dm, hep C, MARCIANO, HTN, ETOH abuse. Pt lives alone, reports full flight of stairs to enter, no use of AD prior. Patient agreeable to therapy, no c/o pain. patient performed bed mobility with supervision, sit to stand with SBA, ambulated 2 laps around nurses unit with CGA, pt has some occassional unsteadiness but able to self correct and no overt LOB. Pt has  impairments consisting of generalized post op weakness, decreased balance and would benefit from skilled PT. Pt reports d/c plan is home with some family assist.  -EM       Row Name 06/21/24 1621          Therapy Assessment/Plan (PT)    Patient/Family Therapy Goals Statement (PT) go home  -EM     Rehab Potential (PT) good, to achieve stated therapy goals  -EM     Criteria for Skilled Interventions Met (PT) yes;skilled treatment is necessary  -EM     Therapy Frequency (PT) 3 times/wk  -EM       Row Name 06/21/24 1621          Positioning and Restraints    Pre-Treatment Position in bed  -EM     Post Treatment Position chair  -EM     In Chair sitting;call light within reach;notified nsg  -EM               User Key  (r) = Recorded By, (t) = Taken By, (c) = Cosigned By      Initials Name Provider Type    Desi Parker PT Physical Therapist                   Outcome Measures       Row Name 06/21/24 1630 06/21/24 0800       How much help from another person do you currently need...    Turning from your back to your side while in flat bed without using bedrails? 4  -EM 4  -EG    Moving from lying on back to sitting on the side of a flat bed without bedrails? 3  -EM 4  -EG    Moving to and from a bed to a chair (including a wheelchair)? 3  -EM 3  -EG    Standing up from a chair using your arms (e.g., wheelchair, bedside chair)? 3  -EM 3  -EG    Climbing 3-5 steps with a railing? 3  -EM 3  -EG    To walk in hospital room? 3  -EM 3  -EG    AM-PAC 6 Clicks Score (PT) 19  -EM 20  -EG    Highest Level of Mobility Goal 6 --> Walk 10 steps or more  -EM 6 --> Walk 10 steps or more  -EG              User Key  (r) = Recorded By, (t) = Taken By, (c) = Cosigned By      Initials Name Provider Type    Desi Parker PT Physical Therapist    Velma Agustin RN Registered Nurse                                 Physical Therapy Education       Title: PT OT SLP Therapies (Done)       Topic: Physical Therapy (Done)        Point: Mobility training (Done)       Learning Progress Summary             Patient Acceptance, E, VU by EM at 6/21/2024 1631    Acceptance, E,D, VU by RS at 6/16/2024 1358                         Point: Home exercise program (Done)       Learning Progress Summary             Patient Acceptance, E,D, VU by RS at 6/16/2024 1358                         Point: Body mechanics (Done)       Learning Progress Summary             Patient Acceptance, E,D, VU by RS at 6/16/2024 1358                         Point: Precautions (Done)       Learning Progress Summary             Patient Acceptance, E,D, VU by RS at 6/16/2024 1358                                         User Key       Initials Effective Dates Name Provider Type Discipline    EM 06/16/21 -  Desi Mcneal PT Physical Therapist PT    RS 06/16/21 -  Bina Bloom PT Physical Therapist PT                  PT Recommendation and Plan  Planned Therapy Interventions (PT): gait training, patient/family education, transfer training, strengthening, balance training  Plan of Care Reviewed With: patient  Outcome Evaluation: Pt is 59 yo male re-evaluated for PT after R frontal craniotomy with evacuation of R basal ganglia mass. PMH significant for Dm, hep C, MARCIANO, HTN, ETOH abuse. Pt lives alone, reports full flight of stairs to enter, no use of AD prior. Patient agreeable to therapy, no c/o pain. patient performed bed mobility with supervision, sit to stand with SBA, ambulated 2 laps around nurses unit with CGA, pt has some occassional unsteadiness but able to self correct and no overt LOB. Pt has impairments consisting of generalized post op weakness, decreased balance and would benefit from skilled PT. Pt reports d/c plan is home with some family assist.     Time Calculation:         PT Charges       Row Name 06/21/24 1631             Time Calculation    Start Time 1555  -EM      Stop Time 1610  -EM      Time Calculation (min) 15 min  -EM      PT Received On  06/21/24  -EM      PT - Next Appointment 06/24/24  -EM      PT Goal Re-Cert Due Date 06/28/24  -EM         Time Calculation- PT    Total Timed Code Minutes- PT 10 minute(s)  -EM         Timed Charges    56708 - PT Therapeutic Activity Minutes 10  -EM         Total Minutes    Timed Charges Total Minutes 10  -EM       Total Minutes 10  -EM                User Key  (r) = Recorded By, (t) = Taken By, (c) = Cosigned By      Initials Name Provider Type    EM Desi Mcneal, PT Physical Therapist                  Therapy Charges for Today       Code Description Service Date Service Provider Modifiers Qty    [id number]  PT THERAPEUTIC ACT EA 15 MIN 6/21/2024 Desi Mcneal, PT GP 1    [id number]  PT RE-EVAL ESTABLISHED PLAN 2 6/21/2024 Desi Mcneal, PT GP 1            PT G-Codes  Outcome Measure Options: AM-PAC 6 Clicks Basic Mobility (PT)  AM-PAC 6 Clicks Score (PT): 19  PT Discharge Summary  Anticipated Discharge Disposition (PT): home with assist    Desi Mcneal PT  6/21/2024

## 2024-06-21 NOTE — PROGRESS NOTES
Radiation Oncology Consult Note    Name: Iain Christina  YOB: 1965  MRN #: [id number]  Date of Service: 6/21/2024  Referring Provider: No referring provider defined for this encounter.  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    HISTORY OF PRESENT ILLNESS:  Iain Christina is a 58 y.o. male who is a  by profession.  He has a history of hepatitis C and heavy alcohol use but stopped drinking some 6 months ago.  Patient was initially evaluated at [facility] where a CT scan of the head demonstrated a right frontal lobe mass measuring some 4.8 x 3.3 cm with associated vasogenic edema.  There is an 8 mm midline shift.  This was obtained 6/15/2024.  Patient was subsequently transferred to [facility] where an MRI with and without contrast of the brain was obtained 6/15/2024 demonstrating a 4 x 4 x 4 cm mass located in the right frontal lobe.  There is evidence of internal hemorrhage and extensive vasogenic edema extending across the midline to the corpus callosum.  Midline shift on the MRI was 14 mm.  Staging workup including CT scans chest abdomen and pelvis performed 6/16/2024 demonstrated no evidence of primary or metastatic disease.    Patient was seen by Dr. Blane Mayfield who performed right frontal craniotomy with biopsy of the right frontal lobe mass.  Pathologic evaluation demonstrated this as a primary CNS lymphoma consistent with diffuse B-cell lymphoma.  This will need to be reviewed at an outside facility looking specifically at tumor classification such as CD20  positivity.    Patient has been seen in consultation by .  He has discussed with the patient the possibility of high-dose methotrexate as initial therapy.  Patient will require full body PET/CT imaging as well as echocardiogram prior to high-dose therapy.    We have been asked to see patient regarding the possibility of radiotherapy as part of the treatment management.        The following portions of the patient's history were reviewed and updated as appropriate: allergies, current medications, past family history, past medical history, past social history, past surgical history and problem list. Reviewed with the patient and remain unchanged.    PAST MEDICAL HISTORY:  he  has a past medical history of Diabetes mellitus, Hepatitis C, Hyperthyroidism, and Sleep apnea.  MEDICATIONS: No current facility-administered medications for this visit.  No current outpatient medications on file.    Facility-Administered Medications Ordered in Other Visits:     acetaminophen (TYLENOL) tablet 650 mg, 650 mg, Oral, Q4H PRN **OR** acetaminophen (TYLENOL) 160 MG/5ML oral solution 650 mg, 650 mg, Oral, Q4H PRN **OR** acetaminophen (TYLENOL) suppository 650 mg, 650 mg, Rectal, Q4H PRN, Rosendo Bello MD    sennosides-docusate (PERICOLACE) 8.6-50 MG per tablet 2 tablet, 2 tablet, Oral, BID PRN **AND** polyethylene glycol (MIRALAX) packet 17 g, 17 g, Oral, Daily PRN **AND** bisacodyl (DULCOLAX) EC tablet 5 mg, 5 mg, Oral, Daily PRN **AND** bisacodyl (DULCOLAX) suppository 10 mg, 10 mg, Rectal, Daily PRN, Rosendo Bello MD    dexAMETHasone (DECADRON) tablet 4 mg, 4 mg, Oral, Q6H, Dave Vargas MD, 4 mg at 06/21/24 1740    dextrose (D50W) (25 g/50 mL) IV injection 25 g, 25 g, Intravenous, Q15 Min PRN, Rosendo Bello MD    dextrose (GLUTOSE) oral gel 15 g, 15 g, Oral, Q15 Min PRN, Rosendo Bello MD    glucagon (GLUCAGEN) injection 1 mg, 1 mg, Intramuscular, Q15 Min PRN, Rosendo Bello MD    hydrALAZINE  (APRESOLINE) injection 10 mg, 10 mg, Intravenous, Q6H PRN, Ania Latham, APRN, 10 mg at 06/21/24 0120    losartan (COZAAR) tablet 100 mg, 100 mg, Oral, Q24H, 100 mg at 06/20/24 0931 **AND** hydroCHLOROthiazide tablet 12.5 mg, 12.5 mg, Oral, Q24H, Rosendo Bello MD, 12.5 mg at 06/21/24 0824    HYDROcodone-acetaminophen (NORCO) 5-325 MG per tablet 1 tablet, 1 tablet, Oral, Q4H PRN, Rosendo Bello MD, 1 tablet at 06/20/24 1402    insulin glargine (LANTUS, SEMGLEE) injection 30 Units, 30 Units, Subcutaneous, Daily, Rosendo Bello MD, 30 Units at 06/21/24 0823    insulin lispro (HUMALOG/ADMELOG) injection 10 Units, 10 Units, Subcutaneous, TID With Meals, Rosendo Bello MD, 10 Units at 06/21/24 1740    insulin lispro (HUMALOG/ADMELOG) injection 3-14 Units, 3-14 Units, Subcutaneous, 4x Daily AC & at Bedtime, Rosendo Bello MD, 5 Units at 06/21/24 2120    levETIRAcetam (KEPPRA) tablet 500 mg, 500 mg, Oral, Q12H, Dave Vargas MD, 500 mg at 06/21/24 2119    melatonin tablet 5 mg, 5 mg, Oral, Nightly PRN, Lilia Ennis, APRN, 5 mg at 06/21/24 0120    morphine injection 1 mg, 1 mg, Intravenous, Q2H PRN, 1 mg at 06/19/24 2308 **AND** naloxone (NARCAN) injection 0.4 mg, 0.4 mg, Intravenous, Q5 Min PRN, Rosendo Bello MD    niCARdipine (CARDENE) 25 mg in 250 mL NS infusion kit, 5-15 mg/hr, Intravenous, Titrated, Jaida Garzon, APRONDINA, Last Rate: 50 mL/hr at 06/20/24 0737, 5 mg/hr at 06/20/24 0737    ondansetron ODT (ZOFRAN-ODT) disintegrating tablet 4 mg, 4 mg, Oral, Q6H PRN **OR** ondansetron (ZOFRAN) injection 4 mg, 4 mg, Intravenous, Q6H PRN, Rosendo Bello MD    sodium bicarbonate tablet 650 mg, 650 mg, Oral, TID, Rosendo Bello MD, 650 mg at 06/21/24 2119    sodium chloride 0.9 % flush 10 mL, 10 mL, Intravenous, Q12H, Rosendo Bello MD, 10 mL at 06/21/24 2120    sodium chloride 0.9 % flush 10 mL, 10 mL, Intravenous, PRN, Rosendo Bello MD    sodium chloride 0.9 % infusion 40 mL, 40  mL, Intravenous, PRN, Rosendo Bello MD    sodium chloride 0.9 % with KCl 20 mEq/L infusion, 100 mL/hr, Intravenous, Continuous, Rosendo Bello MD, Last Rate: 100 mL/hr at 06/20/24 0400, 100 mL/hr at 06/20/24 0400  ALLERGIES:   Allergies   Allergen Reactions    Peanut (Diagnostic) Anaphylaxis     PAST SURGICAL HISTORY: he has a past surgical history that includes Nasal Septal Reconstruction and Craniotomy for Tumor (Right, 6/19/2024).  PREVIOUS RADIOTHERAPY OR CHEMOTHERAPY: No  FAMILY HISTORY: his family history includes COPD in his mother; Cancer in his sister; No Known Problems in his brother.  SOCIAL HISTORY: he  reports that he has never smoked. He has never used smokeless tobacco. He reports that he does not currently use alcohol. He reports that he does not use drugs.  PAIN AND PAIN MANAGEMENT: There were no vitals filed for this visit.  NUTRITIONAL STATUS:  Otherwise no issues  KPS: 90:  Minor signs or symptoms  ECOG: (1) Restricted in physically strenuous activity, ambulatory and able to do work of light nature       PHQ-9 Total Score:       Review of Systems:   Review of Systems   Constitutional: Negative.    HENT: Negative.     Eyes: Negative.    Respiratory: Negative.     Cardiovascular: Negative.    Gastrointestinal: Negative.    Endocrine: Negative.    Genitourinary: Negative.    Musculoskeletal: Negative.    Skin: Negative.    Neurological:  Positive for dizziness.        Iain has experienced some mental confusion.  He has also experienced a fall which prompted his evaluation at [facility]   Hematological: Negative.    Psychiatric/Behavioral:          Iain is rather depressed at this time.  He is very concerned about monetary issues as well as the seriousness of his newly diagnosed condition          Objective     Vitals:  There were no vitals filed for this visit.      PHYSICAL EXAM:  Physical Exam  Constitutional:       Appearance: Normal appearance. He is normal weight.   HENT:       Head: Normocephalic.      Ears:      Comments: Iain has a frontal craniotomy incision which is well-healed without evidence of drainage or bleeding  Eyes:      Extraocular Movements: Extraocular movements intact.      Pupils: Pupils are equal, round, and reactive to light.   Cardiovascular:      Rate and Rhythm: Normal rate and regular rhythm.   Pulmonary:      Effort: Pulmonary effort is normal.      Breath sounds: Normal breath sounds.   Abdominal:      General: Abdomen is flat.      Palpations: Abdomen is soft.   Musculoskeletal:         General: Normal range of motion.      Cervical back: Normal range of motion.   Skin:     General: Skin is warm.   Neurological:      Mental Status: He is alert.      Comments: Somewhat surprisingly, patient demonstrates no obvious focal neurological deficit.  His speech is completely normal and he demonstrates no significant localizing signs   Psychiatric:      Comments: Patient is obviously depressed.  He is somewhat tearful at time of examination.  He is grateful for the information that I was able to share with him.          PERTINENT IMAGING/PATHOLOGY/LABS (Medical Decision Making):     COORDINATION OF CARE: A copy of this note is sent to the referring provider.    PATHOLOGY (Reviewed):   Tissue Pathology Exam: [id number]  Order: [id number]  Status: Preliminary result       Visible to patient: No (not released)       Next appt: 01/16/2025 at 11:15 AM in Family Medicine (Scott Medina MD)       Dx: Brain mass    Specimen Information: A: Brain; Tissue    B: Brain; Tissue   0 Result Notes      Component    Case Report   Surgical Pathology Report                         Case: [id number]                                   Authorizing Provider:  Rosendo Bello MD        Collected:           06/19/2024 11:17 AM           Ordering Location:     [facility]  Received:            06/19/2024 11:29 AM                                  MAIN OR                            "                                            Pathologist:           Maria Eugenia Grajeda MD                                                     Specimens:   1) - Brain, Right frontal brain mass- frozen call back main OR 28                                    2) - Brain, Right frontal brain mass tissue-fresh                                          Preliminary Diagnosis   1-2.  Brain, right frontal lobe, biopsy resection:               -Atypical lymphoproliferation consistent with diffuse large B cell lymphoma.               -Pending outside consultation.     SWM   Preliminary result electronically signed by Maria Eugenia Grajeda MD on 6/21/2024 at 0917  Preliminary result electronically signed by Maria Eugenia Grajeda MD on 6/20/2024 at 1459   Comment P   This case will be sent to [facility] lab for additional consultation, with final diagnosis to follow.   Intraoperative Consultation P   Crush prep/frozen section diagnosis: Right frontal brain mass: Poorly differentiated malignancy. SWM to Dr Bello 1144   Gross Description P   1. Brain.  The specimen is received fresh, labeled with the patient's barcode and \"right frontal brain mass\" and consists of 1.4 x 1.0 x 0.4 cm aggregate of soft tan to pink rubbery tissue.  Crush preps are made.  One half of the specimen is submitted for frozen section and subsequently for permanents in cassette FS A.  The remaining tissue is entirely submitted for flow cytometry.     LMP/uso/swm  2. Brain.  The specimen is received fresh in two containers, each, labeled with the patient's barcode and \"right frontal brain mass\" and consists of a 3 x 2 x 1 cm aggregate of soft tan-pink to white rubbery tissue and blood.  Approximately 50% is submitted in four cassettes.              LMP/uso/swm   Special Stains P   Utilizing appropriate controls, multiple immunohistochemical stains are performed on block 2A including AE1/AE3, Bcl-2, BCL6, cyclin D1, CD10, CD3, CD5, CD20, Mum 1P and Ki-67.  The " lymphoid cells are positive for Mum 1P, CD20, BCL 6 (weak) and Bcl-2.  The overall immunoprofile supports a diffuse large B-cell lymphoma with an elevated Ki-67 approximately 80%.   Flow Cytometry Summary P   [id number]: MONOCLONAL B-CELL POPULATION IDENTIFIED (71.6% OF TOTAL EVENTS).   Resulting Agency [facility]              Specimen Collected: 06/19/24 11:17 EDT Last Resulted: 06/21/24 09:17 EDT               IMAGING (Reviewed):     Patient: HARMEET STANTON  Time Out: 01:45  Exam(s): MRI HEAD W WO Contrast      EXAM:  MR Head Without and With Intravenous Contrast     CLINICAL HISTORY:  Reason for exam: brain mass.     TECHNIQUE:  Magnetic resonance images of the head brain without and with intravenous   contrast in multiple planes.     COMPARISON:  CT head 6 15 24     FINDINGS:  Enhancing 4.0 x 4.0 x 4.0 cm intra-axial mass in the deep white matter of   the right frontal lobe, demonstrating internal regions of cystic change.    Foci of susceptibility artifact are compatible with intratumoral   microhemorrhage.  There is extensive surrounding vasogenic edema which   extends across the midline at the corpus callosum.  Mass-effect results   in effacement of the frontal horn of the right lateral ventricle, right   frontal lobe sulcal effacement, and leftward midline shift within the   anterior cranial fossa measuring 14 mm.     No additional enhancing brain lesions are identified.     There is no diffusion restriction to suggest acute cerebral ischemia.    There is no abnormal extra-axial fluid collection.  There is no   hydrocephalus.  Basal cisterns are patent.  Intracranial flow-voids   appear normal as visualized.     There is mucosal thickening in the maxillary sinuses and ethmoid air   cells.  Mastoid air cells are clear.     There is a chronic left lamina papyracea fracture.     IMPRESSION:     1.  Enhancing intra-axial mass within the deep white matter of the right   frontal lobe measuring 4 x 4 x 4.  There  are multiple small foci of   intratumoral hemorrhage.  2.  Surrounding right frontal lobe vasogenic edema extending across the   midline along the anterior corpus callosum.  3.  Mass-effect results in right frontal sulcal effacement, effacement of   the frontal horn of the right lateral ventricle, and 14 mm leftward shift   within the anterior cranial fossa.  4.  No additional enhancing brain lesions.  5.  No infarct.    Patient: HARMEET STANTON  Time Out: 00:16  Exam(s): MRI HEAD W WO Contrast      EXAM:    MR Head Without and With Intravenous Contrast     CLINICAL HISTORY:     Reason for exam: post-op.     TECHNIQUE:    Magnetic resonance images of the head brain without and with   intravenous contrast in multiple planes.     COMPARISON:  Comparison made to prior brain MRI from [facility] 224.     FINDINGS:    Brain: Status post biopsy of heterogeneously enhancing right frontal   lobe tumor.  There is extensive vasogenic edema around the tumor with   moderate regional mass-effect causing 12 mm of midline shift.  The flow   voids at the base of the brain are intact.  No evidence of acute ischemic   injury.  The dural venous sinuses are patent.    Ventricles:  Unremarkable.  No ventriculomegaly.    Bones joints: Status post right frontal craniotomy.  No acute fracture.    Sinuses: Chronic obstructive right maxillary sinusitis.  Chronic   ethmoid sinusitis.  No acute sinusitis.    Mastoid air cells:  Unremarkable as visualized.  No mastoid effusion.    Orbits:  Unremarkable as visualized.     IMPRESSION:       Status post biopsy of right frontal lobe tumor with expected postsurgical   changes.     IMPRESSION:        Electronically signed by Lavern Armstrong MD on 06-21-24 at 0016  LABS (Reviewed):  Hematology WBC   Date Value Ref Range Status   06/21/2024 11.28 (H) 3.40 - 10.80 10*3/mm3 Final   01/15/2024 6.0 3.4 - 10.8 x10E3/uL Final     RBC   Date Value Ref Range Status   06/21/2024 5.33 4.14 - 5.80 10*6/mm3 Final    01/15/2024 5.33 4.14 - 5.80 x10E6/uL Final     Hemoglobin   Date Value Ref Range Status   06/21/2024 16.1 13.0 - 17.7 g/dL Final     Hematocrit   Date Value Ref Range Status   06/21/2024 46.6 37.5 - 51.0 % Final     Platelets   Date Value Ref Range Status   06/21/2024 154 140 - 450 10*3/mm3 Final      Chemistry   Lab Results   Component Value Date    GLUCOSE 206 (H) 06/21/2024    BUN 24 (H) 06/21/2024    CREATININE 0.80 06/21/2024    EGFRIFNONA 94 12/13/2021    EGFRIFAFRI 109 12/13/2021    BCR 30.0 (H) 06/21/2024    K 3.8 06/21/2024    CO2 20.8 (L) 06/21/2024    CALCIUM 8.6 06/21/2024    PROTENTOTREF 7.6 01/15/2024    ALBUMIN 3.5 06/20/2024    LABIL2 1.5 01/15/2024    AST 93 (H) 06/20/2024     (H) 06/20/2024       Assessment & Plan     ASSESSMENT :  1.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 Christina will need to complete his staging workup including PET/CT scanning and serum chemistries including LDH.  Further workup will also include HIV testing and ophthalmologic evaluation.  I would also recommend pneumocystis prophylaxis.    Mr. Christina is about 58 years of age and should seriously consider high-dose methotrexate based combination chemotherapy if he is deemed an appropriate candidate.    External beam radiotherapy can be used as a means of effective palliation if high-dose chemotherapy is not possible.  Response rates are on the order of 75% but the incidence of local recurrence is high and treatment is associated with neurocognitive deficits.  As such, I will await 's recommendations at this time.    I spent 45 minutes caring for Iain on this date of service. This time includes time spent by me in the following activities:preparing for the visit, reviewing tests, obtaining and/or reviewing a separately obtained history, performing a medically appropriate examination and/or evaluation , ordering medications, tests, or procedures,  referring and communicating with other health care professionals , and independently interpreting results and communicating that information with the patient/family/caregiver       CC: No ref. provider found Scott Medina MD Mark R. Jones, MD  6/21/2024  7:46 PM EDT

## 2024-06-21 NOTE — PLAN OF CARE
Goal Outcome Evaluation:      Pt has done well this day. His surgical site looks clean and without redness or drainage. He did receive his sliding scale insulin this shift at each meal. He was able to get out of bed and walk around the unit with PT. After PT he has been sitting up in a chair and tolerating it well. Oncology did come in this afternoon and gave the pt the results of his patho results and the pending tx for his dx. Pt did express concerns for being able to pay for tx a consult was put in  to come in to discuss with pt different options. Pt will be transferred to another unit once a bed is available.

## 2024-06-21 NOTE — CONSULTS
Met patient at bedside, patient's brother-in-law also present in room.     Patient expressed he is working on recovery after brain surgery for a tumor.  voiced hope for progress in healing journey. Patient said  could pray for him-- promised to carry prayer in her heart for his health. Patient educated on how to reach out for chaplains for emotional support during hospitalization. Consulted with RN before and after visit. Pastoral care remains available.

## 2024-06-21 NOTE — PROGRESS NOTES
[facility] NEUROSURGERY INTRACRANIAL POSTOP note    PATIENT IDENTIFICATION:   Name:  Iain Christina      MRN:  [id number]     58 y.o.  male               CC:POD 2 s/p R frontal craniotomy w/evacuation of right basal ganglia mass showing undifferentiated malignancy on frozen section-possible lymphoma       Subjective     Interval History:  Off cardene. Complains of mild intermittent headache. Denies vision changes, N/V. Plans to transfer out of ICU    Objective     Vital signs in last 24 hours:  Temp:  [97.9 °F (36.6 °C)-98.4 °F (36.9 °C)] 98.1 °F (36.7 °C)  Heart Rate:  [50-79] 72  Resp:  [14] 14  BP: ()/() 152/87      Intake/Output this shift:  No intake/output data recorded.    Intake/Output last 3 shifts:  I/O last 3 completed shifts:  In: 4537 [P.O.:720; I.V.:2242; Other:1575]  Out: 3340 [Urine:3300; Drains:40]        LABS:  Results from last 7 days   Lab Units 06/21/24  0334 06/20/24  0336 06/19/24  1257   WBC 10*3/mm3 11.28* 12.41* 11.26*   HEMOGLOBIN g/dL 16.1 15.2 15.3   HEMATOCRIT % 46.6 44.2 46.2   PLATELETS 10*3/mm3 154 173 157      Results from last 7 days   Lab Units 06/21/24  0334 06/20/24  0336 06/19/24  1257   SODIUM mmol/L 137 140 143   POTASSIUM mmol/L 3.8 4.1 4.0   CHLORIDE mmol/L 105 110* 109*   CO2 mmol/L 20.8* 21.1* 23.0   BUN mg/dL 24* 27* 22   CREATININE mg/dL 0.80 0.84 0.82   GLUCOSE mg/dL 206* 227* 224*   CALCIUM mg/dL 8.6 8.3* 8.3*         IMAGING STUDIES:  MRI brain w/ & w/o:  Status post biopsy of heterogeneously enhancing right frontal   lobe tumor.  There is extensive vasogenic edema around the tumor with   moderate regional mass-effect causing 12 mm of midline shift.  The flow   voids at the base of the brain are intact.  No evidence of acute ischemic   injury.  The dural venous sinuses are patent. No ventriculomegaly.     I personally viewed and interpreted the patient's Chart. Imaging reviewed and discussed w/ Dr. Bello.    Meds reviewed/changed: Yes  Decadron 4  mg p.o. every 6 hours  Cefazolin 2 g IV every 8 hours  Lantus 30 units subcu daily  Humalog 10 units subcu 3 times daily with meals and 3 to 14 units sliding scale subcu 4 times daily before meals and nightly  Keppra 500 mg p.o. every 12 hours  Sodium bicarb 650 mg p.o. 3 times daily  Tylenol 6 and 50 mg p.o. every 4 hours as needed x 0  Hydralazine 10 mg IV every 6 hours as needed x1  Norco 5 p.o. every 4 hours as needed x 0  Morphine 1 mg IV every 2 hours as needed x 0    Physical Exam:    General:   Awake, alert, oriented x3. Speech clear with no aphasia  HEENT:    Right frontal craniotomy incision well-appearing and approximated with skin glue.  No redness drainage or swelling.  CN II:    Visual fields full to confrontation  CN III IV VI:   Extraocular movements are full , PERRL   CN VII:   Facial movements are symmetric. No weakness.  Motor:    Normal muscle strength, bulk and tone in upper and lower extremities.  No fasciculations, rigidity, spasticity, or abnormal movements.  Sensation:   Normal to light touch; no extinction  Station and Gait:  Has not yet worked w/ PT  Coordination:   Finger-to-nose shows no dysmetria.  Rapid alternating movements are normal.   Extremities:   Wearing SCD    Assessment & Plan     ASSESSMENT:      Neoplasm causing mass effect and brain compression on adjacent structures    Hypertension    Sleep apnea    Chronic hepatitis C without hepatic coma    Brain mass    Abnormal liver function tests    Steroid-induced hyperglycemia    POD 2 s/p R frontal craniotomy w/ evacuation of right basal ganglia mass showing undifferentiated malignancy on frozen section-concern for lymphoma.  Outside pathology pending.  MRI w/ and w/o shows residual tumor as expected, and stable midline shift. He is doing well postoperatively and only complains of mild intermittent headache. He is off Cardene and should be able to transfer out of the ICU.  Will have him work with PT and continue steroids for now.   "Will also have radiation and medical oncology see him to assess for further treatment.     PLAN:     -medical and radiation oncology consult  -continue steroids  -Keep SBP < 140  -PT  -OK to TTF        I discussed the patient's findings and my recommendations with patient and Dr. Bello    During patient visit, I utilized appropriate personal protective equipment including mask and gloves.  Mask used was standard procedure mask. Appropriate PPE was worn during the entire visit.  Hand hygiene was completed before and after.      LOS: 6 days       Jaida Garzon, APRN  6/21/2024  09:21 EDT    \"Dictated utilizing Dragon dictation\".     "

## 2024-06-22 ENCOUNTER — APPOINTMENT (OUTPATIENT)
Dept: CARDIOLOGY | Facility: HOSPITAL | Age: 59
DRG: 820 | End: 2024-06-22
Payer: COMMERCIAL

## 2024-06-22 PROBLEM — I87.8 POOR VENOUS ACCESS: Status: ACTIVE | Noted: 2024-06-22

## 2024-06-22 LAB
AORTIC ARCH: 3.4 CM
AORTIC DIMENSIONLESS INDEX: 0.6 (DI)
ASCENDING AORTA: 3.2 CM
BASOPHILS # BLD AUTO: 0.02 10*3/MM3 (ref 0–0.2)
BASOPHILS NFR BLD AUTO: 0.2 % (ref 0–1.5)
BH CV ECHO LEFT VENTRICLE GLOBAL LONGITUDINAL STRAIN: -22.7 %
BH CV ECHO MEAS - ACS: 1.9 CM
BH CV ECHO MEAS - AO MAX PG: 6 MMHG
BH CV ECHO MEAS - AO MEAN PG: 3.4 MMHG
BH CV ECHO MEAS - AO ROOT DIAM: 3.2 CM
BH CV ECHO MEAS - AO V2 MAX: 122.6 CM/SEC
BH CV ECHO MEAS - AO V2 VTI: 24.1 CM
BH CV ECHO MEAS - AVA(I,D): 2.28 CM2
BH CV ECHO MEAS - EDV(CUBED): 80.6 ML
BH CV ECHO MEAS - EDV(MOD-SP2): 133 ML
BH CV ECHO MEAS - EDV(MOD-SP4): 138 ML
BH CV ECHO MEAS - EF(MOD-BP): 60.6 %
BH CV ECHO MEAS - EF(MOD-SP2): 57.1 %
BH CV ECHO MEAS - EF(MOD-SP4): 63.8 %
BH CV ECHO MEAS - ESV(CUBED): 21.8 ML
BH CV ECHO MEAS - ESV(MOD-SP2): 57 ML
BH CV ECHO MEAS - ESV(MOD-SP4): 50 ML
BH CV ECHO MEAS - FS: 35.4 %
BH CV ECHO MEAS - IVS/LVPW: 1.03 CM
BH CV ECHO MEAS - IVSD: 1.12 CM
BH CV ECHO MEAS - LAT PEAK E' VEL: 7.5 CM/SEC
BH CV ECHO MEAS - LV MASS(C)D: 165.2 GRAMS
BH CV ECHO MEAS - LV MAX PG: 2.21 MMHG
BH CV ECHO MEAS - LV MEAN PG: 1.31 MMHG
BH CV ECHO MEAS - LV V1 MAX: 74.4 CM/SEC
BH CV ECHO MEAS - LV V1 VTI: 15.8 CM
BH CV ECHO MEAS - LVIDD: 4.3 CM
BH CV ECHO MEAS - LVIDS: 2.8 CM
BH CV ECHO MEAS - LVOT AREA: 3.5 CM2
BH CV ECHO MEAS - LVOT DIAM: 2.1 CM
BH CV ECHO MEAS - LVPWD: 1.09 CM
BH CV ECHO MEAS - MED PEAK E' VEL: 6.2 CM/SEC
BH CV ECHO MEAS - MV A DUR: 0.11 SEC
BH CV ECHO MEAS - MV A MAX VEL: 78.2 CM/SEC
BH CV ECHO MEAS - MV DEC SLOPE: 268.3 CM/SEC2
BH CV ECHO MEAS - MV DEC TIME: 0.3 SEC
BH CV ECHO MEAS - MV E MAX VEL: 74.9 CM/SEC
BH CV ECHO MEAS - MV E/A: 0.96
BH CV ECHO MEAS - MV MAX PG: 5 MMHG
BH CV ECHO MEAS - MV MEAN PG: 1.52 MMHG
BH CV ECHO MEAS - MV P1/2T: 88.9 MSEC
BH CV ECHO MEAS - MV V2 VTI: 33.9 CM
BH CV ECHO MEAS - MVA(P1/2T): 2.47 CM2
BH CV ECHO MEAS - MVA(VTI): 1.62 CM2
BH CV ECHO MEAS - PA ACC TIME: 0.12 SEC
BH CV ECHO MEAS - PA V2 MAX: 141.2 CM/SEC
BH CV ECHO MEAS - PULM A REVS DUR: 0.09 SEC
BH CV ECHO MEAS - PULM A REVS VEL: 36.5 CM/SEC
BH CV ECHO MEAS - PULM DIAS VEL: 40.2 CM/SEC
BH CV ECHO MEAS - PULM S/D: 1.02
BH CV ECHO MEAS - PULM SYS VEL: 41.1 CM/SEC
BH CV ECHO MEAS - RAP SYSTOLE: 3 MMHG
BH CV ECHO MEAS - RV MAX PG: 1.32 MMHG
BH CV ECHO MEAS - RV V1 MAX: 57.4 CM/SEC
BH CV ECHO MEAS - RV V1 VTI: 12.4 CM
BH CV ECHO MEAS - RVSP: 16 MMHG
BH CV ECHO MEAS - SV(LVOT): 55 ML
BH CV ECHO MEAS - SV(MOD-SP2): 76 ML
BH CV ECHO MEAS - SV(MOD-SP4): 88 ML
BH CV ECHO MEAS - TAPSE (>1.6): 2.19 CM
BH CV ECHO MEAS - TR MAX PG: 12.5 MMHG
BH CV ECHO MEAS - TR MAX VEL: 176.5 CM/SEC
BH CV ECHO MEASUREMENTS AVERAGE E/E' RATIO: 10.93
BH CV ECHO SHUNT ASSESSMENT PERFORMED (HIDDEN SCRIPTING): 1
BH CV XLRA - TDI S': 19.8 CM/SEC
DEPRECATED RDW RBC AUTO: 39.7 FL (ref 37–54)
EOSINOPHIL # BLD AUTO: 0 10*3/MM3 (ref 0–0.4)
EOSINOPHIL NFR BLD AUTO: 0 % (ref 0.3–6.2)
ERYTHROCYTE [DISTWIDTH] IN BLOOD BY AUTOMATED COUNT: 12.4 % (ref 12.3–15.4)
GLUCOSE BLDC GLUCOMTR-MCNC: 189 MG/DL (ref 70–130)
GLUCOSE BLDC GLUCOMTR-MCNC: 214 MG/DL (ref 70–130)
GLUCOSE BLDC GLUCOMTR-MCNC: 228 MG/DL (ref 70–130)
GLUCOSE BLDC GLUCOMTR-MCNC: 273 MG/DL (ref 70–130)
HCT VFR BLD AUTO: 47.9 % (ref 37.5–51)
HGB BLD-MCNC: 16.1 G/DL (ref 13–17.7)
IMM GRANULOCYTES # BLD AUTO: 0.23 10*3/MM3 (ref 0–0.05)
IMM GRANULOCYTES NFR BLD AUTO: 1.9 % (ref 0–0.5)
LDH SERPL-CCNC: 305 U/L (ref 135–225)
LEFT ATRIUM VOLUME INDEX: 16 ML/M2
LYMPHOCYTES # BLD AUTO: 1.08 10*3/MM3 (ref 0.7–3.1)
LYMPHOCYTES NFR BLD AUTO: 8.9 % (ref 19.6–45.3)
MCH RBC QN AUTO: 29.5 PG (ref 26.6–33)
MCHC RBC AUTO-ENTMCNC: 33.6 G/DL (ref 31.5–35.7)
MCV RBC AUTO: 87.9 FL (ref 79–97)
MONOCYTES # BLD AUTO: 0.62 10*3/MM3 (ref 0.1–0.9)
MONOCYTES NFR BLD AUTO: 5.1 % (ref 5–12)
NEUTROPHILS NFR BLD AUTO: 10.19 10*3/MM3 (ref 1.7–7)
NEUTROPHILS NFR BLD AUTO: 83.9 % (ref 42.7–76)
NRBC BLD AUTO-RTO: 0 /100 WBC (ref 0–0.2)
PLATELET # BLD AUTO: 187 10*3/MM3 (ref 140–450)
PMV BLD AUTO: 11.2 FL (ref 6–12)
RBC # BLD AUTO: 5.45 10*6/MM3 (ref 4.14–5.8)
SINUS: 3.1 CM
STJ: 2.7 CM
WBC NRBC COR # BLD AUTO: 12.14 10*3/MM3 (ref 3.4–10.8)

## 2024-06-22 PROCEDURE — 82948 REAGENT STRIP/BLOOD GLUCOSE: CPT

## 2024-06-22 PROCEDURE — 63710000001 INSULIN LISPRO (HUMAN) PER 5 UNITS: Performed by: NEUROLOGICAL SURGERY

## 2024-06-22 PROCEDURE — 99233 SBSQ HOSP IP/OBS HIGH 50: CPT | Performed by: INTERNAL MEDICINE

## 2024-06-22 PROCEDURE — 83615 LACTATE (LD) (LDH) ENZYME: CPT | Performed by: INTERNAL MEDICINE

## 2024-06-22 PROCEDURE — 25510000001 PERFLUTREN (DEFINITY) 8.476 MG IN SODIUM CHLORIDE (PF) 0.9 % 10 ML INJECTION: Performed by: INTERNAL MEDICINE

## 2024-06-22 PROCEDURE — 63710000001 INSULIN GLARGINE PER 5 UNITS: Performed by: NEUROLOGICAL SURGERY

## 2024-06-22 PROCEDURE — 99024 POSTOP FOLLOW-UP VISIT: CPT | Performed by: NEUROLOGICAL SURGERY

## 2024-06-22 PROCEDURE — 85025 COMPLETE CBC W/AUTO DIFF WBC: CPT | Performed by: NEUROLOGICAL SURGERY

## 2024-06-22 PROCEDURE — 99253 IP/OBS CNSLTJ NEW/EST LOW 45: CPT | Performed by: SURGERY

## 2024-06-22 PROCEDURE — 93306 TTE W/DOPPLER COMPLETE: CPT | Performed by: INTERNAL MEDICINE

## 2024-06-22 PROCEDURE — 63710000001 DEXAMETHASONE PER 0.25 MG: Performed by: INTERNAL MEDICINE

## 2024-06-22 PROCEDURE — 93306 TTE W/DOPPLER COMPLETE: CPT

## 2024-06-22 PROCEDURE — 93356 MYOCRD STRAIN IMG SPCKL TRCK: CPT | Performed by: INTERNAL MEDICINE

## 2024-06-22 PROCEDURE — 93356 MYOCRD STRAIN IMG SPCKL TRCK: CPT

## 2024-06-22 RX ORDER — ZOLPIDEM TARTRATE 5 MG/1
5 TABLET ORAL NIGHTLY PRN
Status: DISCONTINUED | OUTPATIENT
Start: 2024-06-22 | End: 2024-06-24 | Stop reason: HOSPADM

## 2024-06-22 RX ORDER — FAMOTIDINE 20 MG/1
20 TABLET, FILM COATED ORAL
Status: DISCONTINUED | OUTPATIENT
Start: 2024-06-23 | End: 2024-06-24 | Stop reason: HOSPADM

## 2024-06-22 RX ORDER — SULFAMETHOXAZOLE AND TRIMETHOPRIM 800; 160 MG/1; MG/1
1 TABLET ORAL 3 TIMES WEEKLY
Status: DISCONTINUED | OUTPATIENT
Start: 2024-06-23 | End: 2024-06-24 | Stop reason: HOSPADM

## 2024-06-22 RX ADMIN — LOSARTAN POTASSIUM 100 MG: 100 TABLET, FILM COATED ORAL at 09:45

## 2024-06-22 RX ADMIN — LEVETIRACETAM 500 MG: 500 TABLET, FILM COATED ORAL at 09:45

## 2024-06-22 RX ADMIN — HYDROCHLOROTHIAZIDE 12.5 MG: 12.5 TABLET ORAL at 09:45

## 2024-06-22 RX ADMIN — INSULIN LISPRO 10 UNITS: 100 INJECTION, SOLUTION INTRAVENOUS; SUBCUTANEOUS at 09:44

## 2024-06-22 RX ADMIN — Medication 10 ML: at 21:00

## 2024-06-22 RX ADMIN — INSULIN LISPRO 10 UNITS: 100 INJECTION, SOLUTION INTRAVENOUS; SUBCUTANEOUS at 17:21

## 2024-06-22 RX ADMIN — SODIUM BICARBONATE 650 MG: 650 TABLET, ORALLY DISINTEGRATING ORAL at 09:45

## 2024-06-22 RX ADMIN — INSULIN LISPRO 5 UNITS: 100 INJECTION, SOLUTION INTRAVENOUS; SUBCUTANEOUS at 17:21

## 2024-06-22 RX ADMIN — HYDROCODONE BITARTRATE AND ACETAMINOPHEN 1 TABLET: 5; 325 TABLET ORAL at 22:40

## 2024-06-22 RX ADMIN — DEXAMETHASONE 4 MG: 4 TABLET ORAL at 12:38

## 2024-06-22 RX ADMIN — Medication 10 ML: at 09:45

## 2024-06-22 RX ADMIN — INSULIN LISPRO 3 UNITS: 100 INJECTION, SOLUTION INTRAVENOUS; SUBCUTANEOUS at 09:44

## 2024-06-22 RX ADMIN — SODIUM BICARBONATE 650 MG: 650 TABLET, ORALLY DISINTEGRATING ORAL at 17:19

## 2024-06-22 RX ADMIN — INSULIN LISPRO 8 UNITS: 100 INJECTION, SOLUTION INTRAVENOUS; SUBCUTANEOUS at 12:39

## 2024-06-22 RX ADMIN — INSULIN GLARGINE 30 UNITS: 100 INJECTION, SOLUTION SUBCUTANEOUS at 09:45

## 2024-06-22 RX ADMIN — PERFLUTREN 3 ML: 6.52 INJECTION, SUSPENSION INTRAVENOUS at 11:43

## 2024-06-22 RX ADMIN — DEXAMETHASONE 4 MG: 4 TABLET ORAL at 05:17

## 2024-06-22 RX ADMIN — Medication 5 MG: at 22:40

## 2024-06-22 RX ADMIN — DEXAMETHASONE 4 MG: 4 TABLET ORAL at 17:18

## 2024-06-22 RX ADMIN — INSULIN LISPRO 5 UNITS: 100 INJECTION, SOLUTION INTRAVENOUS; SUBCUTANEOUS at 22:36

## 2024-06-22 RX ADMIN — INSULIN LISPRO 10 UNITS: 100 INJECTION, SOLUTION INTRAVENOUS; SUBCUTANEOUS at 12:38

## 2024-06-22 NOTE — PLAN OF CARE
Problem: Adult Inpatient Plan of Care  Goal: Plan of Care Review  Outcome: Ongoing, Progressing  Flowsheets (Taken 6/22/2024 0615)  Progress: improving  Plan of Care Reviewed With: patient  Outcome Evaluation: A&Ox4, VSS, received from ICU during the night, no complaints of pain or discomfort, ECHO to be done, waiting to see surgery for port placement, incision cdi and tripp, plan of care continues.

## 2024-06-22 NOTE — PROGRESS NOTES
Name: Iain Christina ADMIT: 6/15/2024   : 1965  PCP: Scott Medina MD    MRN: [id number] LOS: 7 days   AGE/SEX: 58 y.o. male  ROOM: Count includes the [facility]     Subjective   Subjective   Patient lying comfortably bed.  No complaints.  No headaches, dizziness, nausea, vomiting.  Has eaten breakfast and lunch without any problems.  Family at bedside.  Eager to leave the hospital when he is able to.    Review of Systems  As above     Objective   Objective   Vital Signs  Temp:  [97.5 °F (36.4 °C)-97.8 °F (36.6 °C)] 97.7 °F (36.5 °C)  Heart Rate:  [54-81] 68  Resp:  [14-20] 18  BP: (111-148)/(63-99) 132/85  SpO2:  [89 %-95 %] 90 %  on  Flow (L/min):  [2] 2;   Device (Oxygen Therapy): room air  Body mass index is 33 kg/m².  Physical Exam  Vitals and nursing note reviewed.   Constitutional:       General: He is not in acute distress.  Cardiovascular:      Rate and Rhythm: Normal rate and regular rhythm.   Pulmonary:      Effort: Pulmonary effort is normal. No respiratory distress.   Abdominal:      General: Abdomen is flat. There is no distension.      Tenderness: There is no abdominal tenderness.   Musculoskeletal:         General: No swelling or deformity.   Skin:     General: Skin is warm and dry.   Neurological:      General: No focal deficit present.      Mental Status: He is alert. Mental status is at baseline.         Results Review     I reviewed the patient's new clinical results.  Results from last 7 days   Lab Units 24  0439 24  0334 24  0336 24  1257   WBC 10*3/mm3 12.14* 11.28* 12.41* 11.26*   HEMOGLOBIN g/dL 16.1 16.1 15.2 15.3   PLATELETS 10*3/mm3 187 154 173 157     Results from last 7 days   Lab Units 24  0334 24  0336 24  1257 24  0448   SODIUM mmol/L 137 140 143 141   POTASSIUM mmol/L 3.8 4.1 4.0 3.7   CHLORIDE mmol/L 105 110* 109* 108*   CO2 mmol/L 20.8* 21.1* 23.0 24.0   BUN mg/dL 24* 27* 22 23   CREATININE mg/dL 0.80 0.84 0.82 0.72*   GLUCOSE  "mg/dL 206* 227* 224* 212*   Estimated Creatinine Clearance: 118.6 mL/min (by C-G formula based on SCr of 0.8 mg/dL).  Results from last 7 days   Lab Units 06/20/24  0336 06/19/24  0448 06/16/24  0511   ALBUMIN g/dL 3.5 3.9 4.0   BILIRUBIN mg/dL 1.1 0.8 1.2   ALK PHOS U/L 97 130* 109   AST (SGOT) U/L 93* 79* 71*   ALT (SGPT) U/L 223* 221* 195*     Results from last 7 days   Lab Units 06/21/24  0334 06/20/24  0336 06/19/24  1257 06/19/24  0448 06/17/24  0719 06/16/24  0511   CALCIUM mg/dL 8.6 8.3* 8.3* 8.5*   < > 8.9   ALBUMIN g/dL  --  3.5  --  3.9  --  4.0    < > = values in this interval not displayed.     No results found for: \"COVID19\"  Glucose   Date/Time Value Ref Range Status   06/22/2024 1152 273 (H) 70 - 130 mg/dL Final   06/22/2024 0750 189 (H) 70 - 130 mg/dL Final   06/21/2024 2115 201 (H) 70 - 130 mg/dL Final   06/21/2024 1618 295 (H) 70 - 130 mg/dL Final   06/21/2024 1128 174 (H) 70 - 130 mg/dL Final   06/21/2024 0728 175 (H) 70 - 130 mg/dL Final   06/20/2024 2117 178 (H) 70 - 130 mg/dL Final       Adult Transthoracic Echo Complete w/ Color, Spectral and Contrast if Necessary Per Protocol    Left ventricular systolic function is normal. Calculated left   ventricular EF = 60.6% Septal wall motion is normal. Normal global   longitudinal LV strain (GLS) = -22.7%    Left ventricular diastolic function was normal.    Saline test results are negative for right to left atrial level shunt.    I reviewed the patient's daily medications.  Scheduled Medications  dexAMETHasone, 4 mg, Oral, Q6H  losartan, 100 mg, Oral, Q24H   And  hydroCHLOROthiazide, 12.5 mg, Oral, Q24H  insulin glargine, 30 Units, Subcutaneous, Daily  insulin lispro, 10 Units, Subcutaneous, TID With Meals  insulin lispro, 3-14 Units, Subcutaneous, 4x Daily AC & at Bedtime  levETIRAcetam, 500 mg, Oral, Q12H  sodium bicarbonate, 650 mg, Oral, TID  sodium chloride, 10 mL, Intravenous, Q12H    Infusions   Diet  Diet: Regular/House, Diabetic; Consistent " Carbohydrate; Fluid Consistency: Thin (IDDSI 0)         I have personally reviewed:  [x]  Laboratory   []  Microbiology   [x]  Radiology   []  EKG/Telemetry   []  Cardiology/Vascular   [x]  Pathology   [x]  Records     Assessment/Plan     Active Hospital Problems    Diagnosis  POA    **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       58 y.o. 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    CNS lymphoma  -HIV negative in Feb 2024, but will recheck  -Neurosurgery consulted-plan to follow-up in about 2 weeks when the final path results are back.  Have deferred to oncology for steroid regimen  -Oncology consulted-plan for outpatient whole-body PET scan, outpatient ophthalmology evaluation, genetic counseling  -Surgery consulted for Mediport placement-plan for Monday placement    Prediabetes, A1c 5.7%  Steroid-induced hyperglycemia  -Continue Lantus 30 units and lispro 10 units with meals as well as sliding scale insulin  -Pending final recommendations about steroids may need to go home on insulin, I discussed with patient.  If this is the case we will have diabetic nurse educator on Monday prior to discharge    Hypertension-continue home blood pressure medications    Insomnia-resume home Ambien    Mild leukocytosis-afebrile no infectious symptoms.  Has been stable since initiation of steroids    SCDs for DVT prophylaxis.  Full code.  Discussed with patient and nursing staff.  Anticipate discharge home with family in 1-2  days.    Expected Discharge Date: 6/21/2024; Expected Discharge Time:       Frank Maravilla MD  [facility]  06/22/24  14:55 EDT

## 2024-06-22 NOTE — CONSULTS
[facility]   Consult Note    Patient Name: Iain Christina  : 1965  MRN: [id number]  Primary Care Physician:  Scott Medina MD  Referring Physician: Abilio Lal MD  Date of admission: 6/15/2024    Inpatient General Surgery Consult  Consult performed by: Prabhu Wyatt Jr., MD  Consult ordered by: Krista Fernandez MD PhD        Subjective   Subjective     Reason for Consult/ Chief Complaint: CNS lymphoma and poor venous access    History of Present Illness  Iain Christina is a pleasant 58 y.o. male who has several medical problems including diabetes mellitus, hypothyroidism, hepatitis C, and sleep apnea who presented to [facility] emergency room for recent falls and worsening memory.  A CT scan of the head showed a right frontal lobe mass.  He was transferred to [facility] where an MRI of the brain showed a mass of the right frontal lobe.  He underwent a right frontal craniotomy with excisional biopsy of the mass that shows a B-cell lymphoma.  He will require chemotherapy but has poor venous access.    Review of Systems   Constitutional:  Negative for chills and fever.   Respiratory:  Negative for chest tightness and shortness of breath.    Cardiovascular:  Negative for chest pain and palpitations.   Gastrointestinal:  Negative for abdominal pain and nausea.        Personal History     Past Medical History:   Diagnosis Date    Diabetes mellitus     Hepatitis C     Hyperthyroidism     Sleep apnea        Past Surgical History:   Procedure Laterality Date    CRANIOTOMY FOR TUMOR Right 2024    Procedure: Right frontal craniotomy for tumor;  Surgeon: Rosendo Bello MD;  Location: [facility];  Service: Neurosurgery;  Laterality: Right;    NASAL SEPTAL RECONSTRUCTION         Family History: family history includes COPD in his mother; Cancer in his sister; No Known Problems in his brother. Otherwise pertinent FHx was reviewed and not pertinent  to current issue.    Social History:  reports that he has never smoked. He has never used smokeless tobacco. He reports that he does not currently use alcohol. He reports that he does not use drugs.    Home Medications:   irbesartan-hydrochlorothiazide and zolpidem CR    Allergies:  Allergies   Allergen Reactions    Peanut (Diagnostic) Anaphylaxis       Objective    Objective     Vitals:  Temp:  [97.5 °F (36.4 °C)-97.8 °F (36.6 °C)] 97.7 °F (36.5 °C)  Heart Rate:  [54-81] 68  Resp:  [14-20] 18  BP: (111-148)/(63-99) 132/85  Flow (L/min):  [2] 2    Physical Exam  Constitutional:       Appearance: He is not ill-appearing or toxic-appearing.   Pulmonary:      Effort: Pulmonary effort is normal. No respiratory distress.   Chest:      Comments: There were no significant abnormalities noted in the upper chest or shoulders.  Neurological:      Mental Status: He is alert.   Psychiatric:         Behavior: Behavior is cooperative.         Result Review    Result Review:  I have personally reviewed the results from the time of this admission to 6/22/2024 13:58 EDT and agree with these findings:  [x]  Laboratory list / accordion  []  Microbiology  [x]  Radiology  []  EKG/Telemetry   []  Cardiology/Vascular   []  Pathology  []  Old records  []  Other:      Assessment & Plan   Assessment / Plan     Brief Patient Summary with assessment and plan:  Iain Christina is a 58 y.o. male who has been diagnosed with CNS lymphoma and will require chemotherapy.  He has poor venous access.    1.  Lymphoma: The patient has a brain mass that has returned as consistent with lymphoma.  He will need chemotherapy but has poor venous access.  A Mediport is necessary for chemotherapy access.  The patient has eaten today and is not a candidate for the procedure today.  We will plan to proceed with a Mediport on Monday, 6/24/2024.      Prabhu Wyatt Jr., MD

## 2024-06-22 NOTE — PROGRESS NOTES
LOS: 7 days   Patient Care Team:  Scott Medina MD as PCP - General (Internal Medicine)    Chief Complaint: Postop craniotomy    Subjective     This patient is feeling pretty good.  He does not have much headache.  He is concerned about his diagnosis obviously.    Interval History:     History taken from: patient chart    Objective      His incision is healing well.  He feels pretty good.    Vital Signs  Temp:  [97.5 °F (36.4 °C)-97.8 °F (36.6 °C)] 97.5 °F (36.4 °C)  Heart Rate:  [54-90] 63  Resp:  [14-20] 18  BP: (111-148)/(63-99) 123/80       Results Review:     I reviewed the patient's new clinical results.  He is afebrile with a white count of 12.1.      Assessment & Plan       Neoplasm causing mass effect and brain compression on adjacent structures    Hypertension    Sleep apnea    Chronic hepatitis C without hepatic coma    Brain mass    Abnormal liver function tests    Steroid-induced hyperglycemia    Primary CNS lymphoma      I reviewed the notes from both radiation and medical oncology.  From my point of view he can be discharged at any time.  I do not think we are going to be able to set a plan until we get the final path report which could be a week or 2.  I agree with the recommendations from both oncology services.  Will see him in my office in about 2 weeks.  I will defer to oncology as to whether to keep him on steroids.      Rosendo Bello MD  06/22/24  11:47 EDT

## 2024-06-22 NOTE — PROGRESS NOTES
LOS: 7 days   Patient Care Team:  Scott Medina MD as PCP - General (Internal Medicine)    Chief Complaint: CNS lymphoma with midline shift, status post right frontal lobe lesion excisional biopsy on 6/19/2024    Interval History:  Patient is afebrile today.  No headaches.    A comprehensive 14 point review of systems was performed and was negative except as mentioned.    Vital Signs:  Temp:  [97.5 °F (36.4 °C)-98.4 °F (36.9 °C)] 98.4 °F (36.9 °C)  Heart Rate:  [54-81] 79  Resp:  [14-20] 18  BP: (111-148)/(63-96) 127/80    Intake/Output Summary (Last 24 hours) at 6/22/2024 1719  Last data filed at 6/21/2024 1746  Gross per 24 hour   Intake 1576.6 ml   Output --   Net 1576.6 ml       Physical Exam:   General Appearance:    No acute distress, alert and oriented x4   Lungs:     Clear to auscultation bilaterally     Heart:    Regular rhythm and normal rate.    Abdomen:     Soft, non-tender, non-distended.    Extremities:   Moves all extremities well.  No clubbing, cyanosis, or edema.     Results Review:   CBC:      Lab 06/22/24  0439 06/21/24  0334 06/20/24  0336 06/19/24  1257 06/17/24  0719 06/16/24  0511   WBC 12.14* 11.28* 12.41* 11.26* 13.92* 8.20   HEMOGLOBIN 16.1 16.1 15.2 15.3 15.5 15.1   HEMATOCRIT 47.9 46.6 44.2 46.2 47.2 46.2   PLATELETS 187 154 173 157 156 154   NEUTROS ABS 10.19* 9.37* 9.97* 9.06*  --  7.04*   IMMATURE GRANS (ABS) 0.23* 0.20* 0.13* 0.25*  --  0.03   LYMPHS ABS 1.08 1.21 1.42 1.44  --  0.95   MONOS ABS 0.62 0.48 0.88 0.48  --  0.17   EOS ABS 0.00 0.00 0.00 0.00  --  0.00   MCV 87.9 87.4 88.8 89.2 90.1 89.0     CMP:        Lab 06/21/24  0334 06/20/24  0336 06/19/24  1257 06/19/24  0448 06/18/24  0443 06/17/24  0719 06/16/24  0511   SODIUM 137 140 143 141 141   < > 142   POTASSIUM 3.8 4.1 4.0 3.7 3.8   < > 3.6   CHLORIDE 105 110* 109* 108* 108*   < > 112*   CO2 20.8* 21.1* 23.0 24.0 21.3*   < > 17.6*   ANION GAP 11.2 8.9 11.0 9.0 11.7   < > 12.4   BUN 24* 27* 22 23 23   < > 21    CREATININE 0.80 0.84 0.82 0.72* 0.87   < > 0.78   EGFR 101.3 99.8 100.6 104.6 98.8   < > 102.1   GLUCOSE 206* 227* 224* 212* 306*   < > 255*   CALCIUM 8.6 8.3* 8.3* 8.5* 8.7   < > 8.9   TOTAL PROTEIN  --  6.1  --  6.9  --   --  6.8   ALBUMIN  --  3.5  --  3.9  --   --  4.0   GLOBULIN  --  2.6  --  3.0  --   --   --    ALT (SGPT)  --  223*  --  221*  --   --  195*   AST (SGOT)  --  93*  --  79*  --   --  71*   BILIRUBIN  --  1.1  --  0.8  --   --  1.2   INDIRECT BILIRUBIN  --   --   --   --   --   --  0.9   BILIRUBIN DIRECT  --   --   --   --   --   --  0.3   ALK PHOS  --  97  --  130*  --   --  109    < > = values in this interval not displayed.           Results from last 7 days   Lab Units 06/20/24  0336   INR  1.14*   APTT seconds 23.0                   I reviewed the patient's new clinical results.        Adult Transthoracic Echo Complete w/ Color, Spectral and Contrast if Necessary Per Protocol    Left ventricular systolic function is normal. Calculated left   ventricular EF = 60.6% Septal wall motion is normal. Normal global   longitudinal LV strain (GLS) = -22.7%    Left ventricular diastolic function was normal.    Saline test results are negative for right to left atrial level shunt.      EXAM:    MR Head Without and With Intravenous Contrast 6/20/2024      CLINICAL HISTORY:     Reason for exam: post-op.     TECHNIQUE:    Magnetic resonance images of the head brain without and with   intravenous contrast in multiple planes.     COMPARISON:  Comparison made to prior brain MRI from [facility].     FINDINGS:    Brain: Status post biopsy of heterogeneously enhancing right frontal   lobe tumor.  There is extensive vasogenic edema around the tumor with   moderate regional mass-effect causing 12 mm of midline shift.  The flow   voids at the base of the brain are intact.  No evidence of acute ischemic   injury.  The dural venous sinuses are patent.    Ventricles:  Unremarkable.  No ventriculomegaly.    Bones  joints: Status post right frontal craniotomy.  No acute fracture.    Sinuses: Chronic obstructive right maxillary sinusitis.  Chronic   ethmoid sinusitis.  No acute sinusitis.    Mastoid air cells:  Unremarkable as visualized.  No mastoid effusion.    Orbits:  Unremarkable as visualized.     IMPRESSION:       Status post biopsy of right frontal lobe tumor with expected postsurgical   changes.           Assessment:    *. Primary CNS lymphoma, diffuse large B-cell lymphoma. Final pathology study is pending at outside facility.      This patient was forgetful and episodes of falling but otherwise asymptomatic. No weight loss, no vision changes.     Discussed with patient today on 06/21/2024 that he would need chemotherapy with high dose methotrexate. He would need Port-A-Catheter for the treatment. Prior to treatment we do need to have full body PET scan for evaluation. I also recommended to obtain echocardiogram study as prechemotherapy baseline.      I encouraged the patient to consider chemotherapy. He is way too young to not have systemic therapy. I proposed 6 cycles high dose methotrexate. After that the patient will likely need consolidation chemotherapy and I do think he would benefit from evaluation from stem cell transplant team at [facility]. This disease does have high chance of recurrence after chemotherapy.     Since patient had significant edema in the midline shift on the postop MRI examination obtained 6/20/2024, I recommended to continue oral dexamethasone for now.    Echocardiogram study today on 6/20/2024 reported normal LVEF.     PLAN:  Appreciate consult General Surgeon consultation for Port-A-Catheter placement scheduled Monday, 6/24/2024.   Continue oral dexamethasone 4 mg every 6 hours.  At the time of discharge early next week, could decrease to 4 mg every 8 hours.    Will start the patient Bactrim DS 3 times a week for PJP prophylaxis due to expected long-term use of steroids.    Started Pepcid 20 mg twice a day for GI prophylaxis due to expected long-term use of steroids.  I sent a message to our office staff to arrange outpatient whole body PET scan examination.  I already sent a message to our clinic  This patient will need  consult as he reports no commercial insurance, he does have some sort of payment mechanism through the [employer].  Patient could be discharged to home after portacatheter placement per oncology point of view.   I will see the patient in about 10-14 days for reevaluation ideally after the whole body PET scan examination and in preparation for his chemotherapy.   This patient will need outpatient ophthalmology evaluation.    This patient also will need genetic counseling due to multiple family members had malignancy..    I reviewed a note from neurosurgery, general surgery, pulmonary and primary team [facility].    I discussed with the patient at the bedside..  Patient voiced understanding and agreeable.     I spoke with nursing staff.     We will sign off.  Please call if having questions.      Krista Fernandez MD PhD  06/22/24  17:19 EDT

## 2024-06-23 ENCOUNTER — ANESTHESIA EVENT (OUTPATIENT)
Dept: PERIOP | Facility: HOSPITAL | Age: 59
DRG: 820 | End: 2024-06-23
Payer: COMMERCIAL

## 2024-06-23 LAB
ANION GAP SERPL CALCULATED.3IONS-SCNC: 11.3 MMOL/L (ref 5–15)
BASOPHILS # BLD AUTO: 0.02 10*3/MM3 (ref 0–0.2)
BASOPHILS NFR BLD AUTO: 0.1 % (ref 0–1.5)
BUN SERPL-MCNC: 30 MG/DL (ref 8–23)
BUN/CREAT SERPL: 37.5 (ref 7–25)
CALCIUM SPEC-SCNC: 8.6 MG/DL (ref 8.6–10.5)
CHLORIDE SERPL-SCNC: 108 MMOL/L (ref 98–107)
CO2 SERPL-SCNC: 20.7 MMOL/L (ref 22–29)
CREAT SERPL-MCNC: 0.8 MG/DL (ref 0.76–1.27)
DEPRECATED RDW RBC AUTO: 38.4 FL (ref 37–54)
EGFRCR SERPLBLD CKD-EPI 2021: 101.3 ML/MIN/1.73
EOSINOPHIL # BLD AUTO: 0 10*3/MM3 (ref 0–0.4)
EOSINOPHIL NFR BLD AUTO: 0 % (ref 0.3–6.2)
ERYTHROCYTE [DISTWIDTH] IN BLOOD BY AUTOMATED COUNT: 12.4 % (ref 12.3–15.4)
GLUCOSE BLDC GLUCOMTR-MCNC: 146 MG/DL (ref 70–130)
GLUCOSE BLDC GLUCOMTR-MCNC: 172 MG/DL (ref 70–130)
GLUCOSE BLDC GLUCOMTR-MCNC: 192 MG/DL (ref 70–130)
GLUCOSE BLDC GLUCOMTR-MCNC: 196 MG/DL (ref 70–130)
GLUCOSE SERPL-MCNC: 171 MG/DL (ref 65–99)
HCT VFR BLD AUTO: 46.3 % (ref 37.5–51)
HGB BLD-MCNC: 15.9 G/DL (ref 13–17.7)
HIV 1+2 AB+HIV1 P24 AG SERPL QL IA: NORMAL
IMM GRANULOCYTES # BLD AUTO: 0.26 10*3/MM3 (ref 0–0.05)
IMM GRANULOCYTES NFR BLD AUTO: 1.9 % (ref 0–0.5)
LYMPHOCYTES # BLD AUTO: 1.26 10*3/MM3 (ref 0.7–3.1)
LYMPHOCYTES NFR BLD AUTO: 9.4 % (ref 19.6–45.3)
MCH RBC QN AUTO: 29.8 PG (ref 26.6–33)
MCHC RBC AUTO-ENTMCNC: 34.3 G/DL (ref 31.5–35.7)
MCV RBC AUTO: 86.7 FL (ref 79–97)
MONOCYTES # BLD AUTO: 1.2 10*3/MM3 (ref 0.1–0.9)
MONOCYTES NFR BLD AUTO: 9 % (ref 5–12)
NEUTROPHILS NFR BLD AUTO: 10.65 10*3/MM3 (ref 1.7–7)
NEUTROPHILS NFR BLD AUTO: 79.6 % (ref 42.7–76)
NRBC BLD AUTO-RTO: 0 /100 WBC (ref 0–0.2)
PLATELET # BLD AUTO: 163 10*3/MM3 (ref 140–450)
PMV BLD AUTO: 11.2 FL (ref 6–12)
POTASSIUM SERPL-SCNC: 3.9 MMOL/L (ref 3.5–5.2)
RBC # BLD AUTO: 5.34 10*6/MM3 (ref 4.14–5.8)
SODIUM SERPL-SCNC: 140 MMOL/L (ref 136–145)
WBC NRBC COR # BLD AUTO: 13.39 10*3/MM3 (ref 3.4–10.8)

## 2024-06-23 PROCEDURE — 63710000001 INSULIN LISPRO (HUMAN) PER 5 UNITS: Performed by: NEUROLOGICAL SURGERY

## 2024-06-23 PROCEDURE — 63710000001 DEXAMETHASONE PER 0.25 MG: Performed by: INTERNAL MEDICINE

## 2024-06-23 PROCEDURE — G0432 EIA HIV-1/HIV-2 SCREEN: HCPCS | Performed by: STUDENT IN AN ORGANIZED HEALTH CARE EDUCATION/TRAINING PROGRAM

## 2024-06-23 PROCEDURE — 85025 COMPLETE CBC W/AUTO DIFF WBC: CPT | Performed by: NEUROLOGICAL SURGERY

## 2024-06-23 PROCEDURE — 80048 BASIC METABOLIC PNL TOTAL CA: CPT | Performed by: STUDENT IN AN ORGANIZED HEALTH CARE EDUCATION/TRAINING PROGRAM

## 2024-06-23 PROCEDURE — 63710000001 DEXAMETHASONE PER 0.25 MG: Performed by: STUDENT IN AN ORGANIZED HEALTH CARE EDUCATION/TRAINING PROGRAM

## 2024-06-23 PROCEDURE — 82948 REAGENT STRIP/BLOOD GLUCOSE: CPT

## 2024-06-23 PROCEDURE — 63710000001 INSULIN GLARGINE PER 5 UNITS: Performed by: NEUROLOGICAL SURGERY

## 2024-06-23 PROCEDURE — 99231 SBSQ HOSP IP/OBS SF/LOW 25: CPT | Performed by: SURGERY

## 2024-06-23 RX ORDER — DEXAMETHASONE 4 MG/1
4 TABLET ORAL EVERY 8 HOURS SCHEDULED
Status: DISCONTINUED | OUTPATIENT
Start: 2024-06-23 | End: 2024-06-24 | Stop reason: HOSPADM

## 2024-06-23 RX ADMIN — INSULIN LISPRO 3 UNITS: 100 INJECTION, SOLUTION INTRAVENOUS; SUBCUTANEOUS at 17:39

## 2024-06-23 RX ADMIN — HYDROCHLOROTHIAZIDE 12.5 MG: 12.5 TABLET ORAL at 08:56

## 2024-06-23 RX ADMIN — INSULIN GLARGINE 30 UNITS: 100 INJECTION, SOLUTION SUBCUTANEOUS at 08:55

## 2024-06-23 RX ADMIN — LEVETIRACETAM 500 MG: 500 TABLET, FILM COATED ORAL at 20:27

## 2024-06-23 RX ADMIN — FAMOTIDINE 20 MG: 20 TABLET, FILM COATED ORAL at 08:54

## 2024-06-23 RX ADMIN — INSULIN LISPRO 3 UNITS: 100 INJECTION, SOLUTION INTRAVENOUS; SUBCUTANEOUS at 20:34

## 2024-06-23 RX ADMIN — SODIUM BICARBONATE 650 MG: 650 TABLET, ORALLY DISINTEGRATING ORAL at 08:54

## 2024-06-23 RX ADMIN — LOSARTAN POTASSIUM 100 MG: 100 TABLET, FILM COATED ORAL at 08:56

## 2024-06-23 RX ADMIN — INSULIN LISPRO 3 UNITS: 100 INJECTION, SOLUTION INTRAVENOUS; SUBCUTANEOUS at 12:44

## 2024-06-23 RX ADMIN — DEXAMETHASONE 4 MG: 4 TABLET ORAL at 00:00

## 2024-06-23 RX ADMIN — SODIUM BICARBONATE 650 MG: 650 TABLET, ORALLY DISINTEGRATING ORAL at 20:27

## 2024-06-23 RX ADMIN — HYDROCODONE BITARTRATE AND ACETAMINOPHEN 1 TABLET: 5; 325 TABLET ORAL at 20:27

## 2024-06-23 RX ADMIN — Medication 10 ML: at 20:28

## 2024-06-23 RX ADMIN — LEVETIRACETAM 500 MG: 500 TABLET, FILM COATED ORAL at 08:54

## 2024-06-23 RX ADMIN — INSULIN LISPRO 10 UNITS: 100 INJECTION, SOLUTION INTRAVENOUS; SUBCUTANEOUS at 08:55

## 2024-06-23 RX ADMIN — Medication 5 MG: at 20:27

## 2024-06-23 RX ADMIN — Medication 10 ML: at 08:55

## 2024-06-23 RX ADMIN — SODIUM BICARBONATE 650 MG: 650 TABLET, ORALLY DISINTEGRATING ORAL at 17:42

## 2024-06-23 RX ADMIN — DEXAMETHASONE 4 MG: 4 TABLET ORAL at 05:58

## 2024-06-23 RX ADMIN — DEXAMETHASONE 4 MG: 4 TABLET ORAL at 14:13

## 2024-06-23 RX ADMIN — FAMOTIDINE 20 MG: 20 TABLET, FILM COATED ORAL at 17:45

## 2024-06-23 RX ADMIN — INSULIN LISPRO 10 UNITS: 100 INJECTION, SOLUTION INTRAVENOUS; SUBCUTANEOUS at 18:29

## 2024-06-23 RX ADMIN — INSULIN LISPRO 10 UNITS: 100 INJECTION, SOLUTION INTRAVENOUS; SUBCUTANEOUS at 12:43

## 2024-06-23 RX ADMIN — DEXAMETHASONE 4 MG: 4 TABLET ORAL at 21:30

## 2024-06-23 NOTE — PROGRESS NOTES
Chief Complaint:    CNS lymphoma and poor venous access    Subjective:    The patient is stable with no complaints.    Objective:    Temp:  [97.5 °F (36.4 °C)-98.4 °F (36.9 °C)] 97.5 °F (36.4 °C)  Heart Rate:  [46-79] 59  Resp:  [18] 18  BP: (120-134)/(80-99) 128/81    Physical Exam  Constitutional:       Appearance: He is not ill-appearing or toxic-appearing.   Neurological:      Mental Status: He is alert.   Psychiatric:         Behavior: Behavior is cooperative.           Results:    WBC is 13.39.  H/H is 15.9/46.3.  BUN is 30 and creatinine is 0.80.    Assessment/Plan:    The patient has CNS lymphoma that will require chemotherapy.  He has poor venous access.  We will plan a Mediport tomorrow.  The patient understands the indications, alternatives, risks, and benefits of the procedure and wishes to proceed.     Prabhu Wyatt Jr., M.D.

## 2024-06-23 NOTE — PROGRESS NOTES
Name: Iain Christina ADMIT: 6/15/2024   : 1965  PCP: Scott Medina MD    MRN: [id number] LOS: 8 days   AGE/SEX: 58 y.o. male  ROOM: [facility]     Subjective   Subjective   Patient lying comfortably bed.  No complaints.  No headaches, dizziness, nausea, vomiting.  Discussed plan for port tomorrow    Review of Systems  As above     Objective   Objective   Vital Signs  Temp:  [97.5 °F (36.4 °C)-98.4 °F (36.9 °C)] 97.5 °F (36.4 °C)  Heart Rate:  [46-79] 69  Resp:  [18] 18  BP: (120-134)/(80-99) 134/99  SpO2:  [90 %-95 %] 93 %  on   ;   Device (Oxygen Therapy): room air  Body mass index is 33 kg/m².  Physical Exam  Vitals and nursing note reviewed.   Constitutional:       General: He is not in acute distress.  Cardiovascular:      Rate and Rhythm: Normal rate and regular rhythm.   Pulmonary:      Effort: Pulmonary effort is normal. No respiratory distress.   Abdominal:      General: Abdomen is flat. There is no distension.      Tenderness: There is no abdominal tenderness.   Musculoskeletal:         General: No swelling or deformity.   Skin:     General: Skin is warm and dry.   Neurological:      General: No focal deficit present.      Mental Status: He is alert. Mental status is at baseline.         Results Review     I reviewed the patient's new clinical results.  Results from last 7 days   Lab Units 24  0344 24  0439 24  0334 24  0336   WBC 10*3/mm3 13.39* 12.14* 11.28* 12.41*   HEMOGLOBIN g/dL 15.9 16.1 16.1 15.2   PLATELETS 10*3/mm3 163 187 154 173     Results from last 7 days   Lab Units 24  0345 24  0334 24  0336 24  1257   SODIUM mmol/L 140 137 140 143   POTASSIUM mmol/L 3.9 3.8 4.1 4.0   CHLORIDE mmol/L 108* 105 110* 109*   CO2 mmol/L 20.7* 20.8* 21.1* 23.0   BUN mg/dL 30* 24* 27* 22   CREATININE mg/dL 0.80 0.80 0.84 0.82   GLUCOSE mg/dL 171* 206* 227* 224*   Estimated Creatinine Clearance: 118.6 mL/min (by C-G formula based on SCr of 0.8  "mg/dL).  Results from last 7 days   Lab Units 06/20/24  0336 06/19/24  0448   ALBUMIN g/dL 3.5 3.9   BILIRUBIN mg/dL 1.1 0.8   ALK PHOS U/L 97 130*   AST (SGOT) U/L 93* 79*   ALT (SGPT) U/L 223* 221*     Results from last 7 days   Lab Units 06/23/24  0345 06/21/24  0334 06/20/24  0336 06/19/24  1257 06/19/24  0448   CALCIUM mg/dL 8.6 8.6 8.3* 8.3* 8.5*   ALBUMIN g/dL  --   --  3.5  --  3.9     No results found for: \"COVID19\"  Glucose   Date/Time Value Ref Range Status   06/23/2024 0720 146 (H) 70 - 130 mg/dL Final   06/22/2024 2033 214 (H) 70 - 130 mg/dL Final   06/22/2024 1627 228 (H) 70 - 130 mg/dL Final   06/22/2024 1152 273 (H) 70 - 130 mg/dL Final   06/22/2024 0750 189 (H) 70 - 130 mg/dL Final   06/21/2024 2115 201 (H) 70 - 130 mg/dL Final   06/21/2024 1618 295 (H) 70 - 130 mg/dL Final       Adult Transthoracic Echo Complete w/ Color, Spectral and Contrast if Necessary Per Protocol    Left ventricular systolic function is normal. Calculated left   ventricular EF = 60.6% Septal wall motion is normal. Normal global   longitudinal LV strain (GLS) = -22.7%    Left ventricular diastolic function was normal.    Saline test results are negative for right to left atrial level shunt.    I reviewed the patient's daily medications.  Scheduled Medications  dexAMETHasone, 4 mg, Oral, Q8H  famotidine, 20 mg, Oral, BID AC  losartan, 100 mg, Oral, Q24H   And  hydroCHLOROthiazide, 12.5 mg, Oral, Q24H  insulin glargine, 30 Units, Subcutaneous, Daily  insulin lispro, 10 Units, Subcutaneous, TID With Meals  insulin lispro, 3-14 Units, Subcutaneous, 4x Daily AC & at Bedtime  levETIRAcetam, 500 mg, Oral, Q12H  sodium bicarbonate, 650 mg, Oral, TID  sodium chloride, 10 mL, Intravenous, Q12H  sulfamethoxazole-trimethoprim, 1 tablet, Oral, Once per day on Monday Wednesday Friday    Infusions   Diet  Diet: Regular/House, Diabetic; Consistent Carbohydrate; Fluid Consistency: Thin (IDDSI 0)         I have personally reviewed:  [x]  " Laboratory   []  Microbiology   [x]  Radiology   []  EKG/Telemetry   []  Cardiology/Vascular   [x]  Pathology   [x]  Records     Assessment/Plan     Active Hospital Problems    Diagnosis  POA    **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       58 y.o. 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    CNS lymphoma  -Neurosurgery consulted-plan to follow-up in about 2 weeks when the final path results are back.    -Oncology consulted-plan for outpatient whole-body PET scan, outpatient ophthalmology evaluation, genetic counseling  -Surgery consulted for Mediport placement-plan for Monday placement  -Have transition to dexamethasone 4 mg every 8 hours    Prediabetes, A1c 5.7%  Steroid-induced hyperglycemia  -Continue Lantus 30 units and lispro 10 units with meals as well as sliding scale insulin  -Diabetic nurse educator consulted as patient is not on insulin at home and will be going home on    Hypertension-continue home blood pressure medications    Insomnia-resume home Ambien    Mild leukocytosis-afebrile no infectious symptoms.  Has been stable since initiation of steroids    SCDs for DVT prophylaxis.  Full code.  Discussed with patient and nursing staff.  Anticipate discharge home with family in 1-2 days.    Expected Discharge Date: 6/21/2024; Expected Discharge Time:       Frank Maravilla MD  [facility]  06/23/24  10:32 EDT

## 2024-06-23 NOTE — PLAN OF CARE
Goal Outcome Evaluation: no changes    Problem: Adult Inpatient Plan of Care  Goal: Plan of Care Review  Outcome: Ongoing, Progressing     Problem: Fall Injury Risk  Goal: Absence of Fall and Fall-Related Injury  Outcome: Ongoing, Progressing  Intervention: Identify and Manage Contributors  Recent Flowsheet Documentation  Taken 6/22/2024 0800 by Rabia Camargo RN  Self-Care Promotion: independence encouraged  Intervention: Promote Injury-Free Environment  Recent Flowsheet Documentation  Taken 6/22/2024 1800 by Rabia Camargo RN  Safety Promotion/Fall Prevention: safety round/check completed  Taken 6/22/2024 1600 by Rabia Camargo RN  Safety Promotion/Fall Prevention: safety round/check completed  Taken 6/22/2024 1400 by Rabia Camargo RN  Safety Promotion/Fall Prevention: safety round/check completed  Taken 6/22/2024 1200 by Rabia Camargo RN  Safety Promotion/Fall Prevention: safety round/check completed  Taken 6/22/2024 1000 by Rabia Camargo RN  Safety Promotion/Fall Prevention: safety round/check completed  Taken 6/22/2024 0800 by Rabia Camargo RN  Safety Promotion/Fall Prevention: safety round/check completed     Problem: Coping Ineffective (Oncology Care)  Goal: Effective Coping  Outcome: Ongoing, Progressing  Intervention: Support and Enhance Coping Strategies  Recent Flowsheet Documentation  Taken 6/22/2024 0800 by Rabia Camargo RN  Family/Support System Care:   self-care encouraged   support provided     Problem: Fatigue (Oncology Care)  Goal: Improved Activity Tolerance  Outcome: Ongoing, Progressing  Intervention: Promote Improved Energy  Recent Flowsheet Documentation  Taken 6/22/2024 0800 by Rabia Camargo RN  Activity Management: activity encouraged  Sleep/Rest Enhancement:   consistent schedule promoted   natural light exposure provided   regular sleep/rest pattern promoted   relaxation techniques promoted     Problem: Oral Intake Altered (Oncology Care)  Goal:  Optimal Oral Intake  Outcome: Ongoing, Progressing  Intervention: Minimize and Manage Barriers to Oral Intake  Recent Flowsheet Documentation  Taken 6/22/2024 1800 by Rabia Camargo RN  Oral Care: oral rinse provided  Taken 6/22/2024 0800 by Rabia Camargo RN  Oral Care: oral rinse provided     Problem: Oral Mucositis (Oncology Care)  Goal: Improved Oral Mucous Membrane Integrity  Outcome: Ongoing, Progressing  Intervention: Promote Oral Comfort and Health  Recent Flowsheet Documentation  Taken 6/22/2024 1800 by Rabia Camargo RN  Oral Care: oral rinse provided  Taken 6/22/2024 0800 by Rabia Camargo RN  Oral Care: oral rinse provided     Problem: Pain Acute (Oncology Care)  Goal: Optimal Pain Control  Outcome: Ongoing, Progressing  Intervention: Prevent or Manage Pain  Recent Flowsheet Documentation  Taken 6/22/2024 0800 by Rabia Camargo RN  Sensory Stimulation Regulation: care clustered  Sleep/Rest Enhancement:   consistent schedule promoted   natural light exposure provided   regular sleep/rest pattern promoted   relaxation techniques promoted  Intervention: Develop Pain Management Plan  Recent Flowsheet Documentation  Taken 6/22/2024 1800 by Rabia Camargo RN  Pain Management Interventions: care clustered  Taken 6/22/2024 1600 by Rabia Camargo RN  Pain Management Interventions: care clustered  Taken 6/22/2024 1400 by Rabia Camargo RN  Pain Management Interventions: care clustered  Taken 6/22/2024 1200 by Rabia Camargo RN  Pain Management Interventions: care clustered  Taken 6/22/2024 1000 by Rabia Camargo RN  Pain Management Interventions: care clustered  Taken 6/22/2024 0800 by Rabia Camargo RN  Pain Management Interventions:   care clustered   pillow support provided   position adjusted   quiet environment facilitated   relaxation techniques promoted  Intervention: Optimize Psychosocial Wellbeing  Recent Flowsheet Documentation  Taken 6/22/2024 0800 by Raman  Rabia, RN  Diversional Activities:   television   smartphone     Problem: Skin Injury Risk Increased  Goal: Skin Health and Integrity  Outcome: Ongoing, Progressing  Intervention: Optimize Skin Protection  Recent Flowsheet Documentation  Taken 6/22/2024 0800 by Rabia Camargo, RN  Pressure Reduction Techniques:   frequent weight shift encouraged   weight shift assistance provided  Pressure Reduction Devices: pressure-redistributing mattress utilized  Skin Protection:   adhesive use limited   incontinence pads utilized   transparent dressing maintained   tubing/devices free from skin contact

## 2024-06-23 NOTE — PLAN OF CARE
Problem: Adult Inpatient Plan of Care  Goal: Absence of Hospital-Acquired Illness or Injury  Intervention: Identify and Manage Fall Risk  Recent Flowsheet Documentation  Taken 6/23/2024 1840 by Kaylee Pa RN  Safety Promotion/Fall Prevention: safety round/check completed  Taken 6/23/2024 1620 by Kaylee Pa RN  Safety Promotion/Fall Prevention: safety round/check completed  Taken 6/23/2024 1425 by Kaylee Pa RN  Safety Promotion/Fall Prevention: safety round/check completed  Taken 6/23/2024 1230 by Kaylee Pa RN  Safety Promotion/Fall Prevention: safety round/check completed  Taken 6/23/2024 1030 by Kaylee Pa RN  Safety Promotion/Fall Prevention: safety round/check completed  Taken 6/23/2024 0830 by Kaylee Pa RN  Safety Promotion/Fall Prevention:   safety round/check completed   nonskid shoes/slippers when out of bed   fall prevention program maintained   clutter free environment maintained   assistive device/personal items within reach  Intervention: Prevent Skin Injury  Recent Flowsheet Documentation  Taken 6/23/2024 0830 by Kaylee Pa RN  Body Position: position changed independently  Skin Protection:   adhesive use limited   transparent dressing maintained  Intervention: Prevent and Manage VTE (Venous Thromboembolism) Risk  Recent Flowsheet Documentation  Taken 6/23/2024 1300 by Kaylee Pa RN  VTE Prevention/Management:   bilateral   sequential compression devices on  Taken 6/23/2024 0830 by Kaylee Pa RN  Activity Management: activity minimized  VTE Prevention/Management:   bilateral   sequential compression devices on  Range of Motion: active ROM (range of motion) encouraged  Intervention: Prevent Infection  Recent Flowsheet Documentation  Taken 6/23/2024 1840 by Kaylee Pa RN  Infection Prevention:   single patient room provided   hand hygiene promoted  Taken 6/23/2024 0830 by Kaylee Pa RN  Infection Prevention: hand hygiene promoted  Goal: Optimal Comfort and  Wellbeing  Intervention: Provide Person-Centered Care  Recent Flowsheet Documentation  Taken 6/23/2024 0830 by Kaylee Pa RN  Trust Relationship/Rapport: care explained     Problem: Fall Injury Risk  Goal: Absence of Fall and Fall-Related Injury  Intervention: Identify and Manage Contributors  Recent Flowsheet Documentation  Taken 6/23/2024 0830 by Kaylee aP RN  Medication Review/Management: medications reviewed  Intervention: Promote Injury-Free Environment  Recent Flowsheet Documentation  Taken 6/23/2024 1840 by Kaylee Pa RN  Safety Promotion/Fall Prevention: safety round/check completed  Taken 6/23/2024 1620 by Kaylee Pa RN  Safety Promotion/Fall Prevention: safety round/check completed  Taken 6/23/2024 1425 by Kaylee Pa RN  Safety Promotion/Fall Prevention: safety round/check completed  Taken 6/23/2024 1230 by Kaylee Pa RN  Safety Promotion/Fall Prevention: safety round/check completed  Taken 6/23/2024 1030 by Kaylee Pa RN  Safety Promotion/Fall Prevention: safety round/check completed  Taken 6/23/2024 0830 by Kaylee Pa RN  Safety Promotion/Fall Prevention:   safety round/check completed   nonskid shoes/slippers when out of bed   fall prevention program maintained   clutter free environment maintained   assistive device/personal items within reach     Problem: Coping Ineffective (Oncology Care)  Goal: Effective Coping  Intervention: Support and Enhance Coping Strategies  Recent Flowsheet Documentation  Taken 6/23/2024 0830 by Kaylee Pa RN  Family/Support System Care: self-care encouraged     Problem: Fatigue (Oncology Care)  Goal: Improved Activity Tolerance  Intervention: Promote Improved Energy  Recent Flowsheet Documentation  Taken 6/23/2024 0830 by Kaylee Pa RN  Activity Management: activity minimized     Problem: Pain Acute (Oncology Care)  Goal: Optimal Pain Control  Intervention: Prevent or Manage Pain  Recent Flowsheet Documentation  Taken 6/23/2024 0830 by Kaylee Pa  RN  Sensory Stimulation Regulation:   care clustered   lighting decreased  Medication Review/Management: medications reviewed  Intervention: Optimize Psychosocial Wellbeing  Recent Flowsheet Documentation  Taken 6/23/2024 0830 by Kaylee Pa RN  Diversional Activities:   television   smartphone     Problem: Skin Injury Risk Increased  Goal: Skin Health and Integrity  Intervention: Optimize Skin Protection  Recent Flowsheet Documentation  Taken 6/23/2024 0830 by Kaylee Pa RN  Pressure Reduction Techniques: frequent weight shift encouraged  Head of Bed (HOB) Positioning: HOB elevated  Pressure Reduction Devices: specialty bed utilized  Skin Protection:   adhesive use limited   transparent dressing maintained   Goal Outcome Evaluation:   VSS throughout shift, no acute changes noted, PERRLA, no s/s of pain noted. Call light and personal items in reach.

## 2024-06-24 ENCOUNTER — APPOINTMENT (OUTPATIENT)
Dept: GENERAL RADIOLOGY | Facility: HOSPITAL | Age: 59
DRG: 820 | End: 2024-06-24
Payer: COMMERCIAL

## 2024-06-24 ENCOUNTER — TELEPHONE (OUTPATIENT)
Dept: NEUROSURGERY | Facility: CLINIC | Age: 59
End: 2024-06-24
Payer: COMMERCIAL

## 2024-06-24 ENCOUNTER — ANESTHESIA (OUTPATIENT)
Dept: PERIOP | Facility: HOSPITAL | Age: 59
DRG: 820 | End: 2024-06-24
Payer: COMMERCIAL

## 2024-06-24 ENCOUNTER — READMISSION MANAGEMENT (OUTPATIENT)
Dept: CALL CENTER | Facility: HOSPITAL | Age: 59
End: 2024-06-24
Payer: COMMERCIAL

## 2024-06-24 VITALS
RESPIRATION RATE: 20 BRPM | DIASTOLIC BLOOD PRESSURE: 86 MMHG | OXYGEN SATURATION: 95 % | WEIGHT: 230 LBS | TEMPERATURE: 97.9 F | SYSTOLIC BLOOD PRESSURE: 142 MMHG | BODY MASS INDEX: 32.93 KG/M2 | HEART RATE: 55 BPM | HEIGHT: 70 IN

## 2024-06-24 DIAGNOSIS — C79.31 METASTASIS TO BRAIN: ICD-10-CM

## 2024-06-24 DIAGNOSIS — C85.10 LARGE B-CELL LYMPHOMA: ICD-10-CM

## 2024-06-24 DIAGNOSIS — C85.89 PRIMARY CNS LYMPHOMA: Primary | ICD-10-CM

## 2024-06-24 LAB
ANION GAP SERPL CALCULATED.3IONS-SCNC: 9.9 MMOL/L (ref 5–15)
BASOPHILS # BLD AUTO: 0.02 10*3/MM3 (ref 0–0.2)
BASOPHILS NFR BLD AUTO: 0.2 % (ref 0–1.5)
BUN SERPL-MCNC: 27 MG/DL (ref 8–23)
BUN/CREAT SERPL: 31.8 (ref 7–25)
CALCIUM SPEC-SCNC: 8.6 MG/DL (ref 8.6–10.5)
CHLORIDE SERPL-SCNC: 105 MMOL/L (ref 98–107)
CO2 SERPL-SCNC: 23.1 MMOL/L (ref 22–29)
CREAT SERPL-MCNC: 0.85 MG/DL (ref 0.76–1.27)
DEPRECATED RDW RBC AUTO: 41.8 FL (ref 37–54)
EGFRCR SERPLBLD CKD-EPI 2021: 99.5 ML/MIN/1.73
EOSINOPHIL # BLD AUTO: 0 10*3/MM3 (ref 0–0.4)
EOSINOPHIL NFR BLD AUTO: 0 % (ref 0.3–6.2)
ERYTHROCYTE [DISTWIDTH] IN BLOOD BY AUTOMATED COUNT: 12.8 % (ref 12.3–15.4)
GLUCOSE BLDC GLUCOMTR-MCNC: 149 MG/DL (ref 70–130)
GLUCOSE BLDC GLUCOMTR-MCNC: 176 MG/DL (ref 70–130)
GLUCOSE SERPL-MCNC: 182 MG/DL (ref 65–99)
HCT VFR BLD AUTO: 47.9 % (ref 37.5–51)
HGB BLD-MCNC: 16 G/DL (ref 13–17.7)
IMM GRANULOCYTES # BLD AUTO: 0.15 10*3/MM3 (ref 0–0.05)
IMM GRANULOCYTES NFR BLD AUTO: 1.4 % (ref 0–0.5)
LYMPHOCYTES # BLD AUTO: 1.21 10*3/MM3 (ref 0.7–3.1)
LYMPHOCYTES NFR BLD AUTO: 11 % (ref 19.6–45.3)
MCH RBC QN AUTO: 29.7 PG (ref 26.6–33)
MCHC RBC AUTO-ENTMCNC: 33.4 G/DL (ref 31.5–35.7)
MCV RBC AUTO: 89 FL (ref 79–97)
MONOCYTES # BLD AUTO: 0.68 10*3/MM3 (ref 0.1–0.9)
MONOCYTES NFR BLD AUTO: 6.2 % (ref 5–12)
NEUTROPHILS NFR BLD AUTO: 8.94 10*3/MM3 (ref 1.7–7)
NEUTROPHILS NFR BLD AUTO: 81.2 % (ref 42.7–76)
NRBC BLD AUTO-RTO: 0 /100 WBC (ref 0–0.2)
PLATELET # BLD AUTO: 148 10*3/MM3 (ref 140–450)
PMV BLD AUTO: 11.3 FL (ref 6–12)
POTASSIUM SERPL-SCNC: 4.2 MMOL/L (ref 3.5–5.2)
RBC # BLD AUTO: 5.38 10*6/MM3 (ref 4.14–5.8)
SODIUM SERPL-SCNC: 138 MMOL/L (ref 136–145)
WBC NRBC COR # BLD AUTO: 11 10*3/MM3 (ref 3.4–10.8)

## 2024-06-24 PROCEDURE — 76000 FLUOROSCOPY <1 HR PHYS/QHP: CPT

## 2024-06-24 PROCEDURE — 63710000001 DEXAMETHASONE PER 0.25 MG: Performed by: STUDENT IN AN ORGANIZED HEALTH CARE EDUCATION/TRAINING PROGRAM

## 2024-06-24 PROCEDURE — 63710000001 DEXAMETHASONE PER 0.25 MG: Performed by: SURGERY

## 2024-06-24 PROCEDURE — 25010000002 PROPOFOL 10 MG/ML EMULSION: Performed by: STUDENT IN AN ORGANIZED HEALTH CARE EDUCATION/TRAINING PROGRAM

## 2024-06-24 PROCEDURE — C1788 PORT, INDWELLING, IMP: HCPCS | Performed by: SURGERY

## 2024-06-24 PROCEDURE — 25010000002 PROPOFOL 200 MG/20ML EMULSION: Performed by: STUDENT IN AN ORGANIZED HEALTH CARE EDUCATION/TRAINING PROGRAM

## 2024-06-24 PROCEDURE — 25010000002 CEFAZOLIN PER 500 MG: Performed by: SURGERY

## 2024-06-24 PROCEDURE — 85025 COMPLETE CBC W/AUTO DIFF WBC: CPT | Performed by: NEUROLOGICAL SURGERY

## 2024-06-24 PROCEDURE — 02HV33Z INSERTION OF INFUSION DEVICE INTO SUPERIOR VENA CAVA, PERCUTANEOUS APPROACH: ICD-10-PCS | Performed by: SURGERY

## 2024-06-24 PROCEDURE — 77001 FLUOROGUIDE FOR VEIN DEVICE: CPT | Performed by: SURGERY

## 2024-06-24 PROCEDURE — 76937 US GUIDE VASCULAR ACCESS: CPT | Performed by: SURGERY

## 2024-06-24 PROCEDURE — 82948 REAGENT STRIP/BLOOD GLUCOSE: CPT

## 2024-06-24 PROCEDURE — 63710000001 INSULIN LISPRO (HUMAN) PER 5 UNITS: Performed by: SURGERY

## 2024-06-24 PROCEDURE — 25010000002 HYDROMORPHONE PER 4 MG: Performed by: STUDENT IN AN ORGANIZED HEALTH CARE EDUCATION/TRAINING PROGRAM

## 2024-06-24 PROCEDURE — 80048 BASIC METABOLIC PNL TOTAL CA: CPT | Performed by: STUDENT IN AN ORGANIZED HEALTH CARE EDUCATION/TRAINING PROGRAM

## 2024-06-24 PROCEDURE — 36561 INSERT TUNNELED CV CATH: CPT | Performed by: SURGERY

## 2024-06-24 PROCEDURE — B548ZZA ULTRASONOGRAPHY OF SUPERIOR VENA CAVA, GUIDANCE: ICD-10-PCS | Performed by: SURGERY

## 2024-06-24 PROCEDURE — 25010000002 ONDANSETRON PER 1 MG: Performed by: STUDENT IN AN ORGANIZED HEALTH CARE EDUCATION/TRAINING PROGRAM

## 2024-06-24 PROCEDURE — 25810000003 LACTATED RINGERS PER 1000 ML: Performed by: ANESTHESIOLOGY

## 2024-06-24 PROCEDURE — 0JH63XZ INSERTION OF TUNNELED VASCULAR ACCESS DEVICE INTO CHEST SUBCUTANEOUS TISSUE AND FASCIA, PERCUTANEOUS APPROACH: ICD-10-PCS | Performed by: SURGERY

## 2024-06-24 PROCEDURE — 25810000003 LACTATED RINGERS PER 1000 ML: Performed by: STUDENT IN AN ORGANIZED HEALTH CARE EDUCATION/TRAINING PROGRAM

## 2024-06-24 PROCEDURE — 25010000002 HEPARIN (PORCINE) PER 1000 UNITS: Performed by: SURGERY

## 2024-06-24 DEVICE — POWERPORT CLEARVUE ISP IMPLANTABLE PORT WITH ATTACHABLE 8F POLYURETHANE OPEN-ENDED SINGLE-LUMEN VENOUS CATHETER PROCEDURAL KIT
Type: IMPLANTABLE DEVICE | Site: INTERNAL JUGULAR | Status: FUNCTIONAL
Brand: POWERPORT CLEARVUE

## 2024-06-24 RX ORDER — SODIUM CHLORIDE, SODIUM LACTATE, POTASSIUM CHLORIDE, CALCIUM CHLORIDE 600; 310; 30; 20 MG/100ML; MG/100ML; MG/100ML; MG/100ML
9 INJECTION, SOLUTION INTRAVENOUS CONTINUOUS
Status: DISCONTINUED | OUTPATIENT
Start: 2024-06-24 | End: 2024-06-24 | Stop reason: HOSPADM

## 2024-06-24 RX ORDER — FAMOTIDINE 20 MG/1
20 TABLET, FILM COATED ORAL
Qty: 60 TABLET | Refills: 0 | Status: SHIPPED | OUTPATIENT
Start: 2024-06-24 | End: 2024-07-24

## 2024-06-24 RX ORDER — HYDROMORPHONE HYDROCHLORIDE 1 MG/ML
0.5 INJECTION, SOLUTION INTRAMUSCULAR; INTRAVENOUS; SUBCUTANEOUS
Status: DISCONTINUED | OUTPATIENT
Start: 2024-06-24 | End: 2024-06-24 | Stop reason: HOSPADM

## 2024-06-24 RX ORDER — LABETALOL HYDROCHLORIDE 5 MG/ML
5 INJECTION, SOLUTION INTRAVENOUS
Status: DISCONTINUED | OUTPATIENT
Start: 2024-06-24 | End: 2024-06-24 | Stop reason: HOSPADM

## 2024-06-24 RX ORDER — LIDOCAINE HYDROCHLORIDE 10 MG/ML
0.5 INJECTION, SOLUTION INFILTRATION; PERINEURAL ONCE AS NEEDED
Status: DISCONTINUED | OUTPATIENT
Start: 2024-06-24 | End: 2024-06-24 | Stop reason: HOSPADM

## 2024-06-24 RX ORDER — DROPERIDOL 2.5 MG/ML
0.62 INJECTION, SOLUTION INTRAMUSCULAR; INTRAVENOUS
Status: DISCONTINUED | OUTPATIENT
Start: 2024-06-24 | End: 2024-06-24 | Stop reason: HOSPADM

## 2024-06-24 RX ORDER — IPRATROPIUM BROMIDE AND ALBUTEROL SULFATE 2.5; .5 MG/3ML; MG/3ML
3 SOLUTION RESPIRATORY (INHALATION) ONCE AS NEEDED
Status: DISCONTINUED | OUTPATIENT
Start: 2024-06-24 | End: 2024-06-24 | Stop reason: HOSPADM

## 2024-06-24 RX ORDER — FENTANYL CITRATE 50 UG/ML
50 INJECTION, SOLUTION INTRAMUSCULAR; INTRAVENOUS ONCE AS NEEDED
Status: DISCONTINUED | OUTPATIENT
Start: 2024-06-24 | End: 2024-06-24 | Stop reason: HOSPADM

## 2024-06-24 RX ORDER — HYDRALAZINE HYDROCHLORIDE 20 MG/ML
5 INJECTION INTRAMUSCULAR; INTRAVENOUS
Status: DISCONTINUED | OUTPATIENT
Start: 2024-06-24 | End: 2024-06-24 | Stop reason: HOSPADM

## 2024-06-24 RX ORDER — OXYCODONE AND ACETAMINOPHEN 7.5; 325 MG/1; MG/1
1 TABLET ORAL EVERY 4 HOURS PRN
Status: DISCONTINUED | OUTPATIENT
Start: 2024-06-24 | End: 2024-06-24 | Stop reason: HOSPADM

## 2024-06-24 RX ORDER — PROMETHAZINE HYDROCHLORIDE 25 MG/1
25 SUPPOSITORY RECTAL ONCE AS NEEDED
Status: DISCONTINUED | OUTPATIENT
Start: 2024-06-24 | End: 2024-06-24 | Stop reason: HOSPADM

## 2024-06-24 RX ORDER — SODIUM CHLORIDE, SODIUM LACTATE, POTASSIUM CHLORIDE, CALCIUM CHLORIDE 600; 310; 30; 20 MG/100ML; MG/100ML; MG/100ML; MG/100ML
INJECTION, SOLUTION INTRAVENOUS CONTINUOUS PRN
Status: DISCONTINUED | OUTPATIENT
Start: 2024-06-24 | End: 2024-06-24 | Stop reason: SURG

## 2024-06-24 RX ORDER — DIPHENHYDRAMINE HYDROCHLORIDE 50 MG/ML
12.5 INJECTION INTRAMUSCULAR; INTRAVENOUS
Status: DISCONTINUED | OUTPATIENT
Start: 2024-06-24 | End: 2024-06-24 | Stop reason: HOSPADM

## 2024-06-24 RX ORDER — PROPOFOL 10 MG/ML
INJECTION, EMULSION INTRAVENOUS AS NEEDED
Status: DISCONTINUED | OUTPATIENT
Start: 2024-06-24 | End: 2024-06-24 | Stop reason: SURG

## 2024-06-24 RX ORDER — MIDAZOLAM HYDROCHLORIDE 1 MG/ML
1 INJECTION INTRAMUSCULAR; INTRAVENOUS
Status: DISCONTINUED | OUTPATIENT
Start: 2024-06-24 | End: 2024-06-24 | Stop reason: HOSPADM

## 2024-06-24 RX ORDER — FLUMAZENIL 0.1 MG/ML
0.2 INJECTION INTRAVENOUS AS NEEDED
Status: DISCONTINUED | OUTPATIENT
Start: 2024-06-24 | End: 2024-06-24 | Stop reason: HOSPADM

## 2024-06-24 RX ORDER — SODIUM CHLORIDE 0.9 % (FLUSH) 0.9 %
3-10 SYRINGE (ML) INJECTION AS NEEDED
Status: DISCONTINUED | OUTPATIENT
Start: 2024-06-24 | End: 2024-06-24 | Stop reason: HOSPADM

## 2024-06-24 RX ORDER — SODIUM CHLORIDE 0.9 % (FLUSH) 0.9 %
3 SYRINGE (ML) INJECTION EVERY 12 HOURS SCHEDULED
Status: DISCONTINUED | OUTPATIENT
Start: 2024-06-24 | End: 2024-06-24 | Stop reason: HOSPADM

## 2024-06-24 RX ORDER — ONDANSETRON 2 MG/ML
4 INJECTION INTRAMUSCULAR; INTRAVENOUS ONCE AS NEEDED
Status: COMPLETED | OUTPATIENT
Start: 2024-06-24 | End: 2024-06-24

## 2024-06-24 RX ORDER — INSULIN ASPART 100 [IU]/ML
5 INJECTION, SOLUTION INTRAVENOUS; SUBCUTANEOUS
Qty: 15 ML | Refills: 1 | Status: SHIPPED | OUTPATIENT
Start: 2024-06-24

## 2024-06-24 RX ORDER — SULFAMETHOXAZOLE AND TRIMETHOPRIM 800; 160 MG/1; MG/1
1 TABLET ORAL 3 TIMES WEEKLY
Qty: 13 TABLET | Refills: 0 | Status: SHIPPED | OUTPATIENT
Start: 2024-06-24 | End: 2024-07-24

## 2024-06-24 RX ORDER — EPHEDRINE SULFATE 50 MG/ML
5 INJECTION, SOLUTION INTRAVENOUS ONCE AS NEEDED
Status: DISCONTINUED | OUTPATIENT
Start: 2024-06-24 | End: 2024-06-24 | Stop reason: HOSPADM

## 2024-06-24 RX ORDER — PROMETHAZINE HYDROCHLORIDE 25 MG/1
25 TABLET ORAL ONCE AS NEEDED
Status: DISCONTINUED | OUTPATIENT
Start: 2024-06-24 | End: 2024-06-24 | Stop reason: HOSPADM

## 2024-06-24 RX ORDER — LEVETIRACETAM 500 MG/1
500 TABLET ORAL EVERY 12 HOURS SCHEDULED
Qty: 60 TABLET | Refills: 0 | Status: SHIPPED | OUTPATIENT
Start: 2024-06-24 | End: 2024-07-24

## 2024-06-24 RX ORDER — INSULIN DEGLUDEC 100 U/ML
25 INJECTION, SOLUTION SUBCUTANEOUS DAILY
Qty: 15 ML | Refills: 1 | Status: SHIPPED | OUTPATIENT
Start: 2024-06-24

## 2024-06-24 RX ORDER — FAMOTIDINE 10 MG/ML
20 INJECTION, SOLUTION INTRAVENOUS ONCE
Status: COMPLETED | OUTPATIENT
Start: 2024-06-24 | End: 2024-06-24

## 2024-06-24 RX ORDER — DEXAMETHASONE 6 MG/1
6 TABLET ORAL 2 TIMES DAILY WITH MEALS
Qty: 60 TABLET | Refills: 0 | Status: SHIPPED | OUTPATIENT
Start: 2024-06-24 | End: 2024-07-24

## 2024-06-24 RX ORDER — HYDROCODONE BITARTRATE AND ACETAMINOPHEN 5; 325 MG/1; MG/1
1 TABLET ORAL ONCE AS NEEDED
Status: COMPLETED | OUTPATIENT
Start: 2024-06-24 | End: 2024-06-24

## 2024-06-24 RX ORDER — LIDOCAINE HYDROCHLORIDE 20 MG/ML
INJECTION, SOLUTION EPIDURAL; INFILTRATION; INTRACAUDAL; PERINEURAL AS NEEDED
Status: DISCONTINUED | OUTPATIENT
Start: 2024-06-24 | End: 2024-06-24 | Stop reason: SURG

## 2024-06-24 RX ORDER — FENTANYL CITRATE 50 UG/ML
50 INJECTION, SOLUTION INTRAMUSCULAR; INTRAVENOUS
Status: DISCONTINUED | OUTPATIENT
Start: 2024-06-24 | End: 2024-06-24 | Stop reason: HOSPADM

## 2024-06-24 RX ORDER — NALOXONE HCL 0.4 MG/ML
0.2 VIAL (ML) INJECTION AS NEEDED
Status: DISCONTINUED | OUTPATIENT
Start: 2024-06-24 | End: 2024-06-24 | Stop reason: HOSPADM

## 2024-06-24 RX ORDER — BUPIVACAINE HYDROCHLORIDE AND EPINEPHRINE 5; 5 MG/ML; UG/ML
INJECTION, SOLUTION EPIDURAL; INTRACAUDAL; PERINEURAL AS NEEDED
Status: DISCONTINUED | OUTPATIENT
Start: 2024-06-24 | End: 2024-06-24 | Stop reason: HOSPADM

## 2024-06-24 RX ADMIN — SODIUM CHLORIDE 2000 MG: 900 INJECTION INTRAVENOUS at 10:11

## 2024-06-24 RX ADMIN — FAMOTIDINE 20 MG: 10 INJECTION INTRAVENOUS at 08:55

## 2024-06-24 RX ADMIN — DEXAMETHASONE 4 MG: 4 TABLET ORAL at 13:23

## 2024-06-24 RX ADMIN — SULFAMETHOXAZOLE AND TRIMETHOPRIM 1 TABLET: 800; 160 TABLET ORAL at 13:23

## 2024-06-24 RX ADMIN — SODIUM BICARBONATE 650 MG: 650 TABLET, ORALLY DISINTEGRATING ORAL at 13:23

## 2024-06-24 RX ADMIN — ONDANSETRON 4 MG: 2 INJECTION INTRAMUSCULAR; INTRAVENOUS at 11:26

## 2024-06-24 RX ADMIN — LIDOCAINE HYDROCHLORIDE 60 MG: 20 INJECTION, SOLUTION EPIDURAL; INFILTRATION; INTRACAUDAL; PERINEURAL at 10:19

## 2024-06-24 RX ADMIN — PROPOFOL 100 MCG/KG/MIN: 10 INJECTION, EMULSION INTRAVENOUS at 10:19

## 2024-06-24 RX ADMIN — SODIUM CHLORIDE, SODIUM LACTATE, POTASSIUM CHLORIDE, AND CALCIUM CHLORIDE: 600; 310; 30; 20 INJECTION, SOLUTION INTRAVENOUS at 10:16

## 2024-06-24 RX ADMIN — PROPOFOL 100 MG: 10 INJECTION, EMULSION INTRAVENOUS at 10:19

## 2024-06-24 RX ADMIN — INSULIN LISPRO 10 UNITS: 100 INJECTION, SOLUTION INTRAVENOUS; SUBCUTANEOUS at 13:24

## 2024-06-24 RX ADMIN — HYDROMORPHONE HYDROCHLORIDE 0.5 MG: 1 INJECTION, SOLUTION INTRAMUSCULAR; INTRAVENOUS; SUBCUTANEOUS at 11:25

## 2024-06-24 RX ADMIN — SODIUM CHLORIDE, POTASSIUM CHLORIDE, SODIUM LACTATE AND CALCIUM CHLORIDE 9 ML/HR: 600; 310; 30; 20 INJECTION, SOLUTION INTRAVENOUS at 08:54

## 2024-06-24 RX ADMIN — LEVETIRACETAM 500 MG: 500 TABLET, FILM COATED ORAL at 13:23

## 2024-06-24 RX ADMIN — HYDROCODONE BITARTRATE AND ACETAMINOPHEN 1 TABLET: 5; 325 TABLET ORAL at 11:26

## 2024-06-24 RX ADMIN — LOSARTAN POTASSIUM 100 MG: 100 TABLET, FILM COATED ORAL at 13:24

## 2024-06-24 RX ADMIN — HYDROCHLOROTHIAZIDE 12.5 MG: 12.5 TABLET ORAL at 13:22

## 2024-06-24 RX ADMIN — LOSARTAN POTASSIUM 100 MG: 100 TABLET, FILM COATED ORAL at 13:22

## 2024-06-24 RX ADMIN — DEXAMETHASONE 4 MG: 4 TABLET ORAL at 06:34

## 2024-06-24 NOTE — OP NOTE
PREOPERATIVE DIAGNOSIS:  Need for long-term IV access    POSTOPERATIVE DIAGNOSIS AND FINDINGS:  same    PROCEDURE:  Insertion of tunneled central venous catheter with subcutaneous port, CPT 16085  Ultrasound guided access of right internal jugular vein, CPT 33960  Live interpretation of intraoperative fluoroscopy, CPT 88270    DATE OF PROCEDURE:   06/24/24    SURGEON:  Linda Coates MD    ASSISTANT:  None    ANESTHESIA:  MAC    EBL:  Minimal    SPECIMEN:  none    DESCRIPTION:  All risks, benefits, and alternatives were explained and informed consent was obtained. The patient was taken to the operating room, transferred onto the operating room table in the supine position, underwent monitored anesthesia, and was prepped and draped in the usual sterile manner.  Half percent marcaine with epinephrine was administered.  The right internal jugular vein was accessed with an 18-gauge needle under ultrasound guidance, and a guidewire was inserted under fluoroscopic guidance into the superior vena cava.  A subcutaneous pocket was then created with electrocautery on the right chest wall. The tubing was attached to the port. The port was placed in the pocket and secured in two points with 2-0 prolene. The tubing was then tunneled from the subcutaneous pocket to the location of the wire in the neck. The appropriate length of the catheter was measured using fluoroscopy. The vein dilator and sheath were introduced, and the dilator and guidewire were removed.  The port tubing was inserted to the cavoatrial junction, and position of tip was confirmed with fluoroscopic guidance. Venous blood was easily aspirated from the port, and the port was flushed with heparinized saline. There was good hemostasis noted. The skin was then closed with 3-0 Vicryl deep dermal and 4-0 Monocryl subcuticular sutures. Dermabond was placed over the wound. The patient tolerated the procedure well, and was transferred to the recovery area in stable  condition. Recovery room CXR was ordered to confirm placement.    PETR JONES MD  General, Robotic, and Endoscopic Surgery  [facility], [place], operated by [facility]    [address], [address]  [place], [place], [address]  P: [phone]  F: [phone]

## 2024-06-24 NOTE — CONSULTS
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  Friend at bedside.]   Assessment Topics    Taking Medication - Assessment Needs education   Problem Solving - Assessment Needs education   Reducing Risk - Assessment Needs education   Monitoring - Assessment Needs education   DM Goals    Taking Medication - Goal 0-7 days from discharge   Problem Solving - Goal 0-30 days from discharge   Reducing Risk - Goal 30-90 days from discharge   Monitoring - Goal 0-7 days from discharge   Contact Plan Follow-up medical care       Flowsheet Row Most Recent Value   DM Education Needs    Meter Meter provided   Meter Type Contour  [Sample glucometer with 10 test strips/lancets provided.   Discussed cost saving options as no current insurance coverage.]   Frequency of Testing AC meals   Blood Glucose Target Range  mg/dl premeal   Medication Insulin, Actions, Side effects, Administration, Pen  [Tresiba Flextouch and Novolog Flexpen with 4 mm 32 G Anne pen needles.  [facility] pharmacist applied savings card to bring to $35/month for both boxes.]   Problem Solving Hypoglycemia, Signs, Symptoms, Treatment   Reducing Risks A1C testing  [A1c 6.6%]   Healthy Coping Appropriate   Discharge Plan Home, Follow-up with PCP   Motivation Engaged   Teaching Method Explanation, Discussion, Demonstration, Handouts   Patient Response Verbalized understanding       Pt declined return demonstration at this time.  Pt going " home with friend at bedside that is familiar with BGM and insulin administration.  Plan is for friend to initially assist patient with patient taking over his care.      Electronically signed by:  Carmela Junior RN, [facility]  06/24/24 15:07 EDT

## 2024-06-24 NOTE — ANESTHESIA POSTPROCEDURE EVALUATION
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        Post Anesthesia Care and Evaluation    Patient location during evaluation: PACU  Patient participation: complete - patient participated  Level of consciousness: awake  Pain management: adequate    Airway patency: patent  Anesthetic complications: No anesthetic complications    Cardiovascular status: acceptable  Respiratory status: acceptable  Hydration status: acceptable    Comments: /91 (BP Location: Left arm, Patient Position: Lying)   Pulse 55   Temp 36.6 °C (97.9 °F) (Oral)   Resp 18   Ht 177.8 cm (70\")   Wt 104 kg (230 lb)   SpO2 94%   BMI 33.00 kg/m²     "

## 2024-06-24 NOTE — OUTREACH NOTE
Prep Survey      Flowsheet Row Responses   [facility] patient discharged from? [facility]   Is LACE score < 7 ? 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 Other   Prep survey completed? Yes            Lalitha JACOBSON - Registered Nurse

## 2024-06-24 NOTE — PLAN OF CARE
Problem: Adult Inpatient Plan of Care  Goal: Absence of Hospital-Acquired Illness or Injury  Intervention: Identify and Manage Fall Risk  Recent Flowsheet Documentation  Taken 6/24/2024 1010 by Kaylee Pa RN  Safety Promotion/Fall Prevention: patient off unit  Taken 6/24/2024 0739 by Kaylee Pa RN  Safety Promotion/Fall Prevention:   safety round/check completed   nonskid shoes/slippers when out of bed   fall prevention program maintained   clutter free environment maintained   assistive device/personal items within reach  Intervention: Prevent Skin Injury  Recent Flowsheet Documentation  Taken 6/24/2024 0739 by Kaylee Pa RN  Body Position: position changed independently  Skin Protection:   adhesive use limited   transparent dressing maintained  Intervention: Prevent and Manage VTE (Venous Thromboembolism) Risk  Recent Flowsheet Documentation  Taken 6/24/2024 0739 by Kaylee Pa RN  Activity Management: activity encouraged  VTE Prevention/Management:   bilateral   sequential compression devices on  Range of Motion: active ROM (range of motion) encouraged  Intervention: Prevent Infection  Recent Flowsheet Documentation  Taken 6/24/2024 0739 by Kaylee Pa RN  Infection Prevention:   single patient room provided   hand hygiene promoted  Goal: Optimal Comfort and Wellbeing  Intervention: Provide Person-Centered Care  Recent Flowsheet Documentation  Taken 6/24/2024 0739 by Kaylee Pa RN  Trust Relationship/Rapport:   care explained   thoughts/feelings acknowledged   questions answered     Problem: Fall Injury Risk  Goal: Absence of Fall and Fall-Related Injury  Intervention: Identify and Manage Contributors  Recent Flowsheet Documentation  Taken 6/24/2024 0739 by Kaylee Pa RN  Medication Review/Management: medications reviewed  Intervention: Promote Injury-Free Environment  Recent Flowsheet Documentation  Taken 6/24/2024 1010 by Kaylee Pa RN  Safety Promotion/Fall Prevention: patient off unit  Taken  6/24/2024 0739 by Kaylee Pa RN  Safety Promotion/Fall Prevention:   safety round/check completed   nonskid shoes/slippers when out of bed   fall prevention program maintained   clutter free environment maintained   assistive device/personal items within reach     Problem: Coping Ineffective (Oncology Care)  Goal: Effective Coping  Intervention: Support and Enhance Coping Strategies  Recent Flowsheet Documentation  Taken 6/24/2024 0739 by Kaylee Pa RN  Supportive Measures: active listening utilized  Family/Support System Care: support provided     Problem: Fatigue (Oncology Care)  Goal: Improved Activity Tolerance  Intervention: Promote Improved Energy  Recent Flowsheet Documentation  Taken 6/24/2024 0739 by Kaylee Pa RN  Activity Management: activity encouraged     Problem: Pain Acute (Oncology Care)  Goal: Optimal Pain Control  Intervention: Prevent or Manage Pain  Recent Flowsheet Documentation  Taken 6/24/2024 0739 by Kaylee Pa RN  Sensory Stimulation Regulation:   care clustered   lighting decreased  Medication Review/Management: medications reviewed  Intervention: Optimize Psychosocial Wellbeing  Recent Flowsheet Documentation  Taken 6/24/2024 0739 by Kaylee Pa RN  Supportive Measures: active listening utilized  Diversional Activities:   television   smartphone     Problem: Skin Injury Risk Increased  Goal: Skin Health and Integrity  Intervention: Optimize Skin Protection  Recent Flowsheet Documentation  Taken 6/24/2024 0739 by Kaylee Pa RN  Pressure Reduction Techniques: frequent weight shift encouraged  Head of Bed (HOB) Positioning: HOB elevated  Pressure Reduction Devices: specialty bed utilized  Skin Protection:   adhesive use limited   transparent dressing maintained   Goal Outcome Evaluation:   VSS throughout shift, PERRLA, no acute changes noted. Pt now has a mediport in rt chest for out pt tx r/t brain tumor. Rt chest incision intact, glued, c/ no drainage noted at this time. Pt is  estella to d/c to home today. Med list reviwed c/ pt and copy will be sent c/ pt, along c/ info and wrist band for mediport.

## 2024-06-24 NOTE — ANESTHESIA PREPROCEDURE EVALUATION
Anesthesia Evaluation     no history of anesthetic complications:                Airway   Mallampati: II  TM distance: >3 FB  Neck ROM: full  Dental - normal exam     Pulmonary    (+) lung cancer,sleep apnea  (-) asthma, recent URI, not a smoker  Cardiovascular     (+) hypertension, hyperlipidemia  (-) dysrhythmias, angina      Neuro/Psych  (+) dizziness/light headedness, syncope  GI/Hepatic/Renal/Endo    (+) morbid obesity, hepatitis C    Musculoskeletal     Abdominal    Substance History      OB/GYN          Other                    Anesthesia Plan    ASA 3     MAC     intravenous induction     Anesthetic plan, risks, benefits, and alternatives have been provided, discussed and informed consent has been obtained with: patient.    CODE STATUS:    Code Status (Patient has no pulse and is not breathing): CPR (Attempt to Resuscitate)  Medical Interventions (Patient has pulse or is breathing): Full Support

## 2024-06-24 NOTE — TELEPHONE ENCOUNTER
TRIED CALLING PATIENT'S NURSE ON [facility] TO INFORM OF PATIENTS PO APPT WITH DR. THOMAS ON 7/2/2024 @ 3:30     ----- Message from Rosendo Thomas sent at 6/22/2024 11:49 AM EDT -----  Patient needs to be seen in the office in about 2 weeks.

## 2024-06-24 NOTE — DISCHARGE INSTRUCTIONS
Dr. Linda Coates  [address]  [place], [place] [id number] [phone]    Discharge Instructions for Port Placement    Go home, rest and take it easy today; however, you should get up and move about several times today to reduce the risk of developing a clot in your legs.      You may experience some dizziness or memory loss from the anesthesia.  This may last for the next 24 hours.  Someone should plan on staying with you for the first 24 hours for your safety.    Do not make any important legal decisions or sign any legal papers for the next 24 hours.      Eat and drink lightly today.  Start off with liquids, jello, soup, crackers or other bland foods at first. You may advance your diet tomorrow as tolerated as long as you do not experience any nausea or vomiting.     If dressings are in place, you may remove your outer dressings in 3-4 days or until your first treatment whichever comes first. If skin glue was used, leave it in place until it falls off. This usually falls off in 1-2 weeks.  Do not worry if it comes off sooner.     You may notice some bleeding/drainage on your outer dressings. A little bloody drainage is normal. If the bleeding/drainage is such that the bandage cannot absorb it, remove the dressing, apply clean gauze and apply firm pressure for a full 15 minutes.  If the bleeding continues, please call me.    You may shower tomorrow.  No tub baths until your incisions are completely healed.    You will have some pain postoperatively. You can take tylenol and ibuprofen to manage this pain.    No driving for 24 hours.    If the port is to be used within 10-14 days of placement, no surgical follow-up is needed.  Otherwise, you should call the office at [phone] to schedule a follow-up in about 1 week.      Remember to contact me for any of the following:  Fever> 101 degrees  Severe pain that cannot be controlled by taking your pain pills  Severe nausea or vomiting that cannot be  controlled by taking anti nausea medicine  Significant bleeding from your incision  Drainage that has a bad smell or is yellow or green in appearance  Any other questions or concerns

## 2024-06-24 NOTE — DISCHARGE SUMMARY
Patient Name: Iain Christina  : 1965  MRN: [id number]    Date of Admission: 6/15/2024  Date of Discharge:  2024  Primary Care Physician: Scott Medina MD      Chief Complaint:   No chief complaint on file.      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        Hospital Course     Mr. Christina is a 58 y.o. male with a history of hypertension, MARCIANO presenting with fall found to have brain mass. Neurosurgery consulted and patient underwent a right frontal craniotomy with evacuation of right basal bleeding with mass and pathology concerning for lymphoma.  Medical and radiation oncology evaluated patient.  Plan to continue dexamethasone on discharge as well as Pepcid for GI prophylaxis and Bactrim 3 times a week for PJP prophylaxis.  Plan for outpatient whole-body PET scan and follow-up in 10 to 14 days in the oncology clinic.  Have sent a referral for ophthalmology evaluation.  Surgery was consulted and placed a Mediport on .  Patient tolerated procedure well.    Patient has prediabetes but had steroid-induced hyperglycemia.  Patient to go home on insulin until he does not need steroids anymore.  With decrease of steroids at time of discharge plan to send home on Tresiba 25 units and NovoLog 5 units with meals.  Have discussed with diabetic nurse educator and patient is to get his glucometer/test strips/lancets at [facility] as he will be with the most affordable with his  insurance.    At the time of discharge patient was told to take all medications as prescribed, keep all follow-up appointments, and call their doctor or return to the hospital with any worsening or concerning symptoms.    Day of Discharge     Subjective:  Patient resting comfortably in bed.  Discussed medication changes.  Patient tolerated procedure well.  Eager to leave the hospital today.    Review of Systems   Constitutional:  Negative for chills and fever.   Respiratory:  Negative for cough and shortness of breath.    Cardiovascular:  Negative for chest pain and palpitations.   Gastrointestinal:  Negative for abdominal pain, diarrhea, nausea and vomiting.       Physical Exam:  Temp:  [97.5 °F (36.4 °C)-98.2 °F (36.8 °C)] 97.9 °F (36.6 °C)  Heart Rate:  [55-74] 55  Resp:  [16-20] 20  BP: (116-142)/(72-91) 142/86  Body mass index is 33 kg/m².  Physical Exam  Vitals and nursing note reviewed.   Constitutional:       General: He is not in acute distress.  Cardiovascular:      Rate and Rhythm: Normal rate and regular rhythm.   Pulmonary:      Effort: Pulmonary effort is normal. No respiratory distress.   Abdominal:      General: Abdomen is flat. There is no distension.      Tenderness: There is no abdominal tenderness.   Musculoskeletal:         General: No swelling or deformity.   Skin:     General: Skin is warm and dry.   Neurological:      General: No focal deficit present.      Mental Status: He is alert. Mental status is at baseline.         Consultants     Consult Orders (all) (From admission, onward)       Start     Ordered    06/23/24 0821  Inpatient Diabetes Educator Consult  Once        Comments: Prediabetes now on long term steroids for cancer, new significant insulin needs, not on insulin prior to admission   Provider:  (Not yet assigned)    06/23/24 0821    06/21/24 1821  Inpatient General Surgery Consult  Once        Specialty:  General Surgery  Provider:  Shirley Hurst MD    06/21/24 1821     06/21/24 1817  Inpatient Case Management  Consult  Once        Provider:  (Not yet assigned)    06/21/24 1816    06/21/24 0921  Hematology & Oncology Inpatient Consult  Once        Specialty:  Hematology and Oncology  Provider:  Yves Shah MD    06/21/24 0920    06/21/24 0920  Inpatient Radiation Oncology Consult  Once        Specialty:  Radiation Oncology  Provider:  Charlie Fuentes MD    06/21/24 0920 06/19/24 2005  Inpatient Pulmonology Consult  STAT        Comments: Spoke with Aure at the answering service @2005 on 6/19. Janet H WALKER   Specialty:  Pulmonary Disease  Provider:  Dave Vargas MD    06/19/24 2011 06/19/24 1956  Inpatient Pulmonology Consult  STAT,   Status:  Canceled        Specialty:  Pulmonary Disease  Provider:  (Not yet assigned)    06/19/24 1958    06/15/24 1502  Inpatient Neurosurgery Consult  Once        Specialty:  Neurosurgery  Provider:  German Young IV, MD    06/15/24 1502                  Procedures     Imaging Results (All)       Procedure Component Value Units Date/Time    XR Chest Post CVA Port [[id number]] Collected: 06/24/24 1125     Updated: 06/24/24 1129    Narrative:      XR CHEST POST CVA PORT-     Clinical: Mediport placement     FINDINGS: Mediport reservoir superimposes the right chest wall, tubing  tip superimposes the projected area of the superior vena cava, no  pneumothorax.     Cardiac size within normal limits. Calcified left hilar lymph nodes as  before. No pleural effusion, vascular congestion or acute airspace  disease has developed. The remainder is unremarkable.     This report was finalized on 6/24/2024 11:26 AM by Dr. Jairon Henson M.D on Workstation: [id number]       FL C Arm During Surgery [[id number]] Resulted: 06/24/24 1058     Updated: 06/24/24 1058    Narrative:      This procedure was auto-finalized with no dictation required.    MRI Brain With & Without Contrast [[id number]] Collected: 06/21/24 0016     Updated:  06/21/24 0016    Narrative:        Patient: HARMEET STANTON  Time Out: 00:16  Exam(s): MRI HEAD W WO Contrast     EXAM:    MR Head Without and With Intravenous Contrast    CLINICAL HISTORY:     Reason for exam: post-op.    TECHNIQUE:    Magnetic resonance images of the head brain without and with   intravenous contrast in multiple planes.    COMPARISON:  Comparison made to prior brain MRI from [facility] 224.    FINDINGS:    Brain: Status post biopsy of heterogeneously enhancing right frontal   lobe tumor.  There is extensive vasogenic edema around the tumor with   moderate regional mass-effect causing 12 mm of midline shift.  The flow   voids at the base of the brain are intact.  No evidence of acute ischemic   injury.  The dural venous sinuses are patent.    Ventricles:  Unremarkable.  No ventriculomegaly.    Bones joints: Status post right frontal craniotomy.  No acute fracture.    Sinuses: Chronic obstructive right maxillary sinusitis.  Chronic   ethmoid sinusitis.  No acute sinusitis.    Mastoid air cells:  Unremarkable as visualized.  No mastoid effusion.    Orbits:  Unremarkable as visualized.    IMPRESSION:       Status post biopsy of right frontal lobe tumor with expected postsurgical   changes.      Impression:          Electronically signed by Lavern Armstrong MD on 06-21-24 at 0016    CT Head Without Contrast [[id number]] Collected: 06/19/24 1755     Updated: 06/20/24 1913    Narrative:      CT OF THE BRAIN WITHOUT CONTRAST 06/19/2024     HISTORY: Postop craniotomy.     TECHNIQUE: Axial images were obtained through the brain without  intravenous contrast and reconstructed in 1 mm intervals. Previous CT of  the brain dated 06/18/2024 is compared.     FINDINGS: There is a right superior frontal craniotomy. Small caliber  catheter is seen in the scalp superficial to the craniotomy site. There  is small amount of extra-axial air in the superior right frontal region.  Tiny air bubble is seen in the right frontal  lobe on image 139. There is  a small amount of air in the right cavernous sinus and in the right  orbit with the intraorbital air possibly intravenous as it has a  somewhat linear configuration. The intraorbital contents otherwise  appear unremarkable.     The right frontal lobe mass is again seen measuring approximately 4.0 cm  by approximately 3.1 cm on image 111 and containing a few small cystic  areas. Moderately large amount of vasogenic edema is seen in the right  frontal lobe. Small amount of hemorrhage is seen within the mass.     A few tiny areas of hyperdense hemorrhage are seen in the right frontal  lobe such as on axial images 126 through 131. There is mass effect on  the right lateral ventricle with mild midline shift to the left.       Impression:      1. Postoperative changes from right superior frontal craniotomy with  intracranial air and tiny amount of hemorrhage as described. Please see  full discussion above.        Radiation dose reduction techniques were utilized, including automated  exposure control and exposure modulation based on body size.        This report was finalized on 6/20/2024 7:10 PM by Dr. Akash Butcher M.D on Workstation: [id number]       CT Head Without Contrast [[id number]] Collected: 06/18/24 0828     Updated: 06/19/24 0004    Narrative:      CT OF THE BRAIN WITHOUT CONTRAST 06/18/2024     HISTORY: Right frontal brain lesion. Headache. Stealth protocol.     Axial images were obtained through the brain without intravenous  contrast and reconstructed in 1 mm intervals. There is an approximately  3.2 cm x 4.5 cm right frontal lobe mass which abuts the frontal horn of  the right lateral ventricle. There is mild mass effect on the right  lateral ventricle with mild-to-moderate midline shift to the left.  Moderately large amount of vasogenic edema is seen in the right frontal  lobe. Overall the brain appears unchanged from the 06/15/2024 study. No  intracranial hemorrhage is  seen.       Impression:      1. Moderately large right frontal brain mass demonstrating some small  areas of internal cystic change and moderately large amount of  surrounding edema. There is midline shift to the left.  2. Overall the brain appears largely unchanged from the 06/15/2024  study.  3. No intracranial hemorrhage is seen.     Radiation dose reduction techniques were utilized, including automated  exposure control and exposure modulation based on body size.        This report was finalized on 6/19/2024 12:01 AM by Dr. Akash Butcher M.D on Workstation: [id number]       CT Chest With Contrast Diagnostic [[id number]] Collected: 06/16/24 1853     Updated: 06/16/24 1913    Narrative:      CT CHEST, ABDOMEN, AND PELVIS WITH IV CONTRAST     HISTORY: Elevated LFTs, evaluate for mass     TECHNIQUE: Radiation dose reduction techniques were utilized, including  automated exposure control and exposure modulation based on body size.   3 mm images were obtained through the chest, abdomen, and pelvis with IV  contrast.      COMPARISON: None     FINDINGS:      Chest:      No hilar, mediastinal or axillary adenopathy by size criteria. Trace  pericardial effusion. No pulmonary consolidation, pleural effusion or  pneumothorax.     Abdomen and pelvis:      There is colonic diverticulosis. The appendix is unremarkable. The  liver, gallbladder, pancreas, spleen, glands and kidneys have an  unremarkable postcontrast CT appearance. No hydronephrosis. Borderline  enlarged carley hepatic nodes measuring up to 1.2 cm in short axis  dimension are present. No free intraperitoneal air is seen.     Bone windows: No suspicious lytic or blastic osseous lesions.       Impression:      Impression:  1.  Borderline enlarged carley hepatis nodes. Given patient history of  brain mass, findings are indeterminant and continued close interval  follow-up with CT abdomen and 3 months is recommended to ensure  stability and exclude possibility  of metastatic disease.  2.  Other findings as above           This report was finalized on 6/16/2024 7:10 PM by Dr. Kingsley Salazar M.D  on Workstation: [id number]       CT Abdomen Pelvis With & Without Contrast [[id number]] Collected: 06/16/24 1853     Updated: 06/16/24 1913    Narrative:      CT CHEST, ABDOMEN, AND PELVIS WITH IV CONTRAST     HISTORY: Elevated LFTs, evaluate for mass     TECHNIQUE: Radiation dose reduction techniques were utilized, including  automated exposure control and exposure modulation based on body size.   3 mm images were obtained through the chest, abdomen, and pelvis with IV  contrast.      COMPARISON: None     FINDINGS:      Chest:      No hilar, mediastinal or axillary adenopathy by size criteria. Trace  pericardial effusion. No pulmonary consolidation, pleural effusion or  pneumothorax.     Abdomen and pelvis:      There is colonic diverticulosis. The appendix is unremarkable. The  liver, gallbladder, pancreas, spleen, glands and kidneys have an  unremarkable postcontrast CT appearance. No hydronephrosis. Borderline  enlarged carley hepatic nodes measuring up to 1.2 cm in short axis  dimension are present. No free intraperitoneal air is seen.     Bone windows: No suspicious lytic or blastic osseous lesions.       Impression:      Impression:  1.  Borderline enlarged carley hepatis nodes. Given patient history of  brain mass, findings are indeterminant and continued close interval  follow-up with CT abdomen and 3 months is recommended to ensure  stability and exclude possibility of metastatic disease.  2.  Other findings as above           This report was finalized on 6/16/2024 7:10 PM by Dr. Kingsley Salazar M.D  on Workstation: [id number]       MRI Brain With & Without Contrast [[id number]] Collected: 06/16/24 0145     Updated: 06/16/24 0145    Narrative:        Patient: HARMEET STANTON  Time Out: 01:45  Exam(s): MRI HEAD W WO Contrast     EXAM:  MR Head Without and With Intravenous  Contrast    CLINICAL HISTORY:  Reason for exam: brain mass.    TECHNIQUE:  Magnetic resonance images of the head brain without and with intravenous   contrast in multiple planes.    COMPARISON:  CT head 6 15 24    FINDINGS:  Enhancing 4.0 x 4.0 x 4.0 cm intra-axial mass in the deep white matter of   the right frontal lobe, demonstrating internal regions of cystic change.    Foci of susceptibility artifact are compatible with intratumoral   microhemorrhage.  There is extensive surrounding vasogenic edema which   extends across the midline at the corpus callosum.  Mass-effect results   in effacement of the frontal horn of the right lateral ventricle, right   frontal lobe sulcal effacement, and leftward midline shift within the   anterior cranial fossa measuring 14 mm.    No additional enhancing brain lesions are identified.    There is no diffusion restriction to suggest acute cerebral ischemia.    There is no abnormal extra-axial fluid collection.  There is no   hydrocephalus.  Basal cisterns are patent.  Intracranial flow-voids   appear normal as visualized.    There is mucosal thickening in the maxillary sinuses and ethmoid air   cells.  Mastoid air cells are clear.    There is a chronic left lamina papyracea fracture.    IMPRESSION:     1.  Enhancing intra-axial mass within the deep white matter of the right   frontal lobe measuring 4 x 4 x 4.  There are multiple small foci of   intratumoral hemorrhage.  2.  Surrounding right frontal lobe vasogenic edema extending across the   midline along the anterior corpus callosum.  3.  Mass-effect results in right frontal sulcal effacement, effacement of   the frontal horn of the right lateral ventricle, and 14 mm leftward shift   within the anterior cranial fossa.  4.  No additional enhancing brain lesions.  5.  No infarct.      Impression:          Electronically signed by Tania Miller M.D. on 06-16-24 at 0145            Pertinent Labs     Results from last 7 days   Lab  "Units 06/24/24  0526 06/23/24  0344 06/22/24  0439 06/21/24  0334   WBC 10*3/mm3 11.00* 13.39* 12.14* 11.28*   HEMOGLOBIN g/dL 16.0 15.9 16.1 16.1   PLATELETS 10*3/mm3 148 163 187 154     Results from last 7 days   Lab Units 06/24/24  0526 06/23/24  0345 06/21/24  0334 06/20/24  0336   SODIUM mmol/L 138 140 137 140   POTASSIUM mmol/L 4.2 3.9 3.8 4.1   CHLORIDE mmol/L 105 108* 105 110*   CO2 mmol/L 23.1 20.7* 20.8* 21.1*   BUN mg/dL 27* 30* 24* 27*   CREATININE mg/dL 0.85 0.80 0.80 0.84   GLUCOSE mg/dL 182* 171* 206* 227*   Estimated Creatinine Clearance: 111.6 mL/min (by C-G formula based on SCr of 0.85 mg/dL).  Results from last 7 days   Lab Units 06/20/24  0336 06/19/24  0448   ALBUMIN g/dL 3.5 3.9   BILIRUBIN mg/dL 1.1 0.8   ALK PHOS U/L 97 130*   AST (SGOT) U/L 93* 79*   ALT (SGPT) U/L 223* 221*     Results from last 7 days   Lab Units 06/24/24  0526 06/23/24  0345 06/21/24  0334 06/20/24  0336 06/19/24  1257 06/19/24  0448   CALCIUM mg/dL 8.6 8.6 8.6 8.3*   < > 8.5*   ALBUMIN g/dL  --   --   --  3.5  --  3.9    < > = values in this interval not displayed.               Invalid input(s): \"LDLCALC\"        Test Results Pending at Discharge       Discharge Details        Discharge Medications        New Medications        Instructions Start Date   dexAMETHasone 6 MG tablet  Commonly known as: DECADRON   6 mg, Oral, 2 Times Daily With Meals      famotidine 20 MG tablet  Commonly known as: PEPCID   20 mg, Oral, 2 Times Daily Before Meals      Insulin Pen Needle 32G X 4 MM misc   Use to inject insulin SQ via pens up to 4 times daily      levETIRAcetam 500 MG tablet  Commonly known as: KEPPRA   500 mg, Oral, Every 12 Hours Scheduled      NovoLOG FlexPen 100 UNIT/ML solution pen-injector sc pen  Generic drug: insulin aspart   5 Units, Subcutaneous, 3 Times Daily With Meals      sulfamethoxazole-trimethoprim 800-160 MG per tablet  Commonly known as: BACTRIM DS,SEPTRA DS   1 tablet, Oral, 3 Times Weekly      Tresiba " FlexTouch 100 UNIT/ML solution pen-injector injection  Generic drug: insulin degludec   25 Units, Subcutaneous, Daily             Continue These Medications        Instructions Start Date   irbesartan-hydrochlorothiazide 150-12.5 MG tablet  Commonly known as: AVALIDE   2 tablets, Oral, Daily      zolpidem CR 12.5 MG CR tablet  Commonly known as: Ambien CR   12.5 mg, Oral, Nightly PRN               Allergies   Allergen Reactions    Peanut (Diagnostic) Anaphylaxis         Discharge Disposition:  Home or Self Care    Discharge Diet:  Diet Order   Procedures    Diet: Regular/House; Fluid Consistency: Thin (IDDSI 0)       Discharge Activity:   As tolerated    CODE STATUS:    Code Status and Medical Interventions:   Ordered at: 06/19/24 1625     Code Status (Patient has no pulse and is not breathing):    CPR (Attempt to Resuscitate)     Medical Interventions (Patient has pulse or is breathing):    Full Support       Future Appointments   Date Time Provider Department Center   7/3/2024 10:00 AM [facility] CC PET [facility]   7/9/2024  3:30 PM LAB CHAIR 5 [facility]  LAB [facility]   7/9/2024  4:00 PM Krista Fernandez MD PhD [facility]   1/16/2025 11:15 AM Scott Medina MD [facility]      Follow-up Information       Scott Medina MD .    Specialty: Internal Medicine  Contact information:  [id number] [place] [id number] [phone]                             Time Spent on Discharge:  Greater than 30 minutes      Frank Maravilla MD  [facility]  06/24/24  14:15 EDT

## 2024-06-25 ENCOUNTER — TRANSITIONAL CARE MANAGEMENT TELEPHONE ENCOUNTER (OUTPATIENT)
Dept: CALL CENTER | Facility: HOSPITAL | Age: 59
End: 2024-06-25
Payer: COMMERCIAL

## 2024-06-25 NOTE — OUTREACH NOTE
Call Center TCM Note      Flowsheet Row Responses   [facility] patient discharged from? [place]   Does the patient have one of the following disease processes/diagnoses(primary or secondary)? Other   TCM attempt successful? Yes   Call start time 1227   Call end time 1231   Discharge diagnosis Neoplasm causing mass effect and brain compression on adjacent structures   Meds reviewed with patient/caregiver? Yes   Is the patient having any side effects they believe may be caused by any medication additions or changes? No   Does the patient have all medications ordered at discharge? Yes   Is the patient taking all medications as directed (includes completed medication regime)? Yes   [facility]   Does the patient have an appointment with their PCP within 7-14 days of discharge? Yes   Has home health visited the patient within 72 hours of discharge? N/A   Psychosocial issues? No   Did the patient receive a copy of their discharge instructions? Yes   Nursing interventions Reviewed instructions with patient   What is the patient's perception of their health status since discharge? Improving   Is the patient/caregiver able to teach back signs and symptoms related to disease process for when to call PCP? Yes   Is the patient/caregiver able to teach back signs and symptoms related to disease process for when to call 911? Yes   Is the patient/caregiver able to teach back the hierarchy of who to call/visit for symptoms/problems? PCP, Specialist, Home health nurse, Urgent Care, ED, 911 Yes   If the patient is a current smoker, are they able to teach back resources for cessation? Not a smoker   TCM call completed? Yes   Call end time 1231   Would this patient benefit from a Referral to Amb Social Work? No   Is the patient interested in additional calls from an ambulatory ? No            Zeinab Hawley RN    6/25/2024, 12:31 EDT

## 2024-06-26 LAB
DX PRELIMINARY: NORMAL
LAB AP CASE REPORT: NORMAL
LAB AP DIAGNOSIS COMMENT: NORMAL
LAB AP FLOW CYTOMETRY SUMMARY: NORMAL
LAB AP SPECIAL STAINS: NORMAL
Lab: NORMAL
PATH REPORT.ADDENDUM SPEC: NORMAL
PATH REPORT.FINAL DX SPEC: NORMAL
PATH REPORT.GROSS SPEC: NORMAL

## 2024-07-01 ENCOUNTER — OFFICE VISIT (OUTPATIENT)
Dept: FAMILY MEDICINE CLINIC | Facility: CLINIC | Age: 59
End: 2024-07-01
Payer: COMMERCIAL

## 2024-07-01 VITALS
RESPIRATION RATE: 18 BRPM | OXYGEN SATURATION: 96 % | SYSTOLIC BLOOD PRESSURE: 104 MMHG | HEART RATE: 95 BPM | BODY MASS INDEX: 30.92 KG/M2 | WEIGHT: 216 LBS | DIASTOLIC BLOOD PRESSURE: 70 MMHG | HEIGHT: 70 IN

## 2024-07-01 DIAGNOSIS — B18.2 CHRONIC HEPATITIS C WITHOUT HEPATIC COMA: ICD-10-CM

## 2024-07-01 DIAGNOSIS — R73.9 STEROID-INDUCED HYPERGLYCEMIA: ICD-10-CM

## 2024-07-01 DIAGNOSIS — I10 PRIMARY HYPERTENSION: Primary | ICD-10-CM

## 2024-07-01 DIAGNOSIS — Z09 HOSPITAL DISCHARGE FOLLOW-UP: ICD-10-CM

## 2024-07-01 DIAGNOSIS — T38.0X5A STEROID-INDUCED HYPERGLYCEMIA: ICD-10-CM

## 2024-07-01 DIAGNOSIS — C85.89 PRIMARY CNS LYMPHOMA: ICD-10-CM

## 2024-07-01 RX ORDER — ZOLPIDEM TARTRATE 12.5 MG/1
12.5 TABLET, FILM COATED, EXTENDED RELEASE ORAL NIGHTLY PRN
Qty: 30 TABLET | Refills: 2 | Status: SHIPPED | OUTPATIENT
Start: 2024-07-01

## 2024-07-01 NOTE — PROGRESS NOTES
Subjective   Patient ID: Iain Christina is a 58 y.o. male is here today for 2 week PO follow-up. Patient had a Right frontal craniotomy for tumor on 6/19/2024    Today patient states that he feels well overall, patient denies HA's, visual changes, lightheadedness /dizziness, patient's incision looks healthy, no redness, no drainage, no swelling    History of Present Illness    This patient is doing okay.  He is a little short of breath but says he is that way all the time.  His incision looks good and he has no symptoms related to his brain.  He has no headache and no neurologic deficits.    The following portions of the patient's history were reviewed and updated as appropriate: allergies, current medications, past family history, past medical history, past social history, past surgical history, and problem list.    Review of Systems   Eyes:  Negative for visual disturbance.   Neurological:  Negative for dizziness, light-headedness and headaches.       I reviewed the review of systems listed by the patient and discussed by my MA    Objective     Vitals:    07/02/24 1532   BP: 104/80   Cuff Size: Adult   Pulse: 69   SpO2: 96%     There is no height or weight on file to calculate BMI.    Tobacco Use: Low Risk  (7/2/2024)    Patient History     Smoking Tobacco Use: Never     Smokeless Tobacco Use: Never     Passive Exposure: Not on file          Physical Exam  Neurological:      Mental Status: He is alert and oriented to person, place, and time.       Neurologic Exam     Mental Status   Oriented to person, place, and time.           Assessment & Plan   Independent Review of Radiographic Studies:      I personally reviewed the images from the following studies.    No new imaging to review    Medical Decision Making:      The patient says that currently he is just planning chemotherapy.  I thought that was unusual but will defer to oncology on that.  Since they are just planning chemo I will just have him  follow-up with medical oncology.  If they do follow-up scans and there is a concern they can always call me which they do fairly regularly anyway I be glad to see him back at any time.    Diagnoses and all orders for this visit:    1. Follow-up examination following surgery (Primary)      Return if symptoms worsen or fail to improve.

## 2024-07-01 NOTE — PROGRESS NOTES
Subjective   Iain Christina is a 58 y.o. male. Patient is here today for   Chief Complaint   Patient presents with    Hospital Follow Up Visit       (Not on file)-  Risk for Readmission (LACE) Score: 13 (6/24/2024  6:00 AM)           Vitals:    07/01/24 1000   BP: 104/70   Pulse: 95   Resp: 18   SpO2: 96%     The following portions of the patient's history were reviewed and updated as appropriate: allergies, current medications, past family history, past medical history, past social history, past surgical history and problem list.    Past Medical History:   Diagnosis Date    Diabetes mellitus     Hepatitis C     Hyperthyroidism     Sleep apnea       Allergies   Allergen Reactions    Peanut (Diagnostic) Anaphylaxis      Social History     Socioeconomic History    Marital status: Unknown   Tobacco Use    Smoking status: Never    Smokeless tobacco: Never   Vaping Use    Vaping status: Never Used   Substance and Sexual Activity    Alcohol use: Not Currently     Comment: STATED HE QUIT IN JANUARY    Drug use: Never    Sexual activity: Yes        Current Outpatient Medications:     dexAMETHasone (DECADRON) 6 MG tablet, Take 1 tablet by mouth 2 (Two) Times a Day With Meals for 30 days., Disp: 60 tablet, Rfl: 0    famotidine (PEPCID) 20 MG tablet, Take 1 tablet by mouth 2 (Two) Times a Day Before Meals for 30 days., Disp: 60 tablet, Rfl: 0    insulin aspart (NovoLOG FlexPen) 100 UNIT/ML solution pen-injector sc pen, Inject 5 Units under the skin into the appropriate area as directed 3 (Three) Times a Day With Meals., Disp: 15 mL, Rfl: 1    insulin degludec (Tresiba FlexTouch) 100 UNIT/ML solution pen-injector injection, Inject 25 Units under the skin into the appropriate area as directed Daily., Disp: 15 mL, Rfl: 1    Insulin Pen Needle 32G X 4 MM misc, Use to inject insulin SQ via pens up to 4 times daily, Disp: 100 each, Rfl: 1    irbesartan-hydrochlorothiazide (AVALIDE) 150-12.5 MG tablet, Take 2 tablets by  mouth Daily., Disp: 180 tablet, Rfl: 3    levETIRAcetam (KEPPRA) 500 MG tablet, Take 1 tablet by mouth Every 12 (Twelve) Hours for 30 days., Disp: 60 tablet, Rfl: 0    sulfamethoxazole-trimethoprim (BACTRIM DS,SEPTRA DS) 800-160 MG per tablet, Take 1 tablet by mouth 3 (Three) Times a Week for 13 doses. Indications: PROPHYLAXIS, Disp: 13 tablet, Rfl: 0    zolpidem CR (Ambien CR) 12.5 MG CR tablet, Take 1 tablet by mouth At Night As Needed for Sleep., Disp: 30 tablet, Rfl: 2     Objective     History of Present Illness Iain is here today for hospital discharge follow-up.  He was admitted to [facility] on Maricel 15 after he was found to have a brain mass.  Neurosurgery was consulted and he underwent a craniotomy and removal of the brain mass.  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  He did well postop.  He is on dexamethasone and was on an insulin sliding scale in the hospital.  He currently is on 25 units of Tresiba daily and 5 units NovoLog with each meal.  His blood sugars have been running around 300.  He does have hypertension and hepatitis C.  He has not been sleeping and needs a refill on zolpidem CR.  He has a follow-up with Dr. Mayfield tomorrow and with medical oncology next week.    Review of Systems   Constitutional:  Positive for activity change.   Eyes: Negative.    Respiratory: Negative.     Cardiovascular: Negative.    Neurological:  Positive for light-headedness. Negative for headaches.       Physical Exam  Vitals reviewed.   Constitutional:       Appearance: Normal appearance.   Cardiovascular:      Rate and Rhythm: Normal rate and regular rhythm.      Heart sounds: Normal heart sounds.      Comments: Systolic BP 90  Pulmonary:      Effort: Pulmonary effort is normal.      Breath sounds: Normal breath sounds.   Neurological:      Mental Status: He is alert.   Psychiatric:         Mood and Affect: Mood normal.         Behavior: Behavior normal.         Thought Content: Thought  content normal.         Judgment: Judgment normal.         ASSESSMENT reviewed and discussed hospital records as well as hospital discharge medication list.  Will increase Tresiba to 30 units daily and continue NovoLog sliding scale as instructed.  Blood pressure is low today.  Suggest cutting irbesartan hydrochlorothiazide in half and monitor your blood pressure.  Follow-up in 1 week.     Problems Addressed this Visit          Cardiac and Vasculature    Hypertension - Primary       Endocrine and Metabolic    Steroid-induced hyperglycemia       Gastrointestinal Abdominal     Chronic hepatitis C without hepatic coma       Health Encounters    Hospital discharge follow-up       Hematology and Neoplasia    Primary CNS lymphoma     Diagnoses         Codes Comments    Primary hypertension    -  Primary ICD-10-CM: I10  ICD-9-CM: 401.9     Steroid-induced hyperglycemia     ICD-10-CM: R73.9, T38.0X5A  ICD-9-CM: 790.29, E932.0     Chronic hepatitis C without hepatic coma     ICD-10-CM: B18.2  ICD-9-CM: 070.54     Primary CNS lymphoma     ICD-10-CM: C85.89  ICD-9-CM: 200.50     Hospital discharge follow-up     ICD-10-CM: Z09  ICD-9-CM: V67.59             Current outpatient and discharge medications have been reconciled for the patient.  Reviewed by: Scott Medina MD      PLAN    There are no Patient Instructions on file for this visit.  No follow-ups on file.

## 2024-07-02 ENCOUNTER — OFFICE VISIT (OUTPATIENT)
Dept: NEUROSURGERY | Facility: CLINIC | Age: 59
End: 2024-07-02
Payer: COMMERCIAL

## 2024-07-02 VITALS — DIASTOLIC BLOOD PRESSURE: 80 MMHG | OXYGEN SATURATION: 96 % | SYSTOLIC BLOOD PRESSURE: 104 MMHG | HEART RATE: 69 BPM

## 2024-07-02 DIAGNOSIS — Z09 FOLLOW-UP EXAMINATION FOLLOWING SURGERY: Primary | ICD-10-CM

## 2024-07-03 ENCOUNTER — HOSPITAL ENCOUNTER (OUTPATIENT)
Dept: PET IMAGING | Facility: HOSPITAL | Age: 59
Discharge: HOME | End: 2024-07-03
Admitting: INTERNAL MEDICINE
Payer: COMMERCIAL

## 2024-07-03 DIAGNOSIS — C85.10 LARGE B-CELL LYMPHOMA: ICD-10-CM

## 2024-07-03 DIAGNOSIS — C79.31 METASTASIS TO BRAIN: ICD-10-CM

## 2024-07-03 DIAGNOSIS — C85.89 PRIMARY CNS LYMPHOMA: ICD-10-CM

## 2024-07-03 LAB — GLUCOSE BLDC GLUCOMTR-MCNC: 475 MG/DL (ref 70–105)

## 2024-07-03 PROCEDURE — 82948 REAGENT STRIP/BLOOD GLUCOSE: CPT

## 2024-07-08 LAB
DX PRELIMINARY: NORMAL
LAB AP CASE REPORT: NORMAL
LAB AP DIAGNOSIS COMMENT: NORMAL
LAB AP FLOW CYTOMETRY SUMMARY: NORMAL
LAB AP SPECIAL STAINS: NORMAL
Lab: NORMAL
Lab: NORMAL
PATH REPORT.ADDENDUM SPEC: NORMAL
PATH REPORT.FINAL DX SPEC: NORMAL
PATH REPORT.GROSS SPEC: NORMAL

## 2024-07-09 ENCOUNTER — LAB (OUTPATIENT)
Dept: LAB | Facility: HOSPITAL | Age: 59
End: 2024-07-09
Payer: COMMERCIAL

## 2024-07-09 ENCOUNTER — OFFICE VISIT (OUTPATIENT)
Dept: ONCOLOGY | Facility: CLINIC | Age: 59
End: 2024-07-09
Payer: COMMERCIAL

## 2024-07-09 ENCOUNTER — INFUSION (OUTPATIENT)
Dept: ONCOLOGY | Facility: HOSPITAL | Age: 59
End: 2024-07-09
Payer: COMMERCIAL

## 2024-07-09 VITALS
BODY MASS INDEX: 29.98 KG/M2 | RESPIRATION RATE: 16 BRPM | DIASTOLIC BLOOD PRESSURE: 86 MMHG | TEMPERATURE: 97.4 F | HEART RATE: 97 BPM | WEIGHT: 209.4 LBS | HEIGHT: 70 IN | OXYGEN SATURATION: 95 % | SYSTOLIC BLOOD PRESSURE: 121 MMHG

## 2024-07-09 DIAGNOSIS — C79.31 METASTASIS TO BRAIN: ICD-10-CM

## 2024-07-09 DIAGNOSIS — C85.10 LARGE B-CELL LYMPHOMA: ICD-10-CM

## 2024-07-09 DIAGNOSIS — C85.10 LARGE B-CELL LYMPHOMA: Primary | ICD-10-CM

## 2024-07-09 DIAGNOSIS — C85.89 PRIMARY CNS LYMPHOMA: ICD-10-CM

## 2024-07-09 DIAGNOSIS — R73.9 STEROID-INDUCED HYPERGLYCEMIA: ICD-10-CM

## 2024-07-09 DIAGNOSIS — T38.0X5A STEROID-INDUCED HYPERGLYCEMIA: ICD-10-CM

## 2024-07-09 DIAGNOSIS — C85.89 PRIMARY CNS LYMPHOMA: Primary | ICD-10-CM

## 2024-07-09 LAB
ALBUMIN SERPL-MCNC: 3.6 G/DL (ref 3.5–5.2)
ALBUMIN/GLOB SERPL: 1.3 G/DL
ALP SERPL-CCNC: 184 U/L (ref 39–117)
ALT SERPL W P-5'-P-CCNC: 495 U/L (ref 1–41)
ANION GAP SERPL CALCULATED.3IONS-SCNC: 15.5 MMOL/L (ref 5–15)
AST SERPL-CCNC: 133 U/L (ref 1–40)
BASOPHILS # BLD AUTO: 0.02 10*3/MM3 (ref 0–0.2)
BASOPHILS NFR BLD AUTO: 0.2 % (ref 0–1.5)
BILIRUB SERPL-MCNC: 1.3 MG/DL (ref 0–1.2)
BUN SERPL-MCNC: 23 MG/DL (ref 8–23)
BUN/CREAT SERPL: 34.8 (ref 7–25)
CALCIUM SPEC-SCNC: 9.1 MG/DL (ref 8.6–10.5)
CHLORIDE SERPL-SCNC: 98 MMOL/L (ref 98–107)
CO2 SERPL-SCNC: 18.5 MMOL/L (ref 22–29)
CREAT SERPL-MCNC: 0.66 MG/DL (ref 0.76–1.27)
DEPRECATED RDW RBC AUTO: 38.3 FL (ref 37–54)
EGFRCR SERPLBLD CKD-EPI 2021: 107.4 ML/MIN/1.73
EOSINOPHIL # BLD AUTO: 0.05 10*3/MM3 (ref 0–0.4)
EOSINOPHIL NFR BLD AUTO: 0.6 % (ref 0.3–6.2)
ERYTHROCYTE [DISTWIDTH] IN BLOOD BY AUTOMATED COUNT: 12.3 % (ref 12.3–15.4)
GLOBULIN UR ELPH-MCNC: 2.7 GM/DL
GLUCOSE BLDC-MCNC: 434 MG/DL (ref 74–124)
GLUCOSE SERPL-MCNC: 563 MG/DL (ref 65–99)
HCT VFR BLD AUTO: 50.6 % (ref 37.5–51)
HGB BLD-MCNC: 17.4 G/DL (ref 13–17.7)
IMM GRANULOCYTES # BLD AUTO: 0.1 10*3/MM3 (ref 0–0.05)
IMM GRANULOCYTES NFR BLD AUTO: 1.2 % (ref 0–0.5)
LYMPHOCYTES # BLD AUTO: 0.82 10*3/MM3 (ref 0.7–3.1)
LYMPHOCYTES NFR BLD AUTO: 10.1 % (ref 19.6–45.3)
Lab: ABNORMAL
MCH RBC QN AUTO: 29.5 PG (ref 26.6–33)
MCHC RBC AUTO-ENTMCNC: 34.4 G/DL (ref 31.5–35.7)
MCV RBC AUTO: 85.8 FL (ref 79–97)
MONOCYTES # BLD AUTO: 0.18 10*3/MM3 (ref 0.1–0.9)
MONOCYTES NFR BLD AUTO: 2.2 % (ref 5–12)
NEUTROPHILS NFR BLD AUTO: 6.94 10*3/MM3 (ref 1.7–7)
NEUTROPHILS NFR BLD AUTO: 85.7 % (ref 42.7–76)
NRBC BLD AUTO-RTO: 0 /100 WBC (ref 0–0.2)
PLATELET # BLD AUTO: 133 10*3/MM3 (ref 140–450)
PMV BLD AUTO: 11.1 FL (ref 6–12)
POTASSIUM SERPL-SCNC: 4.6 MMOL/L (ref 3.5–5.2)
PROT SERPL-MCNC: 6.3 G/DL (ref 6–8.5)
RBC # BLD AUTO: 5.9 10*6/MM3 (ref 4.14–5.8)
SODIUM SERPL-SCNC: 132 MMOL/L (ref 136–145)
WBC NRBC COR # BLD AUTO: 8.11 10*3/MM3 (ref 3.4–10.8)

## 2024-07-09 PROCEDURE — 36415 COLL VENOUS BLD VENIPUNCTURE: CPT

## 2024-07-09 PROCEDURE — G0463 HOSPITAL OUTPT CLINIC VISIT: HCPCS

## 2024-07-09 PROCEDURE — 85025 COMPLETE CBC W/AUTO DIFF WBC: CPT

## 2024-07-09 PROCEDURE — 99417 PROLNG OP E/M EACH 15 MIN: CPT | Performed by: INTERNAL MEDICINE

## 2024-07-09 PROCEDURE — 80053 COMPREHEN METABOLIC PANEL: CPT

## 2024-07-09 PROCEDURE — 99215 OFFICE O/P EST HI 40 MIN: CPT | Performed by: INTERNAL MEDICINE

## 2024-07-09 PROCEDURE — 82948 REAGENT STRIP/BLOOD GLUCOSE: CPT

## 2024-07-09 PROCEDURE — 63710000001 INSULIN REGULAR HUMAN PER 5 UNITS: Performed by: INTERNAL MEDICINE

## 2024-07-09 PROCEDURE — 96372 THER/PROPH/DIAG INJ SC/IM: CPT

## 2024-07-09 RX ORDER — FAMOTIDINE 20 MG/1
20 TABLET, FILM COATED ORAL
Qty: 60 TABLET | Refills: 5 | Status: ON HOLD | OUTPATIENT
Start: 2024-07-09 | End: 2025-01-05

## 2024-07-09 RX ORDER — DEXAMETHASONE 6 MG/1
6 TABLET ORAL 2 TIMES DAILY WITH MEALS
Qty: 60 TABLET | Refills: 0 | Status: SHIPPED | OUTPATIENT
Start: 2024-07-09 | End: 2024-07-09

## 2024-07-09 RX ORDER — DEXAMETHASONE 4 MG/1
4 TABLET ORAL 2 TIMES DAILY WITH MEALS
Qty: 60 TABLET | Refills: 1 | Status: ON HOLD | OUTPATIENT
Start: 2024-07-09 | End: 2024-09-07

## 2024-07-09 RX ORDER — LEVETIRACETAM 500 MG/1
500 TABLET ORAL EVERY 12 HOURS SCHEDULED
Qty: 60 TABLET | Refills: 5 | Status: ON HOLD | OUTPATIENT
Start: 2024-07-09 | End: 2025-01-05

## 2024-07-09 RX ORDER — SULFAMETHOXAZOLE AND TRIMETHOPRIM 800; 160 MG/1; MG/1
1 TABLET ORAL 3 TIMES WEEKLY
Qty: 13 TABLET | Refills: 5 | Status: ON HOLD | OUTPATIENT
Start: 2024-07-10 | End: 2025-01-07

## 2024-07-09 RX ADMIN — HUMAN INSULIN 12 UNITS: 100 INJECTION, SOLUTION SUBCUTANEOUS at 17:04

## 2024-07-09 NOTE — NURSING NOTE
Lab Results   Component Value Date    GLUCOSE 563 (C) 07/09/2024    BUN 23 07/09/2024    CREATININE 0.66 (L) 07/09/2024    EGFRRESULT 86 01/15/2024    EGFR 107.4 07/09/2024    BCR 34.8 (H) 07/09/2024    K 4.6 07/09/2024    CO2 18.5 (L) 07/09/2024    CALCIUM 9.1 07/09/2024    PROTENTOTREF 7.6 01/15/2024    ALBUMIN 3.6 07/09/2024    BILITOT 1.3 (H) 07/09/2024     (C) 07/09/2024     (C) 07/09/2024    Per Dr. Fernandez, pt to receive 12 units Regular insulin. Insulin administered. 30 minutes later, blood glucose rechecked: 434. Dr. Fernandez notified. Per ja CHOWDHURY to discharge pt. Pt discharged home in stable condition.

## 2024-07-12 ENCOUNTER — HOSPITAL ENCOUNTER (OUTPATIENT)
Dept: PET IMAGING | Facility: HOSPITAL | Age: 59
Discharge: HOME | End: 2024-07-12
Admitting: INTERNAL MEDICINE
Payer: COMMERCIAL

## 2024-07-12 ENCOUNTER — TELEPHONE (OUTPATIENT)
Dept: FAMILY MEDICINE CLINIC | Facility: CLINIC | Age: 59
End: 2024-07-12
Payer: COMMERCIAL

## 2024-07-12 ENCOUNTER — TELEPHONE (OUTPATIENT)
Dept: ONCOLOGY | Facility: CLINIC | Age: 59
End: 2024-07-12
Payer: COMMERCIAL

## 2024-07-12 LAB — GLUCOSE BLDC GLUCOMTR-MCNC: 355 MG/DL (ref 70–105)

## 2024-07-12 PROCEDURE — 82948 REAGENT STRIP/BLOOD GLUCOSE: CPT

## 2024-07-12 RX ORDER — SODIUM CHLORIDE 9 MG/ML
20 INJECTION, SOLUTION INTRAVENOUS ONCE
Status: CANCELLED | OUTPATIENT
Start: 2024-07-18

## 2024-07-12 RX ORDER — MEPERIDINE HYDROCHLORIDE 25 MG/ML
25 INJECTION INTRAMUSCULAR; INTRAVENOUS; SUBCUTANEOUS
Status: CANCELLED | OUTPATIENT
Start: 2024-07-18

## 2024-07-12 RX ORDER — ONDANSETRON 2 MG/ML
4 INJECTION INTRAMUSCULAR; INTRAVENOUS EVERY 6 HOURS PRN
Status: CANCELLED | OUTPATIENT
Start: 2024-07-15

## 2024-07-12 RX ORDER — FAMOTIDINE 10 MG/ML
20 INJECTION, SOLUTION INTRAVENOUS AS NEEDED
Status: CANCELLED | OUTPATIENT
Start: 2024-07-18

## 2024-07-12 RX ORDER — PROCHLORPERAZINE MALEATE 10 MG
10 TABLET ORAL EVERY 6 HOURS PRN
Status: CANCELLED | OUTPATIENT
Start: 2024-07-15

## 2024-07-12 RX ORDER — ACETAMINOPHEN 325 MG/1
650 TABLET ORAL ONCE
Status: CANCELLED | OUTPATIENT
Start: 2024-07-18

## 2024-07-12 RX ORDER — METHOTREXATE 25 MG/ML
6000 INJECTION, SOLUTION INTRA-ARTERIAL; INTRAMUSCULAR; INTRATHECAL; INTRAVENOUS ONCE
Status: CANCELLED | OUTPATIENT
Start: 2024-07-16

## 2024-07-12 RX ORDER — DIPHENHYDRAMINE HYDROCHLORIDE 50 MG/ML
50 INJECTION INTRAMUSCULAR; INTRAVENOUS AS NEEDED
Status: CANCELLED | OUTPATIENT
Start: 2024-07-18

## 2024-07-12 NOTE — TELEPHONE ENCOUNTER
Called the patient. His BG was 354 today and his PET could not be done. He stated he had not ate since 830pm the night before. I let him know I would talk with Dr. Fernandez to see what he wanted to do regarding the PET prior to treatment initiation next week and Dr. Fernandez stated he wanted him to still get treatment next week and then have his PET scan moved to the following week. I sent scheduling a message and called the patient back. I asked if he had called his PCP to discuss sugar management and he stated he had not yet, but that he would now. Patient v/u.

## 2024-07-12 NOTE — PROGRESS NOTES
.     REASON FOR FOLLOW-UP:     Provide an opinion on any further workup or treatment primary CNS lymphoma.  Negative for BCL2, BCL6, and MYC by FISH.                                 HISTORY OF PRESENT ILLNESS:  The patient is a 58 y.o. year old male who is here for follow-up with the above-mentioned history.  History of Present Illness  The patient presents for evaluation of primary CNS B-cell lymphoma.    I saw him recently in on 6/21/2024 during his hospitalization for newly diagnosed primary CNS lymphoma.    The patient is currently on a regimen of dexamethasone, administered at a dosage of 6 mg twice daily, and Pepcid. He reports no sleep disturbances, attributing this to the aid of a sleep aid prescribed by Dr. Medina. His blood glucose levels typically remain low upon waking, however, postprandial levels are consistently high. This morning, his glucose level was recorded at 290. Prior to the initiation of steroid therapy, the patient was not diagnosed with diabetes.    Patient reports no headaches vision changes.  Denies fainting syncope or seizure.  No fever no sweating no chills.     Results  Laboratory Studies  Genetic study of tumor sample: All negative for the BCL2, BCL6, and MYC. Tumor is strongly positive for CD20. Blood glucose level between 120 and 150. A1c is 5.6.      Past Medical History:   Diagnosis Date    Diabetes mellitus     Hepatitis C     Hyperthyroidism     Sleep apnea      Past Surgical History:   Procedure Laterality Date    CRANIOTOMY FOR TUMOR Right 6/19/2024    Procedure: Right frontal craniotomy for tumor;  Surgeon: Rosendo Bello MD;  Location: [facility];  Service: Neurosurgery;  Laterality: Right;    NASAL SEPTAL RECONSTRUCTION      VENOUS ACCESS DEVICE (PORT) INSERTION N/A 6/24/2024    Procedure: INSERTION VENOUS ACCESS DEVICE;  Surgeon: Linda Coates MD;  Location: [facility];  Service: General;  Laterality: N/A;     MEDICAL ONCOLOGY HISTORY: The patient is a  58 y.o. year old male whom we are consulted for evaluation and management of newly diagnosed CNS diffuse large B-cell lymphoma.     This patient has hypertension, hepatitis C, sleep apnea, however he denies diabetes, also previous history of alcohol use who stopped drinking 6 months ago, who presented to [facility] emergency room on 06/15/2024 because of worsening memory as well as episodes of fall. This patient is a long term . He initially came to [facility] ER and CT scan shows brain mets. He was given IV steroids and brought to [facility] for further evaluation and management. The patient reports no fever, sweating, chills, no weight loss. He does not smoke cigarettes.      The CT scan of the head without contrast on 06/15/2024 at [facility] reported a right frontal lobe mass measuring 4.8 x 3.3 cm with large amount of associated vasogenic edema and so called effacement. There is 8 mm right to left midline shift. There was significant mass effect.     After he was admitted to [facility] further imaging studies with the brain MRI with and without contrast on 06/15/2024 confirmed an enhancing mass 4 x 4 x 4 cm in the deep white matter of the right frontal lobe and with internal region of cystic changes. There was intramural micro hemorrhage. There was extensive surrounding vasogenic edema extends across the midline to the corpus collosum. There is leftward midline shift measuring 14 mm.      The patient further had workup with imaging studies for chest, abdomen and pelvis with IV contrast obtained on 06/16/2024 and this study reported no mediastinal adenopathy in the chest. There was trace pericardial effusion. No pulmonary lesions, pleural effusions or pneumothorax. In the abdomen there was chronic diverticulosis, otherwise unremarkable. There was borderline enlarged carley hepatic lymph nodes 1.2 cm. Otherwise unremarkable. No bone  lesions.      This patient was seen by, Rosendo Bello MD, Neurosurgeon, who performed right frontal craniotomy and the intraoperative frozen section reported undifferentiated malignancy and the possibility of lymphoma. The patient has excisional biopsy for the right frontal lobe mass.     Pathology evaluation reported atypical lymphoproliferative consistent with diffuse large B-cell lymphoma pending outside consultation. The gross description reported specimen measuring 1.4 x 1.0 x 0.4 cm. The 2nd specimen consisted of 3 x 2 x 1 cm rubbery soft tissue.            MEDICATIONS    Current Outpatient Medications:     dexAMETHasone (DECADRON) 4 MG tablet, Take 1 tablet by mouth 2 (Two) Times a Day With Meals for 60 days., Disp: 60 tablet, Rfl: 1    famotidine (PEPCID) 20 MG tablet, Take 1 tablet by mouth 2 (Two) Times a Day Before Meals for 180 days., Disp: 60 tablet, Rfl: 5    insulin aspart (NovoLOG FlexPen) 100 UNIT/ML solution pen-injector sc pen, Inject 5 Units under the skin into the appropriate area as directed 3 (Three) Times a Day With Meals., Disp: 15 mL, Rfl: 1    insulin degludec (Tresiba FlexTouch) 100 UNIT/ML solution pen-injector injection, Inject 25 Units under the skin into the appropriate area as directed Daily., Disp: 15 mL, Rfl: 1    Insulin Pen Needle 32G X 4 MM misc, Use to inject insulin SQ via pens up to 4 times daily, Disp: 100 each, Rfl: 1    irbesartan-hydrochlorothiazide (AVALIDE) 150-12.5 MG tablet, Take 2 tablets by mouth Daily., Disp: 180 tablet, Rfl: 3    levETIRAcetam (KEPPRA) 500 MG tablet, Take 1 tablet by mouth Every 12 (Twelve) Hours for 180 days., Disp: 60 tablet, Rfl: 5    sulfamethoxazole-trimethoprim (BACTRIM DS,SEPTRA DS) 800-160 MG per tablet, Take 1 tablet by mouth 3 (Three) Times a Week for 78 doses. Indications: PROPHYLAXIS, Disp: 13 tablet, Rfl: 5    zolpidem CR (Ambien CR) 12.5 MG CR tablet, Take 1 tablet by mouth At Night As Needed for Sleep., Disp: 30 tablet, Rfl:  "2    ALLERGIES:     Allergies   Allergen Reactions    Peanut (Diagnostic) Anaphylaxis    Peanut-Containing Drug Products Anaphylaxis    Bee Pollen Unknown - Low Severity     eyes itching, running nose       SOCIAL HISTORY:       Social History     Socioeconomic History    Marital status: Unknown   Tobacco Use    Smoking status: Never    Smokeless tobacco: Never   Vaping Use    Vaping status: Never Used   Substance and Sexual Activity    Alcohol use: Not Currently     Comment: STATED HE QUIT IN JANUARY    Drug use: Never    Sexual activity: Yes         FAMILY HISTORY:  Family History   Problem Relation Age of Onset    COPD Mother     Cancer Sister     No Known Problems Brother        REVIEW OF SYSTEMS:  Review of Systems  See HPI.           Vitals:    07/09/24 1537   BP: 121/86   Pulse: 97   Resp: 16   Temp: 97.4 °F (36.3 °C)   TempSrc: Oral   SpO2: 95%   Weight: 95 kg (209 lb 6.4 oz)   Height: 177.8 cm (70\")   PainSc: 0-No pain         7/9/2024     3:40 PM   Current Status   ECOG score 0        PHYSICAL EXAM:      CONSTITUTIONAL:  Vital signs reviewed.  No distress, looks comfortable.  EYES:  Conjunctivae and lids unremarkable.  PERRLA  EARS,NOSE,MOUTH,THROAT:  Ears and nose appear unremarkable.  Lips, teeth, gums appear unremarkable.  RESPIRATORY:  Normal respiratory effort.  Lungs clear to auscultation bilaterally.  CARDIOVASCULAR:  Normal S1, S2.  No murmurs rubs or gallops.  No significant lower extremity edema.  GASTROINTESTINAL: Abdomen appears unremarkable.  Nontender.  No hepatomegaly.  No splenomegaly.  LYMPHATIC:  No cervical, supraclavicular, axillary lymphadenopathy.  MUSCULOSKELETAL:  Unremarkable gait and station.  Unremarkable digits/nails.  No cyanosis or clubbing.  SKIN:  Warm.  No rashes.  Right frontal skull has a scar in place.  PSYCHIATRIC:  Normal judgment and insight.  Normal mood and affect.      RECENT LABS:        WBC   Date Value Ref Range Status   07/09/2024 8.11 3.40 - 10.80 10*3/mm3 " Final   06/24/2024 11.00 (H) 3.40 - 10.80 10*3/mm3 Final   06/23/2024 13.39 (H) 3.40 - 10.80 10*3/mm3 Final   06/22/2024 12.14 (H) 3.40 - 10.80 10*3/mm3 Final   06/21/2024 11.28 (H) 3.40 - 10.80 10*3/mm3 Final   06/20/2024 12.41 (H) 3.40 - 10.80 10*3/mm3 Final   06/19/2024 11.26 (H) 3.40 - 10.80 10*3/mm3 Final   06/17/2024 13.92 (H) 3.40 - 10.80 10*3/mm3 Final   06/16/2024 8.20 3.40 - 10.80 10*3/mm3 Final   06/15/2024 6.53 3.40 - 10.80 10*3/mm3 Final   01/15/2024 6.0 3.4 - 10.8 x10E3/uL Final   01/13/2023 5.6 3.4 - 10.8 x10E3/uL Final   11/21/2019 6.90 3.40 - 10.80 10*3/mm3 Final     Hemoglobin   Date Value Ref Range Status   07/09/2024 17.4 13.0 - 17.7 g/dL Final   06/24/2024 16.0 13.0 - 17.7 g/dL Final   06/23/2024 15.9 13.0 - 17.7 g/dL Final   06/22/2024 16.1 13.0 - 17.7 g/dL Final   06/21/2024 16.1 13.0 - 17.7 g/dL Final   06/20/2024 15.2 13.0 - 17.7 g/dL Final   06/19/2024 15.3 13.0 - 17.7 g/dL Final   06/17/2024 15.5 13.0 - 17.7 g/dL Final   06/16/2024 15.1 13.0 - 17.7 g/dL Final   06/15/2024 15.4 13.0 - 17.7 g/dL Final   01/15/2024 16.8 13.0 - 17.7 g/dL Final   01/13/2023 16.9 13.0 - 17.7 g/dL Final   11/21/2019 16.7 13.0 - 17.7 g/dL Final     Platelets   Date Value Ref Range Status   07/09/2024 133 (L) 140 - 450 10*3/mm3 Final   06/24/2024 148 140 - 450 10*3/mm3 Final   06/23/2024 163 140 - 450 10*3/mm3 Final   06/22/2024 187 140 - 450 10*3/mm3 Final   06/21/2024 154 140 - 450 10*3/mm3 Final   06/20/2024 173 140 - 450 10*3/mm3 Final   06/19/2024 157 140 - 450 10*3/mm3 Final   06/17/2024 156 140 - 450 10*3/mm3 Final   06/16/2024 154 140 - 450 10*3/mm3 Final   06/15/2024 148 140 - 450 10*3/mm3 Final   01/15/2024 188 150 - 450 x10E3/uL Final   01/13/2023 163 150 - 450 x10E3/uL Final   11/21/2019 177 140 - 450 10*3/mm3 Final     Lab Results   Component Value Date    GLUCOSE 563 (C) 07/09/2024    BUN 23 07/09/2024    CREATININE 0.66 (L) 07/09/2024    EGFRRESULT 86 01/15/2024    EGFR 107.4 07/09/2024    BCR 34.8  (H) 07/09/2024    K 4.6 07/09/2024    CO2 18.5 (L) 07/09/2024    CALCIUM 9.1 07/09/2024    PROTENTOTREF 7.6 01/15/2024    ALBUMIN 3.6 07/09/2024    BILITOT 1.3 (H) 07/09/2024     (C) 07/09/2024     (C) 07/09/2024     Tissue Pathology Exam: [id number]  Order: [id number]  Status: Edited Result - FINAL       Visible to patient: Yes (not seen)       Next appt: 07/22/2024 at 01:00 PM in Radiology ([facility] PET)       Dx: Brain mass    Specimen Information: A: Brain; Tissue    B: Brain; Tissue   0 Result Notes      Component    Addendum 2   Please see the completely scanned Summary report  from [facility] below.    Addendum electronically signed by Maria Eugenia Grajeda MD on 7/8/2024 at 0938   Addendum   Please see the completely scanned FISH (BCL-2, BCL-6, MYC) Analysis report from [facility] below.    Addendum electronically signed by Maria Eugenia Grajeda MD on 6/26/2024 at 0959   Case Report   Surgical Pathology Report                         Case: [id number]                                   Authorizing Provider:  Rosendo Bello MD        Collected:           06/19/2024 11:17 AM           Ordering Location:     [facility]  Received:            06/19/2024 11:29 AM                                  MAIN OR                                                                       Pathologist:           Maria Eugenia Grajeda MD                                                     Specimens:   1) - Brain, Right frontal brain mass- frozen call back main OR 28                                    2) - Brain, Right frontal brain mass tissue-fresh                                          Final Diagnosis   OUTSIDE [facility] CONSULTATION [id number]     1-2.  Brain, right frontal lobe, biopsy and resection: DIFFUSE LARGE B-CELL LYMPHOMA WITH A NON-GERMINAL CENTER PHENOTYPE (KI67 80%, VANIA-).                 SWM   Electronically signed by Maria Eugenia Grajeda MD on 6/24/2024 at 1033   Preliminary Diagnosis  "  1-2.  Brain, right frontal lobe, biopsy resection:               -Atypical lymphoproliferation consistent with diffuse large B cell lymphoma.               -Pending outside consultation.     SWM   Preliminary result electronically signed by Maria Eugenia Grajeda MD on 6/21/2024 at 0917  Preliminary result electronically signed by Maria Eugenia Grajeda MD on 6/20/2024 at 1459   Comment    Please see attached detailed consultant report.   Intraoperative Consultation    Crush prep/frozen section diagnosis: Right frontal brain mass: Poorly differentiated malignancy. SWM to Dr Bello 1144   Gross Description    1. Brain.  The specimen is received fresh, labeled with the patient's barcode and \"right frontal brain mass\" and consists of 1.4 x 1.0 x 0.4 cm aggregate of soft tan to pink rubbery tissue.  Crush preps are made.  One half of the specimen is submitted for frozen section and subsequently for permanents in cassette FS A.  The remaining tissue is entirely submitted for flow cytometry.     LMP/uso/swm  2. Brain.  The specimen is received fresh in two containers, each, labeled with the patient's barcode and \"right frontal brain mass\" and consists of a 3 x 2 x 1 cm aggregate of soft tan-pink to white rubbery tissue and blood.  Approximately 50% is submitted in four cassettes.              LMP/uso/swm   Special Stains    Utilizing appropriate controls, multiple immunohistochemical stains are performed on block 2A including AE1/AE3, Bcl-2, BCL6, cyclin D1, CD10, CD3, CD5, CD20, Mum 1P and Ki-67.  The lymphoid cells are positive for Mum 1P, CD20, BCL 6 (weak) and Bcl-2.  The overall immunoprofile supports a diffuse large B-cell lymphoma with an elevated Ki-67 approximately 80%.   Flow Cytometry Summary    [id number]: MONOCLONAL B-CELL POPULATION IDENTIFIED (71.6% OF TOTAL EVENTS).   Resulting Agency [facility]              Specimen Collected: 06/19/24 11:17 EDT Last Resulted: 07/08/24 09:38 EDT           "           Assessment & Plan   Assessment & Plan  *.  Primary CNS diffuse large B-cell lymphoma.    Patient had a craniotomy on 6/19/2024.  Pathology evaluation reported diffuse large B-cell lymphoma, Ki-67 at the 70%.  Further pathology evaluation is triple negative for Bcl-2, BCL6 and MYC.   I discussed with the patient today on 7/9/2024, recommending to have a PET scan examination for further evaluation.  Also discussed with the patient for high-dose methotrexate per NCCN guideline.   Will also obtain CSF on day of cycle 1 day 1 of chemotherapy for flow cytometry study, and also give intra thecal methotrexate. The frequency of chemotherapy will be adjusted no later than every 3 weeks, contingent on his recovery and tolerance. If his recovery is satisfactory, the frequency may be reduced to less than every 3 weeks. Maintenance therapy will be initiated for high-dose methotrexate monthly. A referral will be made for stem cell transplant service at the [facility].       *.  Hyperglycemia secondary to steroids.  The patient's highest recorded glucose level was 290 yesterday morning on 7/8/2024, and his daily glucose levels range from 120 to 150.   Patient was not diabetic before diagnosis of CNS lymphoma.  Clearly his hyperglycemia is caused by steroids.  He currently is on dexamethasone 6 mg twice a day.  Today on 7/9/2024 I recommended to decrease dexamethasone to 4 mg twice a day.  He will continue insulin prescribed by his primary care physician.      PLAN:  Arrange PET scan for further evaluation.  I cautioned patient his glucose needs to be controlled with the glucose level 180 in order to get PET scan examination.  Decrease dexamethasone to 4 mg twice a day.  I E scribed to his pharmacy for new prescription.  Continue Pepcid for GI prophylaxis.  Continue insulin for hyperglycemia caused by steroids.   Continue Keppra 500 mg twice a day for prophylaxis of seizure.  I E  scribed to his pharmacy.  Continue Bactrim 3 times a week for prophylaxis of PJP infection.  I E scribed to his pharmacy.  I will see patient in 1 week for evaluation discuss PET scan results and admitting patient to [facility] for inpatient high-dose methotrexate chemotherapy.  Patient will need CSF for flow cytometry study, and that 1 dose of methotrexate intrathecal chemotherapy that day.      I spent 60 minutes caring for Iain on this date of service. This time includes time spent by me in the following activities: preparing for the visit, reviewing tests, obtaining and/or reviewing a separately obtained history, performing a medically appropriate examination and/or evaluation, counseling and educating the patient/family/caregiver, ordering medications, tests, or procedures, referring and communicating with other health care professionals, documenting information in the medical record, independently interpreting results and communicating that information with the patient/family/caregiver and care coordination       TRINO SMILEY M.D., Ph.D.

## 2024-07-12 NOTE — TELEPHONE ENCOUNTER
Caller: Iain Christina    Relationship: Self    Best call back number: [phone]     What is the best time to reach you: ANY    Who are you requesting to speak with (clinical staff, provider,  specific staff member): CLINICAL STAFF    Do you know the name of the person who called:      What was the call regarding: PATIENT CALLED HE HAS MEDICAL ISSUE GOING ON DR SMILEY FOR PET SCAN AND NOT ABLE TO HAVE IT DONE DUE TO INSULIN LEVELS TO HIGH.  PATIENT WANTS TO KNOW WHAT HE NEEDS TO DO TO GET HIS INSULIN LEVELS DOWN AND SHOULD HE STOP TAKING THE STEROID FOR PERIOD OF TIME UNTIL HE CAN GET THIS PET SCAN DONE.  PLEASE CALL HIM BACK AND LET HIM KNOW WHAT HE NEEDS TO DO SO THAT HE CAN GET HIS SUGAR DOWN TO GET THE PET SCAN DONE.        Is it okay if the provider responds through MyChart:

## 2024-07-12 NOTE — TELEPHONE ENCOUNTER
Provider: Jim  Caller: patient  Relationship to Patient: self  Call Back Phone Number: [phone]  Reason for Call: Pt calling about insulin levels being to high for Pet scan.     Lab Facility: [id number] Lab Facility: [id number]

## 2024-07-15 ENCOUNTER — APPOINTMENT (OUTPATIENT)
Dept: ONCOLOGY | Facility: HOSPITAL | Age: 59
End: 2024-07-15
Payer: COMMERCIAL

## 2024-07-15 ENCOUNTER — LAB (OUTPATIENT)
Dept: LAB | Facility: HOSPITAL | Age: 59
End: 2024-07-15
Payer: COMMERCIAL

## 2024-07-15 ENCOUNTER — OFFICE VISIT (OUTPATIENT)
Dept: ONCOLOGY | Facility: CLINIC | Age: 59
End: 2024-07-15
Payer: COMMERCIAL

## 2024-07-15 ENCOUNTER — APPOINTMENT (OUTPATIENT)
Dept: GENERAL RADIOLOGY | Facility: HOSPITAL | Age: 59
DRG: 846 | End: 2024-07-15
Payer: COMMERCIAL

## 2024-07-15 ENCOUNTER — HOSPITAL ENCOUNTER (INPATIENT)
Facility: HOSPITAL | Age: 59
LOS: 8 days | Discharge: HOME | DRG: 846 | End: 2024-07-23
Attending: INTERNAL MEDICINE | Admitting: INTERNAL MEDICINE
Payer: COMMERCIAL

## 2024-07-15 VITALS
HEIGHT: 70 IN | SYSTOLIC BLOOD PRESSURE: 105 MMHG | WEIGHT: 208.5 LBS | DIASTOLIC BLOOD PRESSURE: 70 MMHG | HEART RATE: 74 BPM | TEMPERATURE: 97.5 F | BODY MASS INDEX: 29.85 KG/M2 | OXYGEN SATURATION: 97 %

## 2024-07-15 DIAGNOSIS — R73.9 STEROID-INDUCED HYPERGLYCEMIA: ICD-10-CM

## 2024-07-15 DIAGNOSIS — C85.89 PRIMARY CNS LYMPHOMA: ICD-10-CM

## 2024-07-15 DIAGNOSIS — T38.0X5A STEROID-INDUCED HYPERGLYCEMIA: ICD-10-CM

## 2024-07-15 DIAGNOSIS — C85.89 PRIMARY CNS LYMPHOMA: Primary | ICD-10-CM

## 2024-07-15 DIAGNOSIS — C85.10 LARGE B-CELL LYMPHOMA: ICD-10-CM

## 2024-07-15 DIAGNOSIS — C79.31 METASTASIS TO BRAIN: ICD-10-CM

## 2024-07-15 LAB
ALBUMIN SERPL-MCNC: 3.6 G/DL (ref 3.5–5.2)
ALBUMIN SERPL-MCNC: 4 G/DL (ref 3.5–5.2)
ALBUMIN/GLOB SERPL: 1.5 G/DL
ALBUMIN/GLOB SERPL: 1.6 G/DL
ALP SERPL-CCNC: 161 U/L (ref 39–117)
ALP SERPL-CCNC: 200 U/L (ref 39–117)
ALT SERPL W P-5'-P-CCNC: 322 U/L (ref 1–41)
ALT SERPL W P-5'-P-CCNC: 360 U/L (ref 1–41)
ANION GAP SERPL CALCULATED.3IONS-SCNC: 11.3 MMOL/L (ref 5–15)
ANION GAP SERPL CALCULATED.3IONS-SCNC: 13.9 MMOL/L (ref 5–15)
APPEARANCE CSF: CLEAR
APPEARANCE CSF: CLEAR
APTT PPP: 21 SECONDS (ref 22.7–35.4)
AST SERPL-CCNC: 82 U/L (ref 1–40)
AST SERPL-CCNC: 87 U/L (ref 1–40)
BASOPHILS # BLD AUTO: 0.02 10*3/MM3 (ref 0–0.2)
BASOPHILS # BLD AUTO: 0.04 10*3/MM3 (ref 0–0.2)
BASOPHILS NFR BLD AUTO: 0.2 % (ref 0–1.5)
BASOPHILS NFR BLD AUTO: 0.3 % (ref 0–1.5)
BILIRUB SERPL-MCNC: 1 MG/DL (ref 0–1.2)
BILIRUB SERPL-MCNC: 1.1 MG/DL (ref 0–1.2)
BUN SERPL-MCNC: 19 MG/DL (ref 8–23)
BUN SERPL-MCNC: 20 MG/DL (ref 8–23)
BUN/CREAT SERPL: 24.4 (ref 7–25)
BUN/CREAT SERPL: 28.8 (ref 7–25)
CALCIUM SPEC-SCNC: 8.8 MG/DL (ref 8.6–10.5)
CALCIUM SPEC-SCNC: 9.3 MG/DL (ref 8.6–10.5)
CHLORIDE SERPL-SCNC: 102 MMOL/L (ref 98–107)
CHLORIDE SERPL-SCNC: 98 MMOL/L (ref 98–107)
CO2 SERPL-SCNC: 19.7 MMOL/L (ref 22–29)
CO2 SERPL-SCNC: 22.1 MMOL/L (ref 22–29)
COLOR CSF: COLORLESS
COLOR CSF: COLORLESS
CREAT SERPL-MCNC: 0.66 MG/DL (ref 0.76–1.27)
CREAT SERPL-MCNC: 0.82 MG/DL (ref 0.76–1.27)
DEPRECATED RDW RBC AUTO: 39.3 FL (ref 37–54)
DEPRECATED RDW RBC AUTO: 41.5 FL (ref 37–54)
EGFRCR SERPLBLD CKD-EPI 2021: 100.6 ML/MIN/1.73
EGFRCR SERPLBLD CKD-EPI 2021: 107.4 ML/MIN/1.73
EOSINOPHIL # BLD AUTO: 0 10*3/MM3 (ref 0–0.4)
EOSINOPHIL # BLD AUTO: 0.01 10*3/MM3 (ref 0–0.4)
EOSINOPHIL NFR BLD AUTO: 0 % (ref 0.3–6.2)
EOSINOPHIL NFR BLD AUTO: 0.1 % (ref 0.3–6.2)
ERYTHROCYTE [DISTWIDTH] IN BLOOD BY AUTOMATED COUNT: 12.4 % (ref 12.3–15.4)
ERYTHROCYTE [DISTWIDTH] IN BLOOD BY AUTOMATED COUNT: 12.8 % (ref 12.3–15.4)
GLOBULIN UR ELPH-MCNC: 2.2 GM/DL
GLOBULIN UR ELPH-MCNC: 2.7 GM/DL
GLUCOSE BLDC GLUCOMTR-MCNC: 296 MG/DL (ref 70–130)
GLUCOSE BLDC GLUCOMTR-MCNC: 319 MG/DL (ref 70–130)
GLUCOSE BLDC GLUCOMTR-MCNC: 330 MG/DL (ref 70–130)
GLUCOSE CSF-MCNC: 201 MG/DL (ref 40–70)
GLUCOSE SERPL-MCNC: 310 MG/DL (ref 65–99)
GLUCOSE SERPL-MCNC: 316 MG/DL (ref 65–99)
HBV SURFACE AB SER RIA-ACNC: NORMAL
HBV SURFACE AG SERPL QL IA: NORMAL
HCT VFR BLD AUTO: 45.8 % (ref 37.5–51)
HCT VFR BLD AUTO: 48.2 % (ref 37.5–51)
HGB BLD-MCNC: 15.5 G/DL (ref 13–17.7)
HGB BLD-MCNC: 16.7 G/DL (ref 13–17.7)
HOLD SPECIMEN: NORMAL
IMM GRANULOCYTES # BLD AUTO: 0.22 10*3/MM3 (ref 0–0.05)
IMM GRANULOCYTES # BLD AUTO: 0.28 10*3/MM3 (ref 0–0.05)
IMM GRANULOCYTES NFR BLD AUTO: 2.2 % (ref 0–0.5)
IMM GRANULOCYTES NFR BLD AUTO: 2.3 % (ref 0–0.5)
INR PPP: 1 (ref 0.9–1.1)
LYMPHOCYTES # BLD AUTO: 1.06 10*3/MM3 (ref 0.7–3.1)
LYMPHOCYTES # BLD AUTO: 1.27 10*3/MM3 (ref 0.7–3.1)
LYMPHOCYTES NFR BLD AUTO: 10.5 % (ref 19.6–45.3)
LYMPHOCYTES NFR BLD AUTO: 10.5 % (ref 19.6–45.3)
MCH RBC QN AUTO: 30 PG (ref 26.6–33)
MCH RBC QN AUTO: 30 PG (ref 26.6–33)
MCHC RBC AUTO-ENTMCNC: 33.8 G/DL (ref 31.5–35.7)
MCHC RBC AUTO-ENTMCNC: 34.6 G/DL (ref 31.5–35.7)
MCV RBC AUTO: 86.7 FL (ref 79–97)
MCV RBC AUTO: 88.8 FL (ref 79–97)
METHOD: ABNORMAL
METHOD: ABNORMAL
MONOCYTES # BLD AUTO: 0.27 10*3/MM3 (ref 0.1–0.9)
MONOCYTES # BLD AUTO: 0.38 10*3/MM3 (ref 0.1–0.9)
MONOCYTES NFR BLD AUTO: 2.7 % (ref 5–12)
MONOCYTES NFR BLD AUTO: 3.2 % (ref 5–12)
NEUTROPHILS NFR BLD AUTO: 10.08 10*3/MM3 (ref 1.7–7)
NEUTROPHILS NFR BLD AUTO: 8.56 10*3/MM3 (ref 1.7–7)
NEUTROPHILS NFR BLD AUTO: 83.6 % (ref 42.7–76)
NEUTROPHILS NFR BLD AUTO: 84.4 % (ref 42.7–76)
NRBC BLD AUTO-RTO: 0 /100 WBC (ref 0–0.2)
NRBC BLD AUTO-RTO: 0 /100 WBC (ref 0–0.2)
NUC CELL # CSF MANUAL: 2 /MM3 (ref 0–5)
NUC CELL # CSF MANUAL: 2 /MM3 (ref 0–5)
PH UR STRIP.AUTO: 7 [PH] (ref 5–8)
PLATELET # BLD AUTO: 138 10*3/MM3 (ref 140–450)
PLATELET # BLD AUTO: 161 10*3/MM3 (ref 140–450)
PMV BLD AUTO: 10.7 FL (ref 6–12)
PMV BLD AUTO: 10.8 FL (ref 6–12)
POTASSIUM SERPL-SCNC: 3.8 MMOL/L (ref 3.5–5.2)
POTASSIUM SERPL-SCNC: 4.1 MMOL/L (ref 3.5–5.2)
PROT CSF-MCNC: 107.2 MG/DL (ref 15–45)
PROT SERPL-MCNC: 5.8 G/DL (ref 6–8.5)
PROT SERPL-MCNC: 6.7 G/DL (ref 6–8.5)
PROTHROMBIN TIME: 11.7 SECONDS (ref 11–13.5)
RBC # BLD AUTO: 5.16 10*6/MM3 (ref 4.14–5.8)
RBC # BLD AUTO: 5.56 10*6/MM3 (ref 4.14–5.8)
RBC # CSF MANUAL: 37 /MM3 (ref 0–0)
RBC # CSF MANUAL: 46 /MM3 (ref 0–0)
SODIUM SERPL-SCNC: 133 MMOL/L (ref 136–145)
SODIUM SERPL-SCNC: 134 MMOL/L (ref 136–145)
TUBE # CSF: 1
TUBE # CSF: 4
WBC NRBC COR # BLD AUTO: 10.13 10*3/MM3 (ref 3.4–10.8)
WBC NRBC COR # BLD AUTO: 12.06 10*3/MM3 (ref 3.4–10.8)

## 2024-07-15 PROCEDURE — 80053 COMPREHEN METABOLIC PANEL: CPT | Performed by: INTERNAL MEDICINE

## 2024-07-15 PROCEDURE — 3E0R30M INTRODUCTION OF MONOCLONAL ANTIBODY INTO SPINAL CANAL, PERCUTANEOUS APPROACH: ICD-10-PCS | Performed by: INTERNAL MEDICINE

## 2024-07-15 PROCEDURE — 85025 COMPLETE CBC W/AUTO DIFF WBC: CPT | Performed by: INTERNAL MEDICINE

## 2024-07-15 PROCEDURE — 82945 GLUCOSE OTHER FLUID: CPT | Performed by: INTERNAL MEDICINE

## 2024-07-15 PROCEDURE — 36415 COLL VENOUS BLD VENIPUNCTURE: CPT

## 2024-07-15 PROCEDURE — 63710000001 INSULIN LISPRO (HUMAN) PER 5 UNITS: Performed by: INTERNAL MEDICINE

## 2024-07-15 PROCEDURE — 81003 URINALYSIS AUTO W/O SCOPE: CPT | Performed by: INTERNAL MEDICINE

## 2024-07-15 PROCEDURE — 86706 HEP B SURFACE ANTIBODY: CPT | Performed by: INTERNAL MEDICINE

## 2024-07-15 PROCEDURE — 99223 1ST HOSP IP/OBS HIGH 75: CPT | Performed by: INTERNAL MEDICINE

## 2024-07-15 PROCEDURE — 89050 BODY FLUID CELL COUNT: CPT | Performed by: INTERNAL MEDICINE

## 2024-07-15 PROCEDURE — 25010000002 LIDOCAINE 1 % SOLUTION: Performed by: INTERNAL MEDICINE

## 2024-07-15 PROCEDURE — 85025 COMPLETE CBC W/AUTO DIFF WBC: CPT

## 2024-07-15 PROCEDURE — 96450 CHEMOTHERAPY INTO CNS: CPT

## 2024-07-15 PROCEDURE — 85730 THROMBOPLASTIN TIME PARTIAL: CPT | Performed by: INTERNAL MEDICINE

## 2024-07-15 PROCEDURE — 63710000001 DEXAMETHASONE PER 0.25 MG: Performed by: INTERNAL MEDICINE

## 2024-07-15 PROCEDURE — 82948 REAGENT STRIP/BLOOD GLUCOSE: CPT

## 2024-07-15 PROCEDURE — 25010000002 METHOTREXATE PF 100 MG/4ML SOLUTION 2 ML VIAL: Performed by: INTERNAL MEDICINE

## 2024-07-15 PROCEDURE — 88108 CYTOPATH CONCENTRATE TECH: CPT | Performed by: INTERNAL MEDICINE

## 2024-07-15 PROCEDURE — 77003 FLUOROGUIDE FOR SPINE INJECT: CPT

## 2024-07-15 PROCEDURE — 87340 HEPATITIS B SURFACE AG IA: CPT | Performed by: INTERNAL MEDICINE

## 2024-07-15 PROCEDURE — 85610 PROTHROMBIN TIME: CPT

## 2024-07-15 PROCEDURE — 009U3ZX DRAINAGE OF SPINAL CANAL, PERCUTANEOUS APPROACH, DIAGNOSTIC: ICD-10-PCS | Performed by: INTERNAL MEDICINE

## 2024-07-15 PROCEDURE — 3E0R305 INTRODUCTION OF OTHER ANTINEOPLASTIC INTO SPINAL CANAL, PERCUTANEOUS APPROACH: ICD-10-PCS | Performed by: INTERNAL MEDICINE

## 2024-07-15 PROCEDURE — 84157 ASSAY OF PROTEIN OTHER: CPT | Performed by: INTERNAL MEDICINE

## 2024-07-15 RX ORDER — LEVETIRACETAM 500 MG/1
500 TABLET ORAL EVERY 12 HOURS SCHEDULED
Status: DISCONTINUED | OUTPATIENT
Start: 2024-07-15 | End: 2024-07-23 | Stop reason: HOSPADM

## 2024-07-15 RX ORDER — SODIUM CHLORIDE 0.9 % (FLUSH) 0.9 %
10 SYRINGE (ML) INJECTION EVERY 12 HOURS SCHEDULED
Status: DISCONTINUED | OUTPATIENT
Start: 2024-07-15 | End: 2024-07-23 | Stop reason: HOSPADM

## 2024-07-15 RX ORDER — IBUPROFEN 600 MG/1
1 TABLET ORAL
Status: DISCONTINUED | OUTPATIENT
Start: 2024-07-15 | End: 2024-07-23 | Stop reason: HOSPADM

## 2024-07-15 RX ORDER — SODIUM CHLORIDE 9 MG/ML
40 INJECTION, SOLUTION INTRAVENOUS AS NEEDED
Status: DISCONTINUED | OUTPATIENT
Start: 2024-07-15 | End: 2024-07-23 | Stop reason: HOSPADM

## 2024-07-15 RX ORDER — HEPARIN SODIUM (PORCINE) LOCK FLUSH IV SOLN 100 UNIT/ML 100 UNIT/ML
5 SOLUTION INTRAVENOUS AS NEEDED
Status: DISCONTINUED | OUTPATIENT
Start: 2024-07-15 | End: 2024-07-23 | Stop reason: HOSPADM

## 2024-07-15 RX ORDER — INSULIN LISPRO 100 [IU]/ML
4-24 INJECTION, SOLUTION INTRAVENOUS; SUBCUTANEOUS
Status: DISCONTINUED | OUTPATIENT
Start: 2024-07-15 | End: 2024-07-23 | Stop reason: HOSPADM

## 2024-07-15 RX ORDER — LOSARTAN POTASSIUM 50 MG/1
50 TABLET ORAL
Status: DISCONTINUED | OUTPATIENT
Start: 2024-07-16 | End: 2024-07-23 | Stop reason: HOSPADM

## 2024-07-15 RX ORDER — FAMOTIDINE 20 MG/1
20 TABLET, FILM COATED ORAL
Status: DISCONTINUED | OUTPATIENT
Start: 2024-07-15 | End: 2024-07-23 | Stop reason: HOSPADM

## 2024-07-15 RX ORDER — NICOTINE POLACRILEX 4 MG
15 LOZENGE BUCCAL
Status: DISCONTINUED | OUTPATIENT
Start: 2024-07-15 | End: 2024-07-23 | Stop reason: HOSPADM

## 2024-07-15 RX ORDER — DEXTROSE MONOHYDRATE 25 G/50ML
25 INJECTION, SOLUTION INTRAVENOUS
Status: DISCONTINUED | OUTPATIENT
Start: 2024-07-15 | End: 2024-07-23 | Stop reason: HOSPADM

## 2024-07-15 RX ORDER — SODIUM CHLORIDE 0.9 % (FLUSH) 0.9 %
20 SYRINGE (ML) INJECTION AS NEEDED
Status: DISCONTINUED | OUTPATIENT
Start: 2024-07-15 | End: 2024-07-23 | Stop reason: HOSPADM

## 2024-07-15 RX ORDER — ZOLPIDEM TARTRATE 5 MG/1
5 TABLET ORAL NIGHTLY PRN
Status: DISCONTINUED | OUTPATIENT
Start: 2024-07-15 | End: 2024-07-17

## 2024-07-15 RX ORDER — SODIUM CHLORIDE 0.9 % (FLUSH) 0.9 %
10 SYRINGE (ML) INJECTION AS NEEDED
Status: DISCONTINUED | OUTPATIENT
Start: 2024-07-15 | End: 2024-07-23 | Stop reason: HOSPADM

## 2024-07-15 RX ORDER — ONDANSETRON 2 MG/ML
4 INJECTION INTRAMUSCULAR; INTRAVENOUS EVERY 6 HOURS PRN
Status: DISCONTINUED | OUTPATIENT
Start: 2024-07-15 | End: 2024-07-23 | Stop reason: HOSPADM

## 2024-07-15 RX ORDER — ENOXAPARIN SODIUM 100 MG/ML
40 INJECTION SUBCUTANEOUS DAILY
Status: DISCONTINUED | OUTPATIENT
Start: 2024-07-16 | End: 2024-07-23 | Stop reason: HOSPADM

## 2024-07-15 RX ORDER — HYDROCHLOROTHIAZIDE 12.5 MG/1
12.5 TABLET ORAL
Status: DISCONTINUED | OUTPATIENT
Start: 2024-07-16 | End: 2024-07-23 | Stop reason: HOSPADM

## 2024-07-15 RX ORDER — LIDOCAINE HYDROCHLORIDE 10 MG/ML
10 INJECTION, SOLUTION INFILTRATION; PERINEURAL ONCE
Status: COMPLETED | OUTPATIENT
Start: 2024-07-15 | End: 2024-07-15

## 2024-07-15 RX ORDER — HYDROCODONE BITARTRATE AND ACETAMINOPHEN 5; 325 MG/1; MG/1
1 TABLET ORAL EVERY 4 HOURS PRN
Status: DISCONTINUED | OUTPATIENT
Start: 2024-07-15 | End: 2024-07-23 | Stop reason: HOSPADM

## 2024-07-15 RX ORDER — DEXAMETHASONE 4 MG/1
4 TABLET ORAL 2 TIMES DAILY WITH MEALS
Status: DISCONTINUED | OUTPATIENT
Start: 2024-07-15 | End: 2024-07-22

## 2024-07-15 RX ORDER — PROCHLORPERAZINE MALEATE 10 MG
10 TABLET ORAL EVERY 6 HOURS PRN
Status: DISCONTINUED | OUTPATIENT
Start: 2024-07-15 | End: 2024-07-23 | Stop reason: HOSPADM

## 2024-07-15 RX ORDER — AMOXICILLIN 250 MG
1 CAPSULE ORAL 2 TIMES DAILY
Status: DISCONTINUED | OUTPATIENT
Start: 2024-07-15 | End: 2024-07-23 | Stop reason: HOSPADM

## 2024-07-15 RX ORDER — INSULIN LISPRO 100 [IU]/ML
5 INJECTION, SOLUTION INTRAVENOUS; SUBCUTANEOUS
Status: DISCONTINUED | OUTPATIENT
Start: 2024-07-15 | End: 2024-07-23 | Stop reason: HOSPADM

## 2024-07-15 RX ADMIN — INSULIN LISPRO 16 UNITS: 100 INJECTION, SOLUTION INTRAVENOUS; SUBCUTANEOUS at 17:08

## 2024-07-15 RX ADMIN — DEXAMETHASONE 4 MG: 4 TABLET ORAL at 17:02

## 2024-07-15 RX ADMIN — SODIUM BICARBONATE: 84 INJECTION, SOLUTION INTRAVENOUS at 11:58

## 2024-07-15 RX ADMIN — INSULIN LISPRO 6 UNITS: 100 INJECTION, SOLUTION INTRAVENOUS; SUBCUTANEOUS at 12:52

## 2024-07-15 RX ADMIN — Medication 10 ML: at 12:22

## 2024-07-15 RX ADMIN — SODIUM BICARBONATE: 84 INJECTION, SOLUTION INTRAVENOUS at 18:28

## 2024-07-15 RX ADMIN — SENNOSIDES AND DOCUSATE SODIUM 1 TABLET: 50; 8.6 TABLET ORAL at 22:27

## 2024-07-15 RX ADMIN — INSULIN LISPRO 16 UNITS: 100 INJECTION, SOLUTION INTRAVENOUS; SUBCUTANEOUS at 22:28

## 2024-07-15 RX ADMIN — Medication 10 ML: at 22:33

## 2024-07-15 RX ADMIN — INSULIN LISPRO 5 UNITS: 100 INJECTION, SOLUTION INTRAVENOUS; SUBCUTANEOUS at 17:09

## 2024-07-15 RX ADMIN — SODIUM CHLORIDE 12 MG: 9 INJECTION, SOLUTION INTRAMUSCULAR; INTRAVENOUS; SUBCUTANEOUS at 13:12

## 2024-07-15 RX ADMIN — LIDOCAINE HYDROCHLORIDE 4 ML: 10 INJECTION, SOLUTION INFILTRATION; PERINEURAL at 13:11

## 2024-07-15 RX ADMIN — ZOLPIDEM TARTRATE 5 MG: 5 TABLET, FILM COATED ORAL at 22:32

## 2024-07-15 RX ADMIN — FAMOTIDINE 20 MG: 20 TABLET, FILM COATED ORAL at 17:02

## 2024-07-15 RX ADMIN — LEVETIRACETAM 500 MG: 500 TABLET, FILM COATED ORAL at 22:27

## 2024-07-15 RX ADMIN — INSULIN LISPRO 5 UNITS: 100 INJECTION, SOLUTION INTRAVENOUS; SUBCUTANEOUS at 14:24

## 2024-07-15 NOTE — PROGRESS NOTES
.     REASON FOR FOLLOW-UP:     Provide an opinion on any further workup or treatment primary CNS lymphoma.  Negative for BCL2, BCL6, and MYC by FISH.                                 HISTORY OF PRESENT ILLNESS:  The patient is a 58 y.o. year old male who is here for follow-up with the above-mentioned history.    History of Present Illness  The patient is here today for evaluation prior to his scheduled high-dose methotrexate chemotherapy for primary CNS lymphoma. He is accompanied by his brother-in-law who helped her with history and discussion.    He was last seen on 07/09/2024 and was scheduled for a PET scan examination for further evaluation. However, his glucose level was significantly elevated at 355 on 07/12/2024 on the day he was scheduled for the PET scan, which was subsequently cancelled due to hyperglycemia.     Today's lab results for CMP, hepatitis B, and hepatitis C are still pending. Had elevated liver enzymes on 7/9/2024 including , , and total bilirubin 1.3.  Because of that we decided to a 25% dose reduction of the high-dose methotrexate at 6 g/m² instead of 8 g/m².     According to his brother-in-law, his blood sugar level was 500 this morning. He has been taking insulin, sometimes twice a day. After returning home about a week after his surgery, he was prescribed insulin. However, he admits to not receiving the insulin for the past 4 to 5 days due to forgetfulness and memory issues. He sleeps a lot, so he often only takes insulin once a day, not three times a day. The lowest his blood sugar level this week has been about 350. He was scheduled for a PET scan on Friday, but it was cancelled due to his blood sugar level of 350 and was told it should be 200 or less.     He denies having a fever. His blood pressure is slightly low, at 104/70, which he attributes to not drinking a lot of water. He has lost about 20 pounds since his surgery. He denies experiencing any pain, nausea, or  vomiting. He reports feeling hungry all the time.    Results  Laboratory Studies  Glucose level was 355 on 7/12/2024.     Lab study today on 7/15/2024 mild leukocytosis, WBC 12,060, neutrophils 10,080, lymphocytes 1270, monocytes 380, hemoglobin 16.7, platelets 161,000. INR is 1.0, PT 11.7 seconds. , , total bilirubin 1.3.            Past Medical History:   Diagnosis Date    Diabetes mellitus     Hepatitis C     Hyperthyroidism     Sleep apnea      Past Surgical History:   Procedure Laterality Date    CRANIOTOMY FOR TUMOR Right 6/19/2024    Procedure: Right frontal craniotomy for tumor;  Surgeon: Rosendo Bello MD;  Location: [facility];  Service: Neurosurgery;  Laterality: Right;    NASAL SEPTAL RECONSTRUCTION      VENOUS ACCESS DEVICE (PORT) INSERTION N/A 6/24/2024    Procedure: INSERTION VENOUS ACCESS DEVICE;  Surgeon: Linda Coates MD;  Location: [facility];  Service: General;  Laterality: N/A;     MEDICAL ONCOLOGY HISTORY: The patient is a 58 y.o. year old male whom we are consulted for evaluation and management of newly diagnosed CNS diffuse large B-cell lymphoma.     This patient has hypertension, hepatitis C, sleep apnea, however he denies diabetes, also previous history of alcohol use who stopped drinking 6 months ago, who presented to [facility] emergency room on 06/15/2024 because of worsening memory as well as episodes of fall. This patient is a long term . He initially came to [facility] ER and CT scan shows brain mets. He was given IV steroids and brought to [facility] for further evaluation and management. The patient reports no fever, sweating, chills, no weight loss. He does not smoke cigarettes.      The CT scan of the head without contrast on 06/15/2024 at [facility] reported a right frontal lobe mass measuring 4.8 x 3.3 cm with large amount of associated vasogenic edema and so called effacement. There is 8  mm right to left midline shift. There was significant mass effect.     After he was admitted to [facility] further imaging studies with the brain MRI with and without contrast on 06/15/2024 confirmed an enhancing mass 4 x 4 x 4 cm in the deep white matter of the right frontal lobe and with internal region of cystic changes. There was intramural micro hemorrhage. There was extensive surrounding vasogenic edema extends across the midline to the corpus collosum. There is leftward midline shift measuring 14 mm.      The patient further had workup with imaging studies for chest, abdomen and pelvis with IV contrast obtained on 06/16/2024 and this study reported no mediastinal adenopathy in the chest. There was trace pericardial effusion. No pulmonary lesions, pleural effusions or pneumothorax. In the abdomen there was chronic diverticulosis, otherwise unremarkable. There was borderline enlarged carley hepatic lymph nodes 1.2 cm. Otherwise unremarkable. No bone lesions.      This patient was seen by, Rosendo Bello MD, Neurosurgeon, who performed right frontal craniotomy and the intraoperative frozen section reported undifferentiated malignancy and the possibility of lymphoma. The patient has excisional biopsy for the right frontal lobe mass.     Pathology evaluation reported atypical lymphoproliferative consistent with diffuse large B-cell lymphoma pending outside consultation. The gross description reported specimen measuring 1.4 x 1.0 x 0.4 cm. The 2nd specimen consisted of 3 x 2 x 1 cm rubbery soft tissue.      The patient presents for evaluation of primary CNS B-cell lymphoma.    I saw him recently in on 6/21/2024 during his hospitalization for newly diagnosed primary CNS lymphoma.    Genetic study of tumor sample: All negative for the BCL2, BCL6, and MYC. Tumor is strongly positive for CD20. Blood glucose level between 120 and 150. A1c is 5.6.      MEDICATIONS  No current facility-administered  medications for this visit.  No current outpatient medications on file.    Facility-Administered Medications Ordered in Other Visits:     dexAMETHasone (DECADRON) tablet 4 mg, 4 mg, Oral, BID With Meals, Krista Fernandez MD PhD, 4 mg at 07/15/24 1702    dextrose (D50W) (25 g/50 mL) IV injection 25 g, 25 g, Intravenous, Q15 Min PRN, Krista Fernandez MD PhD    dextrose (GLUTOSE) oral gel 15 g, 15 g, Oral, Q15 Min PRN, Krista Fernandez MD PhD    [START ON 7/16/2024] Enoxaparin Sodium (LOVENOX) syringe 40 mg, 40 mg, Subcutaneous, Daily, Krista Fernandez MD PhD    famotidine (PEPCID) tablet 20 mg, 20 mg, Oral, BID AC, Krista Fernandez MD PhD, 20 mg at 07/15/24 1702    glucagon (GLUCAGEN) injection 1 mg, 1 mg, Intramuscular, Q15 Min PRN, Krista Fernandez MD PhD    heparin injection 500 Units, 5 mL, Intravenous, PRN, Krista Fernandez MD PhD    [START ON 7/16/2024] losartan (COZAAR) tablet 50 mg, 50 mg, Oral, Q24H **AND** [START ON 7/16/2024] hydroCHLOROthiazide tablet 12.5 mg, 12.5 mg, Oral, Q24H, Krista Fernandez MD PhD    HYDROcodone-acetaminophen (NORCO) 5-325 MG per tablet 1 tablet, 1 tablet, Oral, Q4H PRN, Krista Fernandez MD PhD    insulin glargine (LANTUS, SEMGLEE) injection 25 Units, 25 Units, Subcutaneous, Daily, Krista Fernandez MD PhD    insulin lispro (HUMALOG/ADMELOG) injection 4-24 Units, 4-24 Units, Subcutaneous, 4x Daily AC & at Bedtime, Krista Fernandez MD PhD, 16 Units at 07/15/24 1708    insulin lispro (HUMALOG/ADMELOG) injection 5 Units, 5 Units, Subcutaneous, TID With Meals, Krista Fernandez MD PhD, 5 Units at 07/15/24 1709    levETIRAcetam (KEPPRA) tablet 500 mg, 500 mg, Oral, Q12H, Krista Fernandez MD PhD    ondansetron (ZOFRAN) injection 4 mg, 4 mg, Intravenous, Q6H PRN, Krista Fernandez MD PhD    prochlorperazine (COMPAZINE) tablet 10 mg, 10 mg, Oral, Q6H PRN, Krista Fernandez MD PhD    sennosides-docusate (PERICOLACE) 8.6-50 MG per tablet 1 tablet, 1 tablet, Oral, BID, Krista Fernandez MD PhD     "sodium chloride 0.9 % flush 10 mL, 10 mL, Intravenous, Q12H, Krista Fernandez MD PhD, 10 mL at 07/15/24 1222    sodium chloride 0.9 % flush 10 mL, 10 mL, Intravenous, PRN, Krista Fernandez MD PhD    sodium chloride 0.9 % flush 20 mL, 20 mL, Intravenous, PRN, Krista Fernandez MD PhD    sodium chloride 0.9 % infusion 40 mL, 40 mL, Intravenous, PRN, Krista Fernandez MD PhD    sterile water (preservative free) 850 mL with sodium bicarbonate 8.4 % 150 mEq infusion, , Intravenous, Continuous, Krista Fernandez MD PhD, Last Rate: 150 mL/hr at 07/15/24 1158, New Bag at 07/15/24 1158    zolpidem (AMBIEN) tablet 5 mg, 5 mg, Oral, Nightly PRN, Krista Fernandez MD PhD    ALLERGIES:     Allergies   Allergen Reactions    Cashew Nut Anaphylaxis    Peanut (Diagnostic) Anaphylaxis    Peanut-Containing Drug Products Anaphylaxis    Bee Pollen Other (See Comments)     eyes itching, running nose       SOCIAL HISTORY:       Social History     Socioeconomic History    Marital status: Unknown   Tobacco Use    Smoking status: Never    Smokeless tobacco: Never   Vaping Use    Vaping status: Never Used   Substance and Sexual Activity    Alcohol use: Not Currently     Comment: STATED HE QUIT IN JANUARY    Drug use: Never    Sexual activity: Yes         FAMILY HISTORY:  Family History   Problem Relation Age of Onset    COPD Mother     Cancer Sister     No Known Problems Brother        REVIEW OF SYSTEMS:  Review of Systems  See HPI.           Vitals:    07/15/24 0802   BP: 105/70   Pulse: 74   Temp: 97.5 °F (36.4 °C)   TempSrc: Oral   SpO2: 97%   Weight: 94.6 kg (208 lb 8 oz)   Height: 177.8 cm (70\")   PainSc: 0-No pain         7/15/2024     8:07 AM   Current Status   ECOG score 0     Physical Exam  Vital Signs  Vitals show a blood pressure of 105/70.  GENERAL:  Well-developed, well-nourished in no acute distress.  Orientated to time place and the people.  SKIN:  Warm, dry without rashes, purpura or petechiae.  HEENT:  Normocephalic.  Right " frontal head has surgical scar in place.  LYMPHATICS:  No cervical, supraclavicular adenopathy.  CHEST: Normal respiratory effort.  Lungs clear to auscultation. Good airflow.  CARDIAC:  Regular rate and rhythm without murmurs. Normal S1,S2.  ABDOMEN:  Soft, nontender with no organomegaly or masses.  Bowel sounds normal.  EXTREMITIES:  No lower extremity edema.        RECENT LABS:        WBC   Date Value Ref Range Status   07/15/2024 10.13 3.40 - 10.80 10*3/mm3 Final   07/15/2024 12.06 (H) 3.40 - 10.80 10*3/mm3 Final   07/09/2024 8.11 3.40 - 10.80 10*3/mm3 Final   06/24/2024 11.00 (H) 3.40 - 10.80 10*3/mm3 Final   06/23/2024 13.39 (H) 3.40 - 10.80 10*3/mm3 Final   06/22/2024 12.14 (H) 3.40 - 10.80 10*3/mm3 Final   06/21/2024 11.28 (H) 3.40 - 10.80 10*3/mm3 Final   06/20/2024 12.41 (H) 3.40 - 10.80 10*3/mm3 Final   06/19/2024 11.26 (H) 3.40 - 10.80 10*3/mm3 Final   06/17/2024 13.92 (H) 3.40 - 10.80 10*3/mm3 Final   06/16/2024 8.20 3.40 - 10.80 10*3/mm3 Final   06/15/2024 6.53 3.40 - 10.80 10*3/mm3 Final   01/15/2024 6.0 3.4 - 10.8 x10E3/uL Final   01/13/2023 5.6 3.4 - 10.8 x10E3/uL Final   11/21/2019 6.90 3.40 - 10.80 10*3/mm3 Final     Hemoglobin   Date Value Ref Range Status   07/15/2024 15.5 13.0 - 17.7 g/dL Final   07/15/2024 16.7 13.0 - 17.7 g/dL Final   07/09/2024 17.4 13.0 - 17.7 g/dL Final   06/24/2024 16.0 13.0 - 17.7 g/dL Final   06/23/2024 15.9 13.0 - 17.7 g/dL Final   06/22/2024 16.1 13.0 - 17.7 g/dL Final   06/21/2024 16.1 13.0 - 17.7 g/dL Final   06/20/2024 15.2 13.0 - 17.7 g/dL Final   06/19/2024 15.3 13.0 - 17.7 g/dL Final   06/17/2024 15.5 13.0 - 17.7 g/dL Final   06/16/2024 15.1 13.0 - 17.7 g/dL Final   06/15/2024 15.4 13.0 - 17.7 g/dL Final   01/15/2024 16.8 13.0 - 17.7 g/dL Final   01/13/2023 16.9 13.0 - 17.7 g/dL Final   11/21/2019 16.7 13.0 - 17.7 g/dL Final     Platelets   Date Value Ref Range Status   07/15/2024 138 (L) 140 - 450 10*3/mm3 Final   07/15/2024 161 140 - 450 10*3/mm3 Final    07/09/2024 133 (L) 140 - 450 10*3/mm3 Final   06/24/2024 148 140 - 450 10*3/mm3 Final   06/23/2024 163 140 - 450 10*3/mm3 Final   06/22/2024 187 140 - 450 10*3/mm3 Final   06/21/2024 154 140 - 450 10*3/mm3 Final   06/20/2024 173 140 - 450 10*3/mm3 Final   06/19/2024 157 140 - 450 10*3/mm3 Final   06/17/2024 156 140 - 450 10*3/mm3 Final   06/16/2024 154 140 - 450 10*3/mm3 Final   06/15/2024 148 140 - 450 10*3/mm3 Final   01/15/2024 188 150 - 450 x10E3/uL Final   01/13/2023 163 150 - 450 x10E3/uL Final   11/21/2019 177 140 - 450 10*3/mm3 Final     Lab Results   Component Value Date    GLUCOSE 310 (H) 07/15/2024    BUN 19 07/15/2024    CREATININE 0.66 (L) 07/15/2024    EGFRRESULT 86 01/15/2024    EGFR 107.4 07/15/2024    BCR 28.8 (H) 07/15/2024    K 3.8 07/15/2024    CO2 19.7 (L) 07/15/2024    CALCIUM 8.8 07/15/2024    PROTENTOTREF 7.6 01/15/2024    ALBUMIN 3.6 07/15/2024    BILITOT 1.0 07/15/2024    AST 82 (H) 07/15/2024     (H) 07/15/2024         Assessment & Plan     Assessment & Plan      *.  Primary CNS diffuse large B-cell lymphoma.    Patient had a craniotomy on 6/19/2024.  Pathology evaluation reported diffuse large B-cell lymphoma, Ki-67 at the 70%.  Further pathology evaluation is triple negative for Bcl-2, BCL6 and MYC.   I discussed with the patient today on 7/9/2024, recommending to have a PET scan examination for further evaluation.  Also discussed with the patient for high-dose methotrexate per NCCN guideline.   Will also obtain CSF on day of cycle 1 day 1 of chemotherapy for flow cytometry study, and also give intra thecal methotrexate. The frequency of chemotherapy will be adjusted no later than every 3 weeks, contingent on his recovery and tolerance. If his recovery is satisfactory, the frequency may be reduced to less than every 3 weeks. Maintenance therapy will be initiated for high-dose methotrexate monthly. A referral will be made for stem cell transplant service at [facility] in [facility].  Patient could not have PET scan examination on 7/12/2024 due to significant elevated glucose level.  7/15/2024 patient will be admitted to hospital for cycle 1 day 1 chemotherapy with high-dose methotrexate.  This includes a lumbar puncture with cerebrospinal fluid analysis and intrathecal chemotherapy with methotrexate. Depending on the results, a decision regarding methotrexate administration will be determined. Additional oral tablets of leucovorin will be administered during the hospital stay to mitigate the side effects of the methotrexate.    *.  Hyperglycemia secondary to steroids.  The patient's highest recorded glucose level was 290 yesterday morning on 7/8/2024, and his daily glucose levels range from 120 to 150.   Patient was not diabetic before diagnosis of CNS lymphoma.  Clearly his hyperglycemia is caused by steroids.  He currently is on dexamethasone 6 mg twice a day.  Today on 7/9/2024 I recommended to decrease dexamethasone to 4 mg twice a day.  He will continue insulin prescribed by his primary care physician.  7/12/2024 glucose 355, PET scan was not able to be carried out.  7/15/2024 elevated glucose 316.  Patient will be admitted to hospital for scheduled high-dose methotrexate.  Will consult internal medicine team to help with management of steroids induced hyperglycemia.      PLAN:  Patient will be admitted to [facility] today for high-dose methotrexate plus Rituxan cycle 1 day 1.  Patient will have lumbar puncture today with CSF for flow cytometry study, cytology study and protein, glucose.    Patient will be given 1 dose intrathecal methotrexate today on 7/15/2024.  Continue dexamethasone 4 mg twice a day.    Continue insulin for hyperglycemia.  Consulted internal medicine [facility] for helping with management of steroid-induced hyperglycemia..  Continue Pepcid for GI prophylaxis.  Continue Keppra 500 mg twice a day for prophylaxis of seizure.   .  Continue Bactrim 3 times a week for prophylaxis of PJP infection.    Monitor methotrexate level per protocol.  Patient will be given leucovorin for rescue.  Expecting patient will be discharged at this coming Friday on 7/19/2024.  Patient has appointment with CBC and CMP twice a week as outpatient next week.  Patient has appointment with me for reevaluation in 2 weeks.    I spent 75 minutes caring for Iain on this date of service. This time includes time spent by me in the following activities: preparing for the visit, reviewing tests, obtaining and/or reviewing a separately obtained history, performing a medically appropriate examination and/or evaluation, counseling and educating the patient/family/caregiver, ordering medications, tests, or procedures, referring and communicating with other health care professionals, documenting information in the medical record, independently interpreting results and communicating that information with the patient/family/caregiver and care coordination     Discussed with the patient and his brother-in-law who helped with history and discussion.    I discussed with oncology pharmacist Lulu Simental, Pharm.D.      TRINO SMILEY M.D., Ph.D.

## 2024-07-15 NOTE — H&P
HISTORY AND PHYSICAL   [facility]        Patient Identification:  Name: Iain Christina  Age: 58 y.o.  Sex: male  :  1965  MRN: [id number]                     Primary Care Physician: Scott Medina MD    Chief Complaint: Hyperglycemia and CNS lymphoma    History of Present Illness:        The patient is a 58-year-old male with history of recent diagnosis of CNS lymphoma B-cell history of hepatitis C, history of sleep apnea and hypertension who was seen in the oncology office today by Dr. Fernandez and was sent in for direct admission for further evaluation treatment of steroid-induced hyperglycemia and plans to start chemotherapy for B-cell lymphoma.  They had been trying to get a PET scan as outpatient but his sugars have been out of control.  The patient been started on insulin outpatient but is having trouble controlling it.  The patient did complain of slight headache and did have a lumbar puncture and intrathecal methotrexate administered earlier on the day of admission.  He was also seen by the diabetic nurse educator and is hoping to get continuous glucose monitoring to get his sugars under better control and help follow the numbers.  He denies any fevers or chills.  He has not had any chest pain or shortness of air.  The patient was admitted for further treatment and possibly chemo is planned this admission according to oncology's notes.    Past Medical History:  Past Medical History:   Diagnosis Date    Diabetes mellitus     Hepatitis C     Hyperthyroidism     Sleep apnea      Past Surgical History:  Past Surgical History:   Procedure Laterality Date    CRANIOTOMY FOR TUMOR Right 2024    Procedure: Right frontal craniotomy for tumor;  Surgeon: Rosendo Bello MD;  Location: [facility];  Service: Neurosurgery;  Laterality: Right;    NASAL SEPTAL RECONSTRUCTION      VENOUS ACCESS DEVICE (PORT) INSERTION N/A 2024    Procedure: INSERTION VENOUS ACCESS DEVICE;   Surgeon: Linda Coates MD;  Location: [facility] OR;  Service: General;  Laterality: N/A;      Home Meds:  Medications Prior to Admission   Medication Sig Dispense Refill Last Dose    dexAMETHasone (DECADRON) 4 MG tablet Take 1 tablet by mouth 2 (Two) Times a Day With Meals for 60 days. 60 tablet 1 7/15/2024    famotidine (PEPCID) 20 MG tablet Take 1 tablet by mouth 2 (Two) Times a Day Before Meals for 180 days. 60 tablet 5 7/15/2024    insulin aspart (NovoLOG FlexPen) 100 UNIT/ML solution pen-injector sc pen Inject 5 Units under the skin into the appropriate area as directed 3 (Three) Times a Day With Meals. 15 mL 1 7/15/2024    insulin degludec (Tresiba FlexTouch) 100 UNIT/ML solution pen-injector injection Inject 25 Units under the skin into the appropriate area as directed Daily. 15 mL 1 7/15/2024    Insulin Pen Needle 32G X 4 MM misc Use to inject insulin SQ via pens up to 4 times daily 100 each 1 7/15/2024    irbesartan-hydrochlorothiazide (AVALIDE) 150-12.5 MG tablet Take 2 tablets by mouth Daily. (Patient taking differently: Take 1 tablet by mouth Daily.) 180 tablet 3 7/15/2024    levETIRAcetam (KEPPRA) 500 MG tablet Take 1 tablet by mouth Every 12 (Twelve) Hours for 180 days. 60 tablet 5 7/15/2024    sulfamethoxazole-trimethoprim (BACTRIM DS,SEPTRA DS) 800-160 MG per tablet Take 1 tablet by mouth 3 (Three) Times a Week for 78 doses. Indications: PROPHYLAXIS 13 tablet 5 7/15/2024    zolpidem CR (Ambien CR) 12.5 MG CR tablet Take 1 tablet by mouth At Night As Needed for Sleep. 30 tablet 2 7/14/2024     Current meds    Current Facility-Administered Medications:     dexAMETHasone (DECADRON) tablet 4 mg, 4 mg, Oral, BID With Meals, Krista Fernandez MD PhD    dextrose (D50W) (25 g/50 mL) IV injection 25 g, 25 g, Intravenous, Q15 Min PRN, Krista Fernandez MD PhD    dextrose (GLUTOSE) oral gel 15 g, 15 g, Oral, Q15 Min PRN, Krista Fernandez MD PhD    [START ON 7/16/2024] Enoxaparin Sodium (LOVENOX) syringe 40  mg, 40 mg, Subcutaneous, Daily, Kirsta Fernandez MD PhD    famotidine (PEPCID) tablet 20 mg, 20 mg, Oral, BID AC, Krista Fernandez MD PhD    glucagon (GLUCAGEN) injection 1 mg, 1 mg, Intramuscular, Q15 Min PRN, Krista Fernandez MD PhD    heparin injection 500 Units, 5 mL, Intravenous, PRN, Krista Fernandez MD PhD    [START ON 7/16/2024] losartan (COZAAR) tablet 50 mg, 50 mg, Oral, Q24H **AND** [START ON 7/16/2024] hydroCHLOROthiazide tablet 12.5 mg, 12.5 mg, Oral, Q24H, Krista Fernandez MD PhD    HYDROcodone-acetaminophen (NORCO) 5-325 MG per tablet 1 tablet, 1 tablet, Oral, Q4H PRN, Krista Fernandez MD PhD    insulin glargine (LANTUS, SEMGLEE) injection 25 Units, 25 Units, Subcutaneous, Daily, Krista Fernandez MD PhD    insulin lispro (HUMALOG/ADMELOG) injection 4-24 Units, 4-24 Units, Subcutaneous, 4x Daily AC & at Bedtime, Krista Fernandez MD PhD, 6 Units at 07/15/24 1252    insulin lispro (HUMALOG/ADMELOG) injection 5 Units, 5 Units, Subcutaneous, TID With Meals, Krista Fernandez MD PhD, 5 Units at 07/15/24 1424    levETIRAcetam (KEPPRA) tablet 500 mg, 500 mg, Oral, Q12H, Krista Fernandez MD PhD    ondansetron (ZOFRAN) injection 4 mg, 4 mg, Intravenous, Q6H PRN, Krista Fernandez MD PhD    prochlorperazine (COMPAZINE) tablet 10 mg, 10 mg, Oral, Q6H PRN, Krista Fernandez MD PhD    sennosides-docusate (PERICOLACE) 8.6-50 MG per tablet 1 tablet, 1 tablet, Oral, BID, Krista Fernandez MD PhD    sodium chloride 0.9 % flush 10 mL, 10 mL, Intravenous, Q12H, Krista Fernandez MD PhD, 10 mL at 07/15/24 1222    sodium chloride 0.9 % flush 10 mL, 10 mL, Intravenous, PRN, Krista Fernandez MD PhD    sodium chloride 0.9 % flush 20 mL, 20 mL, Intravenous, PRN, Krista Fernandez MD PhD    sodium chloride 0.9 % infusion 40 mL, 40 mL, Intravenous, PRN, Krista Fernandez MD PhD    sterile water (preservative free) 850 mL with sodium bicarbonate 8.4 % 150 mEq infusion, , Intravenous, Continuous, Krista Fernandez MD PhD, Last Rate: 150  mL/hr at 07/15/24 1158, New Bag at 07/15/24 1158    zolpidem (AMBIEN) tablet 5 mg, 5 mg, Oral, Nightly PRN, Krista Fernandez MD PhD  Allergies:  Allergies   Allergen Reactions    Cashew Nut Anaphylaxis    Peanut (Diagnostic) Anaphylaxis    Peanut-Containing Drug Products Anaphylaxis    Bee Pollen Other (See Comments)     eyes itching, running nose     Immunizations:  Immunization History   Administered Date(s) Administered    COVID-19 (PFIZER) Purple Cap Monovalent 2023    Flu Vaccine Quad PF 6-35MO 10/01/2016, 2018    Tdap 2017, 06/15/2024     Social History:   Social History     Social History Narrative    Not on file     Social History     Socioeconomic History    Marital status: Unknown   Tobacco Use    Smoking status: Never    Smokeless tobacco: Never   Vaping Use    Vaping status: Never Used   Substance and Sexual Activity    Alcohol use: Not Currently     Comment: STATED HE QUIT IN JANUARY    Drug use: Never    Sexual activity: Yes       Family History:  Family History   Problem Relation Age of Onset    COPD Mother     Cancer Sister     No Known Problems Brother         Review of Systems  See history of present illness and past medical history.  Patient denies headache, dizziness, syncope, falls, trauma, change in vision, change in hearing, change in taste, changes in weight, changes in appetite, focal weakness, numbness, or paresthesia.  Patient denies chest pain, palpitations, dyspnea, orthopnea, PND, cough, sinus pressure, rhinorrhea, epistaxis, hemoptysis, nausea, vomiting, hematemesis, diarrhea, constipation or hematochezia. Denies cold or heat intolerance, polydipsia, polyuria, polyphagia. Denies hematuria, pyuria, dysuria, hesitancy, frequency or urgency.  Denies fever, chills, sweats, night sweats.  Denies missing any routine medications. Remainder of ROS is negative.    Objective:  tMax 24 hrs: Temp (24hrs), Av.5 °F (36.4 °C), Min:97.5 °F (36.4 °C), Max:97.5 °F (36.4  "°C)    Vitals Ranges:   Temp:  [97.5 °F (36.4 °C)] 97.5 °F (36.4 °C)  Heart Rate:  [53-74] 53  Resp:  [14-18] 14  BP: (101-115)/(70-78) 115/73      Exam:  /73 (BP Location: Left arm, Patient Position: Lying)   Pulse 53   Temp 97.5 °F (36.4 °C) (Oral)   Resp 14   Ht 177.8 cm (70\")   Wt 94.6 kg (208 lb 8 oz)   SpO2 95%   BMI 29.92 kg/m²     General Appearance:    Alert, cooperative, no distress, appears stated age   Head:    Normocephalic, without obvious abnormality, healing surgical incision over the top of his head   Eyes:    PERRL, conjunctivae/corneas clear, EOM's intact, both eyes   Ears:    Normal external ear canals, both ears   Nose:   Nares normal, septum midline, mucosa normal, no drainage    or sinus tenderness   Throat:   Lips, mucosa, and tongue normal   Neck:   Supple, symmetrical, trachea midline, no adenopathy;     thyroid:  no enlargement/tenderness/nodules; no carotid    bruit or JVD   Back:     Symmetric, no curvature, ROM normal, no CVA tenderness   Lungs:     Clear to auscultation bilaterally, respirations unlabored   Chest Wall:    No tenderness or deformity    Heart:    Regular rate and rhythm, S1 and S2 normal, no murmur, rub   or gallop   Abdomen:     Soft, nontender, bowel sounds active all four quadrants,     no masses, no hepatomegaly, no splenomegaly   Extremities:   Extremities normal, atraumatic, no cyanosis or edema   Pulses:   2+ and symmetric all extremities   Skin:   Skin color, texture, turgor normal, no rashes or lesions   Lymph nodes:   Cervical, supraclavicular, and axillary nodes normal   Neurologic:   CNII-XII intact, normal strength, sensation intact throughout      .    Data Review:  Lab Results (last 72 hours)       Procedure Component Value Units Date/Time    Protein, CSF - Cerebrospinal Fluid, Lumbar Puncture [[id number]]  (Abnormal) Collected: 07/15/24 1309    Specimen: Cerebrospinal Fluid from Lumbar Puncture Updated: 07/15/24 152     Protein, Total (CSF) " 107.2 mg/dL     Glucose, CSF - Cerebrospinal Fluid, Lumbar Puncture [[id number]]  (Abnormal) Collected: 07/15/24 1309    Specimen: Cerebrospinal Fluid from Lumbar Puncture Updated: 07/15/24 1524     Glucose,  mg/dL     Cell Count With Differential, CSF [[id number]]  (Abnormal) Collected: 07/15/24 1309    Specimen: Cerebrospinal Fluid from Lumbar Puncture Updated: 07/15/24 1508    Narrative:      The following orders were created for panel order Cell Count With Differential, CSF.  Procedure                               Abnormality         Status                     ---------                               -----------         ------                     Cell Count, CSF - Cerebr...[[id number]]  Abnormal            Final result                 Please view results for these tests on the individual orders.    Cell Count, CSF - Cerebrospinal Fluid, Lumbar Puncture [[id number]]  (Abnormal) Collected: 07/15/24 1309    Specimen: Cerebrospinal Fluid from Lumbar Puncture Updated: 07/15/24 1508     Color, CSF Colorless     Appearance, CSF Clear     RBC, CSF 37 /mm3      Nucleated Cells, CSF 2 /mm3      Tube Number, CSF 1     Method: UF 5000 Automated Method    Narrative:      Differential not indicated.  This test was developed, its performance characteristics determined and judged suitable for clinical purposes by [facility]. It has not been cleared or approved by the FDA. The laboratory is regulated under CLIA as qualified to perform high-complexity testing.    Cell Count With Differential, CSF Use CSF Tube: 1 [[id number]]  (Abnormal) Collected: 07/15/24 1309    Specimen: Cerebrospinal Fluid from Lumbar Puncture Updated: 07/15/24 1507    Narrative:      The following orders were created for panel order Cell Count With Differential, CSF Use CSF Tube: 1.  Procedure                               Abnormality         Status                     ---------                               -----------          ------                     Cell Count, CSF - Cerebr...[[id number]]  Abnormal            Final result                 Please view results for these tests on the individual orders.    Cell Count, CSF - Cerebrospinal Fluid, Lumbar Puncture [[id number]]  (Abnormal) Collected: 07/15/24 1309    Specimen: Cerebrospinal Fluid from Lumbar Puncture Updated: 07/15/24 1507     Color, CSF Colorless     Appearance, CSF Clear     RBC, CSF 46 /mm3      Nucleated Cells, CSF 2 /mm3      Tube Number, CSF 4     Method: UF 5000 Automated Method    Narrative:      Differential not indicated.  This test was developed, its performance characteristics determined and judged suitable for clinical purposes by [facility]. It has not been cleared or approved by the FDA. The laboratory is regulated under CLIA as qualified to perform high-complexity testing.    CSF Tube - Cerebrospinal Fluid, Lumbar Puncture [[id number]] Collected: 07/15/24 1309    Specimen: Cerebrospinal Fluid from Lumbar Puncture Updated: 07/15/24 1328     Extra Tube HOLD FOR ADD ON    Flow Cytometry [[id number]] Collected: 07/15/24 1309    Specimen: Cerebrospinal Fluid from Lumbar Puncture Updated: 07/15/24 1321    Comprehensive Metabolic Panel [[id number]]  (Abnormal) Collected: 07/15/24 1026    Specimen: Blood Updated: 07/15/24 1126     Glucose 310 mg/dL      BUN 19 mg/dL      Creatinine 0.66 mg/dL      Sodium 133 mmol/L      Potassium 3.8 mmol/L      Chloride 102 mmol/L      CO2 19.7 mmol/L      Calcium 8.8 mg/dL      Total Protein 5.8 g/dL      Albumin 3.6 g/dL      ALT (SGPT) 322 U/L      AST (SGOT) 82 U/L      Alkaline Phosphatase 161 U/L      Total Bilirubin 1.0 mg/dL      Globulin 2.2 gm/dL      A/G Ratio 1.6 g/dL      BUN/Creatinine Ratio 28.8     Anion Gap 11.3 mmol/L      eGFR 107.4 mL/min/1.73     Narrative:      GFR Normal >60  Chronic Kidney Disease <60  Kidney Failure <15      CBC & Differential [[id number]]  (Abnormal) Collected: 07/15/24  1026    Specimen: Blood Updated: 07/15/24 1108    Narrative:      The following orders were created for panel order CBC & Differential.  Procedure                               Abnormality         Status                     ---------                               -----------         ------                     CBC Auto Differential[[id number]]        Abnormal            Final result                 Please view results for these tests on the individual orders.    CBC Auto Differential [[id number]]  (Abnormal) Collected: 07/15/24 1026    Specimen: Blood Updated: 07/15/24 1108     WBC 10.13 10*3/mm3      RBC 5.16 10*6/mm3      Hemoglobin 15.5 g/dL      Hematocrit 45.8 %      MCV 88.8 fL      MCH 30.0 pg      MCHC 33.8 g/dL      RDW 12.8 %      RDW-SD 41.5 fl      MPV 10.8 fL      Platelets 138 10*3/mm3      Neutrophil % 84.4 %      Lymphocyte % 10.5 %      Monocyte % 2.7 %      Eosinophil % 0.0 %      Basophil % 0.2 %      Immature Grans % 2.2 %      Neutrophils, Absolute 8.56 10*3/mm3      Lymphocytes, Absolute 1.06 10*3/mm3      Monocytes, Absolute 0.27 10*3/mm3      Eosinophils, Absolute 0.00 10*3/mm3      Basophils, Absolute 0.02 10*3/mm3      Immature Grans, Absolute 0.22 10*3/mm3      nRBC 0.0 /100 WBC     POC Glucose Once [[id number]]  (Abnormal) Collected: 07/15/24 0937    Specimen: Blood Updated: 07/15/24 0938     Glucose 296 mg/dL                      Imaging Results (All)       Procedure Component Value Units Date/Time    FL Chemo Admin Cns Requir / Incl LP (Rad) [[id number]] Resulted: 07/15/24 1253     Updated: 07/15/24 1314          Past Medical History:   Diagnosis Date    Diabetes mellitus     Hepatitis C     Hyperthyroidism     Sleep apnea        Assessment:  Active Hospital Problems    Diagnosis  POA    **Primary CNS lymphoma [C85.89]  Yes    Steroid-induced hyperglycemia [R73.9, T38.0X5A]  Yes    Neoplasm causing mass effect and brain compression on adjacent structures [D49.9, G93.5]  Yes    Brain mass  [G93.89]  Yes    Chronic hepatitis C without hepatic coma [B18.2]  Yes    Hyperlipidemia [E78.5]  Yes    Hypertension [I10]  Yes      Resolved Hospital Problems   No resolved problems to display.       Plan:  The patient admitted to the hospital with oncology consultation and plans for possible chemotherapy.  Diabetic nurse educator seen the patient and the patient's on some long-acting insulin with sliding scale higher dose for steroid-induced hyperglycemia.  Will get follow-up on labs.  Follow-up on LP results.  Medicine can take over his attending and oncology will be consulting.    Samuel Farley MD  7/15/2024  15:54 EDT

## 2024-07-15 NOTE — H&P
H&P for oncology service.  Please see my note earlier today on 7/15/2024.      Briefly patient has been diagnosed of primary CNS diffuse large B cell lymphoma, he is being admitted today for cycle #1 high-dose methotrexate plus Rituxan.     Patient will have lumbar puncture for CSF for flow cytometry study and cytology study, glucose, protein etc.  He will also be given 1 dose intrathecal methotrexate chemotherapy.    TRINO SMILEY M.D., Ph.D.

## 2024-07-15 NOTE — PLAN OF CARE
Goal Outcome Evaluation:     A&OX4, calm and cooperative. Up ad brandy. LP completed today with methotrexate administration. Port site CDI, good blood return noted. Sodium Bicarb infusing- UA still needed. Bedrest for 8 hours with bathroom privileges. AC/HS, BG remaining elevated sliding scale ordered and administered. VSS on room air, CTM.

## 2024-07-15 NOTE — CONSULTS
"Diabetes Education  Assessment/Teaching    Patient Name:  Iain Christina  YOB: 1965  MRN: [id number]  Admit Date:  7/15/2024      Assessment Date:  7/15/2024  Flowsheet Row Most Recent Value   General Information     Referral From: A1c, Database  [A1C 6.6%]   Height 177.8 cm (70\")   Height Method Stated   Weight 94.6 kg (208 lb 8 oz)   Weight Method Standing scale   Patient expressed need how to better manage his diabetes   Pregnancy Assessment    Diabetes History    What type of diabetes do you have? Type 2   Length of Diabetes Diagnosis 6 -12 months   Current DM knowledge poor   Have you had diabetes education/teaching in the past? no   Do you test your blood sugar at home? yes   Frequency of checks couple times day   Have you had low blood sugar? (<70mg/dl) yes   How often do you have low blood sugar? rare   Have you had high blood sugar? (>140mg/dl) yes   How often do you have high blood sugar? occasionally   What makes it difficult for you to take care of your diabetes or yourself? memeory issues   Education Preferences    What areas of diabetes would you like to learn about? avoiding high blood sugar, medications for diabetes, testing my blood sugar at home, diet infMet with pt andormation, avoiding low blood sugar   Nutrition Information    Assessment Topics    Healthy Eating - Assessment N/A-unable to assess   Being Active - Assessment Needs education   Taking Medication - Assessment Needs education   Problem Solving - Assessment Needs education   Monitoring - Assessment Needs education   DM Goals    Taking Medication - Goal 0-7 days from discharge   Problem Solving - Goal 0-7 days from discharge   Monitoring - Goal 0-7 days from discharge            Flowsheet Row Most Recent Value   DM Education Needs    Meter Has own   Meter Type Other (comment)  [[facility]]   Frequency of Testing 2 times a day   Medication Insulin, Pen, Administration  [Tresiba 35U QD. and Novolog 5U TID] "   Problem Solving Hypoglycemia, Hyperglycemia, Signs, Symptoms, Treatment   Healthy Coping Appropriate   Discharge Plan Home, Follow-up with MD   Motivation Moderate   Teaching Method Explanation, Discussion, Handouts, Teach back   Patient Response Verbalized understanding, Needs reinforcement  [brother in law at visit]              Other Comments:  Met with pt and his brother-in-law ( who is helping  as his caregiver at home) to discuss better managing his diabetes. We problem solved ways to remember testing his Bg, remember taking his insulin doses and assisting with his diet. Family will have to oversee more closely,as pts has severe memory loss. Suggested looking into a CGM,which pts currently has no insurance,so will not have any coverage. Pts brother in law very knowledgeable about CGM's.as he has used in the past. Pt was given our number  to call for any support.        Electronically signed by:  Jamaica Barron RN  07/15/24 12:56 EDT

## 2024-07-16 LAB
ALBUMIN SERPL-MCNC: 3.2 G/DL (ref 3.5–5.2)
ALBUMIN/GLOB SERPL: 1.8 G/DL
ALP SERPL-CCNC: 112 U/L (ref 39–117)
ALT SERPL W P-5'-P-CCNC: 271 U/L (ref 1–41)
ANION GAP SERPL CALCULATED.3IONS-SCNC: 9 MMOL/L (ref 5–15)
AST SERPL-CCNC: 89 U/L (ref 1–40)
BASOPHILS # BLD AUTO: 0.02 10*3/MM3 (ref 0–0.2)
BASOPHILS NFR BLD AUTO: 0.2 % (ref 0–1.5)
BILIRUB SERPL-MCNC: 1 MG/DL (ref 0–1.2)
BUN SERPL-MCNC: 17 MG/DL (ref 8–23)
BUN/CREAT SERPL: 28.3 (ref 7–25)
CALCIUM SPEC-SCNC: 8.7 MG/DL (ref 8.6–10.5)
CHLORIDE SERPL-SCNC: 99 MMOL/L (ref 98–107)
CO2 SERPL-SCNC: 31 MMOL/L (ref 22–29)
CREAT SERPL-MCNC: 0.6 MG/DL (ref 0.76–1.27)
DEPRECATED RDW RBC AUTO: 40.5 FL (ref 37–54)
EGFRCR SERPLBLD CKD-EPI 2021: 110.5 ML/MIN/1.73
EOSINOPHIL # BLD AUTO: 0.02 10*3/MM3 (ref 0–0.4)
EOSINOPHIL NFR BLD AUTO: 0.2 % (ref 0.3–6.2)
ERYTHROCYTE [DISTWIDTH] IN BLOOD BY AUTOMATED COUNT: 12.8 % (ref 12.3–15.4)
GLOBULIN UR ELPH-MCNC: 1.8 GM/DL
GLUCOSE BLDC GLUCOMTR-MCNC: 171 MG/DL (ref 70–130)
GLUCOSE BLDC GLUCOMTR-MCNC: 212 MG/DL (ref 70–130)
GLUCOSE BLDC GLUCOMTR-MCNC: 270 MG/DL (ref 70–130)
GLUCOSE BLDC GLUCOMTR-MCNC: 314 MG/DL (ref 70–130)
GLUCOSE SERPL-MCNC: 303 MG/DL (ref 65–99)
HBV CORE AB SERPL QL IA: NEGATIVE
HCT VFR BLD AUTO: 42.6 % (ref 37.5–51)
HGB BLD-MCNC: 14.5 G/DL (ref 13–17.7)
IMM GRANULOCYTES # BLD AUTO: 0.1 10*3/MM3 (ref 0–0.05)
IMM GRANULOCYTES NFR BLD AUTO: 1.2 % (ref 0–0.5)
LDH SERPL-CCNC: 283 U/L (ref 135–225)
LYMPHOCYTES # BLD AUTO: 1.54 10*3/MM3 (ref 0.7–3.1)
LYMPHOCYTES NFR BLD AUTO: 18 % (ref 19.6–45.3)
MCH RBC QN AUTO: 29.9 PG (ref 26.6–33)
MCHC RBC AUTO-ENTMCNC: 34 G/DL (ref 31.5–35.7)
MCV RBC AUTO: 87.8 FL (ref 79–97)
MONOCYTES # BLD AUTO: 0.45 10*3/MM3 (ref 0.1–0.9)
MONOCYTES NFR BLD AUTO: 5.3 % (ref 5–12)
MTX SERPL-SCNC: NORMAL UMOL/L
NEUTROPHILS NFR BLD AUTO: 6.44 10*3/MM3 (ref 1.7–7)
NEUTROPHILS NFR BLD AUTO: 75.1 % (ref 42.7–76)
PH UR STRIP.AUTO: 8.5 [PH] (ref 5–8)
PLATELET # BLD AUTO: 117 10*3/MM3 (ref 140–450)
PMV BLD AUTO: 11 FL (ref 6–12)
POTASSIUM SERPL-SCNC: 3.4 MMOL/L (ref 3.5–5.2)
PROT SERPL-MCNC: 5 G/DL (ref 6–8.5)
RBC # BLD AUTO: 4.85 10*6/MM3 (ref 4.14–5.8)
SODIUM SERPL-SCNC: 139 MMOL/L (ref 136–145)
URATE SERPL-MCNC: 2.7 MG/DL (ref 3.4–7)
WBC NRBC COR # BLD AUTO: 8.57 10*3/MM3 (ref 3.4–10.8)

## 2024-07-16 PROCEDURE — 25810000003 SODIUM CHLORIDE 0.9 % SOLUTION 500 ML FLEX CONT: Performed by: STUDENT IN AN ORGANIZED HEALTH CARE EDUCATION/TRAINING PROGRAM

## 2024-07-16 PROCEDURE — 25010000002 ONDANSETRON PER 1 MG: Performed by: INTERNAL MEDICINE

## 2024-07-16 PROCEDURE — 63710000001 DEXAMETHASONE PER 0.25 MG: Performed by: INTERNAL MEDICINE

## 2024-07-16 PROCEDURE — 85025 COMPLETE CBC W/AUTO DIFF WBC: CPT | Performed by: INTERNAL MEDICINE

## 2024-07-16 PROCEDURE — 25010000002 ENOXAPARIN PER 10 MG: Performed by: INTERNAL MEDICINE

## 2024-07-16 PROCEDURE — 63710000001 INSULIN GLARGINE PER 5 UNITS: Performed by: INTERNAL MEDICINE

## 2024-07-16 PROCEDURE — 83615 LACTATE (LD) (LDH) ENZYME: CPT | Performed by: INTERNAL MEDICINE

## 2024-07-16 PROCEDURE — 82948 REAGENT STRIP/BLOOD GLUCOSE: CPT

## 2024-07-16 PROCEDURE — 84550 ASSAY OF BLOOD/URIC ACID: CPT | Performed by: INTERNAL MEDICINE

## 2024-07-16 PROCEDURE — 80204 DRUG ASSAY METHOTREXATE: CPT | Performed by: INTERNAL MEDICINE

## 2024-07-16 PROCEDURE — 80053 COMPREHEN METABOLIC PANEL: CPT | Performed by: INTERNAL MEDICINE

## 2024-07-16 PROCEDURE — 25010000002 METHOTREXATE PF 100 MG/4ML SOLUTION 40 ML VIAL: Performed by: STUDENT IN AN ORGANIZED HEALTH CARE EDUCATION/TRAINING PROGRAM

## 2024-07-16 PROCEDURE — 63710000001 INSULIN LISPRO (HUMAN) PER 5 UNITS: Performed by: INTERNAL MEDICINE

## 2024-07-16 PROCEDURE — 81003 URINALYSIS AUTO W/O SCOPE: CPT | Performed by: INTERNAL MEDICINE

## 2024-07-16 RX ORDER — METHOTREXATE 25 MG/ML
6000 INJECTION, SOLUTION INTRA-ARTERIAL; INTRAMUSCULAR; INTRATHECAL; INTRAVENOUS ONCE
Status: DISCONTINUED | OUTPATIENT
Start: 2024-07-16 | End: 2024-07-16

## 2024-07-16 RX ORDER — POTASSIUM CHLORIDE 750 MG/1
40 TABLET, FILM COATED, EXTENDED RELEASE ORAL ONCE
Status: COMPLETED | OUTPATIENT
Start: 2024-07-16 | End: 2024-07-16

## 2024-07-16 RX ADMIN — INSULIN LISPRO 8 UNITS: 100 INJECTION, SOLUTION INTRAVENOUS; SUBCUTANEOUS at 21:18

## 2024-07-16 RX ADMIN — SENNOSIDES AND DOCUSATE SODIUM 1 TABLET: 50; 8.6 TABLET ORAL at 08:21

## 2024-07-16 RX ADMIN — SODIUM BICARBONATE: 84 INJECTION, SOLUTION INTRAVENOUS at 08:20

## 2024-07-16 RX ADMIN — LOSARTAN POTASSIUM 50 MG: 50 TABLET, FILM COATED ORAL at 08:23

## 2024-07-16 RX ADMIN — INSULIN LISPRO 5 UNITS: 100 INJECTION, SOLUTION INTRAVENOUS; SUBCUTANEOUS at 08:22

## 2024-07-16 RX ADMIN — DEXAMETHASONE 4 MG: 4 TABLET ORAL at 18:44

## 2024-07-16 RX ADMIN — INSULIN LISPRO 12 UNITS: 100 INJECTION, SOLUTION INTRAVENOUS; SUBCUTANEOUS at 18:43

## 2024-07-16 RX ADMIN — SODIUM BICARBONATE: 84 INJECTION, SOLUTION INTRAVENOUS at 20:13

## 2024-07-16 RX ADMIN — POTASSIUM CHLORIDE 40 MEQ: 750 TABLET, EXTENDED RELEASE ORAL at 18:43

## 2024-07-16 RX ADMIN — ZOLPIDEM TARTRATE 5 MG: 5 TABLET, FILM COATED ORAL at 21:18

## 2024-07-16 RX ADMIN — ENOXAPARIN SODIUM 40 MG: 100 INJECTION SUBCUTANEOUS at 08:21

## 2024-07-16 RX ADMIN — INSULIN LISPRO 4 UNITS: 100 INJECTION, SOLUTION INTRAVENOUS; SUBCUTANEOUS at 12:23

## 2024-07-16 RX ADMIN — METHOTREXATE 12780 MG: 25 INJECTION INTRA-ARTERIAL; INTRAMUSCULAR; INTRATHECAL; INTRAVENOUS at 11:59

## 2024-07-16 RX ADMIN — INSULIN LISPRO 16 UNITS: 100 INJECTION, SOLUTION INTRAVENOUS; SUBCUTANEOUS at 08:21

## 2024-07-16 RX ADMIN — SODIUM BICARBONATE: 84 INJECTION, SOLUTION INTRAVENOUS at 01:30

## 2024-07-16 RX ADMIN — LEVETIRACETAM 500 MG: 500 TABLET, FILM COATED ORAL at 21:18

## 2024-07-16 RX ADMIN — FAMOTIDINE 20 MG: 20 TABLET, FILM COATED ORAL at 18:45

## 2024-07-16 RX ADMIN — FAMOTIDINE 20 MG: 20 TABLET, FILM COATED ORAL at 08:21

## 2024-07-16 RX ADMIN — HYDROCHLOROTHIAZIDE 12.5 MG: 12.5 TABLET ORAL at 08:23

## 2024-07-16 RX ADMIN — LEVETIRACETAM 500 MG: 500 TABLET, FILM COATED ORAL at 08:23

## 2024-07-16 RX ADMIN — DEXAMETHASONE 4 MG: 4 TABLET ORAL at 08:21

## 2024-07-16 RX ADMIN — SENNOSIDES AND DOCUSATE SODIUM 1 TABLET: 50; 8.6 TABLET ORAL at 21:18

## 2024-07-16 RX ADMIN — ONDANSETRON: 2 INJECTION INTRAMUSCULAR; INTRAVENOUS at 11:27

## 2024-07-16 RX ADMIN — Medication 10 ML: at 08:22

## 2024-07-16 RX ADMIN — Medication 10 ML: at 21:19

## 2024-07-16 RX ADMIN — INSULIN GLARGINE 25 UNITS: 100 INJECTION, SOLUTION SUBCUTANEOUS at 08:22

## 2024-07-16 RX ADMIN — INSULIN LISPRO 5 UNITS: 100 INJECTION, SOLUTION INTRAVENOUS; SUBCUTANEOUS at 18:43

## 2024-07-16 RX ADMIN — INSULIN LISPRO 5 UNITS: 100 INJECTION, SOLUTION INTRAVENOUS; SUBCUTANEOUS at 12:23

## 2024-07-16 NOTE — PLAN OF CARE
Goal Outcome Evaluation:     A&OX4, calm and cooperative. Up ad brandy. Port side CDI, good blood return noted. Methotrexate completed tolerated well AC/HS coverage required. VSS on room air. CTM.

## 2024-07-16 NOTE — PLAN OF CARE
Goal Outcome Evaluation:  Plan of Care Reviewed With: patient        Progress: no change  Outcome Evaluation: Admitted for Chemo, received IT MTX, IVF, port has great blood return, MTX level sent @ [place], U/A sent as well, no c/o pain or nausea, A&O, AD brandy, RA

## 2024-07-16 NOTE — PROGRESS NOTES
"DAILY PROGRESS NOTE  [facility]    Patient Identification:  Name: Iain Christina  Age: 58 y.o.  Sex: male  :  1965  MRN: [id number]         Primary Care Physician: Scott Medina MD    Subjective:  Interval History: He is feeling better today and his headache is better.    Objective:    Scheduled Meds:dexAMETHasone, 4 mg, Oral, BID With Meals  enoxaparin, 40 mg, Subcutaneous, Daily  famotidine, 20 mg, Oral, BID AC  losartan, 50 mg, Oral, Q24H   And  hydroCHLOROthiazide, 12.5 mg, Oral, Q24H  insulin glargine, 25 Units, Subcutaneous, Daily  insulin lispro, 4-24 Units, Subcutaneous, 4x Daily AC & at Bedtime  insulin lispro, 5 Units, Subcutaneous, TID With Meals  [START ON 2024] leucovorin 25 mg in sodium chloride 0.9 % 52.5 mL IVPB, 25 mg, Intravenous, Q6H  levETIRAcetam, 500 mg, Oral, Q12H  methotrexate PF 12,780 mg in sodium chloride 0.9 % 1,011.2 mL IVPB, 12,780 mg, Intravenous, Once  Pharmacy monitoring - high dose methotrexate active, 1 each, Intravenous, Daily  senna-docusate sodium, 1 tablet, Oral, BID  sodium chloride, 10 mL, Intravenous, Q12H      Continuous Infusions:sterile water (preservative free) 850 mL with sodium bicarbonate 8.4 % 150 mEq infusion, , Last Rate: 150 mL/hr at 24 0820        Vital signs in last 24 hours:  Temp:  [97.3 °F (36.3 °C)-98 °F (36.7 °C)] 97.9 °F (36.6 °C)  Heart Rate:  [50-72] 50  Resp:  [16] 16  BP: (106-136)/(64-84) 125/78    Intake/Output:    Intake/Output Summary (Last 24 hours) at 2024 1522  Last data filed at 2024 1430  Gross per 24 hour   Intake 3810 ml   Output 2950 ml   Net 860 ml       Exam:  /78 (BP Location: Right arm, Patient Position: Lying)   Pulse 50   Temp 97.9 °F (36.6 °C) (Oral)   Resp 16   Ht 177.8 cm (70\")   Wt 94.6 kg (208 lb 8 oz)   SpO2 96%   BMI 29.92 kg/m²     General Appearance:    Alert, cooperative, no distress   Head:    Normocephalic, without obvious abnormality, atraumatic "   Eyes:       Throat:   Lips, tongue, gums normal   Neck:   Supple, symmetrical, trachea midline, no JVD   Lungs:     Clear to auscultation bilaterally, respirations unlabored   Chest Wall:    No tenderness or deformity    Heart:    Regular rate and rhythm, S1 and S2 normal, no murmur,no  rub or gallop   Abdomen:     Soft, nontender, bowel sounds active, no masses, no organomegaly    Extremities:   Extremities normal, atraumatic, no cyanosis or edema   Pulses:      Skin:   Skin is warm and dry,  no rashes or palpable lesions   Neurologic:   no focal deficits noted      Lab Results (last 72 hours)       Procedure Component Value Units Date/Time    POC Glucose Once [[id number]]  (Abnormal) Collected: 07/16/24 1150    Specimen: Blood Updated: 07/16/24 1152     Glucose 171 mg/dL     Methotrexate Level [[id number]] Collected: 07/16/24 0001    Specimen: Blood Updated: 07/16/24 1028     Methotrexate See Attatched From [employer]    POC Glucose Once [[id number]]  (Abnormal) Collected: 07/16/24 0804    Specimen: Blood Updated: 07/16/24 0810     Glucose 314 mg/dL     Lactate Dehydrogenase [[id number]]  (Abnormal) Collected: 07/16/24 0604    Specimen: Blood Updated: 07/16/24 0739      U/L      Comment: Specimen hemolyzed.  Results may be affected.       Comprehensive Metabolic Panel [[id number]]  (Abnormal) Collected: 07/16/24 0604    Specimen: Blood Updated: 07/16/24 0736     Glucose 303 mg/dL      BUN 17 mg/dL      Creatinine 0.60 mg/dL      Sodium 139 mmol/L      Potassium 3.4 mmol/L      Chloride 99 mmol/L      CO2 31.0 mmol/L      Calcium 8.7 mg/dL      Total Protein 5.0 g/dL      Albumin 3.2 g/dL      ALT (SGPT) 271 U/L      AST (SGOT) 89 U/L      Alkaline Phosphatase 112 U/L      Total Bilirubin 1.0 mg/dL      Globulin 1.8 gm/dL      A/G Ratio 1.8 g/dL      BUN/Creatinine Ratio 28.3     Anion Gap 9.0 mmol/L      eGFR 110.5 mL/min/1.73     Narrative:      GFR Normal >60  Chronic Kidney Disease <60  Kidney Failure  <15      Uric Acid [[id number]]  (Abnormal) Collected: 07/16/24 0604    Specimen: Blood Updated: 07/16/24 0736     Uric Acid 2.7 mg/dL     CBC & Differential [[id number]]  (Abnormal) Collected: 07/16/24 0604    Specimen: Blood Updated: 07/16/24 0724    Narrative:      The following orders were created for panel order CBC & Differential.  Procedure                               Abnormality         Status                     ---------                               -----------         ------                     CBC Auto Differential[[id number]]        Abnormal            Final result                 Please view results for these tests on the individual orders.    CBC Auto Differential [[id number]]  (Abnormal) Collected: 07/16/24 0604    Specimen: Blood Updated: 07/16/24 0724     WBC 8.57 10*3/mm3      RBC 4.85 10*6/mm3      Hemoglobin 14.5 g/dL      Hematocrit 42.6 %      MCV 87.8 fL      MCH 29.9 pg      MCHC 34.0 g/dL      RDW 12.8 %      RDW-SD 40.5 fl      MPV 11.0 fL      Platelets 117 10*3/mm3      Neutrophil % 75.1 %      Lymphocyte % 18.0 %      Monocyte % 5.3 %      Eosinophil % 0.2 %      Basophil % 0.2 %      Immature Grans % 1.2 %      Neutrophils, Absolute 6.44 10*3/mm3      Lymphocytes, Absolute 1.54 10*3/mm3      Monocytes, Absolute 0.45 10*3/mm3      Eosinophils, Absolute 0.02 10*3/mm3      Basophils, Absolute 0.02 10*3/mm3      Immature Grans, Absolute 0.10 10*3/mm3     Non-gynecologic Cytology [[id number]] Collected: 07/15/24 1309    Specimen: Cerebrospinal Fluid from Lumbar Puncture Updated: 07/16/24 0641    pH, Urine - Urine, Clean Catch [[id number]]  (Abnormal) Collected: 07/16/24 0001    Specimen: Urine, Clean Catch Updated: 07/16/24 0344     pH, UA 8.5    POC Glucose Once [[id number]]  (Abnormal) Collected: 07/15/24 2045    Specimen: Blood Updated: 07/15/24 2047     Glucose 319 mg/dL     pH, Urine - Urine, Clean Catch [[id number]]  (Normal) Collected: 07/15/24 1659    Specimen: Urine, Clean Catch  Updated: 07/15/24 1713     pH, UA 7.0    POC Glucose Once [[id number]]  (Abnormal) Collected: 07/15/24 1700    Specimen: Blood Updated: 07/15/24 1702     Glucose 330 mg/dL     Protein, CSF - Cerebrospinal Fluid, Lumbar Puncture [[id number]]  (Abnormal) Collected: 07/15/24 1309    Specimen: Cerebrospinal Fluid from Lumbar Puncture Updated: 07/15/24 1524     Protein, Total (CSF) 107.2 mg/dL     Glucose, CSF - Cerebrospinal Fluid, Lumbar Puncture [[id number]]  (Abnormal) Collected: 07/15/24 1309    Specimen: Cerebrospinal Fluid from Lumbar Puncture Updated: 07/15/24 1524     Glucose,  mg/dL     Cell Count With Differential, CSF [[id number]]  (Abnormal) Collected: 07/15/24 1309    Specimen: Cerebrospinal Fluid from Lumbar Puncture Updated: 07/15/24 1508    Narrative:      The following orders were created for panel order Cell Count With Differential, CSF.  Procedure                               Abnormality         Status                     ---------                               -----------         ------                     Cell Count, CSF - Cerebr...[[id number]]  Abnormal            Final result                 Please view results for these tests on the individual orders.    Cell Count, CSF - Cerebrospinal Fluid, Lumbar Puncture [[id number]]  (Abnormal) Collected: 07/15/24 1309    Specimen: Cerebrospinal Fluid from Lumbar Puncture Updated: 07/15/24 1508     Color, CSF Colorless     Appearance, CSF Clear     RBC, CSF 37 /mm3      Nucleated Cells, CSF 2 /mm3      Tube Number, CSF 1     Method: UF 5000 Automated Method    Narrative:      Differential not indicated.  This test was developed, its performance characteristics determined and judged suitable for clinical purposes by [facility]. It has not been cleared or approved by the FDA. The laboratory is regulated under CLIA as qualified to perform high-complexity testing.    Cell Count With Differential, CSF Use CSF Tube: 1 [[id number]]   (Abnormal) Collected: 07/15/24 1309    Specimen: Cerebrospinal Fluid from Lumbar Puncture Updated: 07/15/24 1507    Narrative:      The following orders were created for panel order Cell Count With Differential, CSF Use CSF Tube: 1.  Procedure                               Abnormality         Status                     ---------                               -----------         ------                     Cell Count, CSF - Cerebr...[[id number]]  Abnormal            Final result                 Please view results for these tests on the individual orders.    Cell Count, CSF - Cerebrospinal Fluid, Lumbar Puncture [[id number]]  (Abnormal) Collected: 07/15/24 1309    Specimen: Cerebrospinal Fluid from Lumbar Puncture Updated: 07/15/24 1507     Color, CSF Colorless     Appearance, CSF Clear     RBC, CSF 46 /mm3      Nucleated Cells, CSF 2 /mm3      Tube Number, CSF 4     Method: UF 5000 Automated Method    Narrative:      Differential not indicated.  This test was developed, its performance characteristics determined and judged suitable for clinical purposes by [facility]. It has not been cleared or approved by the FDA. The laboratory is regulated under CLIA as qualified to perform high-complexity testing.    CSF Tube - Cerebrospinal Fluid, Lumbar Puncture [[id number]] Collected: 07/15/24 1309    Specimen: Cerebrospinal Fluid from Lumbar Puncture Updated: 07/15/24 1328     Extra Tube HOLD FOR ADD ON    Comprehensive Metabolic Panel [[id number]]  (Abnormal) Collected: 07/15/24 1026    Specimen: Blood Updated: 07/15/24 1126     Glucose 310 mg/dL      BUN 19 mg/dL      Creatinine 0.66 mg/dL      Sodium 133 mmol/L      Potassium 3.8 mmol/L      Chloride 102 mmol/L      CO2 19.7 mmol/L      Calcium 8.8 mg/dL      Total Protein 5.8 g/dL      Albumin 3.6 g/dL      ALT (SGPT) 322 U/L      AST (SGOT) 82 U/L      Alkaline Phosphatase 161 U/L      Total Bilirubin 1.0 mg/dL      Globulin 2.2 gm/dL      A/G Ratio  1.6 g/dL      BUN/Creatinine Ratio 28.8     Anion Gap 11.3 mmol/L      eGFR 107.4 mL/min/1.73     Narrative:      GFR Normal >60  Chronic Kidney Disease <60  Kidney Failure <15      CBC & Differential [[id number]]  (Abnormal) Collected: 07/15/24 1026    Specimen: Blood Updated: 07/15/24 1108    Narrative:      The following orders were created for panel order CBC & Differential.  Procedure                               Abnormality         Status                     ---------                               -----------         ------                     CBC Auto Differential[[id number]]        Abnormal            Final result                 Please view results for these tests on the individual orders.    CBC Auto Differential [[id number]]  (Abnormal) Collected: 07/15/24 1026    Specimen: Blood Updated: 07/15/24 1108     WBC 10.13 10*3/mm3      RBC 5.16 10*6/mm3      Hemoglobin 15.5 g/dL      Hematocrit 45.8 %      MCV 88.8 fL      MCH 30.0 pg      MCHC 33.8 g/dL      RDW 12.8 %      RDW-SD 41.5 fl      MPV 10.8 fL      Platelets 138 10*3/mm3      Neutrophil % 84.4 %      Lymphocyte % 10.5 %      Monocyte % 2.7 %      Eosinophil % 0.0 %      Basophil % 0.2 %      Immature Grans % 2.2 %      Neutrophils, Absolute 8.56 10*3/mm3      Lymphocytes, Absolute 1.06 10*3/mm3      Monocytes, Absolute 0.27 10*3/mm3      Eosinophils, Absolute 0.00 10*3/mm3      Basophils, Absolute 0.02 10*3/mm3      Immature Grans, Absolute 0.22 10*3/mm3      nRBC 0.0 /100 WBC     POC Glucose Once [[id number]]  (Abnormal) Collected: 07/15/24 0937    Specimen: Blood Updated: 07/15/24 0938     Glucose 296 mg/dL           Data Review:  Results from last 7 days   Lab Units 07/16/24  0604 07/15/24  1026 07/15/24  0753   SODIUM mmol/L 139 133* 134*   POTASSIUM mmol/L 3.4* 3.8 4.1   CHLORIDE mmol/L 99 102 98   CO2 mmol/L 31.0* 19.7* 22.1   BUN mg/dL 17 19 20   CREATININE mg/dL 0.60* 0.66* 0.82   GLUCOSE mg/dL 303* 310* 316*   CALCIUM mg/dL 8.7 8.8 9.3  "    Results from last 7 days   Lab Units 07/16/24  0604 07/15/24  1026 07/15/24  0753   WBC 10*3/mm3 8.57 10.13 12.06*   HEMOGLOBIN g/dL 14.5 15.5 16.7   HEMATOCRIT % 42.6 45.8 48.2   PLATELETS 10*3/mm3 117* 138* 161             No results found for: \"TROPONINT\"      Results from last 7 days   Lab Units 07/16/24  0604 07/15/24  1026 07/15/24  0753   ALK PHOS U/L 112 161* 200*   BILIRUBIN mg/dL 1.0 1.0 1.1   ALT (SGPT) U/L 271* 322* 360*   AST (SGOT) U/L 89* 82* 87*             Glucose   Date/Time Value Ref Range Status   07/16/2024 1150 171 (H) 70 - 130 mg/dL Final   07/16/2024 0804 314 (H) 70 - 130 mg/dL Final   07/15/2024 2045 319 (H) 70 - 130 mg/dL Final   07/15/2024 1700 330 (H) 70 - 130 mg/dL Final   07/15/2024 0937 296 (H) 70 - 130 mg/dL Final     Results from last 7 days   Lab Units 07/15/24  0753   INR  1.00       Past Medical History:   Diagnosis Date    Diabetes mellitus     Hepatitis C     Hyperthyroidism     Sleep apnea        Assessment:  Active Hospital Problems    Diagnosis  POA    **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       Plan:  Plans as per oncology.  Chemotherapy and steroids.  Glycemic control.  Follow-up on labs.    Samuel Farley MD  7/16/2024  15:22 EDT   "

## 2024-07-16 NOTE — PROGRESS NOTES
[facility] CBC GROUP INPATIENT PROGRESS NOTE    Length of Stay:  1 days    CHIEF COMPLAINT/REASON FOR VISIT:  Primary B-cell lymphoma    SUBJECTIVE:   No acute overnight events.  He was started on intrathecal methotrexate yesterday.  He had lumbar puncture yesterday.  He denies any headaches, nausea, vomiting, fever, diarrhea.      Onc history  The patient is a 58 y.o. year old male whom we are consulted for evaluation and management of newly diagnosed CNS diffuse large B-cell lymphoma.     This patient has hypertension, hepatitis C, sleep apnea, however he denies diabetes, also previous history of alcohol use who stopped drinking 6 months ago, who presented to [facility] emergency room on 06/15/2024 because of worsening memory as well as episodes of fall. This patient is a long term . He initially came to [facility] ER and CT scan shows brain mets. He was given IV steroids and brought to [facility] for further evaluation and management. The patient reports no fever, sweating, chills, no weight loss. He does not smoke cigarettes.      The CT scan of the head without contrast on 06/15/2024 at [facility] reported a right frontal lobe mass measuring 4.8 x 3.3 cm with large amount of associated vasogenic edema and so called effacement. There is 8 mm right to left midline shift. There was significant mass effect.     After he was admitted to [facility] further imaging studies with the brain MRI with and without contrast on 06/15/2024 confirmed an enhancing mass 4 x 4 x 4 cm in the deep white matter of the right frontal lobe and with internal region of cystic changes. There was intramural micro hemorrhage. There was extensive surrounding vasogenic edema extends across the midline to the corpus collosum. There is leftward midline shift measuring 14 mm.      The patient further had workup with imaging studies for chest, abdomen and pelvis with  IV contrast obtained on 06/16/2024 and this study reported no mediastinal adenopathy in the chest. There was trace pericardial effusion. No pulmonary lesions, pleural effusions or pneumothorax. In the abdomen there was chronic diverticulosis, otherwise unremarkable. There was borderline enlarged carley hepatic lymph nodes 1.2 cm. Otherwise unremarkable. No bone lesions.      This patient was seen by, Roesndo Blelo MD, Neurosurgeon, who performed right frontal craniotomy and the intraoperative frozen section reported undifferentiated malignancy and the possibility of lymphoma. The patient has excisional biopsy for the right frontal lobe mass.     Pathology evaluation reported atypical lymphoproliferative consistent with diffuse large B-cell lymphoma pending outside consultation. The gross description reported specimen measuring 1.4 x 1.0 x 0.4 cm. The 2nd specimen consisted of 3 x 2 x 1 cm rubbery soft tissue.      The patient presents for evaluation of primary CNS B-cell lymphoma.     I saw him recently in on 6/21/2024 during his hospitalization for newly diagnosed primary CNS lymphoma.     Genetic study of tumor sample: All negative for the BCL2, BCL6, and MYC. Tumor is strongly positive for CD20. Blood glucose level between 120 and 150. A1c is 5.6    ROS:  All systems reviewed and found to be negative except for that noted in HPI    OBJECTIVE:  Vitals:    07/15/24 2047 07/15/24 2351 07/16/24 0432 07/16/24 0731   BP: 117/66 136/82 106/64 122/84   BP Location: Left arm Left arm Left arm Left arm   Patient Position: Lying Lying Lying Lying   Pulse: 68 72 54 51   Resp: 16 16 16 16   Temp: 97.9 °F (36.6 °C) 97.5 °F (36.4 °C) 97.3 °F (36.3 °C) 97.5 °F (36.4 °C)   TempSrc: Oral Oral Oral Oral   SpO2: 96% 96% 96% 97%   Weight:       Height:             PHYSICAL EXAMINATION:  Physical Exam  Constitutional:       Appearance: Normal appearance. He is normal weight.   HENT:      Head: Normocephalic and atraumatic.   Eyes:       Extraocular Movements: Extraocular movements intact.      Pupils: Pupils are equal, round, and reactive to light.   Cardiovascular:      Rate and Rhythm: Normal rate and regular rhythm.      Heart sounds: Normal heart sounds. No murmur heard.  Pulmonary:      Effort: Pulmonary effort is normal.      Breath sounds: Normal breath sounds.   Abdominal:      General: Abdomen is flat. Bowel sounds are normal.      Palpations: Abdomen is soft.   Neurological:      General: No focal deficit present.      Mental Status: He is oriented to person, place, and time.           DIAGNOSTIC DATA:  Results Review:     I reviewed the patient's new clinical results.    Results from last 7 days   Lab Units 07/16/24  0604 07/15/24  1026 07/15/24  0753   WBC 10*3/mm3 8.57 10.13 12.06*   HEMOGLOBIN g/dL 14.5 15.5 16.7   HEMATOCRIT % 42.6 45.8 48.2   PLATELETS 10*3/mm3 117* 138* 161      Results from last 7 days   Lab Units 07/16/24  0604 07/15/24  1026 07/15/24  0753   SODIUM mmol/L 139 133* 134*   POTASSIUM mmol/L 3.4* 3.8 4.1   CHLORIDE mmol/L 99 102 98   CO2 mmol/L 31.0* 19.7* 22.1   BUN mg/dL 17 19 20   CREATININE mg/dL 0.60* 0.66* 0.82   CALCIUM mg/dL 8.7 8.8 9.3   BILIRUBIN mg/dL 1.0 1.0 1.1   ALK PHOS U/L 112 161* 200*   ALT (SGPT) U/L 271* 322* 360*   AST (SGOT) U/L 89* 82* 87*   GLUCOSE mg/dL 303* 310* 316*      Lab Results   Component Value Date    NEUTROABS 6.44 07/16/2024     Results from last 7 days   Lab Units 07/15/24  0753   INR  1.00   APTT seconds 21.0*     Glucose, CSF - Cerebrospinal Fluid, Lumbar Puncture (07/15/2024 13:09)  Protein, CSF - Cerebrospinal Fluid, Lumbar Puncture (07/15/2024 13:09)  Cell Count With Differential, CSF Use CSF Tube: 1 (07/15/2024 13:09)  Cell Count With Differential, CSF (07/15/2024 13:09)  CSF Tube - Cerebrospinal Fluid, Lumbar Puncture (07/15/2024 13:09)      Assessment & Plan   ASSESSMENT/PLAN:  This is a 58 y.o. male with:     *  Primary CNS diffuse large B-cell lymphoma.    Patient had a  craniotomy on 6/19/2024.  Pathology evaluation reported diffuse large B-cell lymphoma, Ki-67 at the 70%.  Further pathology evaluation is triple negative for Bcl-2, BCL6 and MYC.   I discussed with the patient today on 7/9/2024, recommending to have a PET scan examination for further evaluation.  Also discussed with the patient for high-dose methotrexate per NCCN guideline.   Will also obtain CSF on day of cycle 1 day 1 of chemotherapy for flow cytometry study, and also give intra thecal methotrexate. The frequency of chemotherapy will be adjusted no later than every 3 weeks, contingent on his recovery and tolerance. If his recovery is satisfactory, the frequency may be reduced to less than every 3 weeks. Maintenance therapy will be initiated for high-dose methotrexate monthly. A referral will be made for stem cell transplant service at the [facility] in [facility].  Patient could not have PET scan examination on 7/12/2024 due to significant elevated glucose level.  7/15/2024 cycle 1 day 1 chemotherapy with high-dose methotrexate.  This includes a lumbar puncture with cerebrospinal fluid analysis and intrathecal chemotherapy with methotrexate. Depending on the results, a decision regarding methotrexate administration will be determined. Additional oral tablets of leucovorin will be administered during the hospital stay to mitigate the side effects of the methotrexate.  7/16/2024: Patient tolerated IT cycle 1 high-dose methotrexate  quite well.  Had lumbar puncture yesterday.  CSF cytology pending.  CSF glucose 201, CSF protein 107, CSF nucleated cells-2, RBC 46     *  Hyperglycemia secondary to steroids.  The patient's highest recorded glucose level was 290 yesterday morning on 7/8/2024, and his daily glucose levels range from 120 to 150.   Patient was not diabetic before diagnosis of CNS lymphoma.  Clearly his hyperglycemia is caused by steroids.  He currently is on dexamethasone 6 mg twice a  day.  Today on 7/9/2024 I recommended to decrease dexamethasone to 4 mg twice a day.  He will continue insulin prescribed by his primary care physician.  7/12/2024 glucose 355, PET scan was not able to be carried out.  7/15/2024 elevated glucose 316.  Patient will be admitted to hospital for scheduled high-dose methotrexate.    7/16/2024 glucose 314.  Appreciate hospitalist management of hyperglycemia    *Transaminitis  *Hep C  -7/16/2024 AST 89, , T. bili 1, alk phos 112    *Thrombocytopenia, acute  -7/15/2024 platelets 138  - 7/16/2024 platelets 117, likely secondary to chemotherapy.  -Continue to monitor     PLAN:  7/22/2024: Cycle 1 day 8 plan for methotrexate  CSF cytology and flow cytometry pending.    Continue dexamethasone 4 mg twice a day.    Appreciate hospitalist management of steroid-induced hyperglycemia   continue Pepcid for GI prophylaxis.  Continue Keppra 500 mg twice a day for prophylaxis of seizure.  .  Continue Bactrim 3 times a week for prophylaxis of PJP infection.    Monitor methotrexate level per protocol.  Patient will be given leucovorin for rescue.  Expecting patient will be discharged at this coming Friday on 7/19/2024.  Patient has appointment with CBC and CMP twice a week as outpatient next week.  Patient has appointment with me for reevaluation in 2 weeks.         Kalina Medina MD

## 2024-07-16 NOTE — NURSING NOTE
"Nursing Chemotherapy Verification    Chemotherapy Regimen:   Treatment Plans       Name Type Plan Dates Plan Provider         Active    IP CNS High Dose Methotrexate / Leucovorin / RiTUXimab (Induction) ONCOLOGY TREATMENT  7/15/2024 - Present Krista Fernandez MD PhD                       Current height and weight: 177.8 cm (70\") 94.6 kg (208 lb 8 oz)  Calculated BSA from current height and weight (Campos): 2.12    Relevant Labs  Results from last 7 days   Lab Units 07/16/24  0604 07/15/24  1026 07/15/24  0753   WBC 10*3/mm3 8.57 10.13 12.06*   HEMOGLOBIN g/dL 14.5 15.5 16.7   HEMATOCRIT % 42.6 45.8 48.2   PLATELETS 10*3/mm3 117* 138* 161     Lab Results   Component Value Date    NEUTROABS 6.44 07/16/2024       Results from last 7 days   Lab Units 07/16/24  0604 07/15/24  1026 07/15/24  0753   CREATININE mg/dL 0.60* 0.66* 0.82       Serum creatinine: 0.6 mg/dL (L) 07/16/24 0604  Estimated creatinine clearance: 151.1 mL/min (A)    Lab Results   Component Value Date    HAV Negative 02/28/2024    HEPBCAB Negative 07/15/2024       Verification attestation:  I have personally reviewed the planned regimen and its administration and dosing. I understand the potential side effects.The patient has been instructed on the regimen, potential side effects, and self care measures; the consent form has been completed. I have confirmed that the appropriate premedication, prehydration, post medication and/or emergency medications are ordered in addition to the chemotherapy.    I have independently verified that the height and weight is current and calculated the BSA. I have verified the doses with the planned regimen and have clarified any deviations with the physician (Dr. Fernandez). I have confirmed the route of administration and patient IV access.    Nurse: Carrie Galo RN  2nd verification Nurse: Sintia Irizarry RN  "

## 2024-07-16 NOTE — PROGRESS NOTES
Discharge Planning Assessment  [facility]     Patient Name: Iain Christina  MRN: [id number]  Today's Date: 7/16/2024    Admit Date: 7/15/2024    Plan: Home with family   Discharge Needs Assessment       Row Name 07/16/24 1506       Living Environment    People in Home alone;other (see comments)  currently staying with brother bhakti and sukhi    Current Living Arrangements home    Potentially Unsafe Housing Conditions none    Primary Care Provided by self    Provides Primary Care For no one    Family Caregiver if Needed other relative(s)    Family Caregiver Names Georgie isbell and brother bhakti Olivier    Quality of Family Relationships supportive;involved;helpful    Able to Return to Prior Arrangements yes       Resource/Environmental Concerns    Resource/Environmental Concerns none       Transition Planning    Patient/Family Anticipates Transition to home with family    Patient/Family Anticipated Services at Transition none    Transportation Anticipated car, drives self;family or friend will provide       Discharge Needs Assessment    Equipment Currently Used at Home none    Concerns to be Addressed denies needs/concerns at this time                   Discharge Plan       Row Name 07/16/24 1508       Plan    Plan Home with family    Plan Comments Spoke with pt andhi jessica bhatki Olivier at bedside to screen for DCP/needs.  Pt stated that he does live in [facility] [place] but is currently staying with his niece Mariel and brother bhakti Olivier in [facility] and plan to go there at [place]/ Pt reports that he is totally independent with self care and ambulation.  Pt denies any DC needs.  Pt did confirm his PCP as Scott Poe. Pt stated that DC he does plan to use [facility] meds to bed.  Pt's family will transport pt home at DC.                  Continued Care and Services - Admitted Since 7/15/2024    No active coordination exists for this encounter.       Expected Discharge Date and Time       Expected Discharge Date Expected  Discharge Time    Jul 19, 2024            Demographic Summary       Row Name 07/16/24 1506       General Information    Admission Type inpatient    Arrived From home    Referral Source admission list    Reason for Consult discharge planning                   Functional Status       Row Name 07/16/24 1506       Functional Status    Usual Activity Tolerance good    Current Activity Tolerance good       Functional Status, IADL    Medications independent    Meal Preparation independent    Housekeeping independent    Laundry independent    Shopping independent       Mental Status Summary    Recent Changes in Mental Status/Cognitive Functioning no changes                   Psychosocial    No documentation.                  Abuse/Neglect    No documentation.                  Legal    No documentation.                  Substance Abuse    No documentation.                  Patient Forms    No documentation.                     RAMEZ Lomax

## 2024-07-17 LAB
ALBUMIN SERPL-MCNC: 3.3 G/DL (ref 3.5–5.2)
ALBUMIN/GLOB SERPL: 1.7 G/DL
ALP SERPL-CCNC: 97 U/L (ref 39–117)
ALT SERPL W P-5'-P-CCNC: 303 U/L (ref 1–41)
ANION GAP SERPL CALCULATED.3IONS-SCNC: 8.2 MMOL/L (ref 5–15)
AST SERPL-CCNC: 106 U/L (ref 1–40)
BASOPHILS # BLD AUTO: 0 10*3/MM3 (ref 0–0.2)
BASOPHILS NFR BLD AUTO: 0 % (ref 0–1.5)
BILIRUB SERPL-MCNC: 2.4 MG/DL (ref 0–1.2)
BUN SERPL-MCNC: 18 MG/DL (ref 8–23)
BUN/CREAT SERPL: 24.7 (ref 7–25)
CALCIUM SPEC-SCNC: 8.1 MG/DL (ref 8.6–10.5)
CHLORIDE SERPL-SCNC: 104 MMOL/L (ref 98–107)
CO2 SERPL-SCNC: 30.8 MMOL/L (ref 22–29)
CREAT SERPL-MCNC: 0.73 MG/DL (ref 0.76–1.27)
DEPRECATED RDW RBC AUTO: 40.8 FL (ref 37–54)
EGFRCR SERPLBLD CKD-EPI 2021: 104.2 ML/MIN/1.73
EOSINOPHIL # BLD AUTO: 0.01 10*3/MM3 (ref 0–0.4)
EOSINOPHIL NFR BLD AUTO: 0.1 % (ref 0.3–6.2)
ERYTHROCYTE [DISTWIDTH] IN BLOOD BY AUTOMATED COUNT: 13 % (ref 12.3–15.4)
GLOBULIN UR ELPH-MCNC: 1.9 GM/DL
GLUCOSE BLDC GLUCOMTR-MCNC: 116 MG/DL (ref 70–130)
GLUCOSE BLDC GLUCOMTR-MCNC: 226 MG/DL (ref 70–130)
GLUCOSE BLDC GLUCOMTR-MCNC: 232 MG/DL (ref 70–130)
GLUCOSE BLDC GLUCOMTR-MCNC: 296 MG/DL (ref 70–130)
GLUCOSE SERPL-MCNC: 92 MG/DL (ref 65–99)
HCT VFR BLD AUTO: 43.1 % (ref 37.5–51)
HGB BLD-MCNC: 15.1 G/DL (ref 13–17.7)
IMM GRANULOCYTES # BLD AUTO: 0.12 10*3/MM3 (ref 0–0.05)
IMM GRANULOCYTES NFR BLD AUTO: 1.3 % (ref 0–0.5)
LYMPHOCYTES # BLD AUTO: 0.77 10*3/MM3 (ref 0.7–3.1)
LYMPHOCYTES NFR BLD AUTO: 8.4 % (ref 19.6–45.3)
MCH RBC QN AUTO: 30.5 PG (ref 26.6–33)
MCHC RBC AUTO-ENTMCNC: 35 G/DL (ref 31.5–35.7)
MCV RBC AUTO: 87.1 FL (ref 79–97)
MONOCYTES # BLD AUTO: 0.51 10*3/MM3 (ref 0.1–0.9)
MONOCYTES NFR BLD AUTO: 5.5 % (ref 5–12)
NEUTROPHILS NFR BLD AUTO: 7.78 10*3/MM3 (ref 1.7–7)
NEUTROPHILS NFR BLD AUTO: 84.7 % (ref 42.7–76)
PH UR STRIP.AUTO: >=9 [PH] (ref 5–8)
PLATELET # BLD AUTO: 125 10*3/MM3 (ref 140–450)
PMV BLD AUTO: 10 FL (ref 6–12)
POTASSIUM SERPL-SCNC: 3.6 MMOL/L (ref 3.5–5.2)
PROT SERPL-MCNC: 5.2 G/DL (ref 6–8.5)
RBC # BLD AUTO: 4.95 10*6/MM3 (ref 4.14–5.8)
SODIUM SERPL-SCNC: 143 MMOL/L (ref 136–145)
WBC NRBC COR # BLD AUTO: 9.19 10*3/MM3 (ref 3.4–10.8)

## 2024-07-17 PROCEDURE — 82948 REAGENT STRIP/BLOOD GLUCOSE: CPT

## 2024-07-17 PROCEDURE — 85025 COMPLETE CBC W/AUTO DIFF WBC: CPT | Performed by: INTERNAL MEDICINE

## 2024-07-17 PROCEDURE — 80204 DRUG ASSAY METHOTREXATE: CPT | Performed by: STUDENT IN AN ORGANIZED HEALTH CARE EDUCATION/TRAINING PROGRAM

## 2024-07-17 PROCEDURE — 63710000001 INSULIN LISPRO (HUMAN) PER 5 UNITS: Performed by: INTERNAL MEDICINE

## 2024-07-17 PROCEDURE — 63710000001 DEXAMETHASONE PER 0.25 MG: Performed by: INTERNAL MEDICINE

## 2024-07-17 PROCEDURE — 25010000002 LEUCOVORIN CALCIUM PER 50 MG: Performed by: STUDENT IN AN ORGANIZED HEALTH CARE EDUCATION/TRAINING PROGRAM

## 2024-07-17 PROCEDURE — 81003 URINALYSIS AUTO W/O SCOPE: CPT | Performed by: INTERNAL MEDICINE

## 2024-07-17 PROCEDURE — 63710000001 INSULIN GLARGINE PER 5 UNITS: Performed by: INTERNAL MEDICINE

## 2024-07-17 PROCEDURE — 25010000002 ENOXAPARIN PER 10 MG: Performed by: INTERNAL MEDICINE

## 2024-07-17 PROCEDURE — 80053 COMPREHEN METABOLIC PANEL: CPT | Performed by: INTERNAL MEDICINE

## 2024-07-17 RX ORDER — ZOLPIDEM TARTRATE 5 MG/1
10 TABLET ORAL NIGHTLY PRN
Status: DISCONTINUED | OUTPATIENT
Start: 2024-07-17 | End: 2024-07-23 | Stop reason: HOSPADM

## 2024-07-17 RX ORDER — URSODIOL 300 MG/1
300 CAPSULE ORAL 2 TIMES DAILY
Status: DISCONTINUED | OUTPATIENT
Start: 2024-07-17 | End: 2024-07-18

## 2024-07-17 RX ADMIN — LEVETIRACETAM 500 MG: 500 TABLET, FILM COATED ORAL at 08:27

## 2024-07-17 RX ADMIN — Medication 10 ML: at 21:16

## 2024-07-17 RX ADMIN — URSODIOL 300 MG: 300 CAPSULE ORAL at 13:20

## 2024-07-17 RX ADMIN — SENNOSIDES AND DOCUSATE SODIUM 1 TABLET: 50; 8.6 TABLET ORAL at 08:27

## 2024-07-17 RX ADMIN — SENNOSIDES AND DOCUSATE SODIUM 1 TABLET: 50; 8.6 TABLET ORAL at 21:15

## 2024-07-17 RX ADMIN — DEXAMETHASONE 4 MG: 4 TABLET ORAL at 18:09

## 2024-07-17 RX ADMIN — SODIUM BICARBONATE: 84 INJECTION, SOLUTION INTRAVENOUS at 18:48

## 2024-07-17 RX ADMIN — INSULIN LISPRO 8 UNITS: 100 INJECTION, SOLUTION INTRAVENOUS; SUBCUTANEOUS at 12:56

## 2024-07-17 RX ADMIN — INSULIN LISPRO 5 UNITS: 100 INJECTION, SOLUTION INTRAVENOUS; SUBCUTANEOUS at 12:56

## 2024-07-17 RX ADMIN — SODIUM BICARBONATE: 84 INJECTION, SOLUTION INTRAVENOUS at 11:05

## 2024-07-17 RX ADMIN — INSULIN GLARGINE 25 UNITS: 100 INJECTION, SOLUTION SUBCUTANEOUS at 08:27

## 2024-07-17 RX ADMIN — LOSARTAN POTASSIUM 50 MG: 50 TABLET, FILM COATED ORAL at 08:27

## 2024-07-17 RX ADMIN — FAMOTIDINE 20 MG: 20 TABLET, FILM COATED ORAL at 18:09

## 2024-07-17 RX ADMIN — ENOXAPARIN SODIUM 40 MG: 100 INJECTION SUBCUTANEOUS at 08:27

## 2024-07-17 RX ADMIN — INSULIN LISPRO 5 UNITS: 100 INJECTION, SOLUTION INTRAVENOUS; SUBCUTANEOUS at 18:10

## 2024-07-17 RX ADMIN — LEVETIRACETAM 500 MG: 500 TABLET, FILM COATED ORAL at 21:15

## 2024-07-17 RX ADMIN — INSULIN LISPRO 8 UNITS: 100 INJECTION, SOLUTION INTRAVENOUS; SUBCUTANEOUS at 21:15

## 2024-07-17 RX ADMIN — URSODIOL 300 MG: 300 CAPSULE ORAL at 21:15

## 2024-07-17 RX ADMIN — INSULIN LISPRO 12 UNITS: 100 INJECTION, SOLUTION INTRAVENOUS; SUBCUTANEOUS at 18:10

## 2024-07-17 RX ADMIN — HYDROCHLOROTHIAZIDE 12.5 MG: 12.5 TABLET ORAL at 08:27

## 2024-07-17 RX ADMIN — INSULIN LISPRO 5 UNITS: 100 INJECTION, SOLUTION INTRAVENOUS; SUBCUTANEOUS at 08:27

## 2024-07-17 RX ADMIN — SODIUM BICARBONATE: 84 INJECTION, SOLUTION INTRAVENOUS at 03:57

## 2024-07-17 RX ADMIN — FAMOTIDINE 20 MG: 20 TABLET, FILM COATED ORAL at 07:40

## 2024-07-17 RX ADMIN — BACTERIOSTATIC WATER 25 MG: 1 INJECTION, SOLUTION INTRAMUSCULAR; INTRAVENOUS; SUBCUTANEOUS at 18:26

## 2024-07-17 RX ADMIN — ZOLPIDEM TARTRATE 10 MG: 5 TABLET, FILM COATED ORAL at 21:15

## 2024-07-17 RX ADMIN — DEXAMETHASONE 4 MG: 4 TABLET ORAL at 08:27

## 2024-07-17 NOTE — PLAN OF CARE
Goal Outcome Evaluation:   Patient A&Ox4.  Leucovorin dose increased.  New order for actigall.  SSI given as needed.  Methotrexate level drawn at 1335 & sent on ice to [facility].  Up ad brandy.  VSS.                                            20.07

## 2024-07-17 NOTE — PROGRESS NOTES
[facility] GROUP INPATIENT PROGRESS NOTE    Length of Stay:  2 days    CHIEF COMPLAINT/REASON FOR VISIT:  Primary B-cell lymphoma    SUBJECTIVE:   7/17/2024  T98.4, heart rate 51, respiratory rate 16, /72, SpO2 93%  Received IV methotrexate yesterday.  No acute overnight events.  Tolerated chemo quite well.  Reports having some fatigue prior to bedtime yesterday.  Denies any other questions or concerns    Onc history  The patient is a 58 y.o. year old male whom we are consulted for evaluation and management of newly diagnosed CNS diffuse large B-cell lymphoma.     This patient has hypertension, hepatitis C, sleep apnea, however he denies diabetes, also previous history of alcohol use who stopped drinking 6 months ago, who presented to [facility] emergency room on 06/15/2024 because of worsening memory as well as episodes of fall. This patient is a long term . He initially came to [facility] ER and CT scan shows brain mets. He was given IV steroids and brought to [facility] for further evaluation and management. The patient reports no fever, sweating, chills, no weight loss. He does not smoke cigarettes.      The CT scan of the head without contrast on 06/15/2024 at [facility] reported a right frontal lobe mass measuring 4.8 x 3.3 cm with large amount of associated vasogenic edema and so called effacement. There is 8 mm right to left midline shift. There was significant mass effect.     After he was admitted to [facility] further imaging studies with the brain MRI with and without contrast on 06/15/2024 confirmed an enhancing mass 4 x 4 x 4 cm in the deep white matter of the right frontal lobe and with internal region of cystic changes. There was intramural micro hemorrhage. There was extensive surrounding vasogenic edema extends across the midline to the corpus collosum. There is leftward midline shift measuring 14 mm.       The patient further had workup with imaging studies for chest, abdomen and pelvis with IV contrast obtained on 06/16/2024 and this study reported no mediastinal adenopathy in the chest. There was trace pericardial effusion. No pulmonary lesions, pleural effusions or pneumothorax. In the abdomen there was chronic diverticulosis, otherwise unremarkable. There was borderline enlarged carley hepatic lymph nodes 1.2 cm. Otherwise unremarkable. No bone lesions.      This patient was seen by, Rosendo Bello MD, Neurosurgeon, who performed right frontal craniotomy and the intraoperative frozen section reported undifferentiated malignancy and the possibility of lymphoma. The patient has excisional biopsy for the right frontal lobe mass.     Pathology evaluation reported atypical lymphoproliferative consistent with diffuse large B-cell lymphoma pending outside consultation. The gross description reported specimen measuring 1.4 x 1.0 x 0.4 cm. The 2nd specimen consisted of 3 x 2 x 1 cm rubbery soft tissue.      The patient presents for evaluation of primary CNS B-cell lymphoma.     I saw him recently in on 6/21/2024 during his hospitalization for newly diagnosed primary CNS lymphoma.     Genetic study of tumor sample: All negative for the BCL2, BCL6, and MYC. Tumor is strongly positive for CD20. Blood glucose level between 120 and 150. A1c is 5.6    ROS:  All systems reviewed and found to be negative except for that noted in HPI    OBJECTIVE:  Vitals:    07/16/24 1128 07/16/24 1655 07/16/24 2009 07/17/24 0444   BP: 125/78 127/84 122/77 105/72   BP Location: Right arm Right arm Right arm Right arm   Patient Position: Lying Sitting Lying Lying   Pulse: 50 51 (!) 48 51   Resp: 16 16 16 16   Temp: 97.9 °F (36.6 °C) 97.7 °F (36.5 °C) 98.2 °F (36.8 °C) 98.4 °F (36.9 °C)   TempSrc: Oral Oral Oral Oral   SpO2: 96% 94% 93% 93%   Weight:    93.4 kg (205 lb 14.6 oz)   Height:             PHYSICAL EXAMINATION:  Physical  Exam  Constitutional:       Appearance: Normal appearance. He is normal weight.   HENT:      Head: Normocephalic and atraumatic.   Eyes:      Extraocular Movements: Extraocular movements intact.      Pupils: Pupils are equal, round, and reactive to light.   Cardiovascular:      Rate and Rhythm: Normal rate and regular rhythm.      Heart sounds: Normal heart sounds. No murmur heard.  Pulmonary:      Effort: Pulmonary effort is normal.      Breath sounds: Normal breath sounds.   Abdominal:      General: Abdomen is flat. Bowel sounds are normal.      Palpations: Abdomen is soft.   Neurological:      General: No focal deficit present.      Mental Status: He is oriented to person, place, and time.           DIAGNOSTIC DATA:  Results Review:     I reviewed the patient's new clinical results.    Results from last 7 days   Lab Units 07/17/24  0551 07/16/24  0604 07/15/24  1026   WBC 10*3/mm3 9.19 8.57 10.13   HEMOGLOBIN g/dL 15.1 14.5 15.5   HEMATOCRIT % 43.1 42.6 45.8   PLATELETS 10*3/mm3 125* 117* 138*      Results from last 7 days   Lab Units 07/17/24  0551 07/16/24  0604 07/15/24  1026   SODIUM mmol/L 143 139 133*   POTASSIUM mmol/L 3.6 3.4* 3.8   CHLORIDE mmol/L 104 99 102   CO2 mmol/L 30.8* 31.0* 19.7*   BUN mg/dL 18 17 19   CREATININE mg/dL 0.73* 0.60* 0.66*   CALCIUM mg/dL 8.1* 8.7 8.8   BILIRUBIN mg/dL 2.4* 1.0 1.0   ALK PHOS U/L 97 112 161*   ALT (SGPT) U/L 303* 271* 322*   AST (SGOT) U/L 106* 89* 82*   GLUCOSE mg/dL 92 303* 310*      Lab Results   Component Value Date    NEUTROABS 7.78 (H) 07/17/2024     Results from last 7 days   Lab Units 07/15/24  0753   INR  1.00   APTT seconds 21.0*     Glucose, CSF - Cerebrospinal Fluid, Lumbar Puncture (07/15/2024 13:09)  Protein, CSF - Cerebrospinal Fluid, Lumbar Puncture (07/15/2024 13:09)  Cell Count With Differential, CSF Use CSF Tube: 1 (07/15/2024 13:09)  Cell Count With Differential, CSF (07/15/2024 13:09)  CSF Tube - Cerebrospinal Fluid, Lumbar Puncture (07/15/2024  13:09)      Assessment & Plan   ASSESSMENT/PLAN:  This is a 58 y.o. male with:     *  Primary CNS diffuse large B-cell lymphoma.    Patient had a craniotomy on 6/19/2024.  Pathology evaluation reported diffuse large B-cell lymphoma, Ki-67 at the 70%.  Further pathology evaluation is triple negative for Bcl-2, BCL6 and MYC.   I discussed with the patient today on 7/9/2024, recommending to have a PET scan examination for further evaluation.  Also discussed with the patient for high-dose methotrexate per NCCN guideline.   Will also obtain CSF on day of cycle 1 day 1 of chemotherapy for flow cytometry study, and also give intra thecal methotrexate. The frequency of chemotherapy will be adjusted no later than every 3 weeks, contingent on his recovery and tolerance. If his recovery is satisfactory, the frequency may be reduced to less than every 3 weeks. Maintenance therapy will be initiated for high-dose methotrexate monthly. A referral will be made for stem cell transplant service at the [facility] in [facility].  Patient could not have PET scan examination on 7/12/2024 due to significant elevated glucose level.  7/15/2024 cycle 1 day 1 chemotherapy with high-dose methotrexate.  This includes a lumbar puncture with cerebrospinal fluid analysis and intrathecal chemotherapy with methotrexate. Depending on the results, a decision regarding methotrexate administration will be determined. Additional oral tablets of leucovorin will be administered during the hospital stay to mitigate the side effects of the methotrexate.  7/16/2024: Patient tolerated IT cycle 1 high-dose methotrexate  quite well.  Had lumbar puncture yesterday.  CSF cytology pending.  CSF glucose 201, CSF protein 107, CSF nucleated cells-2, RBC 46.  Methotrexate level 0.11  7/17/2024: Tolerated IV methotrexate quite well.  No acute overnight events.  T. bili increased to 2.4.  Methotrexate level from today pending.    *  Hyperglycemia  secondary to steroids.  The patient's highest recorded glucose level was 290 yesterday morning on 7/8/2024, and his daily glucose levels range from 120 to 150.   Patient was not diabetic before diagnosis of CNS lymphoma.  Clearly his hyperglycemia is caused by steroids.  He currently is on dexamethasone 6 mg twice a day.  Today on 7/9/2024 I recommended to decrease dexamethasone to 4 mg twice a day.  He will continue insulin prescribed by his primary care physician.  7/12/2024 glucose 355, PET scan was not able to be carried out.  7/15/2024 elevated glucose 316.  Patient will be admitted to hospital for scheduled high-dose methotrexate.    7/16/2024 glucose 314.    7/17/2024 glucose 116. Appreciate hospitalist management of hyperglycemia    *Transaminitis  *Hyperbilirubinemia  *Hep C  -7/16/2024 AST 89, , T. bili 1, alk phos 112  -7/17/2024 AST mildly increased to 106, , T. bili increased to 2.4, likely secondary to Mtx  -Will start patient on Actigall    *Thrombocytopenia, secondary to chemotherapy  -7/15/2024 platelets 138  - 7/16/2024 platelets 117  -7/17/2024 platelets 125.  No active bleeding  -Continue to monitor     PLAN:  7/22/2024: Cycle 1 day 8 plan for methotrexate  CSF cytology and flow cytometry pending.    Continue dexamethasone 4 mg twice a day.    Appreciate hospitalist management of steroid-induced hyperglycemia   continue Pepcid for GI prophylaxis.  Continue Keppra 500 mg twice a day for prophylaxis of seizure.  .  Bactrim currently on hold, plan to resume once methotrexate is cleared.    Monitor methotrexate level per protocol.  Patient will be given leucovorin for rescue.  Appreciate pharmacy assistance  Will start the patient on Actigall for elevated bilirubin  Expecting patient will be discharged at this coming Friday on 7/19/2024.  Patient has appointment with CBC and CMP twice a week as outpatient next week.  Patient has appointment with me for reevaluation in 2 weeks.          Kalina Medina MD

## 2024-07-17 NOTE — PROGRESS NOTES
Pharmacy Consult - HD Methotrexate monitoring    Iain Christina is a 58 y.o. YO male starting therapy with HD-MTX. Pharmacy consulted to monitor and adjust leucovorin.    Patient to have methotrexate levels monitored per protocol every 24 hours, starting 24 hours after methotrexate infusion started. For this patient the first level will be drawn at 1200 on 7/17.    Goal levels are:  24 hr post infusion <5  48 hr post infusion <1  72 hr post infusion <0.1     Discussed plan with Dr. Medina, recommend the following dose adjustments to be conducted based on levels at the following times.     Date Current leucovorin dose Goal level Level Adjustment    7/17 1200 Begin leucovorin at 50 mg Q6H x1, followed by 25 mg Q6  <5 0.8 If level 5-10, increase to 50 mg Q4       If level >10 increase to 100 mg Q6     Pharmacy will continue to monitor and update as levels are available. Level 0.8. will continue with current dose of leucovorin. No changes now    Thank you for this opportunity to review.     Sean Montalvo, PharmD  7/17

## 2024-07-17 NOTE — PLAN OF CARE
Goal Outcome Evaluation:   Pt without complaints this shift, labs drawn, urine sent for pH level, VSS.

## 2024-07-17 NOTE — PROGRESS NOTES
"DAILY PROGRESS NOTE  [facility]    Patient Identification:  Name: Iain Christina  Age: 58 y.o.  Sex: male  :  1965  MRN: [id number]         Primary Care Physician: Scott Medina MD    Subjective:  Interval History: He is feeling better today and his headache is better.    Objective:    Scheduled Meds:dexAMETHasone, 4 mg, Oral, BID With Meals  enoxaparin, 40 mg, Subcutaneous, Daily  famotidine, 20 mg, Oral, BID AC  losartan, 50 mg, Oral, Q24H   And  hydroCHLOROthiazide, 12.5 mg, Oral, Q24H  insulin glargine, 25 Units, Subcutaneous, Daily  insulin lispro, 4-24 Units, Subcutaneous, 4x Daily AC & at Bedtime  insulin lispro, 5 Units, Subcutaneous, TID With Meals  leucovorin 25 mg in sodium chloride 0.9 % 52.5 mL IVPB, 25 mg, Intravenous, Q6H  levETIRAcetam, 500 mg, Oral, Q12H  Pharmacy monitoring - high dose methotrexate active, 1 each, Intravenous, Daily  senna-docusate sodium, 1 tablet, Oral, BID  sodium chloride, 10 mL, Intravenous, Q12H  ursodiol, 300 mg, Oral, BID      Continuous Infusions:sterile water (preservative free) 850 mL with sodium bicarbonate 8.4 % 150 mEq infusion, , Last Rate: 150 mL/hr at 24 1105        Vital signs in last 24 hours:  Temp:  [97.7 °F (36.5 °C)-98.4 °F (36.9 °C)] 98 °F (36.7 °C)  Heart Rate:  [48-56] 54  Resp:  [16-18] 16  BP: (105-129)/(72-93) 120/84    Intake/Output:    Intake/Output Summary (Last 24 hours) at 2024 1646  Last data filed at 2024 1614  Gross per 24 hour   Intake 3570 ml   Output 4575 ml   Net -1005 ml       Exam:  /84 (BP Location: Left arm, Patient Position: Lying)   Pulse 54   Temp 98 °F (36.7 °C) (Oral)   Resp 16   Ht 177.8 cm (70\")   Wt 93.4 kg (205 lb 14.6 oz)   SpO2 94%   BMI 29.54 kg/m²     General Appearance:    Alert, cooperative, no distress   Head:    Normocephalic, without obvious abnormality, atraumatic   Eyes:       Throat:   Lips, tongue, gums normal   Neck:   Supple, symmetrical, trachea " midline, no JVD   Lungs:     Clear to auscultation bilaterally, respirations unlabored   Chest Wall:    No tenderness or deformity    Heart:    Regular rate and rhythm, S1 and S2 normal, no murmur,no  rub or gallop   Abdomen:     Soft, nontender, bowel sounds active, no masses, no organomegaly    Extremities:   Extremities normal, atraumatic, no cyanosis or edema   Pulses:      Skin:   Skin is warm and dry,  no rashes or palpable lesions   Neurologic:   no focal deficits noted      Lab Results (last 72 hours)       Procedure Component Value Units Date/Time    POC Glucose Once [[id number]]  (Abnormal) Collected: 07/16/24 1150    Specimen: Blood Updated: 07/16/24 1152     Glucose 171 mg/dL     Methotrexate Level [[id number]] Collected: 07/16/24 0001    Specimen: Blood Updated: 07/16/24 1028     Methotrexate See Attatched From [employer]    POC Glucose Once [[id number]]  (Abnormal) Collected: 07/16/24 0804    Specimen: Blood Updated: 07/16/24 0810     Glucose 314 mg/dL     Lactate Dehydrogenase [[id number]]  (Abnormal) Collected: 07/16/24 0604    Specimen: Blood Updated: 07/16/24 0739      U/L      Comment: Specimen hemolyzed.  Results may be affected.       Comprehensive Metabolic Panel [[id number]]  (Abnormal) Collected: 07/16/24 0604    Specimen: Blood Updated: 07/16/24 0736     Glucose 303 mg/dL      BUN 17 mg/dL      Creatinine 0.60 mg/dL      Sodium 139 mmol/L      Potassium 3.4 mmol/L      Chloride 99 mmol/L      CO2 31.0 mmol/L      Calcium 8.7 mg/dL      Total Protein 5.0 g/dL      Albumin 3.2 g/dL      ALT (SGPT) 271 U/L      AST (SGOT) 89 U/L      Alkaline Phosphatase 112 U/L      Total Bilirubin 1.0 mg/dL      Globulin 1.8 gm/dL      A/G Ratio 1.8 g/dL      BUN/Creatinine Ratio 28.3     Anion Gap 9.0 mmol/L      eGFR 110.5 mL/min/1.73     Narrative:      GFR Normal >60  Chronic Kidney Disease <60  Kidney Failure <15      Uric Acid [[id number]]  (Abnormal) Collected: 07/16/24 0604    Specimen: Blood  Updated: 07/16/24 0736     Uric Acid 2.7 mg/dL     CBC & Differential [[id number]]  (Abnormal) Collected: 07/16/24 0604    Specimen: Blood Updated: 07/16/24 0724    Narrative:      The following orders were created for panel order CBC & Differential.  Procedure                               Abnormality         Status                     ---------                               -----------         ------                     CBC Auto Differential[[id number]]        Abnormal            Final result                 Please view results for these tests on the individual orders.    CBC Auto Differential [[id number]]  (Abnormal) Collected: 07/16/24 0604    Specimen: Blood Updated: 07/16/24 0724     WBC 8.57 10*3/mm3      RBC 4.85 10*6/mm3      Hemoglobin 14.5 g/dL      Hematocrit 42.6 %      MCV 87.8 fL      MCH 29.9 pg      MCHC 34.0 g/dL      RDW 12.8 %      RDW-SD 40.5 fl      MPV 11.0 fL      Platelets 117 10*3/mm3      Neutrophil % 75.1 %      Lymphocyte % 18.0 %      Monocyte % 5.3 %      Eosinophil % 0.2 %      Basophil % 0.2 %      Immature Grans % 1.2 %      Neutrophils, Absolute 6.44 10*3/mm3      Lymphocytes, Absolute 1.54 10*3/mm3      Monocytes, Absolute 0.45 10*3/mm3      Eosinophils, Absolute 0.02 10*3/mm3      Basophils, Absolute 0.02 10*3/mm3      Immature Grans, Absolute 0.10 10*3/mm3     Non-gynecologic Cytology [[id number]] Collected: 07/15/24 1309    Specimen: Cerebrospinal Fluid from Lumbar Puncture Updated: 07/16/24 0641    pH, Urine - Urine, Clean Catch [[id number]]  (Abnormal) Collected: 07/16/24 0001    Specimen: Urine, Clean Catch Updated: 07/16/24 0344     pH, UA 8.5    POC Glucose Once [[id number]]  (Abnormal) Collected: 07/15/24 2045    Specimen: Blood Updated: 07/15/24 2047     Glucose 319 mg/dL     pH, Urine - Urine, Clean Catch [[id number]]  (Normal) Collected: 07/15/24 1659    Specimen: Urine, Clean Catch Updated: 07/15/24 1713     pH, UA 7.0    POC Glucose Once [[id number]]  (Abnormal) Collected:  07/15/24 1700    Specimen: Blood Updated: 07/15/24 1702     Glucose 330 mg/dL     Protein, CSF - Cerebrospinal Fluid, Lumbar Puncture [[id number]]  (Abnormal) Collected: 07/15/24 1309    Specimen: Cerebrospinal Fluid from Lumbar Puncture Updated: 07/15/24 1524     Protein, Total (CSF) 107.2 mg/dL     Glucose, CSF - Cerebrospinal Fluid, Lumbar Puncture [[id number]]  (Abnormal) Collected: 07/15/24 1309    Specimen: Cerebrospinal Fluid from Lumbar Puncture Updated: 07/15/24 1524     Glucose,  mg/dL     Cell Count With Differential, CSF [[id number]]  (Abnormal) Collected: 07/15/24 1309    Specimen: Cerebrospinal Fluid from Lumbar Puncture Updated: 07/15/24 1508    Narrative:      The following orders were created for panel order Cell Count With Differential, CSF.  Procedure                               Abnormality         Status                     ---------                               -----------         ------                     Cell Count, CSF - Cerebr...[[id number]]  Abnormal            Final result                 Please view results for these tests on the individual orders.    Cell Count, CSF - Cerebrospinal Fluid, Lumbar Puncture [[id number]]  (Abnormal) Collected: 07/15/24 1309    Specimen: Cerebrospinal Fluid from Lumbar Puncture Updated: 07/15/24 1508     Color, CSF Colorless     Appearance, CSF Clear     RBC, CSF 37 /mm3      Nucleated Cells, CSF 2 /mm3      Tube Number, CSF 1     Method: UF 5000 Automated Method    Narrative:      Differential not indicated.  This test was developed, its performance characteristics determined and judged suitable for clinical purposes by [facility]. It has not been cleared or approved by the FDA. The laboratory is regulated under CLIA as qualified to perform high-complexity testing.    Cell Count With Differential, CSF Use CSF Tube: 1 [[id number]]  (Abnormal) Collected: 07/15/24 1309    Specimen: Cerebrospinal Fluid from Lumbar Puncture Updated:  07/15/24 1507    Narrative:      The following orders were created for panel order Cell Count With Differential, CSF Use CSF Tube: 1.  Procedure                               Abnormality         Status                     ---------                               -----------         ------                     Cell Count, CSF - Cerebr...[[id number]]  Abnormal            Final result                 Please view results for these tests on the individual orders.    Cell Count, CSF - Cerebrospinal Fluid, Lumbar Puncture [[id number]]  (Abnormal) Collected: 07/15/24 1309    Specimen: Cerebrospinal Fluid from Lumbar Puncture Updated: 07/15/24 1507     Color, CSF Colorless     Appearance, CSF Clear     RBC, CSF 46 /mm3      Nucleated Cells, CSF 2 /mm3      Tube Number, CSF 4     Method: UF 5000 Automated Method    Narrative:      Differential not indicated.  This test was developed, its performance characteristics determined and judged suitable for clinical purposes by [facility]. It has not been cleared or approved by the FDA. The laboratory is regulated under CLIA as qualified to perform high-complexity testing.    CSF Tube - Cerebrospinal Fluid, Lumbar Puncture [[id number]] Collected: 07/15/24 1309    Specimen: Cerebrospinal Fluid from Lumbar Puncture Updated: 07/15/24 1328     Extra Tube HOLD FOR ADD ON    Comprehensive Metabolic Panel [[id number]]  (Abnormal) Collected: 07/15/24 1026    Specimen: Blood Updated: 07/15/24 1126     Glucose 310 mg/dL      BUN 19 mg/dL      Creatinine 0.66 mg/dL      Sodium 133 mmol/L      Potassium 3.8 mmol/L      Chloride 102 mmol/L      CO2 19.7 mmol/L      Calcium 8.8 mg/dL      Total Protein 5.8 g/dL      Albumin 3.6 g/dL      ALT (SGPT) 322 U/L      AST (SGOT) 82 U/L      Alkaline Phosphatase 161 U/L      Total Bilirubin 1.0 mg/dL      Globulin 2.2 gm/dL      A/G Ratio 1.6 g/dL      BUN/Creatinine Ratio 28.8     Anion Gap 11.3 mmol/L      eGFR 107.4 mL/min/1.73      Narrative:      GFR Normal >60  Chronic Kidney Disease <60  Kidney Failure <15      CBC & Differential [[id number]]  (Abnormal) Collected: 07/15/24 1026    Specimen: Blood Updated: 07/15/24 1108    Narrative:      The following orders were created for panel order CBC & Differential.  Procedure                               Abnormality         Status                     ---------                               -----------         ------                     CBC Auto Differential[[id number]]        Abnormal            Final result                 Please view results for these tests on the individual orders.    CBC Auto Differential [[id number]]  (Abnormal) Collected: 07/15/24 1026    Specimen: Blood Updated: 07/15/24 1108     WBC 10.13 10*3/mm3      RBC 5.16 10*6/mm3      Hemoglobin 15.5 g/dL      Hematocrit 45.8 %      MCV 88.8 fL      MCH 30.0 pg      MCHC 33.8 g/dL      RDW 12.8 %      RDW-SD 41.5 fl      MPV 10.8 fL      Platelets 138 10*3/mm3      Neutrophil % 84.4 %      Lymphocyte % 10.5 %      Monocyte % 2.7 %      Eosinophil % 0.0 %      Basophil % 0.2 %      Immature Grans % 2.2 %      Neutrophils, Absolute 8.56 10*3/mm3      Lymphocytes, Absolute 1.06 10*3/mm3      Monocytes, Absolute 0.27 10*3/mm3      Eosinophils, Absolute 0.00 10*3/mm3      Basophils, Absolute 0.02 10*3/mm3      Immature Grans, Absolute 0.22 10*3/mm3      nRBC 0.0 /100 WBC     POC Glucose Once [[id number]]  (Abnormal) Collected: 07/15/24 0937    Specimen: Blood Updated: 07/15/24 0938     Glucose 296 mg/dL           Data Review:  Results from last 7 days   Lab Units 07/17/24  0551 07/16/24  0604 07/15/24  1026   SODIUM mmol/L 143 139 133*   POTASSIUM mmol/L 3.6 3.4* 3.8   CHLORIDE mmol/L 104 99 102   CO2 mmol/L 30.8* 31.0* 19.7*   BUN mg/dL 18 17 19   CREATININE mg/dL 0.73* 0.60* 0.66*   GLUCOSE mg/dL 92 303* 310*   CALCIUM mg/dL 8.1* 8.7 8.8     Results from last 7 days   Lab Units 07/17/24  0551 07/16/24  0604 07/15/24  1026   WBC 10*3/mm3  "9.19 8.57 10.13   HEMOGLOBIN g/dL 15.1 14.5 15.5   HEMATOCRIT % 43.1 42.6 45.8   PLATELETS 10*3/mm3 125* 117* 138*             No results found for: \"TROPONINT\"      Results from last 7 days   Lab Units 07/17/24  0551 07/16/24  0604 07/15/24  1026   ALK PHOS U/L 97 112 161*   BILIRUBIN mg/dL 2.4* 1.0 1.0   ALT (SGPT) U/L 303* 271* 322*   AST (SGOT) U/L 106* 89* 82*             Glucose   Date/Time Value Ref Range Status   07/17/2024 1550 296 (H) 70 - 130 mg/dL Final   07/17/2024 1137 232 (H) 70 - 130 mg/dL Final   07/17/2024 0728 116 70 - 130 mg/dL Final   07/16/2024 2034 212 (H) 70 - 130 mg/dL Final   07/16/2024 1653 270 (H) 70 - 130 mg/dL Final   07/16/2024 1150 171 (H) 70 - 130 mg/dL Final   07/16/2024 0804 314 (H) 70 - 130 mg/dL Final   07/15/2024 2045 319 (H) 70 - 130 mg/dL Final     Results from last 7 days   Lab Units 07/15/24  0753   INR  1.00       Past Medical History:   Diagnosis Date    Diabetes mellitus     Hepatitis C     Hyperthyroidism     Sleep apnea        Assessment:  Active Hospital Problems    Diagnosis  POA    **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       Plan:  Plans as per oncology.  Chemotherapy and steroids.  Glycemic control.  Follow-up on labs.    Samuel Farley MD  7/17/2024  16:46 EDT   "

## 2024-07-17 NOTE — TELEPHONE ENCOUNTER
Call spoke with patient. He is currently in the hospital. Hospital is taking care of his sugars right now. He also is doing chemo therapy

## 2024-07-18 LAB
ALBUMIN SERPL-MCNC: 3.1 G/DL (ref 3.5–5.2)
ALBUMIN/GLOB SERPL: 1.6 G/DL
ALP SERPL-CCNC: 96 U/L (ref 39–117)
ALT SERPL W P-5'-P-CCNC: 365 U/L (ref 1–41)
ANION GAP SERPL CALCULATED.3IONS-SCNC: 5 MMOL/L (ref 5–15)
AST SERPL-CCNC: 206 U/L (ref 1–40)
BASOPHILS # BLD AUTO: 0.01 10*3/MM3 (ref 0–0.2)
BASOPHILS NFR BLD AUTO: 0.1 % (ref 0–1.5)
BILIRUB SERPL-MCNC: 1.2 MG/DL (ref 0–1.2)
BUN SERPL-MCNC: 17 MG/DL (ref 8–23)
BUN/CREAT SERPL: 21.3 (ref 7–25)
CALCIUM SPEC-SCNC: 8.5 MG/DL (ref 8.6–10.5)
CHLORIDE SERPL-SCNC: 105 MMOL/L (ref 98–107)
CO2 SERPL-SCNC: 30 MMOL/L (ref 22–29)
CREAT SERPL-MCNC: 0.8 MG/DL (ref 0.76–1.27)
DEPRECATED RDW RBC AUTO: 41.3 FL (ref 37–54)
EGFRCR SERPLBLD CKD-EPI 2021: 101.3 ML/MIN/1.73
EOSINOPHIL # BLD AUTO: 0.01 10*3/MM3 (ref 0–0.4)
EOSINOPHIL NFR BLD AUTO: 0.1 % (ref 0.3–6.2)
ERYTHROCYTE [DISTWIDTH] IN BLOOD BY AUTOMATED COUNT: 12.8 % (ref 12.3–15.4)
GLOBULIN UR ELPH-MCNC: 2 GM/DL
GLUCOSE BLDC GLUCOMTR-MCNC: 157 MG/DL (ref 70–130)
GLUCOSE BLDC GLUCOMTR-MCNC: 182 MG/DL (ref 70–130)
GLUCOSE BLDC GLUCOMTR-MCNC: 209 MG/DL (ref 70–130)
GLUCOSE BLDC GLUCOMTR-MCNC: 231 MG/DL (ref 70–130)
GLUCOSE SERPL-MCNC: 164 MG/DL (ref 65–99)
HCT VFR BLD AUTO: 43.2 % (ref 37.5–51)
HGB BLD-MCNC: 14.5 G/DL (ref 13–17.7)
IMM GRANULOCYTES # BLD AUTO: 0.04 10*3/MM3 (ref 0–0.05)
IMM GRANULOCYTES NFR BLD AUTO: 0.5 % (ref 0–0.5)
LYMPHOCYTES # BLD AUTO: 0.99 10*3/MM3 (ref 0.7–3.1)
LYMPHOCYTES NFR BLD AUTO: 13.6 % (ref 19.6–45.3)
MCH RBC QN AUTO: 29.5 PG (ref 26.6–33)
MCHC RBC AUTO-ENTMCNC: 33.6 G/DL (ref 31.5–35.7)
MCV RBC AUTO: 88 FL (ref 79–97)
MONOCYTES # BLD AUTO: 0.22 10*3/MM3 (ref 0.1–0.9)
MONOCYTES NFR BLD AUTO: 3 % (ref 5–12)
MTX SERPL-SCNC: NORMAL UMOL/L
NEUTROPHILS NFR BLD AUTO: 6.02 10*3/MM3 (ref 1.7–7)
NEUTROPHILS NFR BLD AUTO: 82.7 % (ref 42.7–76)
PH UR STRIP.AUTO: >=9 [PH] (ref 5–8)
PLATELET # BLD AUTO: 115 10*3/MM3 (ref 140–450)
PMV BLD AUTO: 10.9 FL (ref 6–12)
POTASSIUM SERPL-SCNC: 3.4 MMOL/L (ref 3.5–5.2)
PROT SERPL-MCNC: 5.1 G/DL (ref 6–8.5)
RBC # BLD AUTO: 4.91 10*6/MM3 (ref 4.14–5.8)
SODIUM SERPL-SCNC: 140 MMOL/L (ref 136–145)
WBC NRBC COR # BLD AUTO: 7.29 10*3/MM3 (ref 3.4–10.8)

## 2024-07-18 PROCEDURE — 63710000001 INSULIN GLARGINE PER 5 UNITS: Performed by: INTERNAL MEDICINE

## 2024-07-18 PROCEDURE — 25010000002 LEUCOVORIN CALCIUM PER 50 MG: Performed by: STUDENT IN AN ORGANIZED HEALTH CARE EDUCATION/TRAINING PROGRAM

## 2024-07-18 PROCEDURE — 82948 REAGENT STRIP/BLOOD GLUCOSE: CPT

## 2024-07-18 PROCEDURE — 25810000003 SODIUM CHLORIDE 0.9 % SOLUTION 250 ML FLEX CONT: Performed by: INTERNAL MEDICINE

## 2024-07-18 PROCEDURE — 25010000002 DIPHENHYDRAMINE PER 50 MG: Performed by: INTERNAL MEDICINE

## 2024-07-18 PROCEDURE — 25010000002 RITUXIMAB-ARRX 100 MG/10ML SOLUTION 10 ML VIAL: Performed by: INTERNAL MEDICINE

## 2024-07-18 PROCEDURE — 81003 URINALYSIS AUTO W/O SCOPE: CPT | Performed by: INTERNAL MEDICINE

## 2024-07-18 PROCEDURE — 80204 DRUG ASSAY METHOTREXATE: CPT | Performed by: STUDENT IN AN ORGANIZED HEALTH CARE EDUCATION/TRAINING PROGRAM

## 2024-07-18 PROCEDURE — 85025 COMPLETE CBC W/AUTO DIFF WBC: CPT | Performed by: INTERNAL MEDICINE

## 2024-07-18 PROCEDURE — 25010000002 RITUXIMAB-ARRX 500 MG/50ML SOLUTION 50 ML VIAL: Performed by: INTERNAL MEDICINE

## 2024-07-18 PROCEDURE — 63710000001 INSULIN LISPRO (HUMAN) PER 5 UNITS: Performed by: INTERNAL MEDICINE

## 2024-07-18 PROCEDURE — 80053 COMPREHEN METABOLIC PANEL: CPT | Performed by: INTERNAL MEDICINE

## 2024-07-18 PROCEDURE — 25810000003 SODIUM CHLORIDE 0.9 % SOLUTION: Performed by: INTERNAL MEDICINE

## 2024-07-18 PROCEDURE — 25010000002 ENOXAPARIN PER 10 MG: Performed by: INTERNAL MEDICINE

## 2024-07-18 PROCEDURE — 63710000001 DEXAMETHASONE PER 0.25 MG: Performed by: INTERNAL MEDICINE

## 2024-07-18 RX ORDER — SODIUM CHLORIDE 9 MG/ML
20 INJECTION, SOLUTION INTRAVENOUS ONCE
Status: COMPLETED | OUTPATIENT
Start: 2024-07-18 | End: 2024-07-18

## 2024-07-18 RX ORDER — DIPHENHYDRAMINE HYDROCHLORIDE 50 MG/ML
50 INJECTION INTRAMUSCULAR; INTRAVENOUS AS NEEDED
Status: DISCONTINUED | OUTPATIENT
Start: 2024-07-18 | End: 2024-07-23 | Stop reason: HOSPADM

## 2024-07-18 RX ORDER — ACETAMINOPHEN 325 MG/1
650 TABLET ORAL ONCE
Status: COMPLETED | OUTPATIENT
Start: 2024-07-18 | End: 2024-07-18

## 2024-07-18 RX ORDER — FAMOTIDINE 10 MG/ML
20 INJECTION, SOLUTION INTRAVENOUS AS NEEDED
Status: DISCONTINUED | OUTPATIENT
Start: 2024-07-18 | End: 2024-07-23 | Stop reason: HOSPADM

## 2024-07-18 RX ORDER — MEPERIDINE HYDROCHLORIDE 25 MG/ML
25 INJECTION INTRAMUSCULAR; INTRAVENOUS; SUBCUTANEOUS
Status: DISCONTINUED | OUTPATIENT
Start: 2024-07-18 | End: 2024-07-23 | Stop reason: HOSPADM

## 2024-07-18 RX ORDER — URSODIOL 300 MG/1
300 CAPSULE ORAL DAILY
Status: DISCONTINUED | OUTPATIENT
Start: 2024-07-19 | End: 2024-07-19

## 2024-07-18 RX ADMIN — FAMOTIDINE 20 MG: 20 TABLET, FILM COATED ORAL at 05:50

## 2024-07-18 RX ADMIN — LEVETIRACETAM 500 MG: 500 TABLET, FILM COATED ORAL at 22:04

## 2024-07-18 RX ADMIN — ENOXAPARIN SODIUM 40 MG: 100 INJECTION SUBCUTANEOUS at 08:55

## 2024-07-18 RX ADMIN — DEXAMETHASONE 4 MG: 4 TABLET ORAL at 08:55

## 2024-07-18 RX ADMIN — Medication 10 ML: at 21:21

## 2024-07-18 RX ADMIN — ACETAMINOPHEN 325MG 650 MG: 325 TABLET ORAL at 14:27

## 2024-07-18 RX ADMIN — HYDROCHLOROTHIAZIDE 12.5 MG: 12.5 TABLET ORAL at 08:56

## 2024-07-18 RX ADMIN — FAMOTIDINE 20 MG: 20 TABLET, FILM COATED ORAL at 17:48

## 2024-07-18 RX ADMIN — BACTERIOSTATIC WATER 25 MG: 1 INJECTION, SOLUTION INTRAMUSCULAR; INTRAVENOUS; SUBCUTANEOUS at 00:10

## 2024-07-18 RX ADMIN — SODIUM BICARBONATE: 84 INJECTION, SOLUTION INTRAVENOUS at 08:53

## 2024-07-18 RX ADMIN — INSULIN LISPRO 5 UNITS: 100 INJECTION, SOLUTION INTRAVENOUS; SUBCUTANEOUS at 08:55

## 2024-07-18 RX ADMIN — LEVETIRACETAM 500 MG: 500 TABLET, FILM COATED ORAL at 08:56

## 2024-07-18 RX ADMIN — BACTERIOSTATIC WATER 25 MG: 1 INJECTION, SOLUTION INTRAMUSCULAR; INTRAVENOUS; SUBCUTANEOUS at 12:26

## 2024-07-18 RX ADMIN — INSULIN LISPRO 4 UNITS: 100 INJECTION, SOLUTION INTRAVENOUS; SUBCUTANEOUS at 08:54

## 2024-07-18 RX ADMIN — LOSARTAN POTASSIUM 50 MG: 50 TABLET, FILM COATED ORAL at 08:56

## 2024-07-18 RX ADMIN — BACTERIOSTATIC WATER 25 MG: 1 INJECTION, SOLUTION INTRAMUSCULAR; INTRAVENOUS; SUBCUTANEOUS at 05:39

## 2024-07-18 RX ADMIN — INSULIN LISPRO 4 UNITS: 100 INJECTION, SOLUTION INTRAVENOUS; SUBCUTANEOUS at 21:25

## 2024-07-18 RX ADMIN — SODIUM BICARBONATE: 84 INJECTION, SOLUTION INTRAVENOUS at 00:42

## 2024-07-18 RX ADMIN — INSULIN LISPRO 8 UNITS: 100 INJECTION, SOLUTION INTRAVENOUS; SUBCUTANEOUS at 12:27

## 2024-07-18 RX ADMIN — INSULIN LISPRO 5 UNITS: 100 INJECTION, SOLUTION INTRAVENOUS; SUBCUTANEOUS at 17:47

## 2024-07-18 RX ADMIN — INSULIN LISPRO 5 UNITS: 100 INJECTION, SOLUTION INTRAVENOUS; SUBCUTANEOUS at 12:27

## 2024-07-18 RX ADMIN — DEXAMETHASONE 4 MG: 4 TABLET ORAL at 17:48

## 2024-07-18 RX ADMIN — BACTERIOSTATIC WATER 25 MG: 1 INJECTION, SOLUTION INTRAMUSCULAR; INTRAVENOUS; SUBCUTANEOUS at 19:30

## 2024-07-18 RX ADMIN — URSODIOL 300 MG: 300 CAPSULE ORAL at 08:57

## 2024-07-18 RX ADMIN — Medication 10 ML: at 08:57

## 2024-07-18 RX ADMIN — INSULIN GLARGINE 25 UNITS: 100 INJECTION, SOLUTION SUBCUTANEOUS at 08:55

## 2024-07-18 RX ADMIN — SENNOSIDES AND DOCUSATE SODIUM 1 TABLET: 50; 8.6 TABLET ORAL at 21:25

## 2024-07-18 RX ADMIN — INSULIN LISPRO 4 UNITS: 100 INJECTION, SOLUTION INTRAVENOUS; SUBCUTANEOUS at 17:47

## 2024-07-18 RX ADMIN — DIPHENHYDRAMINE HYDROCHLORIDE 50 MG: 50 INJECTION, SOLUTION INTRAMUSCULAR; INTRAVENOUS at 14:27

## 2024-07-18 RX ADMIN — SODIUM CHLORIDE 20 ML/HR: 9 INJECTION, SOLUTION INTRAVENOUS at 14:27

## 2024-07-18 RX ADMIN — SODIUM BICARBONATE: 84 INJECTION, SOLUTION INTRAVENOUS at 22:04

## 2024-07-18 RX ADMIN — RITUXIMAB-ARRX 800 MG: 500 INJECTION, SOLUTION INTRAVENOUS at 15:06

## 2024-07-18 RX ADMIN — ZOLPIDEM TARTRATE 10 MG: 5 TABLET, FILM COATED ORAL at 22:04

## 2024-07-18 RX ADMIN — SENNOSIDES AND DOCUSATE SODIUM 1 TABLET: 50; 8.6 TABLET ORAL at 08:57

## 2024-07-18 NOTE — PLAN OF CARE
Pt without complaints, leucovorin every 6/hr, urine to lab for pH level, Rituxan planned for today.  HP=054 at HS, VSS.  Continue care plan.

## 2024-07-18 NOTE — NURSING NOTE
"Nursing Chemotherapy Verification    Chemotherapy Regimen:   Treatment Plans       Name Type Plan Dates Plan Provider         Active    IP CNS High Dose Methotrexate / Leucovorin / RiTUXimab (Induction) ONCOLOGY TREATMENT  7/15/2024 - Present Krista Fernandez MD PhD                       Current height and weight: 177.8 cm (70\") 95 kg (209 lb 7 oz)  Calculated BSA from current height and weight (Campos): 2.13    Relevant Labs  Results from last 7 days   Lab Units 07/18/24  0549 07/17/24  0551 07/16/24  0604   WBC 10*3/mm3 7.29 9.19 8.57   HEMOGLOBIN g/dL 14.5 15.1 14.5   HEMATOCRIT % 43.2 43.1 42.6   PLATELETS 10*3/mm3 115* 125* 117*     Lab Results   Component Value Date    NEUTROABS 6.02 07/18/2024       Results from last 7 days   Lab Units 07/18/24  0549 07/17/24  0551 07/16/24  0604   CREATININE mg/dL 0.80 0.73* 0.60*       Serum creatinine: 0.8 mg/dL 07/18/24 0549  Estimated creatinine clearance: 113.6 mL/min    Lab Results   Component Value Date    HAV Negative 02/28/2024    HEPBCAB Negative 07/15/2024       Verification attestation:  I have personally reviewed the planned regimen and its administration and dosing. I understand the potential side effects.The patient has been instructed on the regimen, potential side effects, and self care measures; the consent form has been completed. I have confirmed that the appropriate premedication, prehydration, post medication and/or emergency medications are ordered in addition to the chemotherapy.    I have independently verified that the height and weight is current and calculated the BSA. I have verified the doses with the planned regimen and have clarified any deviations with the physician (Dr. Medina). I have confirmed the route of administration and patient IV access.    Nurse: Carrie Galo RN  2nd verification Nurse: Marci Hogue RN  "

## 2024-07-18 NOTE — PROGRESS NOTES
Pharmacy Consult - HD Methotrexate monitoring    Iain Christina is a 58 y.o. YO male starting therapy with HD-MTX. Pharmacy consulted to monitor and adjust leucovorin.    Patient to have methotrexate levels monitored per protocol every 24 hours, starting 24 hours after methotrexate infusion started. For this patient the first level will be drawn at 1200 on 7/17.    Goal levels are:  24 hr post infusion <5  48 hr post infusion <1  72 hr post infusion <0.1     Discussed plan with Dr. Medina, recommend the following dose adjustments to be conducted based on levels at the following times.     Date Current leucovorin dose Goal level Level Adjustment    7/17 1200 Begin leucovorin at 50 mg Q6H x1, followed by 25 mg Q6  <5 0.8 If level 5-10, increase to 50 mg Q4   7/18 1304 leucovorin 25 mg Q6H <1 0.7 If level >10 increase to 100 mg Q6     Pharmacy will continue to monitor and update as levels are available. Level 0.7 will continue with current dose of leucovorin. No changes at this time.    Thank you for this opportunity to review.     Laurence Logan, PharmD  7/18/2024

## 2024-07-18 NOTE — PROGRESS NOTES
"DAILY PROGRESS NOTE  [facility]    Patient Identification:  Name: aIin Christina  Age: 58 y.o.  Sex: male  :  1965  MRN: [id number]         Primary Care Physician: Scott Medina MD    Subjective:  Interval History: He is feeling better today and his headache is better.    Objective:    Scheduled Meds:acetaminophen, 650 mg, Oral, Once  dexAMETHasone, 4 mg, Oral, BID With Meals  diphenhydrAMINE, 50 mg, Intravenous, Once  enoxaparin, 40 mg, Subcutaneous, Daily  famotidine, 20 mg, Oral, BID AC  losartan, 50 mg, Oral, Q24H   And  hydroCHLOROthiazide, 12.5 mg, Oral, Q24H  insulin glargine, 25 Units, Subcutaneous, Daily  insulin lispro, 4-24 Units, Subcutaneous, 4x Daily AC & at Bedtime  insulin lispro, 5 Units, Subcutaneous, TID With Meals  leucovorin 25 mg in sodium chloride 0.9 % 52.5 mL IVPB, 25 mg, Intravenous, Q6H  levETIRAcetam, 500 mg, Oral, Q12H  Pharmacy monitoring - high dose methotrexate active, 1 each, Intravenous, Daily  riTUXimab-arrx (RIABNI) 800 mg in sodium chloride 0.9 % 330 mL IVPB, 375 mg/m2 (Treatment Plan Recorded), Intravenous, Once  senna-docusate sodium, 1 tablet, Oral, BID  sodium chloride, 10 mL, Intravenous, Q12H  sodium chloride, 20 mL/hr, Intravenous, Once  [START ON 2024] ursodiol, 300 mg, Oral, Daily      Continuous Infusions:sterile water (preservative free) 850 mL with sodium bicarbonate 8.4 % 150 mEq infusion, , Last Rate: 150 mL/hr at 24 0853        Vital signs in last 24 hours:  Temp:  [97.6 °F (36.4 °C)-98.2 °F (36.8 °C)] 97.6 °F (36.4 °C)  Heart Rate:  [54-63] 56  Resp:  [16-18] 18  BP: ()/(71-84) 125/80    Intake/Output:    Intake/Output Summary (Last 24 hours) at 2024 1257  Last data filed at 2024 0901  Gross per 24 hour   Intake 3986 ml   Output 3050 ml   Net 936 ml       Exam:  /80 (BP Location: Left arm, Patient Position: Lying)   Pulse 56   Temp 97.6 °F (36.4 °C) (Oral)   Resp 18   Ht 177.8 cm (70\")   " Wt 95 kg (209 lb 7 oz)   SpO2 96%   BMI 30.05 kg/m²     General Appearance:    Alert, cooperative, no distress   Head:    Normocephalic, without obvious abnormality, atraumatic   Eyes:       Throat:   Lips, tongue, gums normal   Neck:   Supple, symmetrical, trachea midline, no JVD   Lungs:     Clear to auscultation bilaterally, respirations unlabored   Chest Wall:    No tenderness or deformity    Heart:    Regular rate and rhythm, S1 and S2 normal, no murmur,no  rub or gallop   Abdomen:     Soft, nontender, bowel sounds active, no masses, no organomegaly    Extremities:   Extremities normal, atraumatic, no cyanosis or edema   Pulses:      Skin:   Skin is warm and dry,  no rashes or palpable lesions   Neurologic:   no focal deficits noted      Lab Results (last 72 hours)       Procedure Component Value Units Date/Time    POC Glucose Once [[id number]]  (Abnormal) Collected: 07/16/24 1150    Specimen: Blood Updated: 07/16/24 1152     Glucose 171 mg/dL     Methotrexate Level [[id number]] Collected: 07/16/24 0001    Specimen: Blood Updated: 07/16/24 1028     Methotrexate See Attatched From [employer]    POC Glucose Once [[id number]]  (Abnormal) Collected: 07/16/24 0804    Specimen: Blood Updated: 07/16/24 0810     Glucose 314 mg/dL     Lactate Dehydrogenase [[id number]]  (Abnormal) Collected: 07/16/24 0604    Specimen: Blood Updated: 07/16/24 0739      U/L      Comment: Specimen hemolyzed.  Results may be affected.       Comprehensive Metabolic Panel [[id number]]  (Abnormal) Collected: 07/16/24 0604    Specimen: Blood Updated: 07/16/24 0736     Glucose 303 mg/dL      BUN 17 mg/dL      Creatinine 0.60 mg/dL      Sodium 139 mmol/L      Potassium 3.4 mmol/L      Chloride 99 mmol/L      CO2 31.0 mmol/L      Calcium 8.7 mg/dL      Total Protein 5.0 g/dL      Albumin 3.2 g/dL      ALT (SGPT) 271 U/L      AST (SGOT) 89 U/L      Alkaline Phosphatase 112 U/L      Total Bilirubin 1.0 mg/dL      Globulin 1.8 gm/dL      A/G  Ratio 1.8 g/dL      BUN/Creatinine Ratio 28.3     Anion Gap 9.0 mmol/L      eGFR 110.5 mL/min/1.73     Narrative:      GFR Normal >60  Chronic Kidney Disease <60  Kidney Failure <15      Uric Acid [[id number]]  (Abnormal) Collected: 07/16/24 0604    Specimen: Blood Updated: 07/16/24 0736     Uric Acid 2.7 mg/dL     CBC & Differential [[id number]]  (Abnormal) Collected: 07/16/24 0604    Specimen: Blood Updated: 07/16/24 0724    Narrative:      The following orders were created for panel order CBC & Differential.  Procedure                               Abnormality         Status                     ---------                               -----------         ------                     CBC Auto Differential[[id number]]        Abnormal            Final result                 Please view results for these tests on the individual orders.    CBC Auto Differential [[id number]]  (Abnormal) Collected: 07/16/24 0604    Specimen: Blood Updated: 07/16/24 0724     WBC 8.57 10*3/mm3      RBC 4.85 10*6/mm3      Hemoglobin 14.5 g/dL      Hematocrit 42.6 %      MCV 87.8 fL      MCH 29.9 pg      MCHC 34.0 g/dL      RDW 12.8 %      RDW-SD 40.5 fl      MPV 11.0 fL      Platelets 117 10*3/mm3      Neutrophil % 75.1 %      Lymphocyte % 18.0 %      Monocyte % 5.3 %      Eosinophil % 0.2 %      Basophil % 0.2 %      Immature Grans % 1.2 %      Neutrophils, Absolute 6.44 10*3/mm3      Lymphocytes, Absolute 1.54 10*3/mm3      Monocytes, Absolute 0.45 10*3/mm3      Eosinophils, Absolute 0.02 10*3/mm3      Basophils, Absolute 0.02 10*3/mm3      Immature Grans, Absolute 0.10 10*3/mm3     Non-gynecologic Cytology [[id number]] Collected: 07/15/24 1309    Specimen: Cerebrospinal Fluid from Lumbar Puncture Updated: 07/16/24 0641    pH, Urine - Urine, Clean Catch [[id number]]  (Abnormal) Collected: 07/16/24 0001    Specimen: Urine, Clean Catch Updated: 07/16/24 0344     pH, UA 8.5    POC Glucose Once [[id number]]  (Abnormal) Collected: 07/15/24 2045     Specimen: Blood Updated: 07/15/24 2047     Glucose 319 mg/dL     pH, Urine - Urine, Clean Catch [[id number]]  (Normal) Collected: 07/15/24 1659    Specimen: Urine, Clean Catch Updated: 07/15/24 1713     pH, UA 7.0    POC Glucose Once [[id number]]  (Abnormal) Collected: 07/15/24 1700    Specimen: Blood Updated: 07/15/24 1702     Glucose 330 mg/dL     Protein, CSF - Cerebrospinal Fluid, Lumbar Puncture [[id number]]  (Abnormal) Collected: 07/15/24 1309    Specimen: Cerebrospinal Fluid from Lumbar Puncture Updated: 07/15/24 1524     Protein, Total (CSF) 107.2 mg/dL     Glucose, CSF - Cerebrospinal Fluid, Lumbar Puncture [[id number]]  (Abnormal) Collected: 07/15/24 1309    Specimen: Cerebrospinal Fluid from Lumbar Puncture Updated: 07/15/24 1524     Glucose,  mg/dL     Cell Count With Differential, CSF [[id number]]  (Abnormal) Collected: 07/15/24 1309    Specimen: Cerebrospinal Fluid from Lumbar Puncture Updated: 07/15/24 1508    Narrative:      The following orders were created for panel order Cell Count With Differential, CSF.  Procedure                               Abnormality         Status                     ---------                               -----------         ------                     Cell Count, CSF - Cerebr...[[id number]]  Abnormal            Final result                 Please view results for these tests on the individual orders.    Cell Count, CSF - Cerebrospinal Fluid, Lumbar Puncture [[id number]]  (Abnormal) Collected: 07/15/24 1309    Specimen: Cerebrospinal Fluid from Lumbar Puncture Updated: 07/15/24 1508     Color, CSF Colorless     Appearance, CSF Clear     RBC, CSF 37 /mm3      Nucleated Cells, CSF 2 /mm3      Tube Number, CSF 1     Method: UF 5000 Automated Method    Narrative:      Differential not indicated.  This test was developed, its performance characteristics determined and judged suitable for clinical purposes by [facility]. It has not been cleared or  approved by the FDA. The laboratory is regulated under CLIA as qualified to perform high-complexity testing.    Cell Count With Differential, CSF Use CSF Tube: 1 [[id number]]  (Abnormal) Collected: 07/15/24 1309    Specimen: Cerebrospinal Fluid from Lumbar Puncture Updated: 07/15/24 1507    Narrative:      The following orders were created for panel order Cell Count With Differential, CSF Use CSF Tube: 1.  Procedure                               Abnormality         Status                     ---------                               -----------         ------                     Cell Count, CSF - Cerebr...[[id number]]  Abnormal            Final result                 Please view results for these tests on the individual orders.    Cell Count, CSF - Cerebrospinal Fluid, Lumbar Puncture [[id number]]  (Abnormal) Collected: 07/15/24 1309    Specimen: Cerebrospinal Fluid from Lumbar Puncture Updated: 07/15/24 1507     Color, CSF Colorless     Appearance, CSF Clear     RBC, CSF 46 /mm3      Nucleated Cells, CSF 2 /mm3      Tube Number, CSF 4     Method: UF 5000 Automated Method    Narrative:      Differential not indicated.  This test was developed, its performance characteristics determined and judged suitable for clinical purposes by [facility]. It has not been cleared or approved by the FDA. The laboratory is regulated under CLIA as qualified to perform high-complexity testing.    CSF Tube - Cerebrospinal Fluid, Lumbar Puncture [[id number]] Collected: 07/15/24 1309    Specimen: Cerebrospinal Fluid from Lumbar Puncture Updated: 07/15/24 1328     Extra Tube HOLD FOR ADD ON    Comprehensive Metabolic Panel [[id number]]  (Abnormal) Collected: 07/15/24 1026    Specimen: Blood Updated: 07/15/24 1126     Glucose 310 mg/dL      BUN 19 mg/dL      Creatinine 0.66 mg/dL      Sodium 133 mmol/L      Potassium 3.8 mmol/L      Chloride 102 mmol/L      CO2 19.7 mmol/L      Calcium 8.8 mg/dL      Total Protein 5.8  g/dL      Albumin 3.6 g/dL      ALT (SGPT) 322 U/L      AST (SGOT) 82 U/L      Alkaline Phosphatase 161 U/L      Total Bilirubin 1.0 mg/dL      Globulin 2.2 gm/dL      A/G Ratio 1.6 g/dL      BUN/Creatinine Ratio 28.8     Anion Gap 11.3 mmol/L      eGFR 107.4 mL/min/1.73     Narrative:      GFR Normal >60  Chronic Kidney Disease <60  Kidney Failure <15      CBC & Differential [[id number]]  (Abnormal) Collected: 07/15/24 1026    Specimen: Blood Updated: 07/15/24 1108    Narrative:      The following orders were created for panel order CBC & Differential.  Procedure                               Abnormality         Status                     ---------                               -----------         ------                     CBC Auto Differential[[id number]]        Abnormal            Final result                 Please view results for these tests on the individual orders.    CBC Auto Differential [[id number]]  (Abnormal) Collected: 07/15/24 1026    Specimen: Blood Updated: 07/15/24 1108     WBC 10.13 10*3/mm3      RBC 5.16 10*6/mm3      Hemoglobin 15.5 g/dL      Hematocrit 45.8 %      MCV 88.8 fL      MCH 30.0 pg      MCHC 33.8 g/dL      RDW 12.8 %      RDW-SD 41.5 fl      MPV 10.8 fL      Platelets 138 10*3/mm3      Neutrophil % 84.4 %      Lymphocyte % 10.5 %      Monocyte % 2.7 %      Eosinophil % 0.0 %      Basophil % 0.2 %      Immature Grans % 2.2 %      Neutrophils, Absolute 8.56 10*3/mm3      Lymphocytes, Absolute 1.06 10*3/mm3      Monocytes, Absolute 0.27 10*3/mm3      Eosinophils, Absolute 0.00 10*3/mm3      Basophils, Absolute 0.02 10*3/mm3      Immature Grans, Absolute 0.22 10*3/mm3      nRBC 0.0 /100 WBC     POC Glucose Once [[id number]]  (Abnormal) Collected: 07/15/24 0937    Specimen: Blood Updated: 07/15/24 0938     Glucose 296 mg/dL           Data Review:  Results from last 7 days   Lab Units 07/18/24  0549 07/17/24  0551 07/16/24  0604   SODIUM mmol/L 140 143 139   POTASSIUM mmol/L 3.4* 3.6 3.4*  "  CHLORIDE mmol/L 105 104 99   CO2 mmol/L 30.0* 30.8* 31.0*   BUN mg/dL 17 18 17   CREATININE mg/dL 0.80 0.73* 0.60*   GLUCOSE mg/dL 164* 92 303*   CALCIUM mg/dL 8.5* 8.1* 8.7     Results from last 7 days   Lab Units 07/18/24  0549 07/17/24  0551 07/16/24  0604   WBC 10*3/mm3 7.29 9.19 8.57   HEMOGLOBIN g/dL 14.5 15.1 14.5   HEMATOCRIT % 43.2 43.1 42.6   PLATELETS 10*3/mm3 115* 125* 117*             No results found for: \"TROPONINT\"      Results from last 7 days   Lab Units 07/18/24  0549 07/17/24  0551 07/16/24  0604   ALK PHOS U/L 96 97 112   BILIRUBIN mg/dL 1.2 2.4* 1.0   ALT (SGPT) U/L 365* 303* 271*   AST (SGOT) U/L 206* 106* 89*             Glucose   Date/Time Value Ref Range Status   07/18/2024 1126 231 (H) 70 - 130 mg/dL Final   07/18/2024 0726 157 (H) 70 - 130 mg/dL Final   07/17/2024 2020 226 (H) 70 - 130 mg/dL Final   07/17/2024 1550 296 (H) 70 - 130 mg/dL Final   07/17/2024 1137 232 (H) 70 - 130 mg/dL Final   07/17/2024 0728 116 70 - 130 mg/dL Final   07/16/2024 2034 212 (H) 70 - 130 mg/dL Final   07/16/2024 1653 270 (H) 70 - 130 mg/dL Final     Results from last 7 days   Lab Units 07/15/24  0753   INR  1.00       Past Medical History:   Diagnosis Date    Diabetes mellitus     Hepatitis C     Hyperthyroidism     Sleep apnea        Assessment:  Active Hospital Problems    Diagnosis  POA    **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       Plan:  Plans as per oncology.  Chemotherapy and steroids.  Glycemic control.  Follow-up on labs.    Samuel Farley MD  7/18/2024  12:57 EDT   "

## 2024-07-18 NOTE — PLAN OF CARE
Goal Outcome Evaluation:     A&OX4, calm and cooperative. Up ad brandy. PT receiving Leucovorin  as scheduled, and Rituxin to be administered this evening. Thus far tolerating chemo fine. Good UOP, lab levels improving.  BG a bit more controlled, sliding scale administered as ordered. Port site CDI, good blood return noted. VSS on room air, CTM.         HPI:    Patient ID: Rayne Huff is a 48year old male. HPI  Patient is here requesting physical exam.  Last seen in March 2019 for general physical.  Typically follows Dr. Megan Wharton the outside GI doctor for ulcerative colitis.   It has been well control Smokeless tobacco: Never Used    Vaping Use      Vaping Use: Never used    Alcohol use: Yes      Alcohol/week: 0.0 standard drinks      Comment: socially    Drug use: No         Review of Systems   Constitutional: Negative for chills, fatigue and fever. oropharyngeal exudate. Comments: Right lower inner lip- raised soft lesion  Eyes:      Conjunctiva/sclera: Conjunctivae normal.      Pupils: Pupils are equal, round, and reactive to light. Neck:      Thyroid: No thyromegaly.       Vascular: No caroti with that and follow-up with GI doctor. 3. Mucocele of lip  Appears benign on exam.  Refer to ENT for evaluation and possible removal.  - ENT - INTERNAL    4. Left knee pain, unspecified chronicity  Intermittent left knee pain with occasional swelling.

## 2024-07-18 NOTE — PROGRESS NOTES
[facility] INPATIENT PROGRESS NOTE    Length of Stay:  3 days    CHIEF COMPLAINT/REASON FOR VISIT:  Primary B-cell lymphoma    SUBJECTIVE:   7/18/2024  No acute overnight events.  He is planned to receive Rituxan today.  Tolerated chemo quite well.  Denies nausea vomiting fever diarrhea constipation.      ROS:  All systems reviewed and found to be negative except for that noted in HPI    OBJECTIVE:  Vitals:    07/17/24 1552 07/17/24 2018 07/18/24 0537 07/18/24 0726   BP: 120/84 95/71 131/83 125/80   BP Location: Left arm Left arm Left arm Left arm   Patient Position: Lying Lying Lying Lying   Pulse: 54 63 59 56   Resp: 16 18 16 18   Temp: 98 °F (36.7 °C) 98.2 °F (36.8 °C) 97.7 °F (36.5 °C) 97.6 °F (36.4 °C)   TempSrc: Oral Oral Oral Oral   SpO2: 94% 94%  96%   Weight:   95 kg (209 lb 7 oz)    Height:             PHYSICAL EXAMINATION:  Physical Exam  Constitutional:       Appearance: Normal appearance. He is normal weight.   HENT:      Head: Normocephalic and atraumatic.   Eyes:      Extraocular Movements: Extraocular movements intact.      Pupils: Pupils are equal, round, and reactive to light.   Cardiovascular:      Rate and Rhythm: Normal rate and regular rhythm.      Heart sounds: Normal heart sounds. No murmur heard.  Pulmonary:      Effort: Pulmonary effort is normal.      Breath sounds: Normal breath sounds.   Abdominal:      General: Abdomen is flat. Bowel sounds are normal.      Palpations: Abdomen is soft.   Neurological:      General: No focal deficit present.      Mental Status: He is oriented to person, place, and time.       I have reexamined the patient and the results are consistent with the previously documented exam. Kalina Medina MD       DIAGNOSTIC DATA:  Results Review:     I reviewed the patient's new clinical results.    Results from last 7 days   Lab Units 07/18/24  0549 07/17/24  0551 07/16/24  0604   WBC 10*3/mm3 7.29 9.19 8.57   HEMOGLOBIN g/dL 14.5 15.1 14.5   HEMATOCRIT %  43.2 43.1 42.6   PLATELETS 10*3/mm3 115* 125* 117*      Results from last 7 days   Lab Units 07/18/24  0549 07/17/24  0551 07/16/24  0604   SODIUM mmol/L 140 143 139   POTASSIUM mmol/L 3.4* 3.6 3.4*   CHLORIDE mmol/L 105 104 99   CO2 mmol/L 30.0* 30.8* 31.0*   BUN mg/dL 17 18 17   CREATININE mg/dL 0.80 0.73* 0.60*   CALCIUM mg/dL 8.5* 8.1* 8.7   BILIRUBIN mg/dL 1.2 2.4* 1.0   ALK PHOS U/L 96 97 112   ALT (SGPT) U/L 365* 303* 271*   AST (SGOT) U/L 206* 106* 89*   GLUCOSE mg/dL 164* 92 303*      Lab Results   Component Value Date    NEUTROABS 6.02 07/18/2024     Results from last 7 days   Lab Units 07/15/24  0753   INR  1.00   APTT seconds 21.0*       Assessment & Plan   ASSESSMENT/PLAN:  This is a 58 y.o. male with:     *  Primary CNS diffuse large B-cell lymphoma.    Patient had a craniotomy on 6/19/2024.  Pathology evaluation reported diffuse large B-cell lymphoma, Ki-67 at the 70%.  Further pathology evaluation is triple negative for Bcl-2, BCL6 and MYC.   I discussed with the patient today on 7/9/2024, recommending to have a PET scan examination for further evaluation.  Also discussed with the patient for high-dose methotrexate per NCCN guideline.   Will also obtain CSF on day of cycle 1 day 1 of chemotherapy for flow cytometry study, and also give intra thecal methotrexate. The frequency of chemotherapy will be adjusted no later than every 3 weeks, contingent on his recovery and tolerance. If his recovery is satisfactory, the frequency may be reduced to less than every 3 weeks. Maintenance therapy will be initiated for high-dose methotrexate monthly. A referral will be made for stem cell transplant service at the [facility] in [facility].  Patient could not have PET scan examination on 7/12/2024 due to significant elevated glucose level.  7/15/2024 cycle 1 day 1 chemotherapy with high-dose methotrexate.  This includes a lumbar puncture with cerebrospinal fluid analysis and intrathecal  chemotherapy with methotrexate. Depending on the results, a decision regarding methotrexate administration will be determined. Additional oral tablets of leucovorin will be administered during the hospital stay to mitigate the side effects of the methotrexate.  7/16/2024: Patient tolerated IT cycle 1 high-dose methotrexate  quite well.  Had lumbar puncture yesterday.  CSF cytology pending.  CSF glucose 201, CSF protein 107, CSF nucleated cells-2, RBC 46.  Methotrexate level 0.11  7/17/2024: Tolerated IV methotrexate quite well.  No acute overnight events.  T. bili increased to 2.4.  Methotrexate level 0.8.  7/18/2024: Methotrexate from today pending.  T. bili within normal limits.  Cycle 1 day 3 Rituxan today    *  Hyperglycemia secondary to steroids.  The patient's highest recorded glucose level was 290 yesterday morning on 7/8/2024, and his daily glucose levels range from 120 to 150.   Patient was not diabetic before diagnosis of CNS lymphoma.  Clearly his hyperglycemia is caused by steroids.  He currently is on dexamethasone 6 mg twice a day.  Today on 7/9/2024 I recommended to decrease dexamethasone to 4 mg twice a day.  He will continue insulin prescribed by his primary care physician.  7/12/2024 glucose 355, PET scan was not able to be carried out.  7/15/2024 elevated glucose 316.  Patient will be admitted to hospital for scheduled high-dose methotrexate.    7/16/2024 glucose 314.    7/17/2024 glucose 116.   7/18/2024: Glucose 157 appreciate hospitalist management of hyperglycemia    *Transaminitis, chronic  *Hyperbilirubinemia, resolved  *Hep C  -7/16/2024 AST 89, , T. bili 1, alk phos 112  -7/17/2024 AST mildly increased to 106, , T. bili increased to 2.4, likely secondary to Mtx.  Started on Actigall for hyperbilirubinemia  - 7/18/2024: T. bili 1.2, AST increased to 206, .    *Thrombocytopenia, secondary to chemotherapy  -7/15/2024 platelets 138  - 7/16/2024 platelets 117  -7/17/2024  platelets 125.    -7/18/2024: Platelets 115 . no active bleeding. Continue to monitor     PLAN:  Cycle 1 day 3 Rituxan today  7/22/2024: Cycle 1 day 8 plan for methotrexate  CSF cytology pending.    Continue dexamethasone 4 mg twice a day.    Appreciate hospitalist management of steroid-induced hyperglycemia   continue Pepcid for GI prophylaxis.  Continue Keppra 500 mg twice a day for prophylaxis of seizure.  .  Bactrim currently on hold, plan to resume once methotrexate is cleared.    Monitor methotrexate level per protocol.  Patient will be given leucovorin for rescue.  Appreciate pharmacy assistance  Started Actigall on 7/17/2024 for elevated bilirubin.  Dose decreased to daily on 7/18/2024 due to normalization of bilirubin  Expecting patient will be discharged at this coming Friday on 7/19/2024.  Patient has appointment with CBC and CMP twice a week as outpatient next week.  Patient has appointment with Dr Fernandez for reevaluation in 2 weeks.         Kalina Medina MD

## 2024-07-19 LAB
ALBUMIN SERPL-MCNC: 2.9 G/DL (ref 3.5–5.2)
ALBUMIN/GLOB SERPL: 1.3 G/DL
ALP SERPL-CCNC: 100 U/L (ref 39–117)
ALT SERPL W P-5'-P-CCNC: 794 U/L (ref 1–41)
ANION GAP SERPL CALCULATED.3IONS-SCNC: 7 MMOL/L (ref 5–15)
AST SERPL-CCNC: 544 U/L (ref 1–40)
BASOPHILS # BLD AUTO: 0 10*3/MM3 (ref 0–0.2)
BASOPHILS NFR BLD AUTO: 0 % (ref 0–1.5)
BILIRUB SERPL-MCNC: 1.4 MG/DL (ref 0–1.2)
BUN SERPL-MCNC: 21 MG/DL (ref 8–23)
BUN/CREAT SERPL: 30.9 (ref 7–25)
CALCIUM SPEC-SCNC: 8.3 MG/DL (ref 8.6–10.5)
CHLORIDE SERPL-SCNC: 102 MMOL/L (ref 98–107)
CO2 SERPL-SCNC: 30 MMOL/L (ref 22–29)
CREAT SERPL-MCNC: 0.68 MG/DL (ref 0.76–1.27)
DEPRECATED RDW RBC AUTO: 38.9 FL (ref 37–54)
EGFRCR SERPLBLD CKD-EPI 2021: 106.4 ML/MIN/1.73
EOSINOPHIL # BLD AUTO: 0.02 10*3/MM3 (ref 0–0.4)
EOSINOPHIL NFR BLD AUTO: 0.3 % (ref 0.3–6.2)
ERYTHROCYTE [DISTWIDTH] IN BLOOD BY AUTOMATED COUNT: 12.6 % (ref 12.3–15.4)
GLOBULIN UR ELPH-MCNC: 2.2 GM/DL
GLUCOSE BLDC GLUCOMTR-MCNC: 129 MG/DL (ref 70–130)
GLUCOSE BLDC GLUCOMTR-MCNC: 151 MG/DL (ref 70–130)
GLUCOSE BLDC GLUCOMTR-MCNC: 221 MG/DL (ref 70–130)
GLUCOSE BLDC GLUCOMTR-MCNC: 346 MG/DL (ref 70–130)
GLUCOSE SERPL-MCNC: 175 MG/DL (ref 65–99)
HCT VFR BLD AUTO: 42.3 % (ref 37.5–51)
HGB BLD-MCNC: 14.6 G/DL (ref 13–17.7)
IMM GRANULOCYTES # BLD AUTO: 0.03 10*3/MM3 (ref 0–0.05)
IMM GRANULOCYTES NFR BLD AUTO: 0.4 % (ref 0–0.5)
LYMPHOCYTES # BLD AUTO: 0.29 10*3/MM3 (ref 0.7–3.1)
LYMPHOCYTES NFR BLD AUTO: 4.3 % (ref 19.6–45.3)
MCH RBC QN AUTO: 29.9 PG (ref 26.6–33)
MCHC RBC AUTO-ENTMCNC: 34.5 G/DL (ref 31.5–35.7)
MCV RBC AUTO: 86.7 FL (ref 79–97)
MONOCYTES # BLD AUTO: 0.03 10*3/MM3 (ref 0.1–0.9)
MONOCYTES NFR BLD AUTO: 0.4 % (ref 5–12)
MTX SERPL-SCNC: NORMAL UMOL/L
NEUTROPHILS NFR BLD AUTO: 6.37 10*3/MM3 (ref 1.7–7)
NEUTROPHILS NFR BLD AUTO: 94.6 % (ref 42.7–76)
PH UR STRIP.AUTO: >=9 [PH] (ref 5–8)
PLATELET # BLD AUTO: 112 10*3/MM3 (ref 140–450)
PMV BLD AUTO: 10.2 FL (ref 6–12)
POTASSIUM SERPL-SCNC: 3.5 MMOL/L (ref 3.5–5.2)
PROT SERPL-MCNC: 5.1 G/DL (ref 6–8.5)
RBC # BLD AUTO: 4.88 10*6/MM3 (ref 4.14–5.8)
SODIUM SERPL-SCNC: 139 MMOL/L (ref 136–145)
WBC NRBC COR # BLD AUTO: 6.74 10*3/MM3 (ref 3.4–10.8)

## 2024-07-19 PROCEDURE — 63710000001 DEXAMETHASONE PER 0.25 MG: Performed by: INTERNAL MEDICINE

## 2024-07-19 PROCEDURE — 85025 COMPLETE CBC W/AUTO DIFF WBC: CPT | Performed by: INTERNAL MEDICINE

## 2024-07-19 PROCEDURE — 25010000002 LEUCOVORIN 200 MG RECONSTITUTED SOLUTION 200 MG VIAL: Performed by: STUDENT IN AN ORGANIZED HEALTH CARE EDUCATION/TRAINING PROGRAM

## 2024-07-19 PROCEDURE — 81003 URINALYSIS AUTO W/O SCOPE: CPT | Performed by: INTERNAL MEDICINE

## 2024-07-19 PROCEDURE — 80204 DRUG ASSAY METHOTREXATE: CPT | Performed by: STUDENT IN AN ORGANIZED HEALTH CARE EDUCATION/TRAINING PROGRAM

## 2024-07-19 PROCEDURE — 80053 COMPREHEN METABOLIC PANEL: CPT | Performed by: INTERNAL MEDICINE

## 2024-07-19 PROCEDURE — 63710000001 INSULIN GLARGINE PER 5 UNITS: Performed by: INTERNAL MEDICINE

## 2024-07-19 PROCEDURE — 82948 REAGENT STRIP/BLOOD GLUCOSE: CPT

## 2024-07-19 PROCEDURE — 25010000002 ENOXAPARIN PER 10 MG: Performed by: INTERNAL MEDICINE

## 2024-07-19 PROCEDURE — 63710000001 INSULIN LISPRO (HUMAN) PER 5 UNITS: Performed by: INTERNAL MEDICINE

## 2024-07-19 PROCEDURE — 25010000002 LEUCOVORIN CALCIUM PER 50 MG: Performed by: STUDENT IN AN ORGANIZED HEALTH CARE EDUCATION/TRAINING PROGRAM

## 2024-07-19 RX ORDER — URSODIOL 300 MG/1
300 CAPSULE ORAL 2 TIMES DAILY
Status: DISCONTINUED | OUTPATIENT
Start: 2024-07-19 | End: 2024-07-23 | Stop reason: HOSPADM

## 2024-07-19 RX ADMIN — DEXAMETHASONE 4 MG: 4 TABLET ORAL at 17:12

## 2024-07-19 RX ADMIN — SODIUM BICARBONATE: 84 INJECTION, SOLUTION INTRAVENOUS at 22:14

## 2024-07-19 RX ADMIN — LOSARTAN POTASSIUM 50 MG: 50 TABLET, FILM COATED ORAL at 09:02

## 2024-07-19 RX ADMIN — DEXAMETHASONE 4 MG: 4 TABLET ORAL at 09:02

## 2024-07-19 RX ADMIN — LEUCOVORIN CALCIUM 50 MG: 100 INJECTION, POWDER, LYOPHILIZED, FOR SUSPENSION INTRAMUSCULAR; INTRAVENOUS at 22:03

## 2024-07-19 RX ADMIN — INSULIN LISPRO 16 UNITS: 100 INJECTION, SOLUTION INTRAVENOUS; SUBCUTANEOUS at 17:19

## 2024-07-19 RX ADMIN — LEUCOVORIN CALCIUM 50 MG: 200 INJECTION, POWDER, LYOPHILIZED, FOR SOLUTION INTRAMUSCULAR; INTRAVENOUS at 00:38

## 2024-07-19 RX ADMIN — INSULIN LISPRO 5 UNITS: 100 INJECTION, SOLUTION INTRAVENOUS; SUBCUTANEOUS at 12:00

## 2024-07-19 RX ADMIN — SENNOSIDES AND DOCUSATE SODIUM 1 TABLET: 50; 8.6 TABLET ORAL at 09:01

## 2024-07-19 RX ADMIN — INSULIN LISPRO 5 UNITS: 100 INJECTION, SOLUTION INTRAVENOUS; SUBCUTANEOUS at 17:19

## 2024-07-19 RX ADMIN — LEVETIRACETAM 500 MG: 500 TABLET, FILM COATED ORAL at 22:03

## 2024-07-19 RX ADMIN — LEUCOVORIN CALCIUM 50 MG: 200 INJECTION, POWDER, LYOPHILIZED, FOR SOLUTION INTRAMUSCULAR; INTRAVENOUS at 06:32

## 2024-07-19 RX ADMIN — LEUCOVORIN CALCIUM 50 MG: 200 INJECTION, POWDER, LYOPHILIZED, FOR SOLUTION INTRAMUSCULAR; INTRAVENOUS at 12:40

## 2024-07-19 RX ADMIN — HYDROCHLOROTHIAZIDE 12.5 MG: 12.5 TABLET ORAL at 09:02

## 2024-07-19 RX ADMIN — ENOXAPARIN SODIUM 40 MG: 100 INJECTION SUBCUTANEOUS at 09:04

## 2024-07-19 RX ADMIN — Medication 10 ML: at 22:03

## 2024-07-19 RX ADMIN — LEVETIRACETAM 500 MG: 500 TABLET, FILM COATED ORAL at 09:01

## 2024-07-19 RX ADMIN — ZOLPIDEM TARTRATE 10 MG: 5 TABLET, FILM COATED ORAL at 22:11

## 2024-07-19 RX ADMIN — FAMOTIDINE 20 MG: 20 TABLET, FILM COATED ORAL at 08:12

## 2024-07-19 RX ADMIN — INSULIN LISPRO 5 UNITS: 100 INJECTION, SOLUTION INTRAVENOUS; SUBCUTANEOUS at 09:03

## 2024-07-19 RX ADMIN — INSULIN LISPRO 8 UNITS: 100 INJECTION, SOLUTION INTRAVENOUS; SUBCUTANEOUS at 22:10

## 2024-07-19 RX ADMIN — FAMOTIDINE 20 MG: 20 TABLET, FILM COATED ORAL at 17:13

## 2024-07-19 RX ADMIN — Medication 10 ML: at 09:05

## 2024-07-19 RX ADMIN — LEUCOVORIN CALCIUM 50 MG: 200 INJECTION, POWDER, LYOPHILIZED, FOR SOLUTION INTRAMUSCULAR; INTRAVENOUS at 18:00

## 2024-07-19 RX ADMIN — URSOSIOL 300 MG: 300 CAPSULE ORAL at 17:13

## 2024-07-19 RX ADMIN — INSULIN GLARGINE 25 UNITS: 100 INJECTION, SOLUTION SUBCUTANEOUS at 09:03

## 2024-07-19 RX ADMIN — URSODIOL 300 MG: 300 CAPSULE ORAL at 22:03

## 2024-07-19 RX ADMIN — Medication 1 EACH: at 09:00

## 2024-07-19 NOTE — DOWNTIME EVENT NOTE
The EMR was down for 8 hours on 7/19/2024.    Anabel Jones was responsible for completing the paper charting during this time period.     The following information was re-entered into the system by Anabel Jones RN: Height/weight, Allergies, Problem list, Home meds, Flowsheet data, Intake and output, Orders, and MAR    The following information will remain in the paper chart: Nursing Assessment    Anabel Jones RN  7/19/2024

## 2024-07-19 NOTE — PROGRESS NOTES
"DAILY PROGRESS NOTE  [facility]    Patient Identification:  Name: Iain Christina  Age: 58 y.o.  Sex: male  :  1965  MRN: [id number]         Primary Care Physician: Scott Medina MD    Subjective:  Interval History: He is feeling better today and his headache is better.    Objective:    Scheduled Meds:dexAMETHasone, 4 mg, Oral, BID With Meals  enoxaparin, 40 mg, Subcutaneous, Daily  famotidine, 20 mg, Oral, BID AC  losartan, 50 mg, Oral, Q24H   And  hydroCHLOROthiazide, 12.5 mg, Oral, Q24H  insulin glargine, 25 Units, Subcutaneous, Daily  insulin lispro, 4-24 Units, Subcutaneous, 4x Daily AC & at Bedtime  insulin lispro, 5 Units, Subcutaneous, TID With Meals  leucovorin 50 mg in sodium chloride 0.9 % 55 mL IVPB, 50 mg, Intravenous, Q6H  levETIRAcetam, 500 mg, Oral, Q12H  Pharmacy monitoring - high dose methotrexate active, 1 each, Intravenous, Daily  senna-docusate sodium, 1 tablet, Oral, BID  sodium chloride, 10 mL, Intravenous, Q12H  ursodiol, 300 mg, Oral, BID      Continuous Infusions:sterile water (preservative free) 850 mL with sodium bicarbonate 8.4 % 150 mEq infusion, , Last Rate: 150 mL/hr at 244        Vital signs in last 24 hours:  Temp:  [97.7 °F (36.5 °C)-99 °F (37.2 °C)] 98.1 °F (36.7 °C)  Heart Rate:  [48-82] 48  Resp:  [16-18] 16  BP: ()/(76-89) 143/89    Intake/Output:    Intake/Output Summary (Last 24 hours) at 2024 194  Last data filed at 2024 0516  Gross per 24 hour   Intake 1240 ml   Output 950 ml   Net 290 ml       Exam:  /89 (BP Location: Left arm, Patient Position: Lying)   Pulse (!) 48   Temp 98.1 °F (36.7 °C) (Oral)   Resp 16   Ht 177.8 cm (70\")   Wt 94.7 kg (208 lb 12.4 oz)   SpO2 95%   BMI 29.96 kg/m²     General Appearance:    Alert, cooperative, no distress   Head:    Normocephalic, without obvious abnormality, atraumatic   Eyes:       Throat:   Lips, tongue, gums normal   Neck:   Supple, symmetrical, trachea " midline, no JVD   Lungs:     Clear to auscultation bilaterally, respirations unlabored   Chest Wall:    No tenderness or deformity    Heart:    Regular rate and rhythm, S1 and S2 normal, no murmur,no  rub or gallop   Abdomen:     Soft, nontender, bowel sounds active, no masses, no organomegaly    Extremities:   Extremities normal, atraumatic, no cyanosis or edema   Pulses:      Skin:   Skin is warm and dry,  no rashes or palpable lesions   Neurologic:   no focal deficits noted      Lab Results (last 72 hours)       Procedure Component Value Units Date/Time    POC Glucose Once [[id number]]  (Abnormal) Collected: 07/16/24 1150    Specimen: Blood Updated: 07/16/24 1152     Glucose 171 mg/dL     Methotrexate Level [[id number]] Collected: 07/16/24 0001    Specimen: Blood Updated: 07/16/24 1028     Methotrexate See Attatched From [employer]    POC Glucose Once [[id number]]  (Abnormal) Collected: 07/16/24 0804    Specimen: Blood Updated: 07/16/24 0810     Glucose 314 mg/dL     Lactate Dehydrogenase [[id number]]  (Abnormal) Collected: 07/16/24 0604    Specimen: Blood Updated: 07/16/24 0739      U/L      Comment: Specimen hemolyzed.  Results may be affected.       Comprehensive Metabolic Panel [[id number]]  (Abnormal) Collected: 07/16/24 0604    Specimen: Blood Updated: 07/16/24 0736     Glucose 303 mg/dL      BUN 17 mg/dL      Creatinine 0.60 mg/dL      Sodium 139 mmol/L      Potassium 3.4 mmol/L      Chloride 99 mmol/L      CO2 31.0 mmol/L      Calcium 8.7 mg/dL      Total Protein 5.0 g/dL      Albumin 3.2 g/dL      ALT (SGPT) 271 U/L      AST (SGOT) 89 U/L      Alkaline Phosphatase 112 U/L      Total Bilirubin 1.0 mg/dL      Globulin 1.8 gm/dL      A/G Ratio 1.8 g/dL      BUN/Creatinine Ratio 28.3     Anion Gap 9.0 mmol/L      eGFR 110.5 mL/min/1.73     Narrative:      GFR Normal >60  Chronic Kidney Disease <60  Kidney Failure <15      Uric Acid [[id number]]  (Abnormal) Collected: 07/16/24 0604    Specimen: Blood  Updated: 07/16/24 0736     Uric Acid 2.7 mg/dL     CBC & Differential [[id number]]  (Abnormal) Collected: 07/16/24 0604    Specimen: Blood Updated: 07/16/24 0724    Narrative:      The following orders were created for panel order CBC & Differential.  Procedure                               Abnormality         Status                     ---------                               -----------         ------                     CBC Auto Differential[[id number]]        Abnormal            Final result                 Please view results for these tests on the individual orders.    CBC Auto Differential [[id number]]  (Abnormal) Collected: 07/16/24 0604    Specimen: Blood Updated: 07/16/24 0724     WBC 8.57 10*3/mm3      RBC 4.85 10*6/mm3      Hemoglobin 14.5 g/dL      Hematocrit 42.6 %      MCV 87.8 fL      MCH 29.9 pg      MCHC 34.0 g/dL      RDW 12.8 %      RDW-SD 40.5 fl      MPV 11.0 fL      Platelets 117 10*3/mm3      Neutrophil % 75.1 %      Lymphocyte % 18.0 %      Monocyte % 5.3 %      Eosinophil % 0.2 %      Basophil % 0.2 %      Immature Grans % 1.2 %      Neutrophils, Absolute 6.44 10*3/mm3      Lymphocytes, Absolute 1.54 10*3/mm3      Monocytes, Absolute 0.45 10*3/mm3      Eosinophils, Absolute 0.02 10*3/mm3      Basophils, Absolute 0.02 10*3/mm3      Immature Grans, Absolute 0.10 10*3/mm3     Non-gynecologic Cytology [[id number]] Collected: 07/15/24 1309    Specimen: Cerebrospinal Fluid from Lumbar Puncture Updated: 07/16/24 0641    pH, Urine - Urine, Clean Catch [[id number]]  (Abnormal) Collected: 07/16/24 0001    Specimen: Urine, Clean Catch Updated: 07/16/24 0344     pH, UA 8.5    POC Glucose Once [[id number]]  (Abnormal) Collected: 07/15/24 2045    Specimen: Blood Updated: 07/15/24 2047     Glucose 319 mg/dL     pH, Urine - Urine, Clean Catch [[id number]]  (Normal) Collected: 07/15/24 1659    Specimen: Urine, Clean Catch Updated: 07/15/24 1713     pH, UA 7.0    POC Glucose Once [[id number]]  (Abnormal) Collected:  07/15/24 1700    Specimen: Blood Updated: 07/15/24 1702     Glucose 330 mg/dL     Protein, CSF - Cerebrospinal Fluid, Lumbar Puncture [[id number]]  (Abnormal) Collected: 07/15/24 1309    Specimen: Cerebrospinal Fluid from Lumbar Puncture Updated: 07/15/24 1524     Protein, Total (CSF) 107.2 mg/dL     Glucose, CSF - Cerebrospinal Fluid, Lumbar Puncture [[id number]]  (Abnormal) Collected: 07/15/24 1309    Specimen: Cerebrospinal Fluid from Lumbar Puncture Updated: 07/15/24 1524     Glucose,  mg/dL     Cell Count With Differential, CSF [[id number]]  (Abnormal) Collected: 07/15/24 1309    Specimen: Cerebrospinal Fluid from Lumbar Puncture Updated: 07/15/24 1508    Narrative:      The following orders were created for panel order Cell Count With Differential, CSF.  Procedure                               Abnormality         Status                     ---------                               -----------         ------                     Cell Count, CSF - Cerebr...[[id number]]  Abnormal            Final result                 Please view results for these tests on the individual orders.    Cell Count, CSF - Cerebrospinal Fluid, Lumbar Puncture [[id number]]  (Abnormal) Collected: 07/15/24 1309    Specimen: Cerebrospinal Fluid from Lumbar Puncture Updated: 07/15/24 1508     Color, CSF Colorless     Appearance, CSF Clear     RBC, CSF 37 /mm3      Nucleated Cells, CSF 2 /mm3      Tube Number, CSF 1     Method: UF 5000 Automated Method    Narrative:      Differential not indicated.  This test was developed, its performance characteristics determined and judged suitable for clinical purposes by [facility]. It has not been cleared or approved by the FDA. The laboratory is regulated under CLIA as qualified to perform high-complexity testing.    Cell Count With Differential, CSF Use CSF Tube: 1 [[id number]]  (Abnormal) Collected: 07/15/24 1309    Specimen: Cerebrospinal Fluid from Lumbar Puncture Updated:  07/15/24 1507    Narrative:      The following orders were created for panel order Cell Count With Differential, CSF Use CSF Tube: 1.  Procedure                               Abnormality         Status                     ---------                               -----------         ------                     Cell Count, CSF - Cerebr...[[id number]]  Abnormal            Final result                 Please view results for these tests on the individual orders.    Cell Count, CSF - Cerebrospinal Fluid, Lumbar Puncture [[id number]]  (Abnormal) Collected: 07/15/24 1309    Specimen: Cerebrospinal Fluid from Lumbar Puncture Updated: 07/15/24 1507     Color, CSF Colorless     Appearance, CSF Clear     RBC, CSF 46 /mm3      Nucleated Cells, CSF 2 /mm3      Tube Number, CSF 4     Method: UF 5000 Automated Method    Narrative:      Differential not indicated.  This test was developed, its performance characteristics determined and judged suitable for clinical purposes by [facility]. It has not been cleared or approved by the FDA. The laboratory is regulated under CLIA as qualified to perform high-complexity testing.    CSF Tube - Cerebrospinal Fluid, Lumbar Puncture [[id number]] Collected: 07/15/24 1309    Specimen: Cerebrospinal Fluid from Lumbar Puncture Updated: 07/15/24 1328     Extra Tube HOLD FOR ADD ON    Comprehensive Metabolic Panel [[id number]]  (Abnormal) Collected: 07/15/24 1026    Specimen: Blood Updated: 07/15/24 1126     Glucose 310 mg/dL      BUN 19 mg/dL      Creatinine 0.66 mg/dL      Sodium 133 mmol/L      Potassium 3.8 mmol/L      Chloride 102 mmol/L      CO2 19.7 mmol/L      Calcium 8.8 mg/dL      Total Protein 5.8 g/dL      Albumin 3.6 g/dL      ALT (SGPT) 322 U/L      AST (SGOT) 82 U/L      Alkaline Phosphatase 161 U/L      Total Bilirubin 1.0 mg/dL      Globulin 2.2 gm/dL      A/G Ratio 1.6 g/dL      BUN/Creatinine Ratio 28.8     Anion Gap 11.3 mmol/L      eGFR 107.4 mL/min/1.73      Narrative:      GFR Normal >60  Chronic Kidney Disease <60  Kidney Failure <15      CBC & Differential [[id number]]  (Abnormal) Collected: 07/15/24 1026    Specimen: Blood Updated: 07/15/24 1108    Narrative:      The following orders were created for panel order CBC & Differential.  Procedure                               Abnormality         Status                     ---------                               -----------         ------                     CBC Auto Differential[[id number]]        Abnormal            Final result                 Please view results for these tests on the individual orders.    CBC Auto Differential [[id number]]  (Abnormal) Collected: 07/15/24 1026    Specimen: Blood Updated: 07/15/24 1108     WBC 10.13 10*3/mm3      RBC 5.16 10*6/mm3      Hemoglobin 15.5 g/dL      Hematocrit 45.8 %      MCV 88.8 fL      MCH 30.0 pg      MCHC 33.8 g/dL      RDW 12.8 %      RDW-SD 41.5 fl      MPV 10.8 fL      Platelets 138 10*3/mm3      Neutrophil % 84.4 %      Lymphocyte % 10.5 %      Monocyte % 2.7 %      Eosinophil % 0.0 %      Basophil % 0.2 %      Immature Grans % 2.2 %      Neutrophils, Absolute 8.56 10*3/mm3      Lymphocytes, Absolute 1.06 10*3/mm3      Monocytes, Absolute 0.27 10*3/mm3      Eosinophils, Absolute 0.00 10*3/mm3      Basophils, Absolute 0.02 10*3/mm3      Immature Grans, Absolute 0.22 10*3/mm3      nRBC 0.0 /100 WBC     POC Glucose Once [[id number]]  (Abnormal) Collected: 07/15/24 0937    Specimen: Blood Updated: 07/15/24 0938     Glucose 296 mg/dL           Data Review:  Results from last 7 days   Lab Units 07/19/24  0555 07/18/24  0549 07/17/24  0551   SODIUM mmol/L 139 140 143   POTASSIUM mmol/L 3.5 3.4* 3.6   CHLORIDE mmol/L 102 105 104   CO2 mmol/L 30.0* 30.0* 30.8*   BUN mg/dL 21 17 18   CREATININE mg/dL 0.68* 0.80 0.73*   GLUCOSE mg/dL 175* 164* 92   CALCIUM mg/dL 8.3* 8.5* 8.1*     Results from last 7 days   Lab Units 07/19/24  0555 07/18/24  0549 07/17/24  0551   WBC 10*3/mm3  "6.74 7.29 9.19   HEMOGLOBIN g/dL 14.6 14.5 15.1   HEMATOCRIT % 42.3 43.2 43.1   PLATELETS 10*3/mm3 112* 115* 125*             No results found for: \"TROPONINT\"      Results from last 7 days   Lab Units 07/19/24  0555 07/18/24  0549 07/17/24  0551   ALK PHOS U/L 100 96 97   BILIRUBIN mg/dL 1.4* 1.2 2.4*   ALT (SGPT) U/L 794* 365* 303*   AST (SGOT) U/L 544* 206* 106*             Glucose   Date/Time Value Ref Range Status   07/19/2024 1645 346 (H) 70 - 130 mg/dL Final   07/19/2024 1120 129 70 - 130 mg/dL Final   07/19/2024 0802 151 (H) 70 - 130 mg/dL Final   07/18/2024 2029 209 (H) 70 - 130 mg/dL Final   07/18/2024 1641 182 (H) 70 - 130 mg/dL Final   07/18/2024 1126 231 (H) 70 - 130 mg/dL Final   07/18/2024 0726 157 (H) 70 - 130 mg/dL Final   07/17/2024 2020 226 (H) 70 - 130 mg/dL Final     Results from last 7 days   Lab Units 07/15/24  0753   INR  1.00       Past Medical History:   Diagnosis Date    Diabetes mellitus     Hepatitis C     Hyperthyroidism     Sleep apnea        Assessment:  Active Hospital Problems    Diagnosis  POA    **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       Plan:  Plans as per oncology.  Chemotherapy and steroids.  Glycemic control.  Follow-up on labs.    Samuel Farley MD  7/19/2024  19:46 EDT   "

## 2024-07-19 NOTE — PLAN OF CARE
Goal Outcome Evaluation:  Paper charting done during downtime.  See paper assessment in physical chart.

## 2024-07-20 ENCOUNTER — APPOINTMENT (OUTPATIENT)
Dept: ULTRASOUND IMAGING | Facility: HOSPITAL | Age: 59
DRG: 846 | End: 2024-07-20
Payer: COMMERCIAL

## 2024-07-20 LAB
ALBUMIN SERPL-MCNC: 3.1 G/DL (ref 3.5–5.2)
ALBUMIN/GLOB SERPL: 1.6 G/DL
ALP SERPL-CCNC: 100 U/L (ref 39–117)
ALT SERPL W P-5'-P-CCNC: 1166 U/L (ref 1–41)
ANION GAP SERPL CALCULATED.3IONS-SCNC: 6.4 MMOL/L (ref 5–15)
AST SERPL-CCNC: 581 U/L (ref 1–40)
BASOPHILS # BLD AUTO: 0 10*3/MM3 (ref 0–0.2)
BASOPHILS NFR BLD AUTO: 0 % (ref 0–1.5)
BILIRUB SERPL-MCNC: 1.5 MG/DL (ref 0–1.2)
BUN SERPL-MCNC: 19 MG/DL (ref 8–23)
BUN/CREAT SERPL: 25.3 (ref 7–25)
CALCIUM SPEC-SCNC: 8.4 MG/DL (ref 8.6–10.5)
CHLORIDE SERPL-SCNC: 103 MMOL/L (ref 98–107)
CO2 SERPL-SCNC: 28.6 MMOL/L (ref 22–29)
CREAT SERPL-MCNC: 0.75 MG/DL (ref 0.76–1.27)
DEPRECATED RDW RBC AUTO: 40.2 FL (ref 37–54)
EGFRCR SERPLBLD CKD-EPI 2021: 103.3 ML/MIN/1.73
EOSINOPHIL # BLD AUTO: 0.03 10*3/MM3 (ref 0–0.4)
EOSINOPHIL NFR BLD AUTO: 0.5 % (ref 0.3–6.2)
ERYTHROCYTE [DISTWIDTH] IN BLOOD BY AUTOMATED COUNT: 12.9 % (ref 12.3–15.4)
GLOBULIN UR ELPH-MCNC: 2 GM/DL
GLUCOSE BLDC GLUCOMTR-MCNC: 124 MG/DL (ref 70–130)
GLUCOSE BLDC GLUCOMTR-MCNC: 135 MG/DL (ref 70–130)
GLUCOSE BLDC GLUCOMTR-MCNC: 152 MG/DL (ref 70–130)
GLUCOSE BLDC GLUCOMTR-MCNC: 222 MG/DL (ref 70–130)
GLUCOSE BLDC GLUCOMTR-MCNC: 242 MG/DL (ref 70–130)
GLUCOSE SERPL-MCNC: 138 MG/DL (ref 65–99)
HCT VFR BLD AUTO: 42 % (ref 37.5–51)
HGB BLD-MCNC: 14.2 G/DL (ref 13–17.7)
IMM GRANULOCYTES # BLD AUTO: 0.02 10*3/MM3 (ref 0–0.05)
IMM GRANULOCYTES NFR BLD AUTO: 0.3 % (ref 0–0.5)
LYMPHOCYTES # BLD AUTO: 0.6 10*3/MM3 (ref 0.7–3.1)
LYMPHOCYTES NFR BLD AUTO: 9.9 % (ref 19.6–45.3)
MCH RBC QN AUTO: 29.4 PG (ref 26.6–33)
MCHC RBC AUTO-ENTMCNC: 33.8 G/DL (ref 31.5–35.7)
MCV RBC AUTO: 87 FL (ref 79–97)
MONOCYTES # BLD AUTO: 0.05 10*3/MM3 (ref 0.1–0.9)
MONOCYTES NFR BLD AUTO: 0.8 % (ref 5–12)
MTX SERPL-SCNC: NORMAL UMOL/L
NEUTROPHILS NFR BLD AUTO: 5.38 10*3/MM3 (ref 1.7–7)
NEUTROPHILS NFR BLD AUTO: 88.5 % (ref 42.7–76)
PLATELET # BLD AUTO: 98 10*3/MM3 (ref 140–450)
PMV BLD AUTO: 10.8 FL (ref 6–12)
POTASSIUM SERPL-SCNC: 3.3 MMOL/L (ref 3.5–5.2)
PROT SERPL-MCNC: 5.1 G/DL (ref 6–8.5)
RBC # BLD AUTO: 4.83 10*6/MM3 (ref 4.14–5.8)
SODIUM SERPL-SCNC: 138 MMOL/L (ref 136–145)
WBC NRBC COR # BLD AUTO: 6.08 10*3/MM3 (ref 3.4–10.8)

## 2024-07-20 PROCEDURE — 63710000001 DEXAMETHASONE PER 0.25 MG: Performed by: INTERNAL MEDICINE

## 2024-07-20 PROCEDURE — 76705 ECHO EXAM OF ABDOMEN: CPT

## 2024-07-20 PROCEDURE — 85025 COMPLETE CBC W/AUTO DIFF WBC: CPT | Performed by: INTERNAL MEDICINE

## 2024-07-20 PROCEDURE — 63710000001 INSULIN LISPRO (HUMAN) PER 5 UNITS: Performed by: INTERNAL MEDICINE

## 2024-07-20 PROCEDURE — 25010000002 LEUCOVORIN CALCIUM PER 50 MG: Performed by: STUDENT IN AN ORGANIZED HEALTH CARE EDUCATION/TRAINING PROGRAM

## 2024-07-20 PROCEDURE — 82948 REAGENT STRIP/BLOOD GLUCOSE: CPT

## 2024-07-20 PROCEDURE — 80053 COMPREHEN METABOLIC PANEL: CPT | Performed by: INTERNAL MEDICINE

## 2024-07-20 PROCEDURE — 63710000001 INSULIN GLARGINE PER 5 UNITS: Performed by: INTERNAL MEDICINE

## 2024-07-20 PROCEDURE — 25010000002 ENOXAPARIN PER 10 MG: Performed by: INTERNAL MEDICINE

## 2024-07-20 PROCEDURE — 80204 DRUG ASSAY METHOTREXATE: CPT | Performed by: STUDENT IN AN ORGANIZED HEALTH CARE EDUCATION/TRAINING PROGRAM

## 2024-07-20 PROCEDURE — 25010000002 LEUCOVORIN 200 MG RECONSTITUTED SOLUTION 200 MG VIAL: Performed by: STUDENT IN AN ORGANIZED HEALTH CARE EDUCATION/TRAINING PROGRAM

## 2024-07-20 RX ADMIN — ENOXAPARIN SODIUM 40 MG: 100 INJECTION SUBCUTANEOUS at 09:16

## 2024-07-20 RX ADMIN — URSODIOL 300 MG: 300 CAPSULE ORAL at 09:16

## 2024-07-20 RX ADMIN — FAMOTIDINE 20 MG: 20 TABLET, FILM COATED ORAL at 17:00

## 2024-07-20 RX ADMIN — DEXAMETHASONE 4 MG: 4 TABLET ORAL at 09:16

## 2024-07-20 RX ADMIN — ZOLPIDEM TARTRATE 10 MG: 5 TABLET, FILM COATED ORAL at 22:49

## 2024-07-20 RX ADMIN — INSULIN LISPRO 4 UNITS: 100 INJECTION, SOLUTION INTRAVENOUS; SUBCUTANEOUS at 12:56

## 2024-07-20 RX ADMIN — LEVETIRACETAM 500 MG: 500 TABLET, FILM COATED ORAL at 22:15

## 2024-07-20 RX ADMIN — URSODIOL 300 MG: 300 CAPSULE ORAL at 22:15

## 2024-07-20 RX ADMIN — SODIUM BICARBONATE: 84 INJECTION, SOLUTION INTRAVENOUS at 14:01

## 2024-07-20 RX ADMIN — SODIUM BICARBONATE: 84 INJECTION, SOLUTION INTRAVENOUS at 22:47

## 2024-07-20 RX ADMIN — LEUCOVORIN CALCIUM 50 MG: 100 INJECTION, POWDER, LYOPHILIZED, FOR SUSPENSION INTRAMUSCULAR; INTRAVENOUS at 14:36

## 2024-07-20 RX ADMIN — INSULIN LISPRO 8 UNITS: 100 INJECTION, SOLUTION INTRAVENOUS; SUBCUTANEOUS at 17:00

## 2024-07-20 RX ADMIN — INSULIN LISPRO 5 UNITS: 100 INJECTION, SOLUTION INTRAVENOUS; SUBCUTANEOUS at 17:00

## 2024-07-20 RX ADMIN — INSULIN LISPRO 8 UNITS: 100 INJECTION, SOLUTION INTRAVENOUS; SUBCUTANEOUS at 22:14

## 2024-07-20 RX ADMIN — LEUCOVORIN CALCIUM 50 MG: 100 INJECTION, POWDER, LYOPHILIZED, FOR SUSPENSION INTRAMUSCULAR; INTRAVENOUS at 06:02

## 2024-07-20 RX ADMIN — LEUCOVORIN CALCIUM 50 MG: 100 INJECTION, POWDER, LYOPHILIZED, FOR SUSPENSION INTRAMUSCULAR; INTRAVENOUS at 10:53

## 2024-07-20 RX ADMIN — SODIUM BICARBONATE: 84 INJECTION, SOLUTION INTRAVENOUS at 19:36

## 2024-07-20 RX ADMIN — INSULIN LISPRO 5 UNITS: 100 INJECTION, SOLUTION INTRAVENOUS; SUBCUTANEOUS at 12:56

## 2024-07-20 RX ADMIN — HYDROCHLOROTHIAZIDE 12.5 MG: 12.5 TABLET ORAL at 09:16

## 2024-07-20 RX ADMIN — FAMOTIDINE 20 MG: 20 TABLET, FILM COATED ORAL at 06:37

## 2024-07-20 RX ADMIN — SENNOSIDES AND DOCUSATE SODIUM 1 TABLET: 50; 8.6 TABLET ORAL at 09:16

## 2024-07-20 RX ADMIN — INSULIN GLARGINE 25 UNITS: 100 INJECTION, SOLUTION SUBCUTANEOUS at 09:16

## 2024-07-20 RX ADMIN — Medication 10 ML: at 22:13

## 2024-07-20 RX ADMIN — LOSARTAN POTASSIUM 50 MG: 50 TABLET, FILM COATED ORAL at 09:16

## 2024-07-20 RX ADMIN — SODIUM BICARBONATE: 84 INJECTION, SOLUTION INTRAVENOUS at 05:54

## 2024-07-20 RX ADMIN — LEVETIRACETAM 500 MG: 500 TABLET, FILM COATED ORAL at 09:16

## 2024-07-20 RX ADMIN — DEXAMETHASONE 4 MG: 4 TABLET ORAL at 17:00

## 2024-07-20 RX ADMIN — LEUCOVORIN CALCIUM 100 MG: 100 INJECTION, POWDER, LYOPHILIZED, FOR SUSPENSION INTRAMUSCULAR; INTRAVENOUS at 19:36

## 2024-07-20 RX ADMIN — LEUCOVORIN CALCIUM 50 MG: 100 INJECTION, POWDER, LYOPHILIZED, FOR SUSPENSION INTRAMUSCULAR; INTRAVENOUS at 01:58

## 2024-07-20 NOTE — PROGRESS NOTES
Pharmacy Consult - HD Methotrexate monitoring    Iain Christina is a 58 y.o. YO male starting therapy with HD-MTX. Pharmacy consulted to monitor and adjust leucovorin.    Patient to have methotrexate levels monitored per protocol every 24 hours, starting 24 hours after methotrexate infusion started. For this patient the first level will be drawn at 1200 on 7/17.    Goal levels are:  24 hr post infusion <5  48 hr post infusion <1  72 hr post infusion <0.1     Discussed plan with Dr. Medina, recommend the following dose adjustments to be conducted based on levels at the following times.     Date Current leucovorin dose Goal level Level Adjustment    7/17 1200 Begin leucovorin at 50 mg Q6H x1, followed by 25 mg Q6  <5 0.8 If level 5-10, increase to 50 mg Q4   7/18 1304 Leucovorin 25 mg Q6H <1 0.7 If level >1 increase to 50 mg Q6   7/19 1322 Leucovorin 50 mg Q6H <0.1 0.16 If level >0.1 increase to 50 mg Q4   7/20 1333 Leucovorin 50 mg Q4H <0.01 0.07 Consult Lulu/Sean     Pharmacy will continue to monitor and update as levels are available. Level 0.07 -  will increase leucovorin dose to 100 mg Q4H starting at 1900.  Will get another level tomorrow to track elimination      Thank you for this opportunity to review.     Laurence Logan, PharmD  7/20/2024

## 2024-07-20 NOTE — PROGRESS NOTES
Name: Iain Christina ADMIT: 7/15/2024   : 1965  PCP: Scott Medina MD    MRN: [id number] LOS: 5 days   AGE/SEX: 58 y.o. male  ROOM: [facility]     Subjective   Subjective   Patient is lying in the bed and does not appear to be in major distress.  No new events overnight.       Objective   Objective   Vital Signs  Temp:  [97.3 °F (36.3 °C)-98.4 °F (36.9 °C)] 98.1 °F (36.7 °C)  Heart Rate:  [50-66] 66  Resp:  [16] 16  BP: (112-154)/(77-96) 112/77  SpO2:  [94 %-97 %] 97 %  on   ;   Device (Oxygen Therapy): room air  Body mass index is 29.96 kg/m².  Physical Exam  HEENT: PERRLA, extract movements intact, Scleras no icterus  Neck: Supple, no JVD  Cardiovascular: Regular rate and rhythm with normal S1 and S2  Respiratory: Fairly clear to auscultation bilaterally with no wheezes  GI: Soft, nontender, bowel sounds present  Extremities: No edema, palpable pedal pulses  Results Review     I reviewed the patient's new clinical results.  Results from last 7 days   Lab Units 24  0603 24  0555 24  0549 24  0551   WBC 10*3/mm3 6.08 6.74 7.29 9.19   HEMOGLOBIN g/dL 14.2 14.6 14.5 15.1   PLATELETS 10*3/mm3 98* 112* 115* 125*     Results from last 7 days   Lab Units 24  0603 24  0555 24  0549 24  0551   SODIUM mmol/L 138 139 140 143   POTASSIUM mmol/L 3.3* 3.5 3.4* 3.6   CHLORIDE mmol/L 103 102 105 104   CO2 mmol/L 28.6 30.0* 30.0* 30.8*   BUN mg/dL    CREATININE mg/dL 0.75* 0.68* 0.80 0.73*   GLUCOSE mg/dL 138* 175* 164* 92   EGFR mL/min/1.73 103.3 106.4 101.3 104.2     Results from last 7 days   Lab Units 24  0603 24  0555 24  0549 24  0551   ALBUMIN g/dL 3.1* 2.9* 3.1* 3.3*   BILIRUBIN mg/dL 1.5* 1.4* 1.2 2.4*   ALK PHOS U/L 100 100 96 97   AST (SGOT) U/L 581* 544* 206* 106*   ALT (SGPT) U/L 1,166* 794* 365* 303*     Results from last 7 days   Lab Units 24  0603 24  0555 24  0549 24  0551   CALCIUM  mg/dL 8.4* 8.3* 8.5* 8.1*   ALBUMIN g/dL 3.1* 2.9* 3.1* 3.3*       Glucose   Date/Time Value Ref Range Status   07/20/2024 1636 242 (H) 70 - 130 mg/dL Final   07/20/2024 1153 152 (H) 70 - 130 mg/dL Final   07/20/2024 0753 124 70 - 130 mg/dL Final   07/20/2024 0552 135 (H) 70 - 130 mg/dL Final   07/19/2024 2055 221 (H) 70 - 130 mg/dL Final   07/19/2024 1645 346 (H) 70 - 130 mg/dL Final   07/19/2024 1120 129 70 - 130 mg/dL Final       US Liver    Result Date: 7/20/2024  Normal right upper quadrant ultrasound  This report was finalized on 7/20/2024 10:50 AM by Dr. Chance Dupont M.D on Workstation: [id number]       I have personally reviewed all medications:  Scheduled Medications  dexAMETHasone, 4 mg, Oral, BID With Meals  enoxaparin, 40 mg, Subcutaneous, Daily  famotidine, 20 mg, Oral, BID AC  losartan, 50 mg, Oral, Q24H   And  hydroCHLOROthiazide, 12.5 mg, Oral, Q24H  insulin glargine, 25 Units, Subcutaneous, Daily  insulin lispro, 4-24 Units, Subcutaneous, 4x Daily AC & at Bedtime  insulin lispro, 5 Units, Subcutaneous, TID With Meals  leucovorin 100 mg in sodium chloride 0.9 % 60 mL IVPB, 100 mg, Intravenous, Q4H  levETIRAcetam, 500 mg, Oral, Q12H  Pharmacy monitoring - high dose methotrexate active, 1 each, Intravenous, Daily  senna-docusate sodium, 1 tablet, Oral, BID  sodium chloride, 10 mL, Intravenous, Q12H  ursodiol, 300 mg, Oral, BID    Infusions  sterile water (preservative free) 850 mL with sodium bicarbonate 8.4 % 150 mEq infusion, , Last Rate: 150 mL/hr at 07/20/24 1401    Diet  Diet: Diabetic; Consistent Carbohydrate; Fluid Consistency: Thin (IDDSI 0)    I have personally reviewed:  [x]  Laboratory   [x]  Microbiology   [x]  Radiology   [x]  EKG/Telemetry  [x]  Cardiology/Vascular   []  Pathology    []  Records       Assessment/Plan     Active Hospital Problems    Diagnosis  POA    **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       58 y.o. male admitted with Primary CNS lymphoma.    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  On high-dose steroids to be continued.  Continue with Keppra.  2.  Thrombocytopenia, likely secondary to underlying chemotherapy and monitor platelet count closely.  3.  Steroid-induced hyperglycemia, blood sugars are in the acceptable range and will continue with Lantus and corrective dose insulin.  4.  Hypertension, on HCTZ, Cozaar and monitor blood pressure closely.  5.  Elevated LFTs, liver ultrasound has been ordered by oncology and will await for the results of the same.  6.  History of hepatitis C          Yohan Martin MD  [facility]  07/20/24  18:33 EDT

## 2024-07-20 NOTE — PROGRESS NOTES
Pharmacy Consult - HD Methotrexate monitoring    Iain Christina is a 58 y.o. YO male starting therapy with HD-MTX. Pharmacy consulted to monitor and adjust leucovorin.    Patient to have methotrexate levels monitored per protocol every 24 hours, starting 24 hours after methotrexate infusion started. For this patient the first level will be drawn at 1200 on 7/17.    Goal levels are:  24 hr post infusion <5  48 hr post infusion <1  72 hr post infusion <0.1     Discussed plan with Dr. Medina, recommend the following dose adjustments to be conducted based on levels at the following times.     Date Current leucovorin dose Goal level Level Adjustment    7/17 1200 Begin leucovorin at 50 mg Q6H x1, followed by 25 mg Q6  <5 0.8 If level 5-10, increase to 50 mg Q4   7/18 1304 leucovorin 25 mg Q6H <1 0.7 If level >1 increase to 50 mg Q6   7/19  1322 Leucovorin 50 mg Q6H <0.1 0.16 If level >0.1 increase to 50 mg Q4     Pharmacy will continue to monitor and update as levels are available. Level 0.16 -  will increase leucovorin dose to 50 mg Q4H starting at 2200.    Thank you for this opportunity to review.     Laurence Logan, PharmD  7/19/2024

## 2024-07-20 NOTE — PROGRESS NOTES
[facility] INPATIENT PROGRESS NOTE    Length of Stay:  5 days    CHIEF COMPLAINT/REASON FOR VISIT:  Primary B-cell lymphoma    SUBJECTIVE:   7/20/2024  No acute overnight events.  He reports having bowel movement yesterday.  Had issues with walking to the bathroom.  He is a poor historian, he is unable to elaborate more.  Per nursing staff, he had stools on the floor yesterday, and also his infusion bags were on the floor.  Unclear about the events.  Patient reports he feels weak.  And has not been walking as much.  Will have physical therapy evaluate the patient today      ROS:  All systems reviewed and found to be negative except for that noted in HPI    OBJECTIVE:  Vitals:    07/19/24 0752 07/19/24 2050 07/20/24 0550 07/20/24 0755   BP: 143/89 120/81 154/96 131/84   BP Location: Left arm Left arm Left arm Left arm   Patient Position: Lying Lying Lying Lying   Pulse: (!) 48 59 59 50   Resp: 16 16 16 16   Temp: 98.1 °F (36.7 °C) 98.4 °F (36.9 °C) 98.2 °F (36.8 °C) 97.3 °F (36.3 °C)   TempSrc: Oral Oral Oral Oral   SpO2:  94% 97% 95%   Weight:       Height:             PHYSICAL EXAMINATION:  Physical Exam  Constitutional:       Appearance: Normal appearance. He is normal weight.   HENT:      Head: Normocephalic and atraumatic.   Eyes:      Extraocular Movements: Extraocular movements intact.      Pupils: Pupils are equal, round, and reactive to light.   Cardiovascular:      Rate and Rhythm: Normal rate and regular rhythm.      Heart sounds: Normal heart sounds. No murmur heard.  Pulmonary:      Effort: Pulmonary effort is normal.      Breath sounds: Normal breath sounds.   Abdominal:      General: Abdomen is flat. Bowel sounds are normal.      Palpations: Abdomen is soft.   Neurological:      General: No focal deficit present.      Mental Status: He is oriented to person, place, and time.       I have reexamined the patient and the results are consistent with the previously documented exam. Kalina WITT  MD Adam       DIAGNOSTIC DATA:  Results Review:     I reviewed the patient's new clinical results.    Results from last 7 days   Lab Units 07/20/24  0603 07/19/24  0555 07/18/24  0549   WBC 10*3/mm3 6.08 6.74 7.29   HEMOGLOBIN g/dL 14.2 14.6 14.5   HEMATOCRIT % 42.0 42.3 43.2   PLATELETS 10*3/mm3 98* 112* 115*      Results from last 7 days   Lab Units 07/20/24  0603 07/19/24  0555 07/18/24  0549   SODIUM mmol/L 138 139 140   POTASSIUM mmol/L 3.3* 3.5 3.4*   CHLORIDE mmol/L 103 102 105   CO2 mmol/L 28.6 30.0* 30.0*   BUN mg/dL 19 21 17   CREATININE mg/dL 0.75* 0.68* 0.80   CALCIUM mg/dL 8.4* 8.3* 8.5*   BILIRUBIN mg/dL 1.5* 1.4* 1.2   ALK PHOS U/L 100 100 96   ALT (SGPT) U/L 1,166* 794* 365*   AST (SGOT) U/L 581* 544* 206*   GLUCOSE mg/dL 138* 175* 164*      Lab Results   Component Value Date    NEUTROABS 5.38 07/20/2024     Results from last 7 days   Lab Units 07/15/24  0753   INR  1.00   APTT seconds 21.0*       Assessment & Plan   ASSESSMENT/PLAN:  This is a 58 y.o. male with:     *  Primary CNS diffuse large B-cell lymphoma.    Patient had a craniotomy on 6/19/2024.  Pathology evaluation reported diffuse large B-cell lymphoma, Ki-67 at the 70%.  Further pathology evaluation is triple negative for Bcl-2, BCL6 and MYC.   I discussed with the patient today on 7/9/2024, recommending to have a PET scan examination for further evaluation.  Also discussed with the patient for high-dose methotrexate per NCCN guideline.   Will also obtain CSF on day of cycle 1 day 1 of chemotherapy for flow cytometry study, and also give intra thecal methotrexate. The frequency of chemotherapy will be adjusted no later than every 3 weeks, contingent on his recovery and tolerance. If his recovery is satisfactory, the frequency may be reduced to less than every 3 weeks. Maintenance therapy will be initiated for high-dose methotrexate monthly. A referral will be made for stem cell transplant service at the [facility] in [place]  [facility].  Patient could not have PET scan examination on 7/12/2024 due to significant elevated glucose level.  7/15/2024 cycle 1 day 1 chemotherapy with high-dose methotrexate.  This includes a lumbar puncture with cerebrospinal fluid analysis and intrathecal chemotherapy with methotrexate. Depending on the results, a decision regarding methotrexate administration will be determined. Additional oral tablets of leucovorin will be administered during the hospital stay to mitigate the side effects of the methotrexate.  7/16/2024: Patient tolerated IT cycle 1 high-dose methotrexate  quite well.  Had lumbar puncture yesterday.  CSF cytology pending.  CSF glucose 201, CSF protein 107, CSF nucleated cells-2, RBC 46.  Methotrexate level 0.11  7/17/2024: Tolerated IV methotrexate quite well.  No acute overnight events.  T. bili increased to 2.4.  Methotrexate level 0.8.  7/18/2024:  Cycle 1 day 3 Rituxan  7/20/2024: Leucovorin was increased to 50 mg q4h, Mtx level from this morning pending    *  Hyperglycemia secondary to steroids.  The patient's highest recorded glucose level was 290 yesterday morning on 7/8/2024, and his daily glucose levels range from 120 to 150.   Patient was not diabetic before diagnosis of CNS lymphoma.  Clearly his hyperglycemia is caused by steroids.  He currently is on dexamethasone 6 mg twice a day.  Today on 7/9/2024 I recommended to decrease dexamethasone to 4 mg twice a day.  He will continue insulin prescribed by his primary care physician.  7/12/2024 glucose 355, PET scan was not able to be carried out.  7/15/2024 elevated glucose 316.  Patient will be admitted to hospital for scheduled high-dose methotrexate.    7/16/2024 glucose 314.    7/17/2024 glucose 116.   7/18/2024: Glucose 157   7/20/2024 glucose 124.  Appreciate hospitalist management of hyperglycemia    *Transaminitis, chronic  *Hyperbilirubinemia, resolved  *Hep C  -7/16/2024 AST 89, , T. bili 1, alk phos  112  -7/17/2024 AST mildly increased to 106, , T. bili increased to 2.4, likely secondary to Mtx.  Started on Actigall for hyperbilirubinemia  - 7/18/2024: T. bili 1.2, AST increased to 206, .  -7/20/2024: T. bili further increased to 1.5, , ALT 1166    *Thrombocytopenia, secondary to chemotherapy  -7/15/2024 platelets 138  - 7/16/2024 platelets 117  -7/17/2024 platelets 125.    -7/18/2024: Platelets 115 .   -7/20/2024: Platelets 98,000. No active bleeding. Continue to monitor     PLAN:  Cycle 2 day 1 Mtx/Rituxan planned for 07/30/2024  CSF cytology pending.    Continue dexamethasone 4 mg twice a day.    Appreciate hospitalist management of steroid-induced hyperglycemia   continue Pepcid for GI prophylaxis.  Continue Keppra 500 mg twice a day for prophylaxis of seizure.  .  Bactrim currently on hold, plan to resume once methotrexate is cleared.    Monitor methotrexate level per protocol.  Patient will be given leucovorin for rescue.  Appreciate pharmacy assistance  Started Actigall on 7/17/2024 for elevated bilirubin.  Dose decreased to daily on 7/18/2024 due to normalization of bilirubin  PT eval today  Liver ultrasound today to evaluate for worsening transaminitis  Patient has appointment with CBC and CMP twice a week as outpatient next week.  Patient has appointment with Dr Fernandez for reevaluation on 07/30/2024         Kalina Medina MD

## 2024-07-20 NOTE — PLAN OF CARE
Goal Outcome Evaluation:   Patient A&Ox4.  Continues on Leucovorin q4hrs.  Methotrexate level sent StAT to [facility] at 1330.  SSI given as needed.  Up ad brandy.  VSS.

## 2024-07-21 LAB
ALBUMIN SERPL-MCNC: 3 G/DL (ref 3.5–5.2)
ALBUMIN/GLOB SERPL: 1.4 G/DL
ALP SERPL-CCNC: 90 U/L (ref 39–117)
ALT SERPL W P-5'-P-CCNC: 1135 U/L (ref 1–41)
ANION GAP SERPL CALCULATED.3IONS-SCNC: 11.8 MMOL/L (ref 5–15)
AST SERPL-CCNC: 467 U/L (ref 1–40)
BASOPHILS # BLD AUTO: 0.01 10*3/MM3 (ref 0–0.2)
BASOPHILS NFR BLD AUTO: 0.3 % (ref 0–1.5)
BILIRUB SERPL-MCNC: 1.5 MG/DL (ref 0–1.2)
BUN SERPL-MCNC: 19 MG/DL (ref 8–23)
BUN/CREAT SERPL: 26.4 (ref 7–25)
CALCIUM SPEC-SCNC: 8.3 MG/DL (ref 8.6–10.5)
CHLORIDE SERPL-SCNC: 103 MMOL/L (ref 98–107)
CO2 SERPL-SCNC: 26.2 MMOL/L (ref 22–29)
CREAT SERPL-MCNC: 0.72 MG/DL (ref 0.76–1.27)
DEPRECATED RDW RBC AUTO: 38.6 FL (ref 37–54)
EGFRCR SERPLBLD CKD-EPI 2021: 104.6 ML/MIN/1.73
EOSINOPHIL # BLD AUTO: 0.04 10*3/MM3 (ref 0–0.4)
EOSINOPHIL NFR BLD AUTO: 1 % (ref 0.3–6.2)
ERYTHROCYTE [DISTWIDTH] IN BLOOD BY AUTOMATED COUNT: 12.7 % (ref 12.3–15.4)
GLOBULIN UR ELPH-MCNC: 2.1 GM/DL
GLUCOSE BLDC GLUCOMTR-MCNC: 133 MG/DL (ref 70–130)
GLUCOSE BLDC GLUCOMTR-MCNC: 188 MG/DL (ref 70–130)
GLUCOSE BLDC GLUCOMTR-MCNC: 241 MG/DL (ref 70–130)
GLUCOSE BLDC GLUCOMTR-MCNC: 71 MG/DL (ref 70–130)
GLUCOSE BLDC GLUCOMTR-MCNC: 97 MG/DL (ref 70–130)
GLUCOSE SERPL-MCNC: 99 MG/DL (ref 65–99)
HCT VFR BLD AUTO: 41.3 % (ref 37.5–51)
HGB BLD-MCNC: 14.2 G/DL (ref 13–17.7)
IMM GRANULOCYTES # BLD AUTO: 0.03 10*3/MM3 (ref 0–0.05)
IMM GRANULOCYTES NFR BLD AUTO: 0.8 % (ref 0–0.5)
LYMPHOCYTES # BLD AUTO: 0.82 10*3/MM3 (ref 0.7–3.1)
LYMPHOCYTES NFR BLD AUTO: 20.9 % (ref 19.6–45.3)
MCH RBC QN AUTO: 29.6 PG (ref 26.6–33)
MCHC RBC AUTO-ENTMCNC: 34.4 G/DL (ref 31.5–35.7)
MCV RBC AUTO: 86 FL (ref 79–97)
MONOCYTES # BLD AUTO: 0.14 10*3/MM3 (ref 0.1–0.9)
MONOCYTES NFR BLD AUTO: 3.6 % (ref 5–12)
NEUTROPHILS NFR BLD AUTO: 2.89 10*3/MM3 (ref 1.7–7)
NEUTROPHILS NFR BLD AUTO: 73.4 % (ref 42.7–76)
NRBC BLD AUTO-RTO: 0 /100 WBC (ref 0–0.2)
PH UR STRIP.AUTO: >9 [PH] (ref 5–8)
PLATELET # BLD AUTO: 96 10*3/MM3 (ref 140–450)
PMV BLD AUTO: 10.3 FL (ref 6–12)
POTASSIUM SERPL-SCNC: 3.3 MMOL/L (ref 3.5–5.2)
PROT SERPL-MCNC: 5.1 G/DL (ref 6–8.5)
RBC # BLD AUTO: 4.8 10*6/MM3 (ref 4.14–5.8)
SODIUM SERPL-SCNC: 141 MMOL/L (ref 136–145)
WBC NRBC COR # BLD AUTO: 3.93 10*3/MM3 (ref 3.4–10.8)

## 2024-07-21 PROCEDURE — 25010000002 LEUCOVORIN 200 MG RECONSTITUTED SOLUTION 200 MG VIAL: Performed by: STUDENT IN AN ORGANIZED HEALTH CARE EDUCATION/TRAINING PROGRAM

## 2024-07-21 PROCEDURE — 25010000002 LEUCOVORIN CALCIUM PER 50 MG: Performed by: INTERNAL MEDICINE

## 2024-07-21 PROCEDURE — 82948 REAGENT STRIP/BLOOD GLUCOSE: CPT

## 2024-07-21 PROCEDURE — 80053 COMPREHEN METABOLIC PANEL: CPT | Performed by: INTERNAL MEDICINE

## 2024-07-21 PROCEDURE — 80204 DRUG ASSAY METHOTREXATE: CPT | Performed by: STUDENT IN AN ORGANIZED HEALTH CARE EDUCATION/TRAINING PROGRAM

## 2024-07-21 PROCEDURE — 85025 COMPLETE CBC W/AUTO DIFF WBC: CPT | Performed by: INTERNAL MEDICINE

## 2024-07-21 PROCEDURE — 97162 PT EVAL MOD COMPLEX 30 MIN: CPT

## 2024-07-21 PROCEDURE — 63710000001 INSULIN GLARGINE PER 5 UNITS: Performed by: INTERNAL MEDICINE

## 2024-07-21 PROCEDURE — 63710000001 INSULIN LISPRO (HUMAN) PER 5 UNITS: Performed by: INTERNAL MEDICINE

## 2024-07-21 PROCEDURE — 81003 URINALYSIS AUTO W/O SCOPE: CPT | Performed by: INTERNAL MEDICINE

## 2024-07-21 PROCEDURE — 25010000002 ENOXAPARIN PER 10 MG: Performed by: INTERNAL MEDICINE

## 2024-07-21 PROCEDURE — 25010000002 LEUCOVORIN CALCIUM PER 50 MG: Performed by: STUDENT IN AN ORGANIZED HEALTH CARE EDUCATION/TRAINING PROGRAM

## 2024-07-21 PROCEDURE — 97116 GAIT TRAINING THERAPY: CPT

## 2024-07-21 PROCEDURE — 63710000001 DEXAMETHASONE PER 0.25 MG: Performed by: INTERNAL MEDICINE

## 2024-07-21 RX ADMIN — INSULIN LISPRO 5 UNITS: 100 INJECTION, SOLUTION INTRAVENOUS; SUBCUTANEOUS at 12:07

## 2024-07-21 RX ADMIN — LEUCOVORIN CALCIUM 100 MG: 100 INJECTION, POWDER, LYOPHILIZED, FOR SUSPENSION INTRAMUSCULAR; INTRAVENOUS at 23:59

## 2024-07-21 RX ADMIN — LEUCOVORIN CALCIUM 100 MG: 100 INJECTION, POWDER, LYOPHILIZED, FOR SUSPENSION INTRAMUSCULAR; INTRAVENOUS at 08:32

## 2024-07-21 RX ADMIN — Medication 10 ML: at 21:05

## 2024-07-21 RX ADMIN — LEUCOVORIN CALCIUM 100 MG: 100 INJECTION, POWDER, LYOPHILIZED, FOR SUSPENSION INTRAMUSCULAR; INTRAVENOUS at 04:07

## 2024-07-21 RX ADMIN — LEUCOVORIN CALCIUM 100 MG: 100 INJECTION, POWDER, LYOPHILIZED, FOR SUSPENSION INTRAMUSCULAR; INTRAVENOUS at 16:07

## 2024-07-21 RX ADMIN — DEXAMETHASONE 4 MG: 4 TABLET ORAL at 08:40

## 2024-07-21 RX ADMIN — LEVETIRACETAM 500 MG: 500 TABLET, FILM COATED ORAL at 21:03

## 2024-07-21 RX ADMIN — HYDROCODONE BITARTRATE AND ACETAMINOPHEN 1 TABLET: 5; 325 TABLET ORAL at 22:53

## 2024-07-21 RX ADMIN — URSODIOL 300 MG: 300 CAPSULE ORAL at 21:03

## 2024-07-21 RX ADMIN — INSULIN LISPRO 4 UNITS: 100 INJECTION, SOLUTION INTRAVENOUS; SUBCUTANEOUS at 17:45

## 2024-07-21 RX ADMIN — INSULIN LISPRO 8 UNITS: 100 INJECTION, SOLUTION INTRAVENOUS; SUBCUTANEOUS at 12:07

## 2024-07-21 RX ADMIN — DEXAMETHASONE 4 MG: 4 TABLET ORAL at 17:45

## 2024-07-21 RX ADMIN — SODIUM BICARBONATE: 84 INJECTION, SOLUTION INTRAVENOUS at 14:52

## 2024-07-21 RX ADMIN — SODIUM BICARBONATE: 84 INJECTION, SOLUTION INTRAVENOUS at 22:51

## 2024-07-21 RX ADMIN — Medication 10 ML: at 08:34

## 2024-07-21 RX ADMIN — FAMOTIDINE 20 MG: 20 TABLET, FILM COATED ORAL at 06:30

## 2024-07-21 RX ADMIN — SODIUM BICARBONATE: 84 INJECTION, SOLUTION INTRAVENOUS at 06:41

## 2024-07-21 RX ADMIN — FAMOTIDINE 20 MG: 20 TABLET, FILM COATED ORAL at 17:45

## 2024-07-21 RX ADMIN — HYDROCHLOROTHIAZIDE 12.5 MG: 12.5 TABLET ORAL at 08:41

## 2024-07-21 RX ADMIN — ZOLPIDEM TARTRATE 10 MG: 5 TABLET, FILM COATED ORAL at 22:53

## 2024-07-21 RX ADMIN — URSODIOL 300 MG: 300 CAPSULE ORAL at 08:40

## 2024-07-21 RX ADMIN — INSULIN LISPRO 5 UNITS: 100 INJECTION, SOLUTION INTRAVENOUS; SUBCUTANEOUS at 17:45

## 2024-07-21 RX ADMIN — LEUCOVORIN CALCIUM 100 MG: 100 INJECTION, POWDER, LYOPHILIZED, FOR SUSPENSION INTRAMUSCULAR; INTRAVENOUS at 12:03

## 2024-07-21 RX ADMIN — INSULIN GLARGINE 25 UNITS: 100 INJECTION, SOLUTION SUBCUTANEOUS at 08:41

## 2024-07-21 RX ADMIN — LEUCOVORIN CALCIUM 100 MG: 100 INJECTION, POWDER, LYOPHILIZED, FOR SUSPENSION INTRAMUSCULAR; INTRAVENOUS at 00:03

## 2024-07-21 RX ADMIN — LEVETIRACETAM 500 MG: 500 TABLET, FILM COATED ORAL at 08:41

## 2024-07-21 RX ADMIN — LOSARTAN POTASSIUM 50 MG: 50 TABLET, FILM COATED ORAL at 08:41

## 2024-07-21 RX ADMIN — ENOXAPARIN SODIUM 40 MG: 100 INJECTION SUBCUTANEOUS at 08:41

## 2024-07-21 RX ADMIN — LEUCOVORIN CALCIUM 100 MG: 100 INJECTION, POWDER, LYOPHILIZED, FOR SUSPENSION INTRAMUSCULAR; INTRAVENOUS at 20:04

## 2024-07-21 NOTE — PLAN OF CARE
Goal Outcome Evaluation:      No change    Pt AxOx4, calm and cooperative, flat affect. Takes pills whole with water, see MAR. IVF infusing at 150 ml/hr. Leucovorin given. Methotrexate levels sent to [facility]. Tolerating diet. Physical therapy worked with pt this morning, up in the chair. Plan of care ongoing.

## 2024-07-21 NOTE — THERAPY EVALUATION
Patient Name: Iain Christina  : 1965    MRN: [id number]                              Today's Date: 2024       Admit Date: 7/15/2024    Visit Dx:     ICD-10-CM ICD-9-CM   1. Primary CNS lymphoma  C85.89 200.50     Patient Active Problem List   Diagnosis    Hypertension    Rosacea    Atopic rhinitis    Hyperlipidemia    Sebaceous cyst    Tinnitus    Hyperthyroidism    Sleep apnea    Chronic hepatitis C without hepatic coma    Brain mass    Neoplasm causing mass effect and brain compression on adjacent structures    Abnormal liver function tests    Steroid-induced hyperglycemia    Primary CNS lymphoma    Poor venous access    Hospital discharge follow-up     Past Medical History:   Diagnosis Date    Diabetes mellitus     Hepatitis C     Hyperthyroidism     Sleep apnea      Past Surgical History:   Procedure Laterality Date    CRANIOTOMY FOR TUMOR Right 2024    Procedure: Right frontal craniotomy for tumor;  Surgeon: Rosendo Bello MD;  Location: [facility];  Service: Neurosurgery;  Laterality: Right;    NASAL SEPTAL RECONSTRUCTION      VENOUS ACCESS DEVICE (PORT) INSERTION N/A 2024    Procedure: INSERTION VENOUS ACCESS DEVICE;  Surgeon: Linda Coates MD;  Location: [facility];  Service: General;  Laterality: N/A;      General Information       Row Name 24 1400          Physical Therapy Time and Intention    Document Type evaluation  -JL     Mode of Treatment individual therapy;physical therapy  -       Row Name 24 1400          General Information    Patient Profile Reviewed yes  -JL     Prior Level of Function independent:  -JL     Existing Precautions/Restrictions fall  -JL     Barriers to Rehab none identified  -JL       Row Name 24 1400          Living Environment    People in Home alone  -       Row Name 24 1400          Home Main Entrance    Number of Stairs, Main Entrance other (see comments)  15 steps outside to apartment  -JL       Row  Name 07/21/24 1400          Stairs Within Home, Primary    Number of Stairs, Within Home, Primary none  -JL       Row Name 07/21/24 1400          Safety Issues, Functional Mobility    Safety Issues Affecting Function (Mobility) awareness of need for assistance;insight into deficits/self-awareness  -JL     Impairments Affecting Function (Mobility) endurance/activity tolerance;shortness of breath;strength;pain  -JL               User Key  (r) = Recorded By, (t) = Taken By, (c) = Cosigned By      Initials Name Provider Type    Sammie Kim PT Physical Therapist                   Mobility       Row Name 07/21/24 1406          Bed Mobility    Bed Mobility supine-sit;sit-supine  -JL     Supine-Sit [place] (Bed Mobility) standby assist  -JL     Sit-Supine [place] (Bed Mobility) standby assist  -JL     Assistive Device (Bed Mobility) bed rails  -JL       Row Name 07/21/24 1406          Sit-Stand Transfer    Sit-Stand [place] (Transfers) standby assist  -JL     Comment, (Sit-Stand Transfer) No AD was used initially due to pt not using one usually  -JL       Row Name 07/21/24 1406          Gait/Stairs (Locomotion)    [place] Level (Gait) contact guard  -JL     Assistive Device (Gait) walker, front-wheeled  -JL     Patient was able to Ambulate yes  -JL     Distance in Feet (Gait) 250  -JL     Deviations/Abnormal Patterns (Gait) base of support, narrow;gait speed decreased;stride length decreased  -JL     Bilateral Gait Deviations forward flexed posture  -JL     Comment, (Gait/Stairs) Pt was breathing heavy with all mobility and initially started with no AD, but started grabbing at the wall and doorframe for stability so he was given a walker  -JL               User Key  (r) = Recorded By, (t) = Taken By, (c) = Cosigned By      Initials Name Provider Type    Sammie Kim PT Physical Therapist                   Obj/Interventions       Row Name 07/21/24 1409          Range of Motion Comprehensive     General Range of Motion no range of motion deficits identified  -       Row Name 07/21/24 1409          Strength Comprehensive (MMT)    General Manual Muscle Testing (MMT) Assessment lower extremity strength deficits identified  -JL     Comment, General Manual Muscle Testing (MMT) Assessment General decrease in functional strength as noted with labored breathing and requiring an AD when usually not using one.  -       Row Name 07/21/24 1409          Balance    Balance Assessment standing static balance;standing dynamic balance;sitting static balance  -JL     Static Sitting Balance independent  -JL     Position, Sitting Balance unsupported;sitting edge of bed  -JL     Dynamic Standing Balance modified independence  -JL     Position/Device Used, Standing Balance supported  Leaning against counter some while brushing teeth  -JL               User Key  (r) = Recorded By, (t) = Taken By, (c) = Cosigned By      Initials Name Provider Type    Sammie Kim, PT Physical Therapist                   Goals/Plan       Row Name 07/21/24 1424          Bed Mobility Goal 1 (PT)    Activity/Assistive Device (Bed Mobility Goal 1, PT) bed mobility activities, all  -JL     [place] Level/Cues Needed (Bed Mobility Goal 1, PT) independent  -JL     Time Frame (Bed Mobility Goal 1, PT) short term goal (STG);1 week  -[facility] Name 07/21/24 142          Transfer Goal 1 (PT)    Activity/Assistive Device (Transfer Goal 1, PT) transfers, all  -JL     [place] Level/Cues Needed (Transfer Goal 1, PT) independent  -JL     Time Frame (Transfer Goal 1, PT) short term goal (STG);1 week  -[facility] Name 07/21/24 1424          Gait Training Goal 1 (PT)    Activity/Assistive Device (Gait Training Goal 1, PT) gait (walking locomotion);increase endurance/gait distance  -JL     [place] Level (Gait Training Goal 1, PT) independent  -JL     Distance (Gait Training Goal 1, PT) 300ft with no AD  -JL     Time Frame (Gait Training  Goal 1, PT) short term goal (STG);1 week  -       Row Name 07/21/24 3337          Therapy Assessment/Plan (PT)    Planned Therapy Interventions (PT) balance training;bed mobility training;patient/family education;gait training;transfer training  -               User Key  (r) = Recorded By, (t) = Taken By, (c) = Cosigned By      Initials Name Provider Type    Sammie Kim, PT Physical Therapist                   Clinical Impression       Row Name 07/21/24 1411          Pain    Pre/Posttreatment Pain Comment No numerical value was given but no facial expression indicated pain  -       Row Name 07/21/24 1411          Plan of Care Review    Plan of Care Reviewed With patient  -JL     Outcome Evaluation Patient is a 58y.o. male who who has a history of recent diagnosis of CNS lymphoma B-cell history of hepatitis C, history of sleep apnea and hypertension. He was supine in bed when PT arrived and agreeable to therapy. Patient reports he lives alone in an apartment and there are 15 steps outside to get to the apartment. He says he can stay with his niece when he leaves. He usually uses no AD with mobility. Patient was able to transfer supine to sitting EOB with SBA. Pt started having SOA and says it is due to sleep apnea. Pt was able to transfer STS with SBA and ambulated to bathroom to brush his teeth with CGA. Pt balanced well but required leaning on the counter some. When pt started leaving the bathroom, he started grabbing for the wall and doorframe to steady himself. He was given a FWW to use with ambulation after this. Patient ambulated 250ft CGA and transferred back to bed with SBA. Patient is close to baseline, but could use PT to increase functional endurance. PT recommendation is home with home health.  -       Row Name 07/21/24 1411          Therapy Assessment/Plan (PT)    Rehab Potential (PT) good, to achieve stated therapy goals  -     Criteria for Skilled Interventions Met (PT) skilled  treatment is necessary;yes  -JL     Therapy Frequency (PT) 3 times/wk  -JL       Row Name 07/21/24 1411          Positioning and Restraints    Pre-Treatment Position in bed  -JL     Post Treatment Position bed  -JL     In Bed supine;call light within reach;encouraged to call for assist  -JL               User Key  (r) = Recorded By, (t) = Taken By, (c) = Cosigned By      Initials Name Provider Type    Sammie Kim, TERRY Physical Therapist                   Outcome Measures       Row Name 07/21/24 1424 07/21/24 0830       How much help from another person do you currently need...    Turning from your back to your side while in flat bed without using bedrails? 4  -JL 4  -AR    Moving from lying on back to sitting on the side of a flat bed without bedrails? 4  -JL 4  -AR    Moving to and from a bed to a chair (including a wheelchair)? 4  -JL 4  -AR    Standing up from a chair using your arms (e.g., wheelchair, bedside chair)? 4  -JL 4  -AR    Climbing 3-5 steps with a railing? 3  -JL 4  -AR    To walk in hospital room? 3  -JL 4  -AR    AM-PAC 6 Clicks Score (PT) 22  -JL 24  -AR    Highest Level of Mobility Goal 7 --> Walk 25 feet or more  -JL 8 --> Walked 250 feet or more  -AR              User Key  (r) = Recorded By, (t) = Taken By, (c) = Cosigned By      Initials Name Provider Type    Nathalie Meadows, RN Registered Nurse    Sammie Kim, PT Physical Therapist                                 Physical Therapy Education       Title: PT OT SLP Therapies (Done)       Topic: Physical Therapy (Done)       Point: Mobility training (Done)       Learning Progress Summary             Patient Acceptance, E, VU by HINA at 7/21/2024 1425                         Point: Home exercise program (Done)       Learning Progress Summary             Patient Acceptance, E, VU by HINA at 7/21/2024 1425                         Point: Body mechanics (Done)       Learning Progress Summary             Patient Acceptance, E, VU by HINA at  7/21/2024 1429                         Point: Precautions (Done)       Learning Progress Summary             Patient Acceptance, E, VU by HINA at 7/21/2024 1425                                         User Key       Initials Effective Dates Name Provider Type Discipline    HINA 01/16/24 -  Sammie Sandhu PT Physical Therapist PT                  PT Recommendation and Plan  Planned Therapy Interventions (PT): balance training, bed mobility training, patient/family education, gait training, transfer training  Plan of Care Reviewed With: patient  Outcome Evaluation: Patient is a 58y.o. male who who has a history of recent diagnosis of CNS lymphoma B-cell history of hepatitis C, history of sleep apnea and hypertension. He was supine in bed when PT arrived and agreeable to therapy. Patient reports he lives alone in an apartment and there are 15 steps outside to get to the apartment. He says he can stay with his niece when he leaves. He usually uses no AD with mobility. Patient was able to transfer supine to sitting EOB with SBA. Pt started having SOA and says it is due to sleep apnea. Pt was able to transfer STS with SBA and ambulated to bathroom to brush his teeth with CGA. Pt balanced well but required leaning on the counter some. When pt started leaving the bathroom, he started grabbing for the wall and doorframe to steady himself. He was given a FWW to use with ambulation after this. Patient ambulated 250ft CGA and transferred back to bed with SBA. Patient is close to baseline, but could use PT to increase functional endurance. PT recommendation is home with home health.     Time Calculation:         PT Charges       Row Name 07/21/24 1425             Time Calculation    Start Time 1038  -HINA      Stop Time 1059  -HINA      Time Calculation (min) 21 min  -HINA      PT Non-Billable Time (min) 10 min  -HINA      PT Received On 07/21/24  -HINA      PT - Next Appointment 07/23/24  -HINA      PT Goal Re-Cert Due Date 07/28/24  -HINA          Time Calculation- PT    Total Timed Code Minutes- PT 11 minute(s)  -JL         Timed Charges    65144 - Gait Training Minutes  11  -JL         Untimed Charges    PT Eval/Re-eval Minutes 10  -JL         Total Minutes    Timed Charges Total Minutes 11  -JL      Untimed Charges Total Minutes 10  -JL       Total Minutes 21  -JL                User Key  (r) = Recorded By, (t) = Taken By, (c) = Cosigned By      Initials Name Provider Type    Sammie Kim, PT Physical Therapist                  Therapy Charges for Today       Code Description Service Date Service Provider Modifiers Qty    79222294135 HC GAIT TRAINING EA 15 MIN 7/21/2024 Sammie Sandhu, PT GP 1    58771198861 HC PT EVAL MOD COMPLEXITY 2 7/21/2024 Sammie Sandhu, PT GP 1            PT G-Codes  AM-PAC 6 Clicks Score (PT): 22  PT Discharge Summary  Anticipated Discharge Disposition (PT): home with home health    Sammie Sandhu, TERRY  7/21/2024

## 2024-07-21 NOTE — PLAN OF CARE
Goal Outcome Evaluation:  Plan of Care Reviewed With: patient           Outcome Evaluation: Patient is a 58y.o. male who who has a history of recent diagnosis of CNS lymphoma B-cell history of hepatitis C, history of sleep apnea and hypertension. He was supine in bed when PT arrived and agreeable to therapy. Patient reports he lives alone in an apartment and there are 15 steps outside to get to the apartment. He says he can stay with his niece when he leaves. He usually uses no AD with mobility. Patient was able to transfer supine to sitting EOB with SBA. Pt started having SOA and says it is due to sleep apnea. Pt was able to transfer STS with SBA and ambulated to bathroom to brush his teeth with CGA. Pt balanced well but required leaning on the counter some. When pt started leaving the bathroom, he started grabbing for the wall and doorframe to steady himself. He was given a FWW to use with ambulation after this. Patient ambulated 250ft CGA and transferred back to bed with SBA. Patient is close to baseline, but could use PT to increase functional endurance. PT recommendation is home with home health.      Anticipated Discharge Disposition (PT): home with home health

## 2024-07-21 NOTE — PROGRESS NOTES
Name: Iain Christina ADMIT: 7/15/2024   : 1965  PCP: Scott Medina MD    MRN: [id number] LOS: 6 days   AGE/SEX: 58 y.o. male  ROOM: [facility]     Subjective   Subjective   Patient is lying in the bed and does not appear to be in major distress.  No new events overnight.  Denies nausea, vomiting, abdominal pain, chest pain.       Objective   Objective   Vital Signs  Temp:  [97.5 °F (36.4 °C)-97.7 °F (36.5 °C)] 97.5 °F (36.4 °C)  Heart Rate:  [47-92] 92  Resp:  [16] 16  BP: ()/(68-92) 98/68  SpO2:  [95 %-96 %] 95 %  on   ;   Device (Oxygen Therapy): room air  Body mass index is 29.45 kg/m².  Physical Exam  HEENT: PERRLA, extract movements intact, Scleras no icterus  Neck: Supple, no JVD  Cardiovascular: Regular rate and rhythm with normal S1 and S2  Respiratory: Fairly clear to auscultation bilaterally with no wheezes  GI: Soft, nontender, bowel sounds present  Extremities: No edema, palpable pedal pulses  Results Review     I reviewed the patient's new clinical results.  Results from last 7 days   Lab Units 24  0624  0603 24  0555 24  0549   WBC 10*3/mm3 3.93 6.08 6.74 7.29   HEMOGLOBIN g/dL 14.2 14.2 14.6 14.5   PLATELETS 10*3/mm3 96* 98* 112* 115*     Results from last 7 days   Lab Units 24  0624  0603 24  0555 24  0549   SODIUM mmol/L 141 138 139 140   POTASSIUM mmol/L 3.3* 3.3* 3.5 3.4*   CHLORIDE mmol/L 103 103 102 105   CO2 mmol/L 26.2 28.6 30.0* 30.0*   BUN mg/dL  17   CREATININE mg/dL 0.72* 0.75* 0.68* 0.80   GLUCOSE mg/dL 99 138* 175* 164*   EGFR mL/min/1.73 104.6 103.3 106.4 101.3     Results from last 7 days   Lab Units 24  0621 24  0603 24  0555 24  0549   ALBUMIN g/dL 3.0* 3.1* 2.9* 3.1*   BILIRUBIN mg/dL 1.5* 1.5* 1.4* 1.2   ALK PHOS U/L 90 100 100 96   AST (SGOT) U/L 467* 581* 544* 206*   ALT (SGPT) U/L 1,135* 1,166* 794* 365*     Results from last 7 days   Lab Units 24  0621  07/20/24  0603 07/19/24  0555 07/18/24  0549   CALCIUM mg/dL 8.3* 8.4* 8.3* 8.5*   ALBUMIN g/dL 3.0* 3.1* 2.9* 3.1*       Glucose   Date/Time Value Ref Range Status   07/21/2024 1123 241 (H) 70 - 130 mg/dL Final   07/21/2024 0734 97 70 - 130 mg/dL Final   07/20/2024 2017 222 (H) 70 - 130 mg/dL Final   07/20/2024 1636 242 (H) 70 - 130 mg/dL Final   07/20/2024 1153 152 (H) 70 - 130 mg/dL Final   07/20/2024 0753 124 70 - 130 mg/dL Final   07/20/2024 0552 135 (H) 70 - 130 mg/dL Final       US Liver    Result Date: 7/20/2024  Normal right upper quadrant ultrasound  This report was finalized on 7/20/2024 10:50 AM by Dr. Chance Dupont M.D on Workstation: [id number]       I have personally reviewed all medications:  Scheduled Medications  dexAMETHasone, 4 mg, Oral, BID With Meals  enoxaparin, 40 mg, Subcutaneous, Daily  famotidine, 20 mg, Oral, BID AC  losartan, 50 mg, Oral, Q24H   And  hydroCHLOROthiazide, 12.5 mg, Oral, Q24H  insulin glargine, 25 Units, Subcutaneous, Daily  insulin lispro, 4-24 Units, Subcutaneous, 4x Daily AC & at Bedtime  insulin lispro, 5 Units, Subcutaneous, TID With Meals  leucovorin 100 mg in sodium chloride 0.9 % 60 mL IVPB, 100 mg, Intravenous, Q4H  levETIRAcetam, 500 mg, Oral, Q12H  Pharmacy monitoring - high dose methotrexate active, 1 each, Intravenous, Daily  senna-docusate sodium, 1 tablet, Oral, BID  sodium chloride, 10 mL, Intravenous, Q12H  ursodiol, 300 mg, Oral, BID    Infusions  sterile water (preservative free) 850 mL with sodium bicarbonate 8.4 % 150 mEq infusion, , Last Rate: 150 mL/hr at 07/21/24 1452    Diet  Diet: Diabetic; Consistent Carbohydrate; Fluid Consistency: Thin (IDDSI 0)    I have personally reviewed:  [x]  Laboratory   [x]  Microbiology   [x]  Radiology   [x]  EKG/Telemetry  [x]  Cardiology/Vascular   []  Pathology    []  Records       Assessment/Plan     Active Hospital Problems    Diagnosis  POA    **Primary CNS lymphoma [C85.89]  Yes    Steroid-induced  hyperglycemia [R73.9, T38.0X5A]  Yes    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       58 y.o. male admitted with Primary CNS lymphoma.    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  On high-dose steroids to be continued.  Continue with Keppra.  Specifically denies cytology pending and discharge once cleared by hematology/oncology  2.  Thrombocytopenia, likely secondary to underlying chemotherapy and monitor platelet count closely.  Platelet count stable at 96,000.  3.  Steroid-induced hyperglycemia, blood sugars are in the acceptable range and will continue with Lantus and corrective dose insulin.  4.  Hypertension, on HCTZ, Cozaar and monitor blood pressure closely.  5.  Elevated LFTs, liver ultrasound ordered on 7/20/2024 was normal. Given elevated Bili on Actigall.  6.  History of hepatitis C          Yohan Martin MD  [facility]  07/21/24  16:39 EDT

## 2024-07-21 NOTE — PROGRESS NOTES
[facility] INPATIENT PROGRESS NOTE    Length of Stay:  6 days    CHIEF COMPLAINT/REASON FOR VISIT:  Primary B-cell lymphoma    SUBJECTIVE:   7/21/2024  No acute overnight events.  Endorses to being fatigued.  No nausea vomiting diarrhea.  Reports he did not get out of bed even to chair yesterday.  No other concerns or complaints      ROS:  All systems reviewed and found to be negative except for that noted in HPI    OBJECTIVE:  Vitals:    07/20/24 1635 07/20/24 1923 07/21/24 0426 07/21/24 0618   BP: 112/77 115/71 144/92    BP Location: Left arm Left arm Left arm    Patient Position: Lying Lying Lying    Pulse: 66 64 (!) 47    Resp: 16 16 16    Temp: 98.1 °F (36.7 °C) 97.5 °F (36.4 °C) 97.7 °F (36.5 °C)    TempSrc: Oral Oral Oral    SpO2: 97% 95% 96%    Weight:    93.1 kg (205 lb 4 oz)   Height:             PHYSICAL EXAMINATION:  Physical Exam  Constitutional:       Appearance: Normal appearance. He is normal weight.   HENT:      Head: Normocephalic and atraumatic.   Eyes:      Extraocular Movements: Extraocular movements intact.      Pupils: Pupils are equal, round, and reactive to light.   Cardiovascular:      Rate and Rhythm: Normal rate and regular rhythm.      Heart sounds: Normal heart sounds. No murmur heard.  Pulmonary:      Effort: Pulmonary effort is normal.      Breath sounds: Normal breath sounds.   Abdominal:      General: Abdomen is flat. Bowel sounds are normal.      Palpations: Abdomen is soft.   Neurological:      General: No focal deficit present.      Mental Status: He is oriented to person, place, and time.       I have reexamined the patient and the results are consistent with the previously documented exam. Kalina Medina MD       DIAGNOSTIC DATA:  Results Review:     I reviewed the patient's new clinical results.    Results from last 7 days   Lab Units 07/21/24  0621 07/20/24  0603 07/19/24  0555   WBC 10*3/mm3 3.93 6.08 6.74   HEMOGLOBIN g/dL 14.2 14.2 14.6   HEMATOCRIT % 41.3  42.0 42.3   PLATELETS 10*3/mm3 96* 98* 112*      Results from last 7 days   Lab Units 07/21/24  0621 07/20/24  0603 07/19/24  0555   SODIUM mmol/L 141 138 139   POTASSIUM mmol/L 3.3* 3.3* 3.5   CHLORIDE mmol/L 103 103 102   CO2 mmol/L 26.2 28.6 30.0*   BUN mg/dL 19 19 21   CREATININE mg/dL 0.72* 0.75* 0.68*   CALCIUM mg/dL 8.3* 8.4* 8.3*   BILIRUBIN mg/dL 1.5* 1.5* 1.4*   ALK PHOS U/L 90 100 100   ALT (SGPT) U/L 1,135* 1,166* 794*   AST (SGOT) U/L 467* 581* 544*   GLUCOSE mg/dL 99 138* 175*      Lab Results   Component Value Date    NEUTROABS 2.89 07/21/2024     Results from last 7 days   Lab Units 07/15/24  0753   INR  1.00   APTT seconds 21.0*       Assessment & Plan   ASSESSMENT/PLAN:  This is a 58 y.o. male with:     *  Primary CNS diffuse large B-cell lymphoma.    Patient had a craniotomy on 6/19/2024.  Pathology evaluation reported diffuse large B-cell lymphoma, Ki-67 at the 70%.  Further pathology evaluation is triple negative for Bcl-2, BCL6 and MYC.   I discussed with the patient today on 7/9/2024, recommending to have a PET scan examination for further evaluation.  Also discussed with the patient for high-dose methotrexate per NCCN guideline.   Will also obtain CSF on day of cycle 1 day 1 of chemotherapy for flow cytometry study, and also give intra thecal methotrexate. The frequency of chemotherapy will be adjusted no later than every 3 weeks, contingent on his recovery and tolerance. If his recovery is satisfactory, the frequency may be reduced to less than every 3 weeks. Maintenance therapy will be initiated for high-dose methotrexate monthly. A referral will be made for stem cell transplant service at the [facility] in [facility].  Patient could not have PET scan examination on 7/12/2024 due to significant elevated glucose level.  7/15/2024 cycle 1 day 1 chemotherapy with high-dose methotrexate.  This includes a lumbar puncture with cerebrospinal fluid analysis and intrathecal  chemotherapy with methotrexate. Depending on the results, a decision regarding methotrexate administration will be determined. Additional oral tablets of leucovorin will be administered during the hospital stay to mitigate the side effects of the methotrexate.  7/16/2024: Patient tolerated IT cycle 1 high-dose methotrexate  quite well.  Had lumbar puncture yesterday.  CSF cytology pending.  CSF glucose 201, CSF protein 107, CSF nucleated cells-2, RBC 46.  Methotrexate level 0.11  7/17/2024: Tolerated IV methotrexate quite well.  No acute overnight events.  T. bili increased to 2.4.  Methotrexate level 0.8.  7/18/2024:  Cycle 1 day 3 Rituxan  7/20/2024: Leucovorin was increased to 50 mg q4h, Mtx level still pending  07/21/2024: Methotrexate level pending    *  Hyperglycemia secondary to steroids.  The patient's highest recorded glucose level was 290 yesterday morning on 7/8/2024, and his daily glucose levels range from 120 to 150.   Patient was not diabetic before diagnosis of CNS lymphoma.  Clearly his hyperglycemia is caused by steroids.  He currently is on dexamethasone 6 mg twice a day.  Today on 7/9/2024 I recommended to decrease dexamethasone to 4 mg twice a day.  He will continue insulin prescribed by his primary care physician.  7/12/2024 glucose 355, PET scan was not able to be carried out.  7/15/2024 elevated glucose 316.  Patient will be admitted to hospital for scheduled high-dose methotrexate.    7/16/2024 glucose 314.    7/17/2024 glucose 116.   7/18/2024: Glucose 157   7/20/2024 glucose 124.    07/21/2024 glucose: 99 . appreciate hospitalist management of hyperglycemia    *Transaminitis, acute on chronic  *Hyperbilirubinemia  *Hep C  -7/16/2024 AST 89, , T. bili 1, alk phos 112  -7/17/2024 AST mildly increased to 106, , T. bili increased to 2.4, likely secondary to Mtx.  Started on Actigall for hyperbilirubinemia  - 7/18/2024: T. bili 1.2, AST increased to 206, .  -7/20/2024: T.  bili further increased to 1.5, , ALT 1166  -7/21/2024 T. bili stable at 1.5, AST mildly decreased to 467, ALT mildly decreased to 1135.  Liver ultrasound (for evaluation of transaminitis) completed on 7/28/2024-unremarkable  Transaminitis/hyperbilirubinemia-likely worsened secondary to chemo  On Actigall for hyperbilirubinemia.    *Thrombocytopenia, secondary to chemotherapy  -7/15/2024 platelets 138  - 7/16/2024 platelets 117  -7/17/2024 platelets 125.    -7/18/2024: Platelets 115 .   -7/20/2024: Platelets 98,000.   -7/21/2024: Platelets 96,000.  No active bleeding. Continue to monitor     PLAN:  Cycle 2 day 1 Mtx/Rituxan planned for 07/30/2024  CSF cytology still pending.    Continue dexamethasone 4 mg twice a day.    Appreciate hospitalist management of steroid-induced hyperglycemia   continue Pepcid for GI prophylaxis.  Continue Keppra 500 mg twice a day for prophylaxis of seizure.  .  Bactrim currently on hold, plan to resume once methotrexate is cleared.    Monitor methotrexate level per protocol.  Patient will be given leucovorin for rescue.  Appreciate pharmacy assistance  Started Actigall on 7/17/2024 for elevated bilirubin.  Dose decreased to daily on 7/18/2024 due to normalization of bilirubin  PT eval consult was placed on 7/20/2024  Patient has appointment with CBC and CMP twice a week as outpatient next week.  Patient has appointment with Dr Fernandez for reevaluation on 07/30/2024         Kalina Medina MD

## 2024-07-22 PROBLEM — D69.6 THROMBOCYTOPENIA: Status: ACTIVE | Noted: 2024-07-22

## 2024-07-22 PROBLEM — R74.01 TRANSAMINITIS: Status: ACTIVE | Noted: 2024-07-22

## 2024-07-22 PROBLEM — E87.6 HYPOKALEMIA: Status: ACTIVE | Noted: 2024-07-22

## 2024-07-22 LAB
ALBUMIN SERPL-MCNC: 3.1 G/DL (ref 3.5–5.2)
ALBUMIN/GLOB SERPL: 1.5 G/DL
ALP SERPL-CCNC: 91 U/L (ref 39–117)
ALT SERPL W P-5'-P-CCNC: 1022 U/L (ref 1–41)
ANION GAP SERPL CALCULATED.3IONS-SCNC: 9.5 MMOL/L (ref 5–15)
AST SERPL-CCNC: 280 U/L (ref 1–40)
BASOPHILS # BLD AUTO: 0.01 10*3/MM3 (ref 0–0.2)
BASOPHILS NFR BLD AUTO: 0.2 % (ref 0–1.5)
BILIRUB SERPL-MCNC: 1.7 MG/DL (ref 0–1.2)
BUN SERPL-MCNC: 17 MG/DL (ref 8–23)
BUN/CREAT SERPL: 23.6 (ref 7–25)
CALCIUM SPEC-SCNC: 8.4 MG/DL (ref 8.6–10.5)
CHLORIDE SERPL-SCNC: 102 MMOL/L (ref 98–107)
CO2 SERPL-SCNC: 27.5 MMOL/L (ref 22–29)
CREAT SERPL-MCNC: 0.72 MG/DL (ref 0.76–1.27)
DEPRECATED RDW RBC AUTO: 38.3 FL (ref 37–54)
EGFRCR SERPLBLD CKD-EPI 2021: 104.6 ML/MIN/1.73
EOSINOPHIL # BLD AUTO: 0.03 10*3/MM3 (ref 0–0.4)
EOSINOPHIL NFR BLD AUTO: 0.6 % (ref 0.3–6.2)
ERYTHROCYTE [DISTWIDTH] IN BLOOD BY AUTOMATED COUNT: 12.6 % (ref 12.3–15.4)
GLOBULIN UR ELPH-MCNC: 2.1 GM/DL
GLUCOSE BLDC GLUCOMTR-MCNC: 164 MG/DL (ref 70–130)
GLUCOSE BLDC GLUCOMTR-MCNC: 247 MG/DL (ref 70–130)
GLUCOSE BLDC GLUCOMTR-MCNC: 264 MG/DL (ref 70–130)
GLUCOSE BLDC GLUCOMTR-MCNC: 84 MG/DL (ref 70–130)
GLUCOSE SERPL-MCNC: 83 MG/DL (ref 65–99)
HCT VFR BLD AUTO: 40.9 % (ref 37.5–51)
HGB BLD-MCNC: 14.4 G/DL (ref 13–17.7)
IMM GRANULOCYTES # BLD AUTO: 0.04 10*3/MM3 (ref 0–0.05)
IMM GRANULOCYTES NFR BLD AUTO: 0.8 % (ref 0–0.5)
LYMPHOCYTES # BLD AUTO: 1.09 10*3/MM3 (ref 0.7–3.1)
LYMPHOCYTES NFR BLD AUTO: 22 % (ref 19.6–45.3)
MAGNESIUM SERPL-MCNC: 1.8 MG/DL (ref 1.6–2.4)
MCH RBC QN AUTO: 30.1 PG (ref 26.6–33)
MCHC RBC AUTO-ENTMCNC: 35.2 G/DL (ref 31.5–35.7)
MCV RBC AUTO: 85.6 FL (ref 79–97)
MONOCYTES # BLD AUTO: 0.22 10*3/MM3 (ref 0.1–0.9)
MONOCYTES NFR BLD AUTO: 4.4 % (ref 5–12)
MTX SERPL-SCNC: NORMAL UMOL/L
MTX SERPL-SCNC: NORMAL UMOL/L
NEUTROPHILS NFR BLD AUTO: 3.56 10*3/MM3 (ref 1.7–7)
NEUTROPHILS NFR BLD AUTO: 72 % (ref 42.7–76)
PH UR STRIP.AUTO: >=9 [PH] (ref 5–8)
PLATELET # BLD AUTO: 90 10*3/MM3 (ref 140–450)
PMV BLD AUTO: 10 FL (ref 6–12)
POTASSIUM SERPL-SCNC: 3 MMOL/L (ref 3.5–5.2)
PROT SERPL-MCNC: 5.2 G/DL (ref 6–8.5)
RBC # BLD AUTO: 4.78 10*6/MM3 (ref 4.14–5.8)
SODIUM SERPL-SCNC: 139 MMOL/L (ref 136–145)
WBC NRBC COR # BLD AUTO: 4.95 10*3/MM3 (ref 3.4–10.8)

## 2024-07-22 PROCEDURE — 81003 URINALYSIS AUTO W/O SCOPE: CPT | Performed by: INTERNAL MEDICINE

## 2024-07-22 PROCEDURE — 25010000002 ENOXAPARIN PER 10 MG: Performed by: INTERNAL MEDICINE

## 2024-07-22 PROCEDURE — 63710000001 DEXAMETHASONE PER 0.25 MG: Performed by: INTERNAL MEDICINE

## 2024-07-22 PROCEDURE — 25010000002 LEUCOVORIN 200 MG RECONSTITUTED SOLUTION 200 MG VIAL: Performed by: STUDENT IN AN ORGANIZED HEALTH CARE EDUCATION/TRAINING PROGRAM

## 2024-07-22 PROCEDURE — 80053 COMPREHEN METABOLIC PANEL: CPT | Performed by: INTERNAL MEDICINE

## 2024-07-22 PROCEDURE — 85025 COMPLETE CBC W/AUTO DIFF WBC: CPT | Performed by: INTERNAL MEDICINE

## 2024-07-22 PROCEDURE — 25010000002 LEUCOVORIN 200 MG RECONSTITUTED SOLUTION 200 MG VIAL: Performed by: INTERNAL MEDICINE

## 2024-07-22 PROCEDURE — 82948 REAGENT STRIP/BLOOD GLUCOSE: CPT

## 2024-07-22 PROCEDURE — 63710000001 INSULIN GLARGINE PER 5 UNITS: Performed by: INTERNAL MEDICINE

## 2024-07-22 PROCEDURE — 83735 ASSAY OF MAGNESIUM: CPT | Performed by: INTERNAL MEDICINE

## 2024-07-22 PROCEDURE — 63710000001 INSULIN LISPRO (HUMAN) PER 5 UNITS: Performed by: INTERNAL MEDICINE

## 2024-07-22 RX ORDER — POTASSIUM CHLORIDE 750 MG/1
40 TABLET, FILM COATED, EXTENDED RELEASE ORAL DAILY
Status: DISCONTINUED | OUTPATIENT
Start: 2024-07-22 | End: 2024-07-23

## 2024-07-22 RX ORDER — DEXAMETHASONE 2 MG/1
2 TABLET ORAL 2 TIMES DAILY WITH MEALS
Status: DISCONTINUED | OUTPATIENT
Start: 2024-07-23 | End: 2024-07-23 | Stop reason: HOSPADM

## 2024-07-22 RX ADMIN — Medication 10 ML: at 08:11

## 2024-07-22 RX ADMIN — DEXAMETHASONE 4 MG: 4 TABLET ORAL at 17:22

## 2024-07-22 RX ADMIN — INSULIN LISPRO 8 UNITS: 100 INJECTION, SOLUTION INTRAVENOUS; SUBCUTANEOUS at 21:04

## 2024-07-22 RX ADMIN — HYDROCHLOROTHIAZIDE 12.5 MG: 12.5 TABLET ORAL at 08:17

## 2024-07-22 RX ADMIN — INSULIN LISPRO 5 UNITS: 100 INJECTION, SOLUTION INTRAVENOUS; SUBCUTANEOUS at 12:06

## 2024-07-22 RX ADMIN — DEXAMETHASONE 4 MG: 4 TABLET ORAL at 08:17

## 2024-07-22 RX ADMIN — SODIUM BICARBONATE: 84 INJECTION, SOLUTION INTRAVENOUS at 15:00

## 2024-07-22 RX ADMIN — SODIUM BICARBONATE: 84 INJECTION, SOLUTION INTRAVENOUS at 22:44

## 2024-07-22 RX ADMIN — LEUCOVORIN CALCIUM 100 MG: 100 INJECTION, POWDER, LYOPHILIZED, FOR SUSPENSION INTRAMUSCULAR; INTRAVENOUS at 12:01

## 2024-07-22 RX ADMIN — LEUCOVORIN CALCIUM 100 MG: 100 INJECTION, POWDER, LYOPHILIZED, FOR SUSPENSION INTRAMUSCULAR; INTRAVENOUS at 16:06

## 2024-07-22 RX ADMIN — FAMOTIDINE 20 MG: 20 TABLET, FILM COATED ORAL at 17:22

## 2024-07-22 RX ADMIN — Medication 10 ML: at 21:04

## 2024-07-22 RX ADMIN — INSULIN LISPRO 5 UNITS: 100 INJECTION, SOLUTION INTRAVENOUS; SUBCUTANEOUS at 17:23

## 2024-07-22 RX ADMIN — INSULIN LISPRO 8 UNITS: 100 INJECTION, SOLUTION INTRAVENOUS; SUBCUTANEOUS at 17:23

## 2024-07-22 RX ADMIN — URSODIOL 300 MG: 300 CAPSULE ORAL at 21:04

## 2024-07-22 RX ADMIN — URSODIOL 300 MG: 300 CAPSULE ORAL at 08:18

## 2024-07-22 RX ADMIN — POTASSIUM CHLORIDE 40 MEQ: 750 TABLET, EXTENDED RELEASE ORAL at 15:00

## 2024-07-22 RX ADMIN — SODIUM BICARBONATE: 84 INJECTION, SOLUTION INTRAVENOUS at 06:57

## 2024-07-22 RX ADMIN — INSULIN LISPRO 4 UNITS: 100 INJECTION, SOLUTION INTRAVENOUS; SUBCUTANEOUS at 12:06

## 2024-07-22 RX ADMIN — LEVETIRACETAM 500 MG: 500 TABLET, FILM COATED ORAL at 21:04

## 2024-07-22 RX ADMIN — LEUCOVORIN CALCIUM 100 MG: 100 INJECTION, POWDER, LYOPHILIZED, FOR SUSPENSION INTRAMUSCULAR; INTRAVENOUS at 20:02

## 2024-07-22 RX ADMIN — INSULIN GLARGINE 25 UNITS: 100 INJECTION, SOLUTION SUBCUTANEOUS at 08:18

## 2024-07-22 RX ADMIN — LEVETIRACETAM 500 MG: 500 TABLET, FILM COATED ORAL at 08:17

## 2024-07-22 RX ADMIN — FAMOTIDINE 20 MG: 20 TABLET, FILM COATED ORAL at 06:57

## 2024-07-22 RX ADMIN — LEUCOVORIN CALCIUM 100 MG: 100 INJECTION, POWDER, LYOPHILIZED, FOR SUSPENSION INTRAMUSCULAR; INTRAVENOUS at 08:09

## 2024-07-22 RX ADMIN — LOSARTAN POTASSIUM 50 MG: 50 TABLET, FILM COATED ORAL at 08:17

## 2024-07-22 RX ADMIN — LEUCOVORIN CALCIUM 100 MG: 100 INJECTION, POWDER, LYOPHILIZED, FOR SUSPENSION INTRAMUSCULAR; INTRAVENOUS at 04:02

## 2024-07-22 NOTE — CASE MANAGEMENT/SOCIAL WORK
Continued Stay Note  [facility]     Patient Name: Iain Christina  MRN: [id number]  Today's Date: 7/22/2024    Admit Date: 7/15/2024    Plan: Home w/ family   Discharge Plan       Row Name 07/22/24 1121       Plan    Plan Home w/ family    Plan Comments Clinical chart reviewed; plan remains home with family. Patient is independent with ADL's. CCP will continue to follow for dc planning needs pending clinical course. RADHA GRANT                   Discharge Codes    No documentation.                 Expected Discharge Date and Time       Expected Discharge Date Expected Discharge Time    Jul 19, 2024               RADHA Maloney

## 2024-07-22 NOTE — PLAN OF CARE
Goal Outcome Evaluation:      Improving    Pt AxOx4, calm and cooperative, flat affect. Takes pills whole with water, see MAR. IVF infusing at 150 ml/hr. Leucovorin given. Tolerating diet. Physical therapy worked with pt this morning, up in the chair. Pt refused Lovenox this AM, Dr. Ames at bedside, notified. RN provided education. Plan of care ongoing.

## 2024-07-22 NOTE — PROGRESS NOTES
"Nutrition Services    Patient Name:  Iain Christina  YOB: 1965  MRN: [id number]  Admit Date:  7/15/2024    Assessment Date:  07/22/24    NUTRITION SCREENING      Reason for Encounter LOS x 7 days   Diagnosis/Problem Primary CNS diffuse large B-cell lymphoma   Elevated transaminases   Thrombocytopenia   Steroid-induced hyperglycemia  Hypertension       PO Diet Diet: Diabetic; Consistent Carbohydrate; Fluid Consistency: Thin (IDDSI 0)   Supplements    PO Intake % %       Medications MAR reviewed by RD   Labs  Listed below, reviewed   Physical Findings Alert, oriented, room air   GI Function Passing flatus, Last BM 7/21   Skin Status Scalp incision        Height  Weight  BMI  Weight Trend     Height: 177.8 cm (70\")  Weight: 93.3 kg (205 lb 11 oz) (07/22/24 0444)  Body mass index is 29.51 kg/m².  Loss       Nutrition Problem (PES) No nutrition diagnosis at this time.       Intervention/Plan RD to follow up per protocol.     Results from last 7 days   Lab Units 07/22/24  0657 07/21/24  0621 07/20/24  0603   SODIUM mmol/L 139 141 138   POTASSIUM mmol/L 3.0* 3.3* 3.3*   CHLORIDE mmol/L 102 103 103   CO2 mmol/L 27.5 26.2 28.6   BUN mg/dL 17 19 19   CREATININE mg/dL 0.72* 0.72* 0.75*   CALCIUM mg/dL 8.4* 8.3* 8.4*   BILIRUBIN mg/dL 1.7* 1.5* 1.5*   ALK PHOS U/L 91 90 100   ALT (SGPT) U/L 1,022* 1,135* 1,166*   AST (SGOT) U/L 280* 467* 581*   GLUCOSE mg/dL 83 99 138*     Results from last 7 days   Lab Units 07/22/24  0657   MAGNESIUM mg/dL 1.8   HEMOGLOBIN g/dL 14.4   HEMATOCRIT % 40.9     Lab Results   Component Value Date    HGBA1C 6.60 (H) 06/16/2024         Electronically signed by:  Janet Kellogg  07/22/24 15:40 EDT  "

## 2024-07-22 NOTE — PROGRESS NOTES
Name: Iain Christina ADMIT: 7/15/2024   : 1965  PCP: Scott Medina MD    MRN: [id number] LOS: 7 days   AGE/SEX: 58 y.o. male  ROOM: [facility]     Subjective   Subjective   No acute events overnight.  Patient states that he is feeling pretty well this morning.  Denies chest pain or shortness of breath.  No significant abdominal pain.  Eating and drinking well.    Objective   Objective     Vital Signs  Temp:  [97.5 °F (36.4 °C)-98.1 °F (36.7 °C)] 98.1 °F (36.7 °C)  Heart Rate:  [43-92] 43  Resp:  [16] 16  BP: ()/(68-87) 146/83  SpO2:  [92 %-97 %] 97 %  on   ;   Device (Oxygen Therapy): room air  Body mass index is 29.51 kg/m².    Physical Exam  General: Alert, no acute distress.  Lying in bed.  Answers questions appropriately.  ENT: No conjunctival injection or scleral icterus. Moist mucous membranes.   Neuro: Eyes open and moving in all directions, strength normal in all extremities, no focal deficits.   Lungs: Clear to auscultation bilaterally. No wheeze or crackles. No distress.   Heart: RRR, no murmurs. No edema.  Abdomen: Soft, non-tender, non-distended. Normal bowel sounds.   Ext: Warm and well-perfused. No edema.   Skin: No rashes or lesions. IV site without swelling or erythema.     Results Review     I reviewed the patient's new clinical results:  Results from last 7 days   Lab Units 24  0657 24  0621 24  0603 24  0555   WBC 10*3/mm3 4.95 3.93 6.08 6.74   HEMOGLOBIN g/dL 14.4 14.2 14.2 14.6   PLATELETS 10*3/mm3 90* 96* 98* 112*     Results from last 7 days   Lab Units 24  0621 24  0603 24  0555 24  0549   SODIUM mmol/L 141 138 139 140   POTASSIUM mmol/L 3.3* 3.3* 3.5 3.4*   CHLORIDE mmol/L 103 103 102 105   CO2 mmol/L 26.2 28.6 30.0* 30.0*   BUN mg/dL 19 19 21 17   CREATININE mg/dL 0.72* 0.75* 0.68* 0.80   GLUCOSE mg/dL 99 138* 175* 164*   EGFR mL/min/1.73 104.6 103.3 106.4 101.3     Results from last 7 days   Lab Units  07/21/24  0621 07/20/24  0603 07/19/24  0555 07/18/24  0549   ALBUMIN g/dL 3.0* 3.1* 2.9* 3.1*   BILIRUBIN mg/dL 1.5* 1.5* 1.4* 1.2   ALK PHOS U/L 90 100 100 96   AST (SGOT) U/L 467* 581* 544* 206*   ALT (SGPT) U/L 1,135* 1,166* 794* 365*     Results from last 7 days   Lab Units 07/21/24  0621 07/20/24  0603 07/19/24  0555 07/18/24  0549   CALCIUM mg/dL 8.3* 8.4* 8.3* 8.5*   ALBUMIN g/dL 3.0* 3.1* 2.9* 3.1*       Glucose   Date/Time Value Ref Range Status   07/22/2024 0746 84 70 - 130 mg/dL Final   07/21/2024 2156 133 (H) 70 - 130 mg/dL Final   07/21/2024 2023 71 70 - 130 mg/dL Final   07/21/2024 1650 188 (H) 70 - 130 mg/dL Final   07/21/2024 1123 241 (H) 70 - 130 mg/dL Final   07/21/2024 0734 97 70 - 130 mg/dL Final   07/20/2024 2017 222 (H) 70 - 130 mg/dL Final       US Liver    Result Date: 7/20/2024  Normal right upper quadrant ultrasound  This report was finalized on 7/20/2024 10:50 AM by Dr. Chance Dupont M.D on Workstation: [id number]       I have personally reviewed all medications:  Scheduled Medications  dexAMETHasone, 4 mg, Oral, BID With Meals  enoxaparin, 40 mg, Subcutaneous, Daily  famotidine, 20 mg, Oral, BID AC  losartan, 50 mg, Oral, Q24H   And  hydroCHLOROthiazide, 12.5 mg, Oral, Q24H  insulin glargine, 25 Units, Subcutaneous, Daily  insulin lispro, 4-24 Units, Subcutaneous, 4x Daily AC & at Bedtime  insulin lispro, 5 Units, Subcutaneous, TID With Meals  leucovorin 100 mg in sodium chloride 0.9 % 60 mL IVPB, 100 mg, Intravenous, Q4H  levETIRAcetam, 500 mg, Oral, Q12H  Pharmacy monitoring - high dose methotrexate active, 1 each, Intravenous, Daily  senna-docusate sodium, 1 tablet, Oral, BID  sodium chloride, 10 mL, Intravenous, Q12H  ursodiol, 300 mg, Oral, BID    Infusions  sterile water (preservative free) 850 mL with sodium bicarbonate 8.4 % 150 mEq infusion, , Last Rate: 150 mL/hr at 07/22/24 0657    Diet  Diet: Diabetic; Consistent Carbohydrate; Fluid Consistency: Thin (IDDSI  0)      Intake/Output Summary (Last 24 hours) at 7/22/2024 0847  Last data filed at 7/22/2024 0657  Gross per 24 hour   Intake 2000 ml   Output 2550 ml   Net -550 ml       Assessment/Plan     Active Hospital Problems    Diagnosis  POA    **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       58 y.o. male with Primary CNS lymphoma.    Primary CNS diffuse large B-cell lymphoma  -Continue high-dose steroids  -Continue Keppra  -CSF pathology pending  -Hematology/oncology following  -Cycle 2 of MTX/Rituxan on 7/30    Elevated transaminases  -Possibly secondary to chemotherapy  -Liver ultrasound unremarkable  -LFTs pending this morning, will follow-up  -Oncology has started on Actigall for hyperbilirubinemia  -Repeat CMP with morning labs    Thrombocytopenia  -Likely secondary to chemotherapy  -Platelets fairly stable today  -Repeat CBC with morning labs    Steroid-induced hyperglycemia  -Current regimen: Lantus 25 units daily, lispro 5 units 3 times daily with meals, SSI  -Glucose levels somewhat labile, but trend overall acceptable  -Continue current regimen, titrate as needed based on requirements    Hypertension  -BP acceptable at this time  -Continue HCTZ and losartan  -Titrate meds as needed based on BP trends    Lovenox 40 mg SC daily for DVT prophylaxis.  Full code.  Discussed with patient and nursing staff.  Anticipate discharge home later this week.      Livier Arizmendi MD  [facility]  07/22/24  08:47 EDT

## 2024-07-22 NOTE — PROGRESS NOTES
Pharmacy Consult - HD Methotrexate monitoring    Iain Christina is a 58 y.o. YO male starting therapy with HD-MTX. Pharmacy consulted to monitor and adjust leucovorin.    Patient to have methotrexate levels monitored per protocol every 24 hours, starting 24 hours after methotrexate infusion started. For this patient the first level will be drawn at 1200 on 7/17.    Goal levels are:  24 hr post infusion <5  48 hr post infusion <1  72 hr post infusion <0.1     Discussed plan with Dr. Medina, recommend the following dose adjustments to be conducted based on levels at the following times.     Date Current leucovorin dose Goal level Level Adjustment    7/17 1200 Begin leucovorin at 50 mg Q6H x1, followed by 25 mg Q6  <5 0.8 If level 5-10, increase to 50 mg Q4   7/18 1304 Leucovorin 25 mg Q6H <1 0.7 If level >1 increase to 50 mg Q6   7/19 1322 Leucovorin 50 mg Q6H <0.1 0.16 If level >0.1 increase to 50 mg Q4   7/20 1333 Leucovorin 50 mg Q4H <0.05 0.07 Consult Alok   7/21  Leucovorin 100 mg Q4H <0.05 <0.05 Consult Lulu/Sean     Pharmacy will continue to monitor and update as levels are available. Level <0.05 -  will keep leucovorin dose of 100 mg Q4H overnight.  AM clinical to reassess - no further MTX levels ordered      Thank you for this opportunity to review.     Laurence Logan, PharmD  7/20/2024

## 2024-07-22 NOTE — PROGRESS NOTES
[facility] GROUP INPATIENT PROGRESS NOTE    Length of Stay:  7 days    CHIEF COMPLAINT/REASON FOR VISIT:  Primary B-cell lymphoma    SUBJECTIVE:   7/21/2024  No acute overnight events.  Endorses to being fatigued.  No nausea vomiting diarrhea.  Reports he did not get out of bed even to chair yesterday.  No other concerns or complaints    7/22/2024 patient is afebrile.  Denies nausea or vomiting.  Continues to have significant fatigue.  Liver enzymes improved slightly however still significant elevated.      ROS:  All systems reviewed and found to be negative except for that noted in HPI    OBJECTIVE:  Vitals:    07/21/24 1955 07/22/24 0443 07/22/24 0444 07/22/24 0741   BP: 110/76 132/87  146/83   BP Location: Left arm Left arm  Left arm   Patient Position: Lying Lying  Lying   Pulse: 61 58  (!) 43   Resp: 16 16  16   Temp: 98.1 °F (36.7 °C) 97.5 °F (36.4 °C)  98.1 °F (36.7 °C)   TempSrc: Oral Oral  Oral   SpO2: 96% 92%  97%   Weight:   93.3 kg (205 lb 11 oz)    Height:             PHYSICAL EXAMINATION:  Physical Exam  Constitutional:       Appearance: Normal appearance. He is normal weight.   HENT:      Head: Normocephalic and atraumatic.      Nose: Nose normal.   Eyes:      Conjunctiva/sclera: Conjunctivae normal.   Cardiovascular:      Rate and Rhythm: Normal rate and regular rhythm.      Heart sounds: Normal heart sounds.   Pulmonary:      Effort: Pulmonary effort is normal.      Breath sounds: Normal breath sounds.   Abdominal:      General: Abdomen is flat. Bowel sounds are normal.      Palpations: Abdomen is soft.   Skin:     Findings: No bruising or rash.      Comments: Scar from craniotomy in place   Neurological:      General: No focal deficit present.   Psychiatric:         Thought Content: Thought content normal.       I have reexamined the patient and the results are consistent with the previously documented exam. Krista Fernandez MD PhD       DIAGNOSTIC DATA:  Results Review:     I reviewed the  patient's new clinical results.    Results from last 7 days   Lab Units 07/22/24  0657 07/21/24  0621 07/20/24  0603   WBC 10*3/mm3 4.95 3.93 6.08   HEMOGLOBIN g/dL 14.4 14.2 14.2   HEMATOCRIT % 40.9 41.3 42.0   PLATELETS 10*3/mm3 90* 96* 98*      Results from last 7 days   Lab Units 07/22/24  0657 07/21/24  0621 07/20/24  0603   SODIUM mmol/L 139 141 138   POTASSIUM mmol/L 3.0* 3.3* 3.3*   CHLORIDE mmol/L 102 103 103   CO2 mmol/L 27.5 26.2 28.6   BUN mg/dL 17 19 19   CREATININE mg/dL 0.72* 0.72* 0.75*   CALCIUM mg/dL 8.4* 8.3* 8.4*   BILIRUBIN mg/dL 1.7* 1.5* 1.5*   ALK PHOS U/L 91 90 100   ALT (SGPT) U/L 1,022* 1,135* 1,166*   AST (SGOT) U/L 280* 467* 581*   GLUCOSE mg/dL 83 99 138*      Lab Results   Component Value Date    NEUTROABS 3.56 07/22/2024             Assessment & Plan   ASSESSMENT/PLAN:  This is a 58 y.o. male with:     *  Primary CNS diffuse large B-cell lymphoma.    Patient had a craniotomy on 6/19/2024.  Pathology evaluation reported diffuse large B-cell lymphoma, Ki-67 at the 70%.  Further pathology evaluation is triple negative for Bcl-2, BCL6 and MYC.   I discussed with the patient today on 7/9/2024, recommending to have a PET scan examination for further evaluation.  Also discussed with the patient for high-dose methotrexate per NCCN guideline.   Will also obtain CSF on day of cycle 1 day 1 of chemotherapy for flow cytometry study, and also give intra thecal methotrexate. The frequency of chemotherapy will be adjusted no later than every 3 weeks, contingent on his recovery and tolerance. If his recovery is satisfactory, the frequency may be reduced to less than every 3 weeks. Maintenance therapy will be initiated for high-dose methotrexate monthly. A referral will be made for stem cell transplant service at the [facility] in [facility].  Patient could not have PET scan examination on 7/12/2024 due to significant elevated glucose level.  7/15/2024 cycle 1 day 1 chemotherapy  with high-dose methotrexate.  This includes a lumbar puncture with cerebrospinal fluid analysis and intrathecal chemotherapy with methotrexate. Depending on the results, a decision regarding methotrexate administration will be determined. Additional oral tablets of leucovorin will be administered during the hospital stay to mitigate the side effects of the methotrexate.  7/16/2024: Patient tolerated IT cycle 1 high-dose methotrexate  quite well.  Had lumbar puncture yesterday.  CSF cytology pending.  CSF glucose 201, CSF protein 107, CSF nucleated cells-2, RBC 46.  Methotrexate level 0.11  7/17/2024: Tolerated IV methotrexate quite well.  No acute overnight events.  T. bili increased to 2.4.  Methotrexate level 0.8.  7/18/2024:  Cycle 1 day 3 Rituxan  7/20/2024: Leucovorin was increased to 50 mg q4h, Mtx level still pending  07/21/2024: Methotrexate level <0.05.   07/22/2024 patient will be continued given leucovorin 100 mg every 4 hours.  CSF flow cytometry study with 2 atypical B cells suspicious for lymphoma involvement.    *  Hyperglycemia secondary to steroids.  The patient's highest recorded glucose level was 290 yesterday morning on 7/8/2024, and his daily glucose levels range from 120 to 150.   Patient was not diabetic before diagnosis of CNS lymphoma.  Clearly his hyperglycemia is caused by steroids.  He currently is on dexamethasone 6 mg twice a day.  Today on 7/9/2024 I recommended to decrease dexamethasone to 4 mg twice a day.  He will continue insulin prescribed by his primary care physician.  7/12/2024 glucose 355, PET scan was not able to be carried out.  7/15/2024 elevated glucose 316.  Patient will be admitted to hospital for scheduled high-dose methotrexate.    7/16/2024 glucose 314.    7/17/2024 glucose 116.   7/18/2024: Glucose 157   7/20/2024 glucose 124.    07/21/2024 glucose: 99 . appreciate hospitalist management of hyperglycemia  7/22/2024 glucose 83.  Peak glucose 241 the previous day.   Continue management of diabetes.    *Transaminitis, acute on chronic  *Hyperbilirubinemia  *Hep C  -7/16/2024 AST 89, , T. bili 1, alk phos 112  -7/17/2024 AST mildly increased to 106, , T. bili increased to 2.4, likely secondary to Mtx.  Started on Actigall for hyperbilirubinemia  - 7/18/2024: T. bili 1.2, AST increased to 206, .  -7/20/2024: T. bili further increased to 1.5, , ALT 1166  -7/21/2024 T. bili stable at 1.5, AST mildly decreased to 467, ALT mildly decreased to 1135.  Liver ultrasound (for evaluation of transaminitis) completed on 7/20/2024-unremarkable  Transaminitis/hyperbilirubinemia-likely worsened secondary to chemo  On Actigall for hyperbilirubinemia.  7/22/2024 improving liver function panel, ALT 1022, , total bilirubin 1.7, alk phosphatase 91.    *Thrombocytopenia, secondary to chemotherapy  -7/15/2024 platelets 138  - 7/16/2024 platelets 117  -7/17/2024 platelets 125.    -7/18/2024: Platelets 115 .   -7/20/2024: Platelets 98,000.   -7/21/2024: Platelets 96,000.    -7/22/2024 platelets 90,000.  No active bleeding. Continue to monitor       *.  Hypokalemia.  Worsening potassium 3.0 on 7/22/2024.  Patient will be given 40 mEq potassium chloride daily.  Will check magnesium level.    PLAN:  Cycle 2 day 1 Mtx/Rituxan planned for 07/30/2024  Continue leucovorin IV every 4 hours.    Appreciate hospitalist management of steroid-induced hyperglycemia   Continue dexamethasone 4 mg twice a day.  However will decrease to 2 mg twice a day 7/23/2024.  Continue Pepcid for GI prophylaxis.  Continue Keppra 500 mg twice a day for prophylaxis of seizure.  .  Bactrim currently on hold, plan to resume on 7/23/2024.    Started Actigall on 7/17/2024 for elevated bilirubin.  Dose decreased to daily on 7/18/2024 due to normalization of bilirubin  Physical therapy.  Monitor CBC CMP in the morning.  If further improving CMP, patient could be discharged tomorrow.  Check magnesium level  in the morning.  Patient has appointment with CBC and CMP twice a week as outpatient  Patient has appointment with me for reevaluation as outpatient on 07/30/2024    Discussed with the patient and further management plan.  I discussed with the pharmacist.     Communicated with A Dr. Arizmendi.  Discussed with nursing staff.           Krista Fernandez MD PhD

## 2024-07-23 ENCOUNTER — READMISSION MANAGEMENT (OUTPATIENT)
Dept: CALL CENTER | Facility: HOSPITAL | Age: 59
End: 2024-07-23
Payer: COMMERCIAL

## 2024-07-23 VITALS
HEIGHT: 70 IN | WEIGHT: 208.56 LBS | HEART RATE: 66 BPM | SYSTOLIC BLOOD PRESSURE: 107 MMHG | TEMPERATURE: 97.5 F | BODY MASS INDEX: 29.86 KG/M2 | OXYGEN SATURATION: 97 % | RESPIRATION RATE: 18 BRPM | DIASTOLIC BLOOD PRESSURE: 69 MMHG

## 2024-07-23 DIAGNOSIS — C85.89 PRIMARY CNS LYMPHOMA: ICD-10-CM

## 2024-07-23 DIAGNOSIS — C85.10 LARGE B-CELL LYMPHOMA: Primary | ICD-10-CM

## 2024-07-23 DIAGNOSIS — C79.31 METASTASIS TO BRAIN: ICD-10-CM

## 2024-07-23 LAB
ALBUMIN SERPL-MCNC: 2.8 G/DL (ref 3.5–5.2)
ALBUMIN/GLOB SERPL: 1.6 G/DL
ALP SERPL-CCNC: 98 U/L (ref 39–117)
ALT SERPL W P-5'-P-CCNC: 698 U/L (ref 1–41)
ANION GAP SERPL CALCULATED.3IONS-SCNC: 5 MMOL/L (ref 5–15)
AST SERPL-CCNC: 133 U/L (ref 1–40)
BASOPHILS # BLD AUTO: 0 10*3/MM3 (ref 0–0.2)
BASOPHILS NFR BLD AUTO: 0 % (ref 0–1.5)
BILIRUB SERPL-MCNC: 1.1 MG/DL (ref 0–1.2)
BUN SERPL-MCNC: 16 MG/DL (ref 8–23)
BUN/CREAT SERPL: 25.8 (ref 7–25)
CALCIUM SPEC-SCNC: 8 MG/DL (ref 8.6–10.5)
CHLORIDE SERPL-SCNC: 102 MMOL/L (ref 98–107)
CO2 SERPL-SCNC: 29 MMOL/L (ref 22–29)
CREAT SERPL-MCNC: 0.62 MG/DL (ref 0.76–1.27)
DEPRECATED RDW RBC AUTO: 39.6 FL (ref 37–54)
EGFRCR SERPLBLD CKD-EPI 2021: 109.4 ML/MIN/1.73
EOSINOPHIL # BLD AUTO: 0.03 10*3/MM3 (ref 0–0.4)
EOSINOPHIL NFR BLD AUTO: 0.5 % (ref 0.3–6.2)
ERYTHROCYTE [DISTWIDTH] IN BLOOD BY AUTOMATED COUNT: 13 % (ref 12.3–15.4)
GLOBULIN UR ELPH-MCNC: 1.8 GM/DL
GLUCOSE BLDC GLUCOMTR-MCNC: 125 MG/DL (ref 70–130)
GLUCOSE BLDC GLUCOMTR-MCNC: 245 MG/DL (ref 70–130)
GLUCOSE SERPL-MCNC: 135 MG/DL (ref 65–99)
HCT VFR BLD AUTO: 38.5 % (ref 37.5–51)
HGB BLD-MCNC: 13.5 G/DL (ref 13–17.7)
IMM GRANULOCYTES # BLD AUTO: 0.09 10*3/MM3 (ref 0–0.05)
IMM GRANULOCYTES NFR BLD AUTO: 1.6 % (ref 0–0.5)
LYMPHOCYTES # BLD AUTO: 0.83 10*3/MM3 (ref 0.7–3.1)
LYMPHOCYTES NFR BLD AUTO: 14.8 % (ref 19.6–45.3)
MAGNESIUM SERPL-MCNC: 1.8 MG/DL (ref 1.6–2.4)
MCH RBC QN AUTO: 30.4 PG (ref 26.6–33)
MCHC RBC AUTO-ENTMCNC: 35.1 G/DL (ref 31.5–35.7)
MCV RBC AUTO: 86.7 FL (ref 79–97)
MONOCYTES # BLD AUTO: 0.3 10*3/MM3 (ref 0.1–0.9)
MONOCYTES NFR BLD AUTO: 5.3 % (ref 5–12)
NEUTROPHILS NFR BLD AUTO: 4.36 10*3/MM3 (ref 1.7–7)
NEUTROPHILS NFR BLD AUTO: 77.8 % (ref 42.7–76)
PH UR STRIP.AUTO: >=9 [PH] (ref 5–8)
PLATELET # BLD AUTO: 78 10*3/MM3 (ref 140–450)
PMV BLD AUTO: 10.3 FL (ref 6–12)
POTASSIUM SERPL-SCNC: 3.2 MMOL/L (ref 3.5–5.2)
PROT SERPL-MCNC: 4.6 G/DL (ref 6–8.5)
RBC # BLD AUTO: 4.44 10*6/MM3 (ref 4.14–5.8)
SODIUM SERPL-SCNC: 136 MMOL/L (ref 136–145)
WBC NRBC COR # BLD AUTO: 5.61 10*3/MM3 (ref 3.4–10.8)

## 2024-07-23 PROCEDURE — 25810000003 SODIUM CHLORIDE 0.9 % SOLUTION: Performed by: INTERNAL MEDICINE

## 2024-07-23 PROCEDURE — 85025 COMPLETE CBC W/AUTO DIFF WBC: CPT | Performed by: INTERNAL MEDICINE

## 2024-07-23 PROCEDURE — 63710000001 INSULIN LISPRO (HUMAN) PER 5 UNITS: Performed by: INTERNAL MEDICINE

## 2024-07-23 PROCEDURE — 82948 REAGENT STRIP/BLOOD GLUCOSE: CPT

## 2024-07-23 PROCEDURE — 83735 ASSAY OF MAGNESIUM: CPT | Performed by: INTERNAL MEDICINE

## 2024-07-23 PROCEDURE — 25010000002 MAGNESIUM SULFATE 2 GM/50ML SOLUTION: Performed by: STUDENT IN AN ORGANIZED HEALTH CARE EDUCATION/TRAINING PROGRAM

## 2024-07-23 PROCEDURE — 25010000002 LEUCOVORIN CALCIUM PER 50 MG: Performed by: INTERNAL MEDICINE

## 2024-07-23 PROCEDURE — 97530 THERAPEUTIC ACTIVITIES: CPT

## 2024-07-23 PROCEDURE — 63710000001 INSULIN GLARGINE PER 5 UNITS: Performed by: INTERNAL MEDICINE

## 2024-07-23 PROCEDURE — 80053 COMPREHEN METABOLIC PANEL: CPT | Performed by: INTERNAL MEDICINE

## 2024-07-23 PROCEDURE — 25010000002 HEPARIN LOCK FLUSH PER 10 UNITS: Performed by: INTERNAL MEDICINE

## 2024-07-23 PROCEDURE — 81003 URINALYSIS AUTO W/O SCOPE: CPT | Performed by: INTERNAL MEDICINE

## 2024-07-23 RX ORDER — URSODIOL 300 MG/1
300 CAPSULE ORAL 2 TIMES DAILY
Qty: 60 CAPSULE | Refills: 0 | Status: SHIPPED | OUTPATIENT
Start: 2024-07-23 | End: 2024-07-23

## 2024-07-23 RX ORDER — HYDROCODONE BITARTRATE AND ACETAMINOPHEN 5; 325 MG/1; MG/1
1 TABLET ORAL EVERY 4 HOURS PRN
Qty: 18 TABLET | Refills: 0 | Status: ON HOLD | OUTPATIENT
Start: 2024-07-23

## 2024-07-23 RX ORDER — DEXAMETHASONE 2 MG/1
2 TABLET ORAL 2 TIMES DAILY WITH MEALS
Qty: 60 TABLET | Refills: 1 | Status: SHIPPED | OUTPATIENT
Start: 2024-07-23 | End: 2024-07-23

## 2024-07-23 RX ORDER — POTASSIUM CHLORIDE 750 MG/1
40 TABLET, FILM COATED, EXTENDED RELEASE ORAL EVERY 4 HOURS
Status: COMPLETED | OUTPATIENT
Start: 2024-07-23 | End: 2024-07-23

## 2024-07-23 RX ORDER — URSODIOL 300 MG/1
300 CAPSULE ORAL 2 TIMES DAILY
Qty: 60 CAPSULE | Refills: 0 | Status: ON HOLD | OUTPATIENT
Start: 2024-07-23

## 2024-07-23 RX ORDER — SODIUM CHLORIDE 9 MG/ML
75 INJECTION, SOLUTION INTRAVENOUS CONTINUOUS
Status: DISCONTINUED | OUTPATIENT
Start: 2024-07-23 | End: 2024-07-23 | Stop reason: HOSPADM

## 2024-07-23 RX ORDER — MAGNESIUM SULFATE HEPTAHYDRATE 40 MG/ML
2 INJECTION, SOLUTION INTRAVENOUS ONCE
Status: COMPLETED | OUTPATIENT
Start: 2024-07-23 | End: 2024-07-23

## 2024-07-23 RX ORDER — DEXAMETHASONE 2 MG/1
2 TABLET ORAL 2 TIMES DAILY WITH MEALS
Qty: 60 TABLET | Refills: 1 | Status: ON HOLD | OUTPATIENT
Start: 2024-07-23

## 2024-07-23 RX ADMIN — FAMOTIDINE 20 MG: 20 TABLET, FILM COATED ORAL at 07:19

## 2024-07-23 RX ADMIN — LEUCOVORIN CALCIUM 100 MG: 100 INJECTION, POWDER, LYOPHILIZED, FOR SUSPENSION INTRAMUSCULAR; INTRAVENOUS at 04:03

## 2024-07-23 RX ADMIN — POTASSIUM CHLORIDE 40 MEQ: 750 TABLET, EXTENDED RELEASE ORAL at 08:16

## 2024-07-23 RX ADMIN — LEUCOVORIN CALCIUM 100 MG: 100 INJECTION, POWDER, LYOPHILIZED, FOR SUSPENSION INTRAMUSCULAR; INTRAVENOUS at 08:03

## 2024-07-23 RX ADMIN — LEVETIRACETAM 500 MG: 500 TABLET, FILM COATED ORAL at 08:15

## 2024-07-23 RX ADMIN — HEPARIN 500 UNITS: 100 SYRINGE at 13:31

## 2024-07-23 RX ADMIN — MAGNESIUM SULFATE HEPTAHYDRATE 2 G: 2 INJECTION, SOLUTION INTRAVENOUS at 08:17

## 2024-07-23 RX ADMIN — INSULIN LISPRO 8 UNITS: 100 INJECTION, SOLUTION INTRAVENOUS; SUBCUTANEOUS at 12:17

## 2024-07-23 RX ADMIN — HYDROCODONE BITARTRATE AND ACETAMINOPHEN 1 TABLET: 5; 325 TABLET ORAL at 04:58

## 2024-07-23 RX ADMIN — INSULIN GLARGINE 25 UNITS: 100 INJECTION, SOLUTION SUBCUTANEOUS at 08:17

## 2024-07-23 RX ADMIN — DEXAMETHASONE 2 MG: 2 TABLET ORAL at 08:16

## 2024-07-23 RX ADMIN — URSODIOL 300 MG: 300 CAPSULE ORAL at 08:15

## 2024-07-23 RX ADMIN — LOSARTAN POTASSIUM 50 MG: 50 TABLET, FILM COATED ORAL at 08:15

## 2024-07-23 RX ADMIN — INSULIN LISPRO 5 UNITS: 100 INJECTION, SOLUTION INTRAVENOUS; SUBCUTANEOUS at 12:17

## 2024-07-23 RX ADMIN — POTASSIUM CHLORIDE 40 MEQ: 750 TABLET, EXTENDED RELEASE ORAL at 12:16

## 2024-07-23 RX ADMIN — SODIUM CHLORIDE 75 ML/HR: 9 INJECTION, SOLUTION INTRAVENOUS at 08:16

## 2024-07-23 RX ADMIN — Medication 10 ML: at 08:07

## 2024-07-23 RX ADMIN — LEUCOVORIN CALCIUM 100 MG: 100 INJECTION, POWDER, LYOPHILIZED, FOR SUSPENSION INTRAMUSCULAR; INTRAVENOUS at 00:08

## 2024-07-23 RX ADMIN — HYDROCHLOROTHIAZIDE 12.5 MG: 12.5 TABLET ORAL at 08:15

## 2024-07-23 NOTE — OUTREACH NOTE
Prep Survey      Flowsheet Row Responses   [facility] patient discharged from? [facility]   Is LACE score < 7 ? No   Eligibility [facility]   Date of Admission 07/15/24   Date of Discharge 07/23/24   Discharge Disposition Home or Self Care   Discharge diagnosis Primary CNS lymphoma   Does the patient have one of the following disease processes/diagnoses(primary or secondary)? Other   Does the patient have Home health ordered? No   Is there a DME ordered? Yes   What DME was ordered? [facility]   Medication alerts for this patient see AVS   Prep survey completed? Yes            Mahi LOWERY - Registered Nurse

## 2024-07-23 NOTE — DISCHARGE PLACEMENT REQUEST
"Harmeet Christina (58 y.o. Male)       Date of Birth   1965    Social Security Number       Address   [address] [place] [address] [place] [address]    Home Phone   [phone]    MRN   [id number]       Amish   None    Marital Status   Unknown                            Admission Date   7/15/24    Admission Type   Urgent    Admitting Provider   Krista Fernandez MD PhD    Attending Provider   Livier Arizmendi MD    Department, Room/Bed   [address], 97/1       Discharge Date       Discharge Disposition   Home or Self Care    Discharge Destination                                 Attending Provider: Livier Arizmendi MD    Allergies: Cashew Nut, Peanut (Diagnostic), Peanut-containing Drug Products, Bee Pollen    Isolation: None   Infection: None   Code Status: CPR    Ht: 177.8 cm (70\")   Wt: 94.6 kg (208 lb 8.9 oz)    Admission Cmt: None   Principal Problem: Primary CNS lymphoma [C85.89]                   Active Insurance as of 7/15/2024       Primary Coverage       Payor Plan Insurance Group Employer/Plan Group    MISC SHARING PLAN MISC SHARING PLAN [id number]       Coverage Address Coverage Phone Number Coverage Fax Number Effective Dates    [address] [place] [address] [phone]  1/1/2018 - None Entered    [facility] [id number]         Subscriber Name Subscriber Birth Date Member ID       HARMEET CHRISTINA [id number]                     Emergency Contacts        (Rel.) Home Phone Work Phone Mobile Phone    heavenly barrios [phone] -- [phone]    Mariel Paez (Relative) -- -- [phone]                "

## 2024-07-23 NOTE — PROGRESS NOTES
[facility] GROUP INPATIENT PROGRESS NOTE    Length of Stay:  8 days    CHIEF COMPLAINT/REASON FOR VISIT:  Primary B-cell lymphoma    SUBJECTIVE:   7/21/2024  No acute overnight events.  Endorses to being fatigued.  No nausea vomiting diarrhea.  Reports he did not get out of bed even to chair yesterday.  No other concerns or complaints    7/22/2024 patient is afebrile.  Denies nausea or vomiting.  Continues to have significant fatigue.  Liver enzymes improved slightly however still significant elevated.    7/23/2024  Patient is afebrile, no nausea or vomiting.  Further significantly improved liver function panel.    ROS:  All systems reviewed and found to be negative except for that noted in HPI    OBJECTIVE:  Vitals:    07/22/24 1700 07/22/24 2025 07/23/24 0415 07/23/24 0800   BP: 126/86 148/91 147/92 168/92   BP Location: Left arm Left arm Left arm Left arm   Patient Position: Lying Lying Lying Lying   Pulse: 69 56 (!) 46 54   Resp:  16 16 18   Temp: 97.9 °F (36.6 °C) 97.2 °F (36.2 °C) 97 °F (36.1 °C) 97.5 °F (36.4 °C)   TempSrc: Oral Oral Oral Oral   SpO2:  95% 95% 97%   Weight:   94.6 kg (208 lb 8.9 oz)    Height:             PHYSICAL EXAMINATION:  Physical Exam  Constitutional:       Appearance: Normal appearance. He is normal weight.   HENT:      Head: Normocephalic.   Eyes:      Conjunctiva/sclera: Conjunctivae normal.   Cardiovascular:      Rate and Rhythm: Normal rate and regular rhythm.      Heart sounds: Normal heart sounds.   Pulmonary:      Effort: Pulmonary effort is normal.      Breath sounds: Normal breath sounds.   Abdominal:      General: Abdomen is flat. Bowel sounds are normal.      Palpations: Abdomen is soft.      Tenderness: There is no abdominal tenderness.   Skin:     Findings: No bruising or rash.      Comments: Scar from craniotomy in place   Neurological:      General: No focal deficit present.   Psychiatric:         Thought Content: Thought content normal.       I have reexamined  the patient and the results are consistent with the previously documented exam. Krista Fernandez MD PhD       DIAGNOSTIC DATA:  Results Review:     I reviewed the patient's new clinical results.    Results from last 7 days   Lab Units 07/23/24  0616 07/22/24  0657 07/21/24  0621   WBC 10*3/mm3 5.61 4.95 3.93   HEMOGLOBIN g/dL 13.5 14.4 14.2   HEMATOCRIT % 38.5 40.9 41.3   PLATELETS 10*3/mm3 78* 90* 96*      Results from last 7 days   Lab Units 07/23/24  0616 07/22/24  0657 07/21/24  0621   SODIUM mmol/L 136 139 141   POTASSIUM mmol/L 3.2* 3.0* 3.3*   CHLORIDE mmol/L 102 102 103   CO2 mmol/L 29.0 27.5 26.2   BUN mg/dL 16 17 19   CREATININE mg/dL 0.62* 0.72* 0.72*   CALCIUM mg/dL 8.0* 8.4* 8.3*   BILIRUBIN mg/dL 1.1 1.7* 1.5*   ALK PHOS U/L 98 91 90   ALT (SGPT) U/L 698* 1,022* 1,135*   AST (SGOT) U/L 133* 280* 467*   GLUCOSE mg/dL 135* 83 99      Lab Results   Component Value Date    NEUTROABS 4.36 07/23/2024             Assessment & Plan   ASSESSMENT/PLAN:  This is a 58 y.o. male with:     *  Primary CNS diffuse large B-cell lymphoma.    Patient had a craniotomy on 6/19/2024.  Pathology evaluation reported diffuse large B-cell lymphoma, Ki-67 at the 70%.  Further pathology evaluation is triple negative for Bcl-2, BCL6 and MYC.   I discussed with the patient today on 7/9/2024, recommending to have a PET scan examination for further evaluation.  Also discussed with the patient for high-dose methotrexate per NCCN guideline.   Will also obtain CSF on day of cycle 1 day 1 of chemotherapy for flow cytometry study, and also give intra thecal methotrexate. The frequency of chemotherapy will be adjusted no later than every 3 weeks, contingent on his recovery and tolerance. If his recovery is satisfactory, the frequency may be reduced to less than every 3 weeks. Maintenance therapy will be initiated for high-dose methotrexate monthly. A referral will be made for stem cell transplant service at the [facility] in  [facility].  Patient could not have PET scan examination on 7/12/2024 due to significant elevated glucose level.  7/15/2024 cycle 1 day 1 chemotherapy with high-dose methotrexate.  This includes a lumbar puncture with cerebrospinal fluid analysis and intrathecal chemotherapy with methotrexate. Depending on the results, a decision regarding methotrexate administration will be determined. Additional oral tablets of leucovorin will be administered during the hospital stay to mitigate the side effects of the methotrexate.  7/16/2024: Patient tolerated IT cycle 1 high-dose methotrexate  quite well.  Had lumbar puncture yesterday.  CSF cytology pending.  CSF glucose 201, CSF protein 107, CSF nucleated cells-2, RBC 46.  Methotrexate level 0.11  7/17/2024: Tolerated IV methotrexate quite well.  No acute overnight events.  T. bili increased to 2.4.  Methotrexate level 0.8.  7/18/2024:  Cycle 1 day 3 Rituxan  7/20/2024: Leucovorin was increased to 50 mg q4h, Mtx level still pending  07/21/2024: Methotrexate level <0.05.   07/22/2024 patient will be continued given leucovorin 100 mg every 4 hours.  CSF flow cytometry study with 2 atypical B cells suspicious for lymphoma involvement.   7/23/2024 patient will be discharged.    *  Hyperglycemia secondary to steroids.  The patient's highest recorded glucose level was 290 yesterday morning on 7/8/2024, and his daily glucose levels range from 120 to 150.   Patient was not diabetic before diagnosis of CNS lymphoma.  Clearly his hyperglycemia is caused by steroids.  He currently is on dexamethasone 6 mg twice a day.  Today on 7/9/2024 I recommended to decrease dexamethasone to 4 mg twice a day.  He will continue insulin prescribed by his primary care physician.  7/12/2024 glucose 355, PET scan was not able to be carried out.  7/15/2024 elevated glucose 316.  Patient will be admitted to hospital for scheduled high-dose methotrexate.    7/16/2024 glucose 314.    7/17/2024  glucose 116.   7/18/2024: Glucose 157   7/20/2024 glucose 124.    07/21/2024 glucose: 99 . appreciate hospitalist management of hyperglycemia  7/22/2024 glucose 83.  Peak glucose 241 the previous day.  Continue management of diabetes.  7/23/2024 glucose 135.  Patient had glucose peak at 264 last night.  Continue management of diabetes per medicine team.    *Transaminitis, acute on chronic  *Hyperbilirubinemia  *Hep C  -7/16/2024 AST 89, , T. bili 1, alk phos 112  -7/17/2024 AST mildly increased to 106, , T. bili increased to 2.4, likely secondary to Mtx.  Started on Actigall for hyperbilirubinemia  - 7/18/2024: T. bili 1.2, AST increased to 206, .  -7/20/2024: T. bili further increased to 1.5, , ALT 1166  -7/21/2024 T. bili stable at 1.5, AST mildly decreased to 467, ALT mildly decreased to 1135.  Liver ultrasound (for evaluation of transaminitis) completed on 7/20/2024-unremarkable  Transaminitis/hyperbilirubinemia-likely worsened secondary to chemo  On Actigall for hyperbilirubinemia.  7/22/2024 improving liver function panel, ALT 1022, , total bilirubin 1.7, alk phosphatase 91.  7/23/2024 further improved liver function panel, , , normal total bilirubin 1.1, alk phos 98.    *Thrombocytopenia, secondary to chemotherapy  -7/15/2024 platelets 138  - 7/16/2024 platelets 117  -7/17/2024 platelets 125.    -7/18/2024: Platelets 115 .   -7/20/2024: Platelets 98,000.   -7/21/2024: Platelets 96,000.    -7/22/2024 platelets 90,000.   -7/23/2024 platelets 78,000.  No active bleeding. Continue to monitor       *.  Hypokalemia.  Worsening potassium 3.0 on 7/22/2024.  Patient will be given 40 mEq potassium chloride daily.  Will check magnesium level.  7/23/2024 potassium 3.2.  Magnesium 1.8.  Continue oral supplement.  Also has patient to drink plenty of orange juice, eating banana and potatoes to supplement potassium.      PLAN:  Decrease dexamethasone to 2 mg twice a day  starting today 7/23/2024.  Continue Pepcid for GI prophylaxis.  Continue Keppra 500 mg twice a day for prophylaxis of s questions eizure.  .  Bactrim to be resume on 7/23/2024 as outpatient.    Started Actigall on 7/17/2024 for elevated bilirubin.  Dose decreased to daily on 7/18/2024 due to normalization of bilirubin.  This will not be continued as outpatient.  Patient has appointment with CBC and CMP twice a week as outpatient  Because of his severe transaminitis from high-dose methotrexate, we will delay his cycle 2 chemotherapy by about 1 week.  I will change his appointment scheduled with me, I will see him on 8/2/2024 for reassessment, and admitting to hospital for IV hydration, and then start chemotherapy on 8/4/2024 with cycle 2 high-dose methotrexate.      Discussed with the patient and further management plan.  I discussed with the pharmacist.     Discussed with oncology pharmacist.  Discussed with nursing staff.    More than 35 min were used for patient care         Krista Fernandez MD PhD

## 2024-07-23 NOTE — DISCHARGE SUMMARY
Patient Name: Iain Christina  : 1965  MRN: [id number]    Date of Admission: 7/15/2024  Date of Discharge:  2024  Primary Care Physician: Scott Medina MD      Chief Complaint:   No chief complaint on file.      Discharge Diagnoses     Active Hospital Problems    Diagnosis  POA    **Primary CNS lymphoma [C85.89]  Yes    Hypokalemia [E87.6]  Unknown    Transaminitis [R74.01]  Unknown    Thrombocytopenia [D69.6]  Unknown    Steroid-induced hyperglycemia [R73.9, T38.0X5A]  Yes    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        Hospital Course     Mr. Christina is a 58 y.o. male with a history of primary CNS diffuse large B-cell lymphoma who presented to [facility] initially for chemotherapy.  Please see the admitting history and physical for further details.  He wasadmitted to the hospital for further evaluation and treatment.      Hematology/oncology was consulted on admission.  Patient was being admitted for cycle 1 of high-dose methotrexate plus Rituxan.  At initiation of chemotherapy, patient did have a lumbar puncture with CSF studies and intrathecal methotrexate was given.  Patient was given leucovorin with the methotrexate.  CSF flow cytometry did ultimately returned positive for 2 atypical B cells suspicious for lymphoma involvement.  Hospital course was complicated by transaminitis and hyperbilirubinemia thought to be secondary to chemotherapy.  LFTs steadily increased.  The patient was ultimately started on Actigall for hyperbilirubinemia.  Ultrasound of the liver was obtained and fairly unremarkable.  LFTs ultimately began trending downward.  Oncology felt that the elevation could be explained by the  chemotherapy, so they were okay with discharge home.  They will follow these labs closely in the outpatient setting.  Patient also had some hyperglycemia secondary to steroids and insulin was adjusted as needed.  He will be discharged on a reduced dose of Decadron at 2 mg twice daily.  This was sent to the pharmacy prior to discharge home.  Patient also had thrombocytopenia secondary to chemotherapy.  Platelets decreased to 78 K on day of discharge.  No signs of active bleeding.  Once again, oncology will follow-up labs in clinic.  Overall, patient had good clinical improvement.  He has appointments for twice weekly CBC and CMP as an outpatient.  In addition, he has a follow-up appointment with Dr. Fernandez on 7/30.  He was counseled on reasons to return to the hospital and conveyed understanding.  Patient ultimately discharged home in satisfactory condition.    Day of Discharge     Subjective:  No acute events overnight.  Patient states that he had a bit of a headache this morning that improved with pain medication.  No chest pain or shortness of breath.  Overall feeling well.  Would like to be discharged home today.    Physical Exam:  Temp:  [97 °F (36.1 °C)-98.1 °F (36.7 °C)] 97 °F (36.1 °C)  Heart Rate:  [46-69] 46  Resp:  [16] 16  BP: (126-148)/(84-92) 147/92  Body mass index is 29.92 kg/m².  Physical Exam  General: Alert, no acute distress.  Lying in bed.  Answers questions appropriately.  ENT: No conjunctival injection or scleral icterus. Moist mucous membranes.   Neuro: Eyes open and moving in all directions, strength normal in all extremities, no focal deficits.   Lungs: Clear to auscultation bilaterally. No wheeze or crackles. No distress.   Heart: RRR, no murmurs. No edema.  Abdomen: Soft, non-tender, non-distended. Normal bowel sounds.   Ext: Warm and well-perfused. No edema.   Skin: No rashes or lesions. IV site without swelling or erythema.     Consultants     Consult Orders (all) (From admission, onward)        Start     Ordered    07/15/24 1605  Hematology & Oncology Inpatient Consult  Once        Specialty:  Hematology and Oncology  Provider:  Krista Fernandez MD PhD    07/15/24 1605    07/15/24 1052  Inpatient Hospitalist Consult  Once        Specialty:  Hospitalist  Provider:  German Warren MD    07/15/24 1055    07/15/24 1034  Inpatient Hospitalist Consult  Once,   Status:  Canceled        Specialty:  Hospitalist  Provider:  Samuel Farley MD    07/15/24 1035    07/15/24 1034  Inpatient Diabetes Educator Consult  Once        Comments: Pt recently diagnosed, on steroids with CNS lymphoma increasing sugar and having hard time remembering to take. Needs reinforcement of education. Brother in law available - wants to be present for education.   Provider:  (Not yet assigned)    07/15/24 1035                  Procedures     * Surgery not found *    Imaging Results (All)       Procedure Component Value Units Date/Time    US Liver [[id number]] Collected: 07/20/24 1046     Updated: 07/20/24 1053    Narrative:      US LIVER-     INDICATION: Worsening transaminitis     COMPARISON: CT abdomen pelvis June 16, 2024     TECHNIQUE: Right upper quadrant ultrasound with grayscale and color  Doppler evaluation and spectral Doppler evaluation of the main portal  vein.     FINDINGS:      Pancreas: Visualized pancreas is unremarkable. Pancreatic head and tail  are obscured by bowel gas artifact.     Liver: Normal echotexture and echogenicity. Liver measures 16.6 cm. No  mass identified. Main portal vein is patent with normal blood flow  direction and waveform.     Biliary system: No biliary ductal dilatation. Common bile duct measures  3 mm.     Gallbladder: No shadowing gallstones. No wall thickening. Negative  reported sonographic Dejesus sign.     Right kidney: Measures 12.2 x 6.9 x 6.8 cm. No renal mass. No  hydronephrosis.       Impression:      Normal right upper quadrant ultrasound     This report was finalized on 7/20/2024  10:50 AM by Dr. Chance Dupont M.D on Workstation: [id number]       FL Chemo Admin Cns Requir / Incl LP (Rad) [[id number]] Collected: 07/15/24 1626     Updated: 07/15/24 1629    Narrative:      FLUOROSCOPIC GUIDED LUMBAR PUNCTURE     HISTORY: Methotrexate admin; diagnostic CNS lymphoma; C85.89-Other  specified types of non-hodgkin lymphoma, extranodal and solid organ  sites       TECHNIQUE:  Informed consent obtained. A pre-procedure timeout was performed  confirming correct patient and procedure.     The patient was placed on the fluoroscopy table, and the skin overlying  the lumbar spine was prepped and draped in the usual sterile fashion  with 2% chlorhexidine. 1% lidocaine was utilized for local anesthesia.     Next, a 12-gauge spinal needle was then advanced into the CSF space  under fluoroscopic guidance at the L3 level. Approximately 10 mL of  clear CSF was then obtained and submitted to the laboratory for  evaluation. Next, intrathecal methotrexate, as prescribed by the  ordering provider, was administered slowly through the needle into the  thecal sac. Needle was removed and a sterile dressing was applied.     The patient tolerated the procedure well without immediate complication.       Impression:      Technically successful fluoroscopically guided lumbar  puncture.     Reference air kerma: 19.9 mGy     This report was finalized on 7/15/2024 4:26 PM by Dr. Yves Kinney M.D on Workstation: [id number]               Results for orders placed during the hospital encounter of 06/15/24    Adult Transthoracic Echo Complete w/ Color, Spectral and Contrast if Necessary Per Protocol    Interpretation Summary    Left ventricular systolic function is normal. Calculated left ventricular EF = 60.6% Septal wall motion is normal. Normal global longitudinal LV strain (GLS) = -22.7%    Left ventricular diastolic function was normal.    Saline test results are negative for right to left atrial level  "shunt.    Pertinent Labs     Results from last 7 days   Lab Units 07/23/24  0616 07/22/24  0657 07/21/24  0621 07/20/24  0603   WBC 10*3/mm3 5.61 4.95 3.93 6.08   HEMOGLOBIN g/dL 13.5 14.4 14.2 14.2   PLATELETS 10*3/mm3 78* 90* 96* 98*     Results from last 7 days   Lab Units 07/23/24  0616 07/22/24  0657 07/21/24 0621 07/20/24  0603   SODIUM mmol/L 136 139 141 138   POTASSIUM mmol/L 3.2* 3.0* 3.3* 3.3*   CHLORIDE mmol/L 102 102 103 103   CO2 mmol/L 29.0 27.5 26.2 28.6   BUN mg/dL 16 17 19 19   CREATININE mg/dL 0.62* 0.72* 0.72* 0.75*   GLUCOSE mg/dL 135* 83 99 138*   EGFR mL/min/1.73 109.4 104.6 104.6 103.3     Results from last 7 days   Lab Units 07/23/24  0616 07/22/24  0657 07/21/24 0621 07/20/24  0603   ALBUMIN g/dL 2.8* 3.1* 3.0* 3.1*   BILIRUBIN mg/dL 1.1 1.7* 1.5* 1.5*   ALK PHOS U/L 98 91 90 100   AST (SGOT) U/L 133* 280* 467* 581*   ALT (SGPT) U/L 698* 1,022* 1,135* 1,166*     Results from last 7 days   Lab Units 07/23/24  0616 07/22/24 0657 07/21/24 0621 07/20/24  0603   CALCIUM mg/dL 8.0* 8.4* 8.3* 8.4*   ALBUMIN g/dL 2.8* 3.1* 3.0* 3.1*   MAGNESIUM mg/dL 1.8 1.8  --   --                Invalid input(s): \"LDLCALC\"          Test Results Pending at Discharge     Pending Labs       Order Current Status    Non-gynecologic Cytology In process            Discharge Details        Discharge Medications        New Medications        Instructions Start Date   HYDROcodone-acetaminophen 5-325 MG per tablet  Commonly known as: NORCO   1 tablet, Oral, Every 4 Hours PRN      ursodiol 300 MG capsule  Commonly known as: ACTIGALL   300 mg, Oral, 2 Times Daily             Changes to Medications        Instructions Start Date   dexAMETHasone 2 MG tablet  Commonly known as: DECADRON  What changed:   medication strength  how much to take   2 mg, Oral, 2 Times Daily With Meals             Continue These Medications        Instructions Start Date   famotidine 20 MG tablet  Commonly known as: PEPCID   20 mg, Oral, 2 Times " Daily Before Meals      Insulin Pen Needle 32G X 4 MM misc   Use to inject insulin SQ via pens up to 4 times daily      irbesartan-hydrochlorothiazide 150-12.5 MG tablet  Commonly known as: AVALIDE   2 tablets, Oral, Daily      levETIRAcetam 500 MG tablet  Commonly known as: KEPPRA   500 mg, Oral, Every 12 Hours Scheduled      NovoLOG FlexPen 100 UNIT/ML solution pen-injector sc pen  Generic drug: insulin aspart   5 Units, Subcutaneous, 3 Times Daily With Meals      sulfamethoxazole-trimethoprim 800-160 MG per tablet  Commonly known as: BACTRIM DS,SEPTRA DS   1 tablet, Oral, 3 Times Weekly      Tresiba FlexTouch 100 UNIT/ML solution pen-injector injection  Generic drug: insulin degludec   25 Units, Subcutaneous, Daily      zolpidem CR 12.5 MG CR tablet  Commonly known as: Ambien CR   12.5 mg, Oral, Nightly PRN               Allergies   Allergen Reactions    Cashew Nut Anaphylaxis    Peanut (Diagnostic) Anaphylaxis    Peanut-Containing Drug Products Anaphylaxis    Bee Pollen Other (See Comments)     eyes itching, running nose       Discharge Disposition:  Home or Self Care      Discharge Diet:  Diet Order   Procedures    Diet: Diabetic; Consistent Carbohydrate; Fluid Consistency: Thin (IDDSI 0)       Discharge Activity: Activity as tolerated      CODE STATUS:    Code Status and Medical Interventions:   Ordered at: 07/15/24 1055     Level Of Support Discussed With:    Patient     Code Status (Patient has no pulse and is not breathing):    CPR (Attempt to Resuscitate)     Medical Interventions (Patient has pulse or is breathing):    Full Support       Future Appointments   Date Time Provider Department Center   7/26/2024 10:10 AM INFU [facility] KO PORT CHAIR  [facility]   7/26/2024 10:30 AM RN REV 1 [facility]  [facility]   7/30/2024  9:20 AM INFU [facility]RE PORT CHAIR  [facility]   7/30/2024  9:40 AM Krista Fernandez MD PhD [facility]   1/16/2025 11:15 AM Scott Medina MD [facility]       Follow-up Information       Scott Medina MD .    Specialty: Internal Medicine  Contact information:  [address] [id number] [phone]                             Time Spent on Discharge:  Greater than 30 minutes      Livier Arizmendi MD  [facility]  07/23/24  07:49 EDT

## 2024-07-23 NOTE — THERAPY TREATMENT NOTE
Patient Name: Iain Christina  : 1965    MRN: [id number]                              Today's Date: 2024       Admit Date: 7/15/2024    Visit Dx:     ICD-10-CM ICD-9-CM   1. Primary CNS lymphoma  C85.89 200.50     Patient Active Problem List   Diagnosis    Hypertension    Rosacea    Atopic rhinitis    Hyperlipidemia    Sebaceous cyst    Tinnitus    Hyperthyroidism    Sleep apnea    Chronic hepatitis C without hepatic coma    Brain mass    Neoplasm causing mass effect and brain compression on adjacent structures    Abnormal liver function tests    Steroid-induced hyperglycemia    Primary CNS lymphoma    Poor venous access    Hospital discharge follow-up    Hypokalemia    Transaminitis    Thrombocytopenia     Past Medical History:   Diagnosis Date    Diabetes mellitus     Hepatitis C     Hyperthyroidism     Sleep apnea      Past Surgical History:   Procedure Laterality Date    CRANIOTOMY FOR TUMOR Right 2024    Procedure: Right frontal craniotomy for tumor;  Surgeon: Rosendo Bello MD;  Location: [facility];  Service: Neurosurgery;  Laterality: Right;    NASAL SEPTAL RECONSTRUCTION      VENOUS ACCESS DEVICE (PORT) INSERTION N/A 2024    Procedure: INSERTION VENOUS ACCESS DEVICE;  Surgeon: Linda Coates MD;  Location: [facility];  Service: General;  Laterality: N/A;      General Information       Row Name 24 1035          Physical Therapy Time and Intention    Document Type therapy note (daily note);discharge treatment  -     Mode of Treatment individual therapy;physical therapy  -       Row Name 24 1035          General Information    Patient Profile Reviewed yes  -     Existing Precautions/Restrictions fall  -       Row Name 24 1035          Living Environment    People in Home alone  staying with family for a while  -       Row Name 24 1035          Safety Issues, Functional Mobility    Safety Issues Affecting Function (Mobility)  impulsivity;safety precautions follow-through/compliance  -     Impairments Affecting Function (Mobility) balance;endurance/activity tolerance;coordination;strength  -               User Key  (r) = Recorded By, (t) = Taken By, (c) = Cosigned By      Initials Name Provider Type    Rabia Mccormick PTA Physical Therapist Assistant                   Mobility       Row Name 07/23/24 1036          Bed Mobility    Comment, (Bed Mobility) in chair  -       Row Name 07/23/24 1036          Sit-Stand Transfer    Sit-Stand [place] (Transfers) contact guard;verbal cues  -     Comment, (Sit-Stand Transfer) no AD , but unsteady, CCP getting rwx for home  -       Row Name 07/23/24 1036          Gait/Stairs (Locomotion)    [place] Level (Gait) contact guard  -     Assistive Device (Gait) walker, front-wheeled  -     Distance in Feet (Gait) 120  -JM     Deviations/Abnormal Patterns (Gait) base of support, narrow;weight shifting decreased;antalgic  -     Bilateral Gait Deviations forward flexed posture  -     [place] Level (Stairs) contact guard;verbal cues  -     Handrail Location (Stairs) right side (ascending)  -     Number of Steps (Stairs) 3  -JM     Ascending Technique (Stairs) step-to-step  -JM     Descending Technique (Stairs) step-to-step  backwards for safety-pt shaky and weak  -               User Key  (r) = Recorded By, (t) = Taken By, (c) = Cosigned By      Initials Name Provider Type    Rabia Mccormick PTA Physical Therapist Assistant                   Obj/Interventions    No documentation.                  Goals/Plan    No documentation.                  Clinical Impression    No documentation.                  Outcome Measures       Row Name 07/23/24 0810          How much help from another person do you currently need...    Turning from your back to your side while in flat bed without using bedrails? 4  -AR     Moving from lying on back to sitting on the side of a flat  bed without bedrails? 3  -AR     Moving to and from a bed to a chair (including a wheelchair)? 3  -AR     Standing up from a chair using your arms (e.g., wheelchair, bedside chair)? 3  -AR     Climbing 3-5 steps with a railing? 2  -AR     To walk in hospital room? 3  -AR     AM-PAC 6 Clicks Score (PT) 18  -AR     Highest Level of Mobility Goal 6 --> Walk 10 steps or more  -AR               User Key  (r) = Recorded By, (t) = Taken By, (c) = Cosigned By      Initials Name Provider Type    Nathalie Meadows, RN Registered Nurse                                 Physical Therapy Education       Title: PT OT SLP Therapies (Resolved)       Topic: Physical Therapy (Resolved)       Point: Mobility training (Resolved)       Learning Progress Summary             Patient Acceptance, E, VU by HINA at 7/21/2024 1425                         Point: Home exercise program (Resolved)       Learning Progress Summary             Patient Acceptance, E, VU by HINA at 7/21/2024 1425                         Point: Body mechanics (Resolved)       Learning Progress Summary             Patient Acceptance, E, VU by HINA at 7/21/2024 1425                         Point: Precautions (Resolved)       Learning Progress Summary             Patient Acceptance, E, VU by HINA at 7/21/2024 1425                                         User Key       Initials Effective Dates Name Provider Type Discipline    HINA 01/16/24 -  Sammie Sandhu, PT Physical Therapist PT                  PT Recommendation and Plan           Time Calculation:         PT Charges       Row Name 07/23/24 1105             Time Calculation    Start Time 1020  -      Stop Time 1102  -      Time Calculation (min) 42 min  -      PT Received On 07/23/24  -                User Key  (r) = Recorded By, (t) = Taken By, (c) = Cosigned By      Initials Name Provider Type    Rabia Mccormick, PTA Physical Therapist Assistant                      PT G-Codes  AM-PAC 6 Clicks Score (PT): 18        Rabia Geiger, PTA  7/23/2024

## 2024-07-24 ENCOUNTER — TRANSITIONAL CARE MANAGEMENT TELEPHONE ENCOUNTER (OUTPATIENT)
Dept: CALL CENTER | Facility: HOSPITAL | Age: 59
End: 2024-07-24
Payer: COMMERCIAL

## 2024-07-24 NOTE — OUTREACH NOTE
Call Center TCM Note      Flowsheet Row Responses   [facility] patient discharged from? [place]   Does the patient have one of the following disease processes/diagnoses(primary or secondary)? Other   TCM attempt successful? Yes   Call start time 1708   Call end time 1712   Discharge diagnosis Primary CNS lymphoma   Is patient permission given to speak with other caregiver? Yes   Person spoke with today (if not patient) and relationship niece, Mariel Paez   Meds reviewed with patient/caregiver? Yes  [Niece reports that they picked up the new meds from [facility]. She reports that her father helps patient with his meds.]   Does the patient have all medications ordered at discharge? Yes   Is the patient taking all medications as directed (includes completed medication regime)? Yes   Comments PCP Dr Medina. Sukhi declined to schedule the PCP appt with the call today. But she is on her way home and will have patient contact office tomorrow to schedule needed appt with PCP   Does the patient have an appointment with their PCP within 7-14 days of discharge? No   Nursing Interventions Patient declined scheduling/rescheduling appointment at this time, Routed TCM call to PCP office   Has home health visited the patient within 72 hours of discharge? N/A   Psychosocial issues? No   Did the patient receive a copy of their discharge instructions? Yes   Nursing interventions Reviewed instructions with patient  [sukhi]   What is the patient's perception of their health status since discharge? Same   Is the patient/caregiver able to teach back signs and symptoms related to disease process for when to call PCP? Yes   Is the patient/caregiver able to teach back signs and symptoms related to disease process for when to call 911? Yes   Is the patient/caregiver able to teach back the hierarchy of who to call/visit for symptoms/problems? PCP, Specialist, Home health nurse, Urgent Care, ED, 911 Yes   If the patient is a current  smoker, are they able to teach back resources for cessation? Not a smoker   TCM call completed? Yes   Call end time 1712   Would this patient benefit from a Referral to [facility] Social Work? No   Is the patient interested in additional calls from an ambulatory ? No            Ange Petit RN    7/24/2024, 17:15 EDT

## 2024-07-24 NOTE — CASE MANAGEMENT/SOCIAL WORK
Case Management Discharge Note      Final Note: Dc home         Selected Continued Care - Discharged on 7/23/2024 Admission date: 7/15/2024 - Discharge disposition: Home or Self Care      Destination    No services have been selected for the patient.                Durable Medical Equipment    No services have been selected for the patient.                Dialysis/Infusion    No services have been selected for the patient.                Home Medical Care    No services have been selected for the patient.                Therapy    No services have been selected for the patient.                Community Resources    No services have been selected for the patient.                Community & DME    No services have been selected for the patient.                         Final Discharge Disposition Code: 01 - home or self-care

## 2024-07-25 ENCOUNTER — PRIOR AUTHORIZATION (OUTPATIENT)
Dept: ONCOLOGY | Facility: CLINIC | Age: 59
End: 2024-07-25
Payer: COMMERCIAL

## 2024-07-25 NOTE — TELEPHONE ENCOUNTER
No PA necessary for 8/2/24 admit. Patient has [facility] share plan. Becka with bed board notified.

## 2024-07-26 ENCOUNTER — CLINICAL SUPPORT (OUTPATIENT)
Dept: ONCOLOGY | Facility: HOSPITAL | Age: 59
End: 2024-07-26
Payer: COMMERCIAL

## 2024-07-26 ENCOUNTER — TELEPHONE (OUTPATIENT)
Dept: FAMILY MEDICINE CLINIC | Facility: CLINIC | Age: 59
End: 2024-07-26
Payer: COMMERCIAL

## 2024-07-26 ENCOUNTER — INFUSION (OUTPATIENT)
Dept: ONCOLOGY | Facility: HOSPITAL | Age: 59
End: 2024-07-26
Payer: COMMERCIAL

## 2024-07-26 DIAGNOSIS — C85.89 PRIMARY CNS LYMPHOMA: ICD-10-CM

## 2024-07-26 DIAGNOSIS — C79.31 METASTASIS TO BRAIN: ICD-10-CM

## 2024-07-26 DIAGNOSIS — C85.10 LARGE B-CELL LYMPHOMA: ICD-10-CM

## 2024-07-26 PROBLEM — Z45.2 FITTING AND ADJUSTMENT OF VASCULAR CATHETER: Status: ACTIVE | Noted: 2024-07-26

## 2024-07-26 LAB
ALBUMIN SERPL-MCNC: 3.8 G/DL (ref 3.5–5.2)
ALBUMIN/GLOB SERPL: 1.5 G/DL
ALP SERPL-CCNC: 175 U/L (ref 39–117)
ALT SERPL W P-5'-P-CCNC: 579 U/L (ref 1–41)
ANION GAP SERPL CALCULATED.3IONS-SCNC: 14.9 MMOL/L (ref 5–15)
AST SERPL-CCNC: 76 U/L (ref 1–40)
BASOPHILS # BLD AUTO: 0.03 10*3/MM3 (ref 0–0.2)
BASOPHILS NFR BLD AUTO: 0.3 % (ref 0–1.5)
BILIRUB SERPL-MCNC: 1.3 MG/DL (ref 0–1.2)
BUN SERPL-MCNC: 22 MG/DL (ref 8–23)
BUN/CREAT SERPL: 33.3 (ref 7–25)
CALCIUM SPEC-SCNC: 9.4 MG/DL (ref 8.6–10.5)
CHLORIDE SERPL-SCNC: 104 MMOL/L (ref 98–107)
CO2 SERPL-SCNC: 21.1 MMOL/L (ref 22–29)
CREAT SERPL-MCNC: 0.66 MG/DL (ref 0.76–1.27)
CYTO UR: NORMAL
DEPRECATED RDW RBC AUTO: 39.9 FL (ref 37–54)
EGFRCR SERPLBLD CKD-EPI 2021: 107.4 ML/MIN/1.73
EOSINOPHIL # BLD AUTO: 0.06 10*3/MM3 (ref 0–0.4)
EOSINOPHIL NFR BLD AUTO: 0.5 % (ref 0.3–6.2)
ERYTHROCYTE [DISTWIDTH] IN BLOOD BY AUTOMATED COUNT: 13.5 % (ref 12.3–15.4)
GLOBULIN UR ELPH-MCNC: 2.6 GM/DL
GLUCOSE SERPL-MCNC: 236 MG/DL (ref 65–99)
HCT VFR BLD AUTO: 45.4 % (ref 37.5–51)
HGB BLD-MCNC: 15.5 G/DL (ref 13–17.7)
IMM GRANULOCYTES # BLD AUTO: 0.25 10*3/MM3 (ref 0–0.05)
IMM GRANULOCYTES NFR BLD AUTO: 2.2 % (ref 0–0.5)
LAB AP CASE REPORT: NORMAL
LAB AP FLOW CYTOMETRY SUMMARY: NORMAL
LYMPHOCYTES # BLD AUTO: 1.38 10*3/MM3 (ref 0.7–3.1)
LYMPHOCYTES NFR BLD AUTO: 12.1 % (ref 19.6–45.3)
MCH RBC QN AUTO: 29.9 PG (ref 26.6–33)
MCHC RBC AUTO-ENTMCNC: 34.1 G/DL (ref 31.5–35.7)
MCV RBC AUTO: 87.5 FL (ref 79–97)
MONOCYTES # BLD AUTO: 0.78 10*3/MM3 (ref 0.1–0.9)
MONOCYTES NFR BLD AUTO: 6.8 % (ref 5–12)
NEUTROPHILS NFR BLD AUTO: 78.1 % (ref 42.7–76)
NEUTROPHILS NFR BLD AUTO: 8.92 10*3/MM3 (ref 1.7–7)
NRBC BLD AUTO-RTO: 0.3 /100 WBC (ref 0–0.2)
PATH REPORT.FINAL DX SPEC: NORMAL
PATH REPORT.GROSS SPEC: NORMAL
PLATELET # BLD AUTO: 158 10*3/MM3 (ref 140–450)
PMV BLD AUTO: 10.4 FL (ref 6–12)
POTASSIUM SERPL-SCNC: 3.6 MMOL/L (ref 3.5–5.2)
PROT SERPL-MCNC: 6.4 G/DL (ref 6–8.5)
RBC # BLD AUTO: 5.19 10*6/MM3 (ref 4.14–5.8)
SODIUM SERPL-SCNC: 140 MMOL/L (ref 136–145)
WBC NRBC COR # BLD AUTO: 11.42 10*3/MM3 (ref 3.4–10.8)

## 2024-07-26 PROCEDURE — 80053 COMPREHEN METABOLIC PANEL: CPT

## 2024-07-26 PROCEDURE — 36591 DRAW BLOOD OFF VENOUS DEVICE: CPT

## 2024-07-26 PROCEDURE — 85025 COMPLETE CBC W/AUTO DIFF WBC: CPT

## 2024-07-26 RX ORDER — HEPARIN SODIUM (PORCINE) LOCK FLUSH IV SOLN 100 UNIT/ML 100 UNIT/ML
500 SOLUTION INTRAVENOUS AS NEEDED
OUTPATIENT
Start: 2024-07-26

## 2024-07-26 RX ORDER — LIDOCAINE AND PRILOCAINE 25; 25 MG/G; MG/G
1 CREAM TOPICAL
Qty: 5 G | Refills: 2 | Status: SHIPPED | OUTPATIENT
Start: 2024-07-26

## 2024-07-26 RX ORDER — SODIUM CHLORIDE 0.9 % (FLUSH) 0.9 %
10 SYRINGE (ML) INJECTION AS NEEDED
OUTPATIENT
Start: 2024-07-26

## 2024-07-26 NOTE — PROGRESS NOTES
Patient is here for lab review with RN.  CBC and CMP reviewed with Dr Fernandez. WBC 11.42 and ANC 8.9. No new orders from Dr Fernandez. Patient's Brother stated they have contact patient's PCP due to blood glucose is still high. Per Dr Fernandez made sure patient's Dexamethasone had been decreased to 2 mg every 12 hours. Patient's brother verified the dose had been decreased.   Patient has no complaints. Copy of labs given to patient and follow up appointment reviewed. Patient and patient's brother were instructed to call the office with any concerns or new symptoms prior to next visit. Patient and patient's brother verbalized understanding and discharged in stable condition.     Lab Results   Component Value Date    WBC 11.42 (H) 07/26/2024    HGB 15.5 07/26/2024    HCT 45.4 07/26/2024    MCV 87.5 07/26/2024     07/26/2024      Lab Results   Component Value Date    GLUCOSE 236 (H) 07/26/2024    BUN 22 07/26/2024    CREATININE 0.66 (L) 07/26/2024    EGFRRESULT 86 01/15/2024    EGFR 107.4 07/26/2024    BCR 33.3 (H) 07/26/2024    K 3.6 07/26/2024    CO2 21.1 (L) 07/26/2024    CALCIUM 9.4 07/26/2024    PROTENTOTREF 7.6 01/15/2024    ALBUMIN 3.8 07/26/2024    BILITOT 1.3 (H) 07/26/2024    AST 76 (C) 07/26/2024     (C) 07/26/2024

## 2024-07-26 NOTE — TELEPHONE ENCOUNTER
Patient called stating that his Blood sugars have been 350+ all the time is currently doing chemo and is taking steroids. Taking 30 units of the Tresiba flextouch and using novolog flex pen 10 units 3x a day.   Please advise

## 2024-08-01 ENCOUNTER — READMISSION MANAGEMENT (OUTPATIENT)
Dept: CALL CENTER | Facility: HOSPITAL | Age: 59
End: 2024-08-01
Payer: COMMERCIAL

## 2024-08-01 NOTE — OUTREACH NOTE
Medical Week 2 Survey      Flowsheet Row Responses   [facility] patient discharged from? [place]   Does the patient have one of the following disease processes/diagnoses(primary or secondary)? Other   Week 2 attempt successful? No   Unsuccessful attempts Attempt 1            Lalitha FRAZIER - Registered Nurse

## 2024-08-02 ENCOUNTER — HOSPITAL ENCOUNTER (INPATIENT)
Facility: HOSPITAL | Age: 59
LOS: 8 days | Discharge: HOME | DRG: 840 | End: 2024-08-10
Attending: INTERNAL MEDICINE | Admitting: INTERNAL MEDICINE
Payer: COMMERCIAL

## 2024-08-02 ENCOUNTER — INFUSION (OUTPATIENT)
Dept: ONCOLOGY | Facility: HOSPITAL | Age: 59
End: 2024-08-02
Payer: COMMERCIAL

## 2024-08-02 ENCOUNTER — LAB (OUTPATIENT)
Dept: LAB | Facility: HOSPITAL | Age: 59
End: 2024-08-02
Payer: COMMERCIAL

## 2024-08-02 ENCOUNTER — PRIOR AUTHORIZATION (OUTPATIENT)
Dept: ONCOLOGY | Facility: CLINIC | Age: 59
End: 2024-08-02
Payer: COMMERCIAL

## 2024-08-02 ENCOUNTER — OFFICE VISIT (OUTPATIENT)
Dept: ONCOLOGY | Facility: CLINIC | Age: 59
End: 2024-08-02
Payer: COMMERCIAL

## 2024-08-02 VITALS
BODY MASS INDEX: 29.28 KG/M2 | WEIGHT: 204.5 LBS | TEMPERATURE: 97.4 F | HEART RATE: 81 BPM | DIASTOLIC BLOOD PRESSURE: 63 MMHG | OXYGEN SATURATION: 98 % | SYSTOLIC BLOOD PRESSURE: 90 MMHG | RESPIRATION RATE: 20 BRPM | HEIGHT: 70 IN

## 2024-08-02 DIAGNOSIS — C85.89 PRIMARY CNS LYMPHOMA: ICD-10-CM

## 2024-08-02 DIAGNOSIS — C79.31 METASTASIS TO BRAIN: ICD-10-CM

## 2024-08-02 DIAGNOSIS — C85.10 LARGE B-CELL LYMPHOMA: ICD-10-CM

## 2024-08-02 DIAGNOSIS — C85.89 PRIMARY CNS LYMPHOMA: Primary | ICD-10-CM

## 2024-08-02 DIAGNOSIS — Z45.2 FITTING AND ADJUSTMENT OF VASCULAR CATHETER: Primary | ICD-10-CM

## 2024-08-02 DIAGNOSIS — C85.10 LARGE B-CELL LYMPHOMA: Primary | ICD-10-CM

## 2024-08-02 DIAGNOSIS — I10 PRIMARY HYPERTENSION: ICD-10-CM

## 2024-08-02 LAB
ALBUMIN SERPL-MCNC: 3.6 G/DL (ref 3.5–5.2)
ALBUMIN SERPL-MCNC: 3.9 G/DL (ref 3.5–5.2)
ALBUMIN/GLOB SERPL: 1.6 G/DL
ALBUMIN/GLOB SERPL: 1.6 G/DL
ALP SERPL-CCNC: 135 U/L (ref 39–117)
ALP SERPL-CCNC: 151 U/L (ref 39–117)
ALT SERPL W P-5'-P-CCNC: 394 U/L (ref 1–41)
ALT SERPL W P-5'-P-CCNC: 470 U/L (ref 1–41)
ANION GAP SERPL CALCULATED.3IONS-SCNC: 12.1 MMOL/L (ref 5–15)
ANION GAP SERPL CALCULATED.3IONS-SCNC: ABNORMAL MMOL/L
APTT PPP: 25 SECONDS (ref 22.7–35.4)
AST SERPL-CCNC: 106 U/L (ref 1–40)
AST SERPL-CCNC: 119 U/L (ref 1–40)
BASOPHILS # BLD AUTO: 0.02 10*3/MM3 (ref 0–0.2)
BASOPHILS # BLD AUTO: 0.03 10*3/MM3 (ref 0–0.2)
BASOPHILS NFR BLD AUTO: 0.4 % (ref 0–1.5)
BASOPHILS NFR BLD AUTO: 0.5 % (ref 0–1.5)
BILIRUB SERPL-MCNC: 1.1 MG/DL (ref 0–1.2)
BILIRUB SERPL-MCNC: 1.2 MG/DL (ref 0–1.2)
BUN SERPL-MCNC: 22 MG/DL (ref 8–23)
BUN SERPL-MCNC: 23 MG/DL (ref 8–23)
BUN/CREAT SERPL: 30.6 (ref 7–25)
BUN/CREAT SERPL: 35.9 (ref 7–25)
CALCIUM SPEC-SCNC: 9.2 MG/DL (ref 8.6–10.5)
CALCIUM SPEC-SCNC: 9.2 MG/DL (ref 8.6–10.5)
CHLORIDE SERPL-SCNC: 101 MMOL/L (ref 98–107)
CHLORIDE SERPL-SCNC: 105 MMOL/L (ref 98–107)
CO2 SERPL-SCNC: 21.9 MMOL/L (ref 22–29)
CO2 SERPL-SCNC: ABNORMAL MMOL/L
CREAT SERPL-MCNC: 0.64 MG/DL (ref 0.76–1.27)
CREAT SERPL-MCNC: 0.72 MG/DL (ref 0.76–1.27)
DEPRECATED RDW RBC AUTO: 43.8 FL (ref 37–54)
DEPRECATED RDW RBC AUTO: 45.2 FL (ref 37–54)
EGFRCR SERPLBLD CKD-EPI 2021: 104.6 ML/MIN/1.73
EGFRCR SERPLBLD CKD-EPI 2021: 108.4 ML/MIN/1.73
EOSINOPHIL # BLD AUTO: 0.02 10*3/MM3 (ref 0–0.4)
EOSINOPHIL # BLD AUTO: 0.02 10*3/MM3 (ref 0–0.4)
EOSINOPHIL NFR BLD AUTO: 0.3 % (ref 0.3–6.2)
EOSINOPHIL NFR BLD AUTO: 0.4 % (ref 0.3–6.2)
ERYTHROCYTE [DISTWIDTH] IN BLOOD BY AUTOMATED COUNT: 14.5 % (ref 12.3–15.4)
ERYTHROCYTE [DISTWIDTH] IN BLOOD BY AUTOMATED COUNT: 14.9 % (ref 12.3–15.4)
GLOBULIN UR ELPH-MCNC: 2.3 GM/DL
GLOBULIN UR ELPH-MCNC: 2.5 GM/DL
GLUCOSE BLDC GLUCOMTR-MCNC: 244 MG/DL (ref 70–130)
GLUCOSE BLDC GLUCOMTR-MCNC: 267 MG/DL (ref 70–130)
GLUCOSE SERPL-MCNC: 193 MG/DL (ref 65–99)
GLUCOSE SERPL-MCNC: 239 MG/DL (ref 65–99)
HCT VFR BLD AUTO: 42.7 % (ref 37.5–51)
HCT VFR BLD AUTO: 44.5 % (ref 37.5–51)
HGB BLD-MCNC: 14.7 G/DL (ref 13–17.7)
HGB BLD-MCNC: 15.3 G/DL (ref 13–17.7)
IMM GRANULOCYTES # BLD AUTO: 0.24 10*3/MM3 (ref 0–0.05)
IMM GRANULOCYTES # BLD AUTO: 0.27 10*3/MM3 (ref 0–0.05)
IMM GRANULOCYTES NFR BLD AUTO: 4.2 % (ref 0–0.5)
IMM GRANULOCYTES NFR BLD AUTO: 5 % (ref 0–0.5)
INR PPP: 1.05 (ref 0.9–1.1)
LYMPHOCYTES # BLD AUTO: 0.97 10*3/MM3 (ref 0.7–3.1)
LYMPHOCYTES # BLD AUTO: 1.19 10*3/MM3 (ref 0.7–3.1)
LYMPHOCYTES NFR BLD AUTO: 17.8 % (ref 19.6–45.3)
LYMPHOCYTES NFR BLD AUTO: 20.7 % (ref 19.6–45.3)
MCH RBC QN AUTO: 30.2 PG (ref 26.6–33)
MCH RBC QN AUTO: 30.4 PG (ref 26.6–33)
MCHC RBC AUTO-ENTMCNC: 34.4 G/DL (ref 31.5–35.7)
MCHC RBC AUTO-ENTMCNC: 34.4 G/DL (ref 31.5–35.7)
MCV RBC AUTO: 87.7 FL (ref 79–97)
MCV RBC AUTO: 88.3 FL (ref 79–97)
MONOCYTES # BLD AUTO: 0.57 10*3/MM3 (ref 0.1–0.9)
MONOCYTES # BLD AUTO: 0.57 10*3/MM3 (ref 0.1–0.9)
MONOCYTES NFR BLD AUTO: 10.5 % (ref 5–12)
MONOCYTES NFR BLD AUTO: 9.9 % (ref 5–12)
NEUTROPHILS NFR BLD AUTO: 3.6 10*3/MM3 (ref 1.7–7)
NEUTROPHILS NFR BLD AUTO: 3.71 10*3/MM3 (ref 1.7–7)
NEUTROPHILS NFR BLD AUTO: 64.4 % (ref 42.7–76)
NEUTROPHILS NFR BLD AUTO: 65.9 % (ref 42.7–76)
NRBC BLD AUTO-RTO: 0 /100 WBC (ref 0–0.2)
NRBC BLD AUTO-RTO: 0.4 /100 WBC (ref 0–0.2)
PLATELET # BLD AUTO: 172 10*3/MM3 (ref 140–450)
PLATELET # BLD AUTO: 208 10*3/MM3 (ref 140–450)
PMV BLD AUTO: 10.1 FL (ref 6–12)
PMV BLD AUTO: 10.5 FL (ref 6–12)
POTASSIUM SERPL-SCNC: 3.8 MMOL/L (ref 3.5–5.2)
POTASSIUM SERPL-SCNC: 4 MMOL/L (ref 3.5–5.2)
PROT SERPL-MCNC: 5.9 G/DL (ref 6–8.5)
PROT SERPL-MCNC: 6.4 G/DL (ref 6–8.5)
PROTHROMBIN TIME: 14 SECONDS (ref 11.7–14.2)
RBC # BLD AUTO: 4.87 10*6/MM3 (ref 4.14–5.8)
RBC # BLD AUTO: 5.04 10*6/MM3 (ref 4.14–5.8)
SODIUM SERPL-SCNC: 135 MMOL/L (ref 136–145)
SODIUM SERPL-SCNC: 139 MMOL/L (ref 136–145)
WBC NRBC COR # BLD AUTO: 5.45 10*3/MM3 (ref 3.4–10.8)
WBC NRBC COR # BLD AUTO: 5.76 10*3/MM3 (ref 3.4–10.8)

## 2024-08-02 PROCEDURE — 85025 COMPLETE CBC W/AUTO DIFF WBC: CPT

## 2024-08-02 PROCEDURE — 36591 DRAW BLOOD OFF VENOUS DEVICE: CPT

## 2024-08-02 PROCEDURE — 25010000002 HEPARIN LOCK FLUSH PER 10 UNITS: Performed by: INTERNAL MEDICINE

## 2024-08-02 PROCEDURE — 85730 THROMBOPLASTIN TIME PARTIAL: CPT | Performed by: INTERNAL MEDICINE

## 2024-08-02 PROCEDURE — 80053 COMPREHEN METABOLIC PANEL: CPT

## 2024-08-02 PROCEDURE — 80053 COMPREHEN METABOLIC PANEL: CPT | Performed by: INTERNAL MEDICINE

## 2024-08-02 PROCEDURE — 85025 COMPLETE CBC W/AUTO DIFF WBC: CPT | Performed by: INTERNAL MEDICINE

## 2024-08-02 PROCEDURE — 82948 REAGENT STRIP/BLOOD GLUCOSE: CPT

## 2024-08-02 PROCEDURE — 36415 COLL VENOUS BLD VENIPUNCTURE: CPT

## 2024-08-02 PROCEDURE — 85610 PROTHROMBIN TIME: CPT | Performed by: INTERNAL MEDICINE

## 2024-08-02 PROCEDURE — 63710000001 INSULIN LISPRO (HUMAN) PER 5 UNITS: Performed by: STUDENT IN AN ORGANIZED HEALTH CARE EDUCATION/TRAINING PROGRAM

## 2024-08-02 RX ORDER — ONDANSETRON 2 MG/ML
4 INJECTION INTRAMUSCULAR; INTRAVENOUS EVERY 6 HOURS PRN
Status: DISCONTINUED | OUTPATIENT
Start: 2024-08-02 | End: 2024-08-10 | Stop reason: HOSPADM

## 2024-08-02 RX ORDER — FAMOTIDINE 20 MG/1
20 TABLET, FILM COATED ORAL
Status: DISCONTINUED | OUTPATIENT
Start: 2024-08-02 | End: 2024-08-10 | Stop reason: HOSPADM

## 2024-08-02 RX ORDER — DEXAMETHASONE 2 MG/1
2 TABLET ORAL 2 TIMES DAILY WITH MEALS
Status: DISCONTINUED | OUTPATIENT
Start: 2024-08-02 | End: 2024-08-10 | Stop reason: HOSPADM

## 2024-08-02 RX ORDER — HEPARIN SODIUM (PORCINE) LOCK FLUSH IV SOLN 100 UNIT/ML 100 UNIT/ML
500 SOLUTION INTRAVENOUS AS NEEDED
Status: DISCONTINUED | OUTPATIENT
Start: 2024-08-02 | End: 2024-08-02 | Stop reason: HOSPADM

## 2024-08-02 RX ORDER — HYDROCODONE BITARTRATE AND ACETAMINOPHEN 5; 325 MG/1; MG/1
1 TABLET ORAL EVERY 4 HOURS PRN
Status: DISCONTINUED | OUTPATIENT
Start: 2024-08-02 | End: 2024-08-10 | Stop reason: HOSPADM

## 2024-08-02 RX ORDER — SODIUM CHLORIDE 0.9 % (FLUSH) 0.9 %
10 SYRINGE (ML) INJECTION AS NEEDED
OUTPATIENT
Start: 2024-08-02

## 2024-08-02 RX ORDER — PROCHLORPERAZINE MALEATE 10 MG
10 TABLET ORAL EVERY 6 HOURS PRN
Status: DISCONTINUED | OUTPATIENT
Start: 2024-08-02 | End: 2024-08-10 | Stop reason: HOSPADM

## 2024-08-02 RX ORDER — SODIUM CHLORIDE 0.9 % (FLUSH) 0.9 %
10 SYRINGE (ML) INJECTION AS NEEDED
Status: DISCONTINUED | OUTPATIENT
Start: 2024-08-02 | End: 2024-08-02 | Stop reason: HOSPADM

## 2024-08-02 RX ORDER — ENOXAPARIN SODIUM 100 MG/ML
40 INJECTION SUBCUTANEOUS DAILY
Status: DISCONTINUED | OUTPATIENT
Start: 2024-08-02 | End: 2024-08-10 | Stop reason: HOSPADM

## 2024-08-02 RX ORDER — METHOTREXATE 25 MG/ML
6000 INJECTION, SOLUTION INTRA-ARTERIAL; INTRAMUSCULAR; INTRATHECAL; INTRAVENOUS ONCE
Status: CANCELLED | OUTPATIENT
Start: 2024-08-04

## 2024-08-02 RX ORDER — INSULIN LISPRO 100 [IU]/ML
5 INJECTION, SOLUTION INTRAVENOUS; SUBCUTANEOUS
Status: DISCONTINUED | OUTPATIENT
Start: 2024-08-02 | End: 2024-08-03

## 2024-08-02 RX ORDER — SULFAMETHOXAZOLE AND TRIMETHOPRIM 800; 160 MG/1; MG/1
1 TABLET ORAL 3 TIMES WEEKLY
Status: DISCONTINUED | OUTPATIENT
Start: 2024-08-02 | End: 2024-08-10 | Stop reason: HOSPADM

## 2024-08-02 RX ORDER — LOSARTAN POTASSIUM 50 MG/1
50 TABLET ORAL
Status: DISCONTINUED | OUTPATIENT
Start: 2024-08-03 | End: 2024-08-10 | Stop reason: HOSPADM

## 2024-08-02 RX ORDER — HEPARIN SODIUM (PORCINE) LOCK FLUSH IV SOLN 100 UNIT/ML 100 UNIT/ML
500 SOLUTION INTRAVENOUS AS NEEDED
OUTPATIENT
Start: 2024-08-02

## 2024-08-02 RX ORDER — LEVETIRACETAM 500 MG/1
500 TABLET ORAL EVERY 12 HOURS SCHEDULED
Status: DISCONTINUED | OUTPATIENT
Start: 2024-08-02 | End: 2024-08-10 | Stop reason: HOSPADM

## 2024-08-02 RX ORDER — HYDROCHLOROTHIAZIDE 12.5 MG/1
12.5 TABLET ORAL
Status: DISCONTINUED | OUTPATIENT
Start: 2024-08-03 | End: 2024-08-10 | Stop reason: HOSPADM

## 2024-08-02 RX ORDER — ZOLPIDEM TARTRATE 5 MG/1
10 TABLET ORAL NIGHTLY
Status: DISCONTINUED | OUTPATIENT
Start: 2024-08-02 | End: 2024-08-10 | Stop reason: HOSPADM

## 2024-08-02 RX ORDER — INSULIN LISPRO 100 [IU]/ML
3-14 INJECTION, SOLUTION INTRAVENOUS; SUBCUTANEOUS
Status: DISCONTINUED | OUTPATIENT
Start: 2024-08-02 | End: 2024-08-10 | Stop reason: HOSPADM

## 2024-08-02 RX ADMIN — Medication 10 ML: at 08:13

## 2024-08-02 RX ADMIN — FAMOTIDINE 20 MG: 20 TABLET, FILM COATED ORAL at 17:26

## 2024-08-02 RX ADMIN — ZOLPIDEM TARTRATE 10 MG: 5 TABLET ORAL at 20:28

## 2024-08-02 RX ADMIN — DEXAMETHASONE 2 MG: 2 TABLET ORAL at 17:26

## 2024-08-02 RX ADMIN — INSULIN LISPRO 5 UNITS: 100 INJECTION, SOLUTION INTRAVENOUS; SUBCUTANEOUS at 21:02

## 2024-08-02 RX ADMIN — LEVETIRACETAM 500 MG: 500 TABLET, FILM COATED ORAL at 20:28

## 2024-08-02 RX ADMIN — SODIUM BICARBONATE 150 ML/HR: 84 INJECTION, SOLUTION INTRAVENOUS at 16:10

## 2024-08-02 RX ADMIN — Medication 500 UNITS: at 08:13

## 2024-08-02 RX ADMIN — SODIUM BICARBONATE 150 ML/HR: 84 INJECTION, SOLUTION INTRAVENOUS at 22:45

## 2024-08-02 RX ADMIN — INSULIN LISPRO 8 UNITS: 100 INJECTION, SOLUTION INTRAVENOUS; SUBCUTANEOUS at 17:26

## 2024-08-02 RX ADMIN — INSULIN LISPRO 5 UNITS: 100 INJECTION, SOLUTION INTRAVENOUS; SUBCUTANEOUS at 17:26

## 2024-08-02 NOTE — TELEPHONE ENCOUNTER
No PA needed for 8/23/24 hospital admit. Patient has [facility] shared plan. Sandeep with bed board notified.

## 2024-08-02 NOTE — PLAN OF CARE
Goal Outcome Evaluation:              Outcome Evaluation: alert & oriented x4, pleasant and cooperative, VSS on room air, LHA consulted for glucose managment, bicarb fluids at 150mL/hr, port accessed at CBC office--C/D/I with good blood return, tolerating CC diet

## 2024-08-02 NOTE — H&P
Oncology admitting H&P.  Please see my note from earlier today 8/2/2024.    Patient was admitted to [facility] oncology service for cycle 2 of high-dose methotrexate for his primary CNS diffuse large B-cell lymphoma.    Patient will be given methotrexate 6 g/m² with leucovorin for rescue.    TRINO SMILEY M.D., Ph.D.

## 2024-08-02 NOTE — PROGRESS NOTES
.     REASON FOR FOLLOW-UP:     *. Provide an opinion on any further workup or treatment primary CNS lymphoma.  Negative for BCL2, BCL6, and MYC by FISH.  Patient was admitted to [facility] on 7/15/2024, and was given first cycle of high-dose methotrexate.                                  HISTORY OF PRESENT ILLNESS:  The patient is a 58 y.o. year old male who is here for follow-up with the above-mentioned history.    History of Present Illness  The patient presents for evaluation of multiple medical concerns. He is accompanied by his brother.    The patient had cycle #1 high-dose methotrexate for which he had a fair tolerance. He did have significant transaminitis due to the high-dose methotrexate with the background of elevated transaminase. [facility]. He reports back to baseline condition today.     He has been managing his blood sugar levels, which fluctuate. His primary care physician has adjusted his insulin dosage based on his blood sugar levels. His lowest recorded blood sugar level was 160 last week. His appetite has improved due to the steroids, but he has lost approximately 25 pounds in the past 4 to 6 weeks. Initially, he lost 28 pounds, but has since reduced to 204 pounds. During his last hospital treatment, his appetite was significantly reduced, leading to skipping meals.    He experienced dizziness and lightheadedness upon exiting his truck today. His blood pressure has been around 130/90 at home.  However his blood pressure at this morning is 90/63 in our clinic.  He has not taken any pain medication, but did have a headache last night. He is currently taking blood pressure medication, irbesartan/hydrochlorothiazide 150-12.5 mg once a day.     Patient reports no fever sweating chills.     He is on Bactrim three times a week. He is unsure if he is taking ursodiol.      Results  Laboratory Studies today  8/2/2024  Glucose number is 193. Kidney function creatinine is 0.64. Electrolytes are normal. AST is 106, ALT is 470. Total bilirubin is 1.2. ALK phos is 151. White cells are 5,760, hemoglobin is 15.3, and platelets are 208,000.            Past Medical History:   Diagnosis Date    Diabetes mellitus     Hepatitis C     Hyperthyroidism     Sleep apnea      Past Surgical History:   Procedure Laterality Date    CRANIOTOMY FOR TUMOR Right 6/19/2024    Procedure: Right frontal craniotomy for tumor;  Surgeon: Rosendo Bello MD;  Location: [facility];  Service: Neurosurgery;  Laterality: Right;    NASAL SEPTAL RECONSTRUCTION      VENOUS ACCESS DEVICE (PORT) INSERTION N/A 6/24/2024    Procedure: INSERTION VENOUS ACCESS DEVICE;  Surgeon: Linda Coates MD;  Location: [facility];  Service: General;  Laterality: N/A;     MEDICAL ONCOLOGY HISTORY: The patient is a 58 y.o. year old male whom we are consulted for evaluation and management of newly diagnosed CNS diffuse large B-cell lymphoma.     This patient has hypertension, hepatitis C, sleep apnea, however he denies diabetes, also previous history of alcohol use who stopped drinking 6 months ago, who presented to [facility] emergency room on 06/15/2024 because of worsening memory as well as episodes of fall. This patient is a long term . He initially came to [facility] ER and CT scan shows brain mets. He was given IV steroids and brought to [facility] for further evaluation and management. The patient reports no fever, sweating, chills, no weight loss. He does not smoke cigarettes.      The CT scan of the head without contrast on 06/15/2024 at [facility] reported a right frontal lobe mass measuring 4.8 x 3.3 cm with large amount of associated vasogenic edema and so called effacement. There is 8 mm right to left midline shift. There was significant mass effect.     After he was admitted to [facility]  [place] further imaging studies with the brain MRI with and without contrast on 06/15/2024 confirmed an enhancing mass 4 x 4 x 4 cm in the deep white matter of the right frontal lobe and with internal region of cystic changes. There was intramural micro hemorrhage. There was extensive surrounding vasogenic edema extends across the midline to the corpus collosum. There is leftward midline shift measuring 14 mm.      The patient further had workup with imaging studies for chest, abdomen and pelvis with IV contrast obtained on 06/16/2024 and this study reported no mediastinal adenopathy in the chest. There was trace pericardial effusion. No pulmonary lesions, pleural effusions or pneumothorax. In the abdomen there was chronic diverticulosis, otherwise unremarkable. There was borderline enlarged carley hepatic lymph nodes 1.2 cm. Otherwise unremarkable. No bone lesions.      This patient was seen by, Rosendo Bello MD, Neurosurgeon, who performed right frontal craniotomy and the intraoperative frozen section reported undifferentiated malignancy and the possibility of lymphoma. The patient has excisional biopsy for the right frontal lobe mass.     Pathology evaluation reported atypical lymphoproliferative consistent with diffuse large B-cell lymphoma pending outside consultation. The gross description reported specimen measuring 1.4 x 1.0 x 0.4 cm. The 2nd specimen consisted of 3 x 2 x 1 cm rubbery soft tissue.      The patient presents for evaluation of primary CNS B-cell lymphoma.    I saw him recently in on 6/21/2024 during his hospitalization for newly diagnosed primary CNS lymphoma.    Genetic study of tumor sample: All negative for the BCL2, BCL6, and MYC. Tumor is strongly positive for CD20. Blood glucose level between 120 and 150. A1c is 5.6.    The patient is here 7/15/2024 for evaluation prior to his scheduled high-dose methotrexate chemotherapy for primary CNS lymphoma. He is accompanied by his brother-in-law  who helped her with history and discussion.    He was last seen on 07/09/2024 and was scheduled for a PET scan examination for further evaluation. However, his glucose level was significantly elevated at 355 on 07/12/2024 on the day he was scheduled for the PET scan, which was subsequently cancelled due to hyperglycemia.     Today's lab results for hepatitis B, and hepatitis C are negative. Had elevated liver enzymes on 7/9/2024 including , , and total bilirubin 1.3.  Because of that we decided to a 25% dose reduction of the high-dose methotrexate at 6 g/m² instead of 8 g/m².     According to his brother-in-law, his blood sugar level was 500 this morning. He has been taking insulin, sometimes twice a day. After returning home about a week after his surgery, he was prescribed insulin. However, he admits to not receiving the insulin for the past 4 to 5 days due to forgetfulness and memory issues. He sleeps a lot, so he often only takes insulin once a day, not three times a day. The lowest his blood sugar level this week has been about 350. He was scheduled for a PET scan on Friday, but it was cancelled due to his blood sugar level of 350 and was told it should be 200 or less.         MEDICATIONS    Current Outpatient Medications:     dexAMETHasone (DECADRON) 2 MG tablet, Take 1 tablet by mouth 2 (Two) Times a Day With Meals., Disp: 60 tablet, Rfl: 1    famotidine (PEPCID) 20 MG tablet, Take 1 tablet by mouth 2 (Two) Times a Day Before Meals for 180 days., Disp: 60 tablet, Rfl: 5    HYDROcodone-acetaminophen (NORCO) 5-325 MG per tablet, Take 1 tablet by mouth Every 4 (Four) Hours As Needed for Moderate Pain., Disp: 18 tablet, Rfl: 0    insulin aspart (NovoLOG FlexPen) 100 UNIT/ML solution pen-injector sc pen, Inject 5 Units under the skin into the appropriate area as directed 3 (Three) Times a Day With Meals., Disp: 15 mL, Rfl: 1    insulin degludec (Tresiba FlexTouch) 100 UNIT/ML solution pen-injector  injection, Inject 25 Units under the skin into the appropriate area as directed Daily., Disp: 15 mL, Rfl: 1    Insulin Pen Needle 32G X 4 MM misc, Use to inject insulin SQ via pens up to 4 times daily, Disp: 100 each, Rfl: 1    irbesartan-hydrochlorothiazide (AVALIDE) 150-12.5 MG tablet, Take 2 tablets by mouth Daily. (Patient taking differently: Take 1 tablet by mouth Daily.), Disp: 180 tablet, Rfl: 3    levETIRAcetam (KEPPRA) 500 MG tablet, Take 1 tablet by mouth Every 12 (Twelve) Hours for 180 days., Disp: 60 tablet, Rfl: 5    lidocaine-prilocaine (EMLA) 2.5-2.5 % cream, Apply 1 Application topically to the appropriate area as directed Every 2 (Two) Hours As Needed for Mild Pain., Disp: 5 g, Rfl: 2    sulfamethoxazole-trimethoprim (BACTRIM DS,SEPTRA DS) 800-160 MG per tablet, Take 1 tablet by mouth 3 (Three) Times a Week for 78 doses. Indications: PROPHYLAXIS, Disp: 13 tablet, Rfl: 5    ursodiol (ACTIGALL) 300 MG capsule, Take 1 capsule by mouth 2 (Two) Times a Day., Disp: 60 capsule, Rfl: 0    zolpidem CR (Ambien CR) 12.5 MG CR tablet, Take 1 tablet by mouth At Night As Needed for Sleep., Disp: 30 tablet, Rfl: 2  No current facility-administered medications for this visit.    Facility-Administered Medications Ordered in Other Visits:     heparin injection 500 Units, 500 Units, Intravenous, PRN, Krista Fernandez MD PhD, 500 Units at 08/02/24 0813    sodium chloride 0.9 % flush 10 mL, 10 mL, Intravenous, PRN, Krista Fernandez MD PhD, 10 mL at 08/02/24 0813    ALLERGIES:     Allergies   Allergen Reactions    Cashew Nut Anaphylaxis    Peanut (Diagnostic) Anaphylaxis    Peanut-Containing Drug Products Anaphylaxis    Bee Pollen Other (See Comments)     eyes itching, running nose       SOCIAL HISTORY:       Social History     Socioeconomic History    Marital status: Unknown   Tobacco Use    Smoking status: Never    Smokeless tobacco: Never   Vaping Use    Vaping status: Never Used   Substance and Sexual Activity     "Alcohol use: Not Currently     Comment: STATED HE QUIT IN JANUARY    Drug use: Never    Sexual activity: Yes         FAMILY HISTORY:  Family History   Problem Relation Age of Onset    COPD Mother     Cancer Sister     No Known Problems Brother        REVIEW OF SYSTEMS:  Review of Systems  See HPI.           Vitals:    08/02/24 0829   BP: 90/63   Pulse: 81   Resp: 20   Temp: 97.4 °F (36.3 °C)   TempSrc: Oral   SpO2: 98%   Weight: 92.8 kg (204 lb 8 oz)   Height: 177.8 cm (70\")   PainSc: 0-No pain     ECOG 1    Physical Exam    Vitals show a blood pressure of 90/63. Weight is 204 pounds.  GENERAL:  Well-developed, well-nourished male in no acute distress.   SKIN:  Warm, dry without rashes, purpura or petechiae.  HEENT:  Normocephalic.    LYMPHATICS:  No cervical, supraclavicular adenopathy.  CHEST: Normal respiratory effort.  Lungs clear to auscultation. Good airflow.  CARDIAC:  Regular rate and rhythm. Normal S1,S2.  ABDOMEN:  Soft, nontender.  Bowel sounds normal.  EXTREMITIES:  No lower extremity edema.        RECENT LABS:        WBC   Date Value Ref Range Status   08/02/2024 5.76 3.40 - 10.80 10*3/mm3 Final   07/26/2024 11.42 (H) 3.40 - 10.80 10*3/mm3 Final   07/23/2024 5.61 3.40 - 10.80 10*3/mm3 Final   07/22/2024 4.95 3.40 - 10.80 10*3/mm3 Final   07/21/2024 3.93 3.40 - 10.80 10*3/mm3 Final   07/20/2024 6.08 3.40 - 10.80 10*3/mm3 Final   07/19/2024 6.74 3.40 - 10.80 10*3/mm3 Final   07/18/2024 7.29 3.40 - 10.80 10*3/mm3 Final   07/17/2024 9.19 3.40 - 10.80 10*3/mm3 Final   07/16/2024 8.57 3.40 - 10.80 10*3/mm3 Final   07/15/2024 10.13 3.40 - 10.80 10*3/mm3 Final   07/15/2024 12.06 (H) 3.40 - 10.80 10*3/mm3 Final   07/09/2024 8.11 3.40 - 10.80 10*3/mm3 Final   06/24/2024 11.00 (H) 3.40 - 10.80 10*3/mm3 Final   06/23/2024 13.39 (H) 3.40 - 10.80 10*3/mm3 Final   06/22/2024 12.14 (H) 3.40 - 10.80 10*3/mm3 Final   06/21/2024 11.28 (H) 3.40 - 10.80 10*3/mm3 Final   06/20/2024 12.41 (H) 3.40 - 10.80 10*3/mm3 Final "   06/19/2024 11.26 (H) 3.40 - 10.80 10*3/mm3 Final   06/17/2024 13.92 (H) 3.40 - 10.80 10*3/mm3 Final   06/16/2024 8.20 3.40 - 10.80 10*3/mm3 Final   06/15/2024 6.53 3.40 - 10.80 10*3/mm3 Final   01/15/2024 6.0 3.4 - 10.8 x10E3/uL Final   01/13/2023 5.6 3.4 - 10.8 x10E3/uL Final   11/21/2019 6.90 3.40 - 10.80 10*3/mm3 Final     Hemoglobin   Date Value Ref Range Status   08/02/2024 15.3 13.0 - 17.7 g/dL Final   07/26/2024 15.5 13.0 - 17.7 g/dL Final   07/23/2024 13.5 13.0 - 17.7 g/dL Final   07/22/2024 14.4 13.0 - 17.7 g/dL Final   07/21/2024 14.2 13.0 - 17.7 g/dL Final   07/20/2024 14.2 13.0 - 17.7 g/dL Final   07/19/2024 14.6 13.0 - 17.7 g/dL Final   07/18/2024 14.5 13.0 - 17.7 g/dL Final   07/17/2024 15.1 13.0 - 17.7 g/dL Final   07/16/2024 14.5 13.0 - 17.7 g/dL Final   07/15/2024 15.5 13.0 - 17.7 g/dL Final   07/15/2024 16.7 13.0 - 17.7 g/dL Final   07/09/2024 17.4 13.0 - 17.7 g/dL Final   06/24/2024 16.0 13.0 - 17.7 g/dL Final   06/23/2024 15.9 13.0 - 17.7 g/dL Final   06/22/2024 16.1 13.0 - 17.7 g/dL Final   06/21/2024 16.1 13.0 - 17.7 g/dL Final   06/20/2024 15.2 13.0 - 17.7 g/dL Final   06/19/2024 15.3 13.0 - 17.7 g/dL Final   06/17/2024 15.5 13.0 - 17.7 g/dL Final   06/16/2024 15.1 13.0 - 17.7 g/dL Final   06/15/2024 15.4 13.0 - 17.7 g/dL Final   01/15/2024 16.8 13.0 - 17.7 g/dL Final   01/13/2023 16.9 13.0 - 17.7 g/dL Final   11/21/2019 16.7 13.0 - 17.7 g/dL Final     Platelets   Date Value Ref Range Status   08/02/2024 208 140 - 450 10*3/mm3 Final   07/26/2024 158 140 - 450 10*3/mm3 Final   07/23/2024 78 (L) 140 - 450 10*3/mm3 Final   07/22/2024 90 (L) 140 - 450 10*3/mm3 Final   07/21/2024 96 (L) 140 - 450 10*3/mm3 Final   07/20/2024 98 (L) 140 - 450 10*3/mm3 Final   07/19/2024 112 (L) 140 - 450 10*3/mm3 Final   07/18/2024 115 (L) 140 - 450 10*3/mm3 Final   07/17/2024 125 (L) 140 - 450 10*3/mm3 Final   07/16/2024 117 (L) 140 - 450 10*3/mm3 Final   07/15/2024 138 (L) 140 - 450 10*3/mm3 Final   07/15/2024  161 140 - 450 10*3/mm3 Final   07/09/2024 133 (L) 140 - 450 10*3/mm3 Final   06/24/2024 148 140 - 450 10*3/mm3 Final   06/23/2024 163 140 - 450 10*3/mm3 Final   06/22/2024 187 140 - 450 10*3/mm3 Final   06/21/2024 154 140 - 450 10*3/mm3 Final   06/20/2024 173 140 - 450 10*3/mm3 Final   06/19/2024 157 140 - 450 10*3/mm3 Final   06/17/2024 156 140 - 450 10*3/mm3 Final   06/16/2024 154 140 - 450 10*3/mm3 Final   06/15/2024 148 140 - 450 10*3/mm3 Final   01/15/2024 188 150 - 450 x10E3/uL Final   01/13/2023 163 150 - 450 x10E3/uL Final   11/21/2019 177 140 - 450 10*3/mm3 Final     Component      Latest Ref Rng 8/2/2024   Glucose      65 - 99 mg/dL 193 (H)    BUN      8 - 23 mg/dL 23    Creatinine      0.76 - 1.27 mg/dL 0.64 (L)    Sodium      136 - 145 mmol/L 139    Potassium      3.5 - 5.2 mmol/L 4.0    Chloride      98 - 107 mmol/L 105    CO2      22.0 - 29.0 mmol/L 21.9 (L)    Calcium      8.6 - 10.5 mg/dL 9.2    Total Protein      6.0 - 8.5 g/dL 6.4    Albumin      3.5 - 5.2 g/dL 3.9    ALT (SGPT)      1 - 41 U/L 470 (HH)    AST (SGOT)      1 - 40 U/L 106 (HH)    Alkaline Phosphatase      39 - 117 U/L 151 (H)    Total Bilirubin      0.0 - 1.2 mg/dL 1.2    Globulin      gm/dL 2.5    A/G Ratio      g/dL 1.6    BUN/Creatinine Ratio      7.0 - 25.0  35.9 (H)    Anion Gap      5.0 - 15.0 mmol/L 12.1    eGFR      >60.0 mL/min/1.73 108.4           Assessment & Plan     Assessment & Plan      *.  Primary CNS diffuse large B-cell lymphoma.    Patient had a craniotomy on 6/19/2024.  Pathology evaluation reported diffuse large B-cell lymphoma, Ki-67 at the 70%.  Further pathology evaluation is triple negative for Bcl-2, BCL6 and MYC.   I discussed with the patient today on 7/9/2024, recommending to have a PET scan examination for further evaluation.  Also discussed with the patient for high-dose methotrexate per NCCN guideline.   Will also obtain CSF on day of cycle 1 day 1 of chemotherapy for flow cytometry study, and also  give intra thecal methotrexate. The frequency of chemotherapy will be adjusted no later than every 3 weeks, contingent on his recovery and tolerance. If his recovery is satisfactory, the frequency may be reduced to less than every 3 weeks. Maintenance therapy will be initiated for high-dose methotrexate monthly. A referral will be made for stem cell transplant service at the [facility] in [facility].  Patient could not have PET scan examination on 7/12/2024 due to significant elevated glucose level.  7/15/2024 patient will be admitted to hospital for cycle 1 day 1 chemotherapy with high-dose methotrexate.  This includes a lumbar puncture with cerebrospinal fluid analysis and intrathecal chemotherapy with methotrexate. Depending on the results, a decision regarding methotrexate administration will be determined. Additional oral tablets of leucovorin will be administered during the hospital stay to mitigate the side effects of the methotrexate.    Patient presented 8/2/2024 for evaluation prior to cycle 2 high-dose methotrexate.  He reports back to baseline condition today. Laboratory studies showed elevated AST and ALT, both slightly higher than the level on 07/15/2024. I recommended to admit the patient to hospital today with IV hydration with alkalinization with bicarb for 2 days and start him on cycle 2 high-dose methotrexate in 2 days on 08/04/2024 with same dose reduction 25% at 6 g/m² same as the first cycle.. The methotrexate will be given per protocol with the leucovorin rescue.         *.  Hyperglycemia secondary to steroids.  The patient's highest recorded glucose level was 290 yesterday morning on 7/8/2024, and his daily glucose levels range from 120 to 150.   Patient was not diabetic before diagnosis of CNS lymphoma.  Clearly his hyperglycemia is caused by steroids.  He currently is on dexamethasone 6 mg twice a day.  Today on 7/9/2024 I recommended to decrease dexamethasone to 4 mg  twice a day.  He will continue insulin prescribed by his primary care physician.  7/12/2024 glucose 355, PET scan was not able to be carried out.  7/15/2024 elevated glucose 316.  Patient will be admitted to hospital for scheduled high-dose methotrexate.  Will consult internal medicine team to help with management of steroids induced hyperglycemia.    Patient presented to the 8/2/2024 prior to cycle 2 chemotherapy.  Glucose level is not well controlled due to steroids. I did decrease his dexamethasone to 2 mg twice a day at the time of discharge 10 days ago and the patient also has been using insulin under the guidance of his primary care physician. His brother reports that the best glucose level he had was about 160. We will request the internal medicine team for help with the management of diabetes during his hospital stay.      *. Thrombocytopenia secondary to chemotherapy.  This patient had thrombocytopenia secondary to chemotherapy with pepe platelets 78,000, but it has now completely resolved with the platelets 208,000 today 82 2024. Expecting thrombocytopenia will happen again during hospitalization. We will monitor labs daily.          PLAN:  Will admit the patient to [facility] today in preparation for cycle #2 high-dose methotrexate.  Patient will be given IV hydration with bicarb for alkalization for 2 days.  Will start patient on second #2 high-dose methotrexate on 8/4/2024, will still use the same dose reduction of methotrexate at 6 g/m2 (a 25% dose reduction ) as the first cycle because of transaminitis.   Leucovorin IV will be given for rescue per protocol. Monitor methotrexate as well as other labs CBC CMP daily.    Will consult internal medicine team [facility] for managing hyperglycemia.  Continue dexamethasone 2 mg twice a day.  Continue Keppra 500 mg twice a day for prophylaxis of seizure.  Bactrim will be temporary on hold due to expected his severe transaminitis from methotrexate  chemotherapy.  It could be resumed once his liver function panel improves, and takes 3 times a week for prophylaxis of PJP infection.  I expect the patient to be discharged next Friday on 08/09/2024 pending clearance of methotrexate.   Starting the following week, we will monitor lab studies as outpatient, CBC and stat CMP twice a week on Tuesdays and Fridays.   I will arrange the patient to have brain MRI with and without IV contrast to be done in the early part of week 08/19/2024.   The patient will follow up on 08/23/2024 for reevaluation and the stat laboratory studies and potential admission for cycle # 3 of high dose methotrexate.       I discussed with the patient and his brother-in-law for the management plan and they voiced understanding.     I discussed with my colleague Dr. Sanchez who will take care of this patient for the next several days.      I also spoke with the oncology pharmacist Lulu Simental, Pharm.D. for arranging the treatment plan.    I spent 82 minutes caring for Iain on this date of service. This time includes time spent by me in the following activities: preparing for the visit, reviewing tests, obtaining and/or reviewing a separately obtained history, performing a medically appropriate examination and/or evaluation, counseling and educating the patient/family/caregiver, ordering medications, tests, or procedures, referring and communicating with other health care professionals, documenting information in the medical record, independently interpreting results and communicating that information with the patient/family/caregiver and care coordination       TRINO SMILEY M.D., Ph.D.

## 2024-08-02 NOTE — CONSULTS
Patient Name:  Iain Christina  YOB: 1965  Patient Care Team:  Scott Medina MD as PCP - General (Internal Medicine)  Scott Medina MD as Referring Physician (Internal Medicine)  Krista Fernandez MD PhD as Consulting Physician (Hematology and Oncology)    Date of Admission:  8/2/2024  Date of Consult:  8/2/2024    Inpatient Hospitalist Consult  Consult performed by: Bull Simon MD  Consult ordered by: Krista Fernandez MD PhD  Reason for consult: steroid induced hyperglycemia          Subjective   History of Present Illness  Mr. Christina is a 58 y.o. male w/ CNS DLBCL, HTN, HLD admitted for chemotherapy administration.     Patient was recently admitted from this facility from 7/15/2024 to 7/23/2024 for chemotherapy administration, high-dose methotrexate plus Rituxan.  Hospital course complicated by transaminitis and high bilirubinemia which was thought to be secondary to chemotherapy, started on ursodiol.  In addition he was initiated on insulin due to steroid-induced hyperglycemia.      He has been readmitted to the hospital for next CTX.  Medicine has been consulted for hyperglycemia.  He states that his blood sugars have fluctuated a bit at home but have recently been improved.  He is currently taking Tresiba 30 units daily as well as 10 units of short acting insulin with meals and SSI.  He denies hypoglycemic episodes.  No fevers, chills, chest pain, shortness breath, N/V/D, urinary symptoms.          Past Medical History:   Diagnosis Date    Diabetes mellitus     Hepatitis C     Hyperthyroidism     Sleep apnea      Past Surgical History:   Procedure Laterality Date    CRANIOTOMY FOR TUMOR Right 6/19/2024    Procedure: Right frontal craniotomy for tumor;  Surgeon: Rosendo Bello MD;  Location: [facility];  Service: Neurosurgery;  Laterality: Right;    NASAL SEPTAL RECONSTRUCTION      VENOUS ACCESS DEVICE (PORT) INSERTION N/A 6/24/2024    Procedure:  INSERTION VENOUS ACCESS DEVICE;  Surgeon: Linda Coates MD;  Location: [facility] OR;  Service: General;  Laterality: N/A;     Family History   Problem Relation Age of Onset    COPD Mother     Cancer Sister     No Known Problems Brother      Social History     Tobacco Use    Smoking status: Never    Smokeless tobacco: Never   Vaping Use    Vaping status: Never Used   Substance Use Topics    Alcohol use: Not Currently     Comment: STATED HE QUIT IN JANUARY    Drug use: Never     Medications Prior to Admission   Medication Sig Dispense Refill Last Dose    dexAMETHasone (DECADRON) 2 MG tablet Take 1 tablet by mouth 2 (Two) Times a Day With Meals. 60 tablet 1 8/2/2024    famotidine (PEPCID) 20 MG tablet Take 1 tablet by mouth 2 (Two) Times a Day Before Meals for 180 days. 60 tablet 5 8/2/2024    insulin aspart (NovoLOG FlexPen) 100 UNIT/ML solution pen-injector sc pen Inject 5 Units under the skin into the appropriate area as directed 3 (Three) Times a Day With Meals. (Patient taking differently: Inject 10 Units under the skin into the appropriate area as directed 3 (Three) Times a Day With Meals.) 15 mL 1 8/2/2024    insulin degludec (Tresiba FlexTouch) 100 UNIT/ML solution pen-injector injection Inject 25 Units under the skin into the appropriate area as directed Daily. (Patient taking differently: Inject 30 Units under the skin into the appropriate area as directed Daily.) 15 mL 1 8/2/2024    irbesartan-hydrochlorothiazide (AVALIDE) 150-12.5 MG tablet Take 2 tablets by mouth Daily. (Patient taking differently: Take 1 tablet by mouth Daily.) 180 tablet 3 8/2/2024    levETIRAcetam (KEPPRA) 500 MG tablet Take 1 tablet by mouth Every 12 (Twelve) Hours for 180 days. 60 tablet 5 8/2/2024    sulfamethoxazole-trimethoprim (BACTRIM DS,SEPTRA DS) 800-160 MG per tablet Take 1 tablet by mouth 3 (Three) Times a Week for 78 doses. Indications: PROPHYLAXIS 13 tablet 5 8/2/2024    ursodiol (ACTIGALL) 300 MG capsule Take 1  capsule by mouth 2 (Two) Times a Day. 60 capsule 0 8/2/2024    zolpidem CR (Ambien CR) 12.5 MG CR tablet Take 1 tablet by mouth At Night As Needed for Sleep. 30 tablet 2 8/1/2024    HYDROcodone-acetaminophen (NORCO) 5-325 MG per tablet Take 1 tablet by mouth Every 4 (Four) Hours As Needed for Moderate Pain. 18 tablet 0     Insulin Pen Needle 32G X 4 MM misc Use to inject insulin SQ via pens up to 4 times daily 100 each 1     lidocaine-prilocaine (EMLA) 2.5-2.5 % cream Apply 1 Application topically to the appropriate area as directed Every 2 (Two) Hours As Needed for Mild Pain. 5 g 2      Allergies:  Cashew nut, Peanut (diagnostic), Peanut-containing drug products, and Bee pollen    Review of Systems   Constitutional:  Negative for activity change, appetite change and fever.   HENT:  Negative for hearing loss, rhinorrhea and sinus pressure.    Eyes:  Negative for pain and discharge.   Respiratory:  Negative for cough, chest tightness, shortness of breath and wheezing.    Cardiovascular:  Negative for chest pain and leg swelling.   Gastrointestinal:  Negative for abdominal distention, abdominal pain, blood in stool, nausea and vomiting.   Endocrine: Negative for cold intolerance and heat intolerance.   Genitourinary:  Negative for dysuria, frequency and urgency.   Musculoskeletal:  Negative for arthralgias and back pain.   Skin:  Negative for rash and wound.   Neurological:  Negative for dizziness, speech difficulty, weakness and headaches.   Hematological:  Negative for adenopathy.   Psychiatric/Behavioral:  Negative for hallucinations and suicidal ideas.        Objective      Vital Signs  Temp:  [97.4 °F (36.3 °C)-97.5 °F (36.4 °C)] 97.5 °F (36.4 °C)  Heart Rate:  [67-81] 67  Resp:  [18-20] 18  BP: ()/(63-69) 103/69  Body mass index is 29.32 kg/m².    Physical Exam  Constitutional:       Appearance: He is ill-appearing.   HENT:      Head:      Comments: Prior surgical site C/D/I  Pulmonary:      Effort:  Pulmonary effort is normal. No respiratory distress.      Breath sounds: No stridor.   Musculoskeletal:      Right lower leg: No edema.      Left lower leg: No edema.   Skin:     Coloration: Skin is not pale.   Neurological:      General: No focal deficit present.      Mental Status: He is alert and oriented to person, place, and time.         Results Review:   I have personally reviewed:  [x]  Laboratory  [x]  Old records  [x]  Radiology   [x]  EKG/Telemetry   []  Microbiology    [x]  Cardiology/Vascular   []  Pathology          Active Hospital Problems    Diagnosis  POA    Transaminitis [R74.01]  Yes    Primary CNS lymphoma [C85.89]  Yes    Abnormal liver function tests [R79.89]  Yes    Steroid-induced hyperglycemia [R73.9, T38.0X5A]  Yes    Hypertension [I10]  Yes       Steroid induced hyperglycemia  -home regimen: Tresiba 30 units, aspart 10 units TIDAC + SSI  -will decrease to glargine 20 units, lispro 5 units TIDAC + SSI  -POC glucose, monitor    Primary CNS diffuse large B-cell lymphoma  -per Oncology  -Keppra, Bactrim PPx     Elevated transaminases  -likely 2/2 CTX  -prior liver US unremarkable  -stable since 7/23 discharge, slightly down trending    HTN  -HCTZ, losartan      Thank you very much for asking [facility] to be involved in this patient's care. We will follow along with you.      Bull Simon MD  [facility]  08/02/24  14:53 EDT

## 2024-08-03 LAB
ALBUMIN SERPL-MCNC: 3.4 G/DL (ref 3.5–5.2)
ALBUMIN/GLOB SERPL: 1.8 G/DL
ALP SERPL-CCNC: 126 U/L (ref 39–117)
ALT SERPL W P-5'-P-CCNC: 344 U/L (ref 1–41)
ANION GAP SERPL CALCULATED.3IONS-SCNC: 10 MMOL/L (ref 5–15)
AST SERPL-CCNC: 102 U/L (ref 1–40)
BASOPHILS # BLD AUTO: 0.02 10*3/MM3 (ref 0–0.2)
BASOPHILS NFR BLD AUTO: 0.5 % (ref 0–1.5)
BILIRUB SERPL-MCNC: 1 MG/DL (ref 0–1.2)
BUN SERPL-MCNC: 18 MG/DL (ref 8–23)
BUN/CREAT SERPL: 26.5 (ref 7–25)
CALCIUM SPEC-SCNC: 8.7 MG/DL (ref 8.6–10.5)
CHLORIDE SERPL-SCNC: 100 MMOL/L (ref 98–107)
CO2 SERPL-SCNC: 26 MMOL/L (ref 22–29)
CREAT SERPL-MCNC: 0.68 MG/DL (ref 0.76–1.27)
DEPRECATED RDW RBC AUTO: 45.6 FL (ref 37–54)
EGFRCR SERPLBLD CKD-EPI 2021: 106.4 ML/MIN/1.73
EOSINOPHIL # BLD AUTO: 0.01 10*3/MM3 (ref 0–0.4)
EOSINOPHIL NFR BLD AUTO: 0.2 % (ref 0.3–6.2)
ERYTHROCYTE [DISTWIDTH] IN BLOOD BY AUTOMATED COUNT: 14.6 % (ref 12.3–15.4)
GLOBULIN UR ELPH-MCNC: 1.9 GM/DL
GLUCOSE BLDC GLUCOMTR-MCNC: 174 MG/DL (ref 70–130)
GLUCOSE BLDC GLUCOMTR-MCNC: 186 MG/DL (ref 70–130)
GLUCOSE BLDC GLUCOMTR-MCNC: 276 MG/DL (ref 70–130)
GLUCOSE BLDC GLUCOMTR-MCNC: 318 MG/DL (ref 70–130)
GLUCOSE SERPL-MCNC: 184 MG/DL (ref 65–99)
HCT VFR BLD AUTO: 39.2 % (ref 37.5–51)
HGB BLD-MCNC: 13.3 G/DL (ref 13–17.7)
IMM GRANULOCYTES # BLD AUTO: 0.16 10*3/MM3 (ref 0–0.05)
IMM GRANULOCYTES NFR BLD AUTO: 3.6 % (ref 0–0.5)
LYMPHOCYTES # BLD AUTO: 0.95 10*3/MM3 (ref 0.7–3.1)
LYMPHOCYTES NFR BLD AUTO: 21.4 % (ref 19.6–45.3)
MAGNESIUM SERPL-MCNC: 2.1 MG/DL (ref 1.6–2.4)
MCH RBC QN AUTO: 30 PG (ref 26.6–33)
MCHC RBC AUTO-ENTMCNC: 33.9 G/DL (ref 31.5–35.7)
MCV RBC AUTO: 88.5 FL (ref 79–97)
MONOCYTES # BLD AUTO: 0.46 10*3/MM3 (ref 0.1–0.9)
MONOCYTES NFR BLD AUTO: 10.4 % (ref 5–12)
NEUTROPHILS NFR BLD AUTO: 2.84 10*3/MM3 (ref 1.7–7)
NEUTROPHILS NFR BLD AUTO: 63.9 % (ref 42.7–76)
PH UR STRIP.AUTO: 7.5 [PH] (ref 5–8)
PHOSPHATE SERPL-MCNC: 2.8 MG/DL (ref 2.5–4.5)
PLATELET # BLD AUTO: 142 10*3/MM3 (ref 140–450)
PMV BLD AUTO: 10.8 FL (ref 6–12)
POTASSIUM SERPL-SCNC: 3.6 MMOL/L (ref 3.5–5.2)
PROT SERPL-MCNC: 5.3 G/DL (ref 6–8.5)
RBC # BLD AUTO: 4.43 10*6/MM3 (ref 4.14–5.8)
SODIUM SERPL-SCNC: 136 MMOL/L (ref 136–145)
WBC NRBC COR # BLD AUTO: 4.44 10*3/MM3 (ref 3.4–10.8)

## 2024-08-03 PROCEDURE — 82948 REAGENT STRIP/BLOOD GLUCOSE: CPT

## 2024-08-03 PROCEDURE — 85025 COMPLETE CBC W/AUTO DIFF WBC: CPT | Performed by: INTERNAL MEDICINE

## 2024-08-03 PROCEDURE — 99232 SBSQ HOSP IP/OBS MODERATE 35: CPT | Performed by: INTERNAL MEDICINE

## 2024-08-03 PROCEDURE — 80053 COMPREHEN METABOLIC PANEL: CPT | Performed by: INTERNAL MEDICINE

## 2024-08-03 PROCEDURE — 25010000002 ENOXAPARIN PER 10 MG: Performed by: INTERNAL MEDICINE

## 2024-08-03 PROCEDURE — 83735 ASSAY OF MAGNESIUM: CPT | Performed by: INTERNAL MEDICINE

## 2024-08-03 PROCEDURE — 81003 URINALYSIS AUTO W/O SCOPE: CPT | Performed by: INTERNAL MEDICINE

## 2024-08-03 PROCEDURE — 63710000001 INSULIN GLARGINE PER 5 UNITS: Performed by: STUDENT IN AN ORGANIZED HEALTH CARE EDUCATION/TRAINING PROGRAM

## 2024-08-03 PROCEDURE — 84100 ASSAY OF PHOSPHORUS: CPT | Performed by: INTERNAL MEDICINE

## 2024-08-03 PROCEDURE — 63710000001 INSULIN LISPRO (HUMAN) PER 5 UNITS: Performed by: STUDENT IN AN ORGANIZED HEALTH CARE EDUCATION/TRAINING PROGRAM

## 2024-08-03 RX ORDER — ACYCLOVIR 400 MG/1
400 TABLET ORAL 2 TIMES DAILY
Status: DISCONTINUED | OUTPATIENT
Start: 2024-08-03 | End: 2024-08-10 | Stop reason: HOSPADM

## 2024-08-03 RX ORDER — INSULIN LISPRO 100 [IU]/ML
8 INJECTION, SOLUTION INTRAVENOUS; SUBCUTANEOUS
Status: DISCONTINUED | OUTPATIENT
Start: 2024-08-03 | End: 2024-08-04

## 2024-08-03 RX ADMIN — ZOLPIDEM TARTRATE 10 MG: 5 TABLET ORAL at 20:08

## 2024-08-03 RX ADMIN — HYDROCHLOROTHIAZIDE 12.5 MG: 12.5 TABLET ORAL at 09:00

## 2024-08-03 RX ADMIN — SODIUM BICARBONATE 150 ML/HR: 84 INJECTION, SOLUTION INTRAVENOUS at 12:30

## 2024-08-03 RX ADMIN — INSULIN LISPRO 10 UNITS: 100 INJECTION, SOLUTION INTRAVENOUS; SUBCUTANEOUS at 18:06

## 2024-08-03 RX ADMIN — DEXAMETHASONE 2 MG: 2 TABLET ORAL at 09:00

## 2024-08-03 RX ADMIN — INSULIN LISPRO 8 UNITS: 100 INJECTION, SOLUTION INTRAVENOUS; SUBCUTANEOUS at 18:06

## 2024-08-03 RX ADMIN — LOSARTAN POTASSIUM 50 MG: 50 TABLET, FILM COATED ORAL at 09:00

## 2024-08-03 RX ADMIN — DEXAMETHASONE 2 MG: 2 TABLET ORAL at 18:06

## 2024-08-03 RX ADMIN — INSULIN LISPRO 8 UNITS: 100 INJECTION, SOLUTION INTRAVENOUS; SUBCUTANEOUS at 20:16

## 2024-08-03 RX ADMIN — INSULIN GLARGINE 20 UNITS: 100 INJECTION, SOLUTION SUBCUTANEOUS at 09:01

## 2024-08-03 RX ADMIN — SODIUM BICARBONATE 150 ML/HR: 84 INJECTION, SOLUTION INTRAVENOUS at 20:16

## 2024-08-03 RX ADMIN — FAMOTIDINE 20 MG: 20 TABLET, FILM COATED ORAL at 18:06

## 2024-08-03 RX ADMIN — LEVETIRACETAM 500 MG: 500 TABLET, FILM COATED ORAL at 09:00

## 2024-08-03 RX ADMIN — INSULIN LISPRO 3 UNITS: 100 INJECTION, SOLUTION INTRAVENOUS; SUBCUTANEOUS at 12:30

## 2024-08-03 RX ADMIN — INSULIN LISPRO 8 UNITS: 100 INJECTION, SOLUTION INTRAVENOUS; SUBCUTANEOUS at 12:30

## 2024-08-03 RX ADMIN — INSULIN LISPRO 3 UNITS: 100 INJECTION, SOLUTION INTRAVENOUS; SUBCUTANEOUS at 09:01

## 2024-08-03 RX ADMIN — FAMOTIDINE 20 MG: 20 TABLET, FILM COATED ORAL at 05:39

## 2024-08-03 RX ADMIN — ENOXAPARIN SODIUM 40 MG: 100 INJECTION SUBCUTANEOUS at 09:00

## 2024-08-03 RX ADMIN — SODIUM BICARBONATE 150 ML/HR: 84 INJECTION, SOLUTION INTRAVENOUS at 05:37

## 2024-08-03 RX ADMIN — ACYCLOVIR 400 MG: 400 TABLET ORAL at 20:08

## 2024-08-03 RX ADMIN — INSULIN LISPRO 8 UNITS: 100 INJECTION, SOLUTION INTRAVENOUS; SUBCUTANEOUS at 09:01

## 2024-08-03 RX ADMIN — LEVETIRACETAM 500 MG: 500 TABLET, FILM COATED ORAL at 20:08

## 2024-08-03 RX ADMIN — HYDROCODONE BITARTRATE AND ACETAMINOPHEN 1 TABLET: 5; 325 TABLET ORAL at 18:15

## 2024-08-03 NOTE — PROGRESS NOTES
[facility] GROUP INPATIENT PROGRESS NOTE    Length of Stay:  1 days    CHIEF COMPLAINT:  Primary CNS diffuse large B cell non-Hodgkin's lymphoma, steroid-induced diabetes, hepatitis C    SUBJECTIVE:   Patient with no complaints today.  He is ambulating with use of a walker.    ROS:  Review of Systems   Comprehensive review of systems was obtained with pertinent positive findings as noted in the interval history above.  All other systems negative.    OBJECTIVE:  Vitals:    08/02/24 1939 08/03/24 0327 08/03/24 0535 08/03/24 0700   BP: 111/70 138/85  120/87   BP Location: Right arm Right arm  Right arm   Patient Position: Lying Lying  Lying   Pulse: 74 54  57   Resp: 16 16  16   Temp: 97.9 °F (36.6 °C) 97.7 °F (36.5 °C)  98 °F (36.7 °C)   TempSrc: Oral Oral  Oral   SpO2: 95% 95%  96%   Weight:   95.3 kg (210 lb)    Height:             PHYSICAL EXAMINATION:  General: Alert and orient x 3 no distress  Chest/Lungs: Clear to auscultation bilaterally  Heart: Regular rate and rhythm  Abdomen/GI: Soft nontender nondistended bowel sounds present  Extremities: No edema    DIAGNOSTIC DATA:  Results Review:     I reviewed the patient's new clinical results.    Results from last 7 days   Lab Units 08/03/24  0338 08/02/24  1436 08/02/24  0803   WBC 10*3/mm3 4.44 5.45 5.76   HEMOGLOBIN g/dL 13.3 14.7 15.3   HEMATOCRIT % 39.2 42.7 44.5   PLATELETS 10*3/mm3 142 172 208      Results from last 7 days   Lab Units 08/03/24  0338 08/02/24  1436 08/02/24  0803   SODIUM mmol/L 136 135* 139   POTASSIUM mmol/L 3.6 3.8 4.0   CHLORIDE mmol/L 100 101 105   CO2 mmol/L 26.0  --  21.9*   BUN mg/dL 18 22 23   CREATININE mg/dL 0.68* 0.72* 0.64*   CALCIUM mg/dL 8.7 9.2 9.2   BILIRUBIN mg/dL 1.0 1.1 1.2   ALK PHOS U/L 126* 135* 151*   ALT (SGPT) U/L 344* 394* 470*   AST (SGOT) U/L 102* 119* 106*   GLUCOSE mg/dL 184* 239* 193*      Lab Results   Component Value Date    NEUTROABS 2.84 08/03/2024     Results from last 7 days   Lab Units  08/02/24  0820   INR  1.05   APTT seconds 25.0     Results from last 7 days   Lab Units 08/03/24  0338   MAGNESIUM mg/dL 2.1           Assessment & Plan   ASSESSMENT/PLAN:  This is a 58 y.o. 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  Associated vasogenic edema extending across the midline into the corpus callosum with mass effect and 1.4 cm left shift.  CT chest abdomen pelvis 6/16/2024 with borderline enlarged carley hepatis lymph nodes, indeterminate.  Brain biopsy on 6/19/2024 with diffuse large B cell non-Hodgkin's lymphoma with non-germinal center phenotype, VANIA negative, Ki-67 80%, FISH negative for Bcl-2, BCL6, MYC rearrangements.  HIV 1/2 negative on 6/23/2024  LDH borderline elevated at 305 on 6/22/2024  Patient unable to perform outpatient PET scan due to hyperglycemia  Patient initiated treatment with high-dose methotrexate and rituximab.  Initial 25% dose reduction in high-dose methotrexate from 8 g/m² down to 6 g/m² with cycle 1 due to baseline elevated LFTs.  Treatment initiated 7/15/2024.  Significant elevation in transaminases following cycle 1 high-dose methotrexate.  Treated with ursodiol.  Subsequent improvement.  Patient continuing on chronic steroids due to cerebral edema, Decadron 2 mg twice daily  Patient admitted 8/2/2024 for hydration/urine alkalinization prior to initiation of cycle 2 rituximab and high-dose methotrexate on 8/4/2024  Patient today continuing on hydration/urine alkalinization with bicarbonate drip 150 cc/h.  Urine pH today 7.5.  Plan to initiate cycle 2 high-dose methotrexate on 8/4/24.  Maintain previous dose reduction at 6 g/m² due to elevated LFTs.  Rituximab due on day 3.  Leucovorin rescue to begin 24 hours after initiation of methotrexate, 25 mg IV every 6 hours.  Laboratory monitoring with methotrexate levels beginning 24  hours after initiation of methotrexate infusion.  Close monitoring of LFTs.    *Steroid-induced diabetes  Patient with diabetes exacerbated by steroids  [facility] consulted to assist with diabetic management  Patient currently receiving Lantus 20 units daily and Humalog 8 units 3 times daily with meals in addition to sliding scale 4 times daily     *Hepatitis C, elevated LFTs  Patient with hepatitis C, genotype Ia  HCVRNA 7,590,000 on 1/30/2024  HCVRNA 7,220,000 on 5/16/2024  Patient was preparing for treatment through hepatology at [facility] with Mavyret x 8 weeks in June 2024 however was not able to begin treatment due to cost issues and subsequent diagnosis of CNS lymphoma  Treatment with high-dose methotrexate for CNS lymphoma complicated by significant escalation of transaminases  Patient treated with ursodiol with improvement  Labs today with stable LFTs with , , normal total bilirubin 1.0.  Concern regarding untreated hepatitis C in the setting of current immunosuppressive treatment including rituximab.  On Monday we will need to contact patient's hepatologist at [facility] to discuss recommendations regarding hepatitis C management as patient continues on treatment for lymphoma    *Polysubstance use  History of significant alcohol use, decreased following diagnosis with hepatitis C in early 2024  UDS positive for THC on 6/15/2024    *Hypertension  Losartan 50 mg daily, HCTZ 12.5 mg daily    *Seizure prophylaxis  Keppra 500 mg every 12 hours    *PCP prophylaxis  Bactrim DS 1 p.o. Monday Wednesday Friday as outpatient  Bactrim currently on hold due to expected transaminitis from methotrexate    *Viral prophylaxis  Begin acyclovir 400 mg twice daily    *GI prophylaxis  Pepcid 20 mg twice daily    *DVT prophylaxis  Lovenox 40 mg daily    *Insomnia  Ambien 10 mg nightly    *Pain control  Hydrocodone 10/325 every 4 hours as needed    *Nausea  Zofran 4 mg IV every 6 hours as  needed  Compazine 10 mg every 6 hours as needed      Plan:  Continue sodium bicarbonate drip 150 cc/h for urine alkalinization prior to high-dose methotrexate  On 8/4/24 proceed with cycle 2 high-dose methotrexate and rituximab.  Methotrexate 6 g/m² (maintaining previous dose reduction) over 4 hours.  Patient will begin leucovorin rescue 24 hours after initiation of methotrexate at 25 mg IV every 6 hours  Patient will begin every 24-hour methotrexate level monitoring, first level to be sent 24 hours after initiation of methotrexate  Rituximab will be due on day 3  Close monitoring of LFTs following methotrexate administration.  Consider use of ursodiol again if needed.  Continue dexamethasone 2 mg twice daily  Continue Keppra 500 mg twice daily  [facility] consulted for assistance with management of steroid-induced diabetes during hospitalization  Begin viral prophylaxis with acyclovir 400 mg twice daily  Viral prophylaxis with Bactrim on hold during high-dose methotrexate treatment  Daily CBC, CMP, urine pH    Discussed with patient at bedside           German Sanchez MD

## 2024-08-03 NOTE — PLAN OF CARE
Goal Outcome Evaluation:   Patient A&Ox4.  Sodium bicarb @ 150/hr currently infusing.  Port flushes well with good blood return.  New order for acyclovir BID.  Insulin given as needed.  Ambulates with walker.  VSS.

## 2024-08-04 ENCOUNTER — READMISSION MANAGEMENT (OUTPATIENT)
Dept: CALL CENTER | Facility: HOSPITAL | Age: 59
End: 2024-08-04
Payer: COMMERCIAL

## 2024-08-04 LAB
ALBUMIN SERPL-MCNC: 3 G/DL (ref 3.5–5.2)
ALBUMIN/GLOB SERPL: 1.6 G/DL
ALP SERPL-CCNC: 167 U/L (ref 39–117)
ALT SERPL W P-5'-P-CCNC: 304 U/L (ref 1–41)
ANION GAP SERPL CALCULATED.3IONS-SCNC: 8.6 MMOL/L (ref 5–15)
AST SERPL-CCNC: 78 U/L (ref 1–40)
BASOPHILS # BLD AUTO: 0.01 10*3/MM3 (ref 0–0.2)
BASOPHILS NFR BLD AUTO: 0.2 % (ref 0–1.5)
BILIRUB SERPL-MCNC: 0.5 MG/DL (ref 0–1.2)
BUN SERPL-MCNC: 25 MG/DL (ref 8–23)
BUN/CREAT SERPL: 41.7 (ref 7–25)
CALCIUM SPEC-SCNC: 8.8 MG/DL (ref 8.6–10.5)
CHLORIDE SERPL-SCNC: 103 MMOL/L (ref 98–107)
CO2 SERPL-SCNC: 25.4 MMOL/L (ref 22–29)
CREAT SERPL-MCNC: 0.6 MG/DL (ref 0.76–1.27)
DEPRECATED RDW RBC AUTO: 46.9 FL (ref 37–54)
EGFRCR SERPLBLD CKD-EPI 2021: 110.5 ML/MIN/1.73
EOSINOPHIL # BLD AUTO: 0.02 10*3/MM3 (ref 0–0.4)
EOSINOPHIL NFR BLD AUTO: 0.5 % (ref 0.3–6.2)
ERYTHROCYTE [DISTWIDTH] IN BLOOD BY AUTOMATED COUNT: 14.7 % (ref 12.3–15.4)
GLOBULIN UR ELPH-MCNC: 1.9 GM/DL
GLUCOSE BLDC GLUCOMTR-MCNC: 185 MG/DL (ref 70–130)
GLUCOSE BLDC GLUCOMTR-MCNC: 269 MG/DL (ref 70–130)
GLUCOSE BLDC GLUCOMTR-MCNC: 272 MG/DL (ref 70–130)
GLUCOSE BLDC GLUCOMTR-MCNC: 281 MG/DL (ref 70–130)
GLUCOSE SERPL-MCNC: 258 MG/DL (ref 65–99)
HCT VFR BLD AUTO: 38 % (ref 37.5–51)
HGB BLD-MCNC: 12.8 G/DL (ref 13–17.7)
IMM GRANULOCYTES # BLD AUTO: 0.18 10*3/MM3 (ref 0–0.05)
IMM GRANULOCYTES NFR BLD AUTO: 4.1 % (ref 0–0.5)
LYMPHOCYTES # BLD AUTO: 0.7 10*3/MM3 (ref 0.7–3.1)
LYMPHOCYTES NFR BLD AUTO: 16.1 % (ref 19.6–45.3)
MCH RBC QN AUTO: 30.3 PG (ref 26.6–33)
MCHC RBC AUTO-ENTMCNC: 33.7 G/DL (ref 31.5–35.7)
MCV RBC AUTO: 90 FL (ref 79–97)
MONOCYTES # BLD AUTO: 0.5 10*3/MM3 (ref 0.1–0.9)
MONOCYTES NFR BLD AUTO: 11.5 % (ref 5–12)
NEUTROPHILS NFR BLD AUTO: 2.95 10*3/MM3 (ref 1.7–7)
NEUTROPHILS NFR BLD AUTO: 67.6 % (ref 42.7–76)
PH UR STRIP.AUTO: 8 [PH] (ref 5–8)
PLATELET # BLD AUTO: 128 10*3/MM3 (ref 140–450)
PMV BLD AUTO: 10.7 FL (ref 6–12)
POTASSIUM SERPL-SCNC: 3.6 MMOL/L (ref 3.5–5.2)
PROT SERPL-MCNC: 4.9 G/DL (ref 6–8.5)
RBC # BLD AUTO: 4.22 10*6/MM3 (ref 4.14–5.8)
SODIUM SERPL-SCNC: 137 MMOL/L (ref 136–145)
WBC NRBC COR # BLD AUTO: 4.36 10*3/MM3 (ref 3.4–10.8)

## 2024-08-04 PROCEDURE — 63710000001 INSULIN LISPRO (HUMAN) PER 5 UNITS: Performed by: STUDENT IN AN ORGANIZED HEALTH CARE EDUCATION/TRAINING PROGRAM

## 2024-08-04 PROCEDURE — 82948 REAGENT STRIP/BLOOD GLUCOSE: CPT

## 2024-08-04 PROCEDURE — 85025 COMPLETE CBC W/AUTO DIFF WBC: CPT | Performed by: INTERNAL MEDICINE

## 2024-08-04 PROCEDURE — 25010000002 ONDANSETRON PER 1 MG: Performed by: INTERNAL MEDICINE

## 2024-08-04 PROCEDURE — 25010000002 DEXAMETHASONE SODIUM PHOSPHATE 100 MG/10ML SOLUTION 10 ML VIAL: Performed by: INTERNAL MEDICINE

## 2024-08-04 PROCEDURE — 80053 COMPREHEN METABOLIC PANEL: CPT | Performed by: INTERNAL MEDICINE

## 2024-08-04 PROCEDURE — 99232 SBSQ HOSP IP/OBS MODERATE 35: CPT | Performed by: INTERNAL MEDICINE

## 2024-08-04 PROCEDURE — 81003 URINALYSIS AUTO W/O SCOPE: CPT | Performed by: INTERNAL MEDICINE

## 2024-08-04 PROCEDURE — 63710000001 INSULIN GLARGINE PER 5 UNITS: Performed by: STUDENT IN AN ORGANIZED HEALTH CARE EDUCATION/TRAINING PROGRAM

## 2024-08-04 PROCEDURE — 25810000003 SODIUM CHLORIDE 0.9 % SOLUTION 1,000 ML FLEX CONT: Performed by: INTERNAL MEDICINE

## 2024-08-04 PROCEDURE — 25010000002 METHOTREXATE PF 100 MG/4ML SOLUTION 40 ML VIAL: Performed by: INTERNAL MEDICINE

## 2024-08-04 RX ORDER — INSULIN LISPRO 100 [IU]/ML
10 INJECTION, SOLUTION INTRAVENOUS; SUBCUTANEOUS
Status: DISCONTINUED | OUTPATIENT
Start: 2024-08-04 | End: 2024-08-05

## 2024-08-04 RX ORDER — INSULIN LISPRO 100 [IU]/ML
10 INJECTION, SOLUTION INTRAVENOUS; SUBCUTANEOUS
Status: DISCONTINUED | OUTPATIENT
Start: 2024-08-04 | End: 2024-08-04

## 2024-08-04 RX ADMIN — INSULIN LISPRO 8 UNITS: 100 INJECTION, SOLUTION INTRAVENOUS; SUBCUTANEOUS at 08:38

## 2024-08-04 RX ADMIN — SODIUM BICARBONATE 150 ML/HR: 84 INJECTION, SOLUTION INTRAVENOUS at 10:00

## 2024-08-04 RX ADMIN — SODIUM BICARBONATE 150 ML/HR: 84 INJECTION, SOLUTION INTRAVENOUS at 03:46

## 2024-08-04 RX ADMIN — INSULIN GLARGINE 25 UNITS: 100 INJECTION, SOLUTION SUBCUTANEOUS at 08:38

## 2024-08-04 RX ADMIN — DEXAMETHASONE 2 MG: 2 TABLET ORAL at 08:38

## 2024-08-04 RX ADMIN — INSULIN LISPRO 10 UNITS: 100 INJECTION, SOLUTION INTRAVENOUS; SUBCUTANEOUS at 17:49

## 2024-08-04 RX ADMIN — ZOLPIDEM TARTRATE 10 MG: 5 TABLET ORAL at 21:28

## 2024-08-04 RX ADMIN — SODIUM BICARBONATE 150 ML/HR: 84 INJECTION, SOLUTION INTRAVENOUS at 16:30

## 2024-08-04 RX ADMIN — FAMOTIDINE 20 MG: 20 TABLET, FILM COATED ORAL at 07:04

## 2024-08-04 RX ADMIN — ACYCLOVIR 400 MG: 400 TABLET ORAL at 08:38

## 2024-08-04 RX ADMIN — INSULIN LISPRO 8 UNITS: 100 INJECTION, SOLUTION INTRAVENOUS; SUBCUTANEOUS at 17:49

## 2024-08-04 RX ADMIN — LEVETIRACETAM 500 MG: 500 TABLET, FILM COATED ORAL at 08:38

## 2024-08-04 RX ADMIN — DEXAMETHASONE SODIUM PHOSPHATE: 10 INJECTION, SOLUTION INTRAMUSCULAR; INTRAVENOUS at 10:51

## 2024-08-04 RX ADMIN — METHOTREXATE 12780 MG: 25 INJECTION, SOLUTION INTRA-ARTERIAL; INTRAMUSCULAR; INTRATHECAL; INTRAVENOUS at 11:17

## 2024-08-04 RX ADMIN — LOSARTAN POTASSIUM 50 MG: 50 TABLET, FILM COATED ORAL at 08:38

## 2024-08-04 RX ADMIN — DEXAMETHASONE 2 MG: 2 TABLET ORAL at 17:49

## 2024-08-04 RX ADMIN — HYDROCHLOROTHIAZIDE 12.5 MG: 12.5 TABLET ORAL at 08:38

## 2024-08-04 RX ADMIN — INSULIN LISPRO 10 UNITS: 100 INJECTION, SOLUTION INTRAVENOUS; SUBCUTANEOUS at 08:38

## 2024-08-04 RX ADMIN — LEVETIRACETAM 500 MG: 500 TABLET, FILM COATED ORAL at 21:28

## 2024-08-04 RX ADMIN — ACYCLOVIR 400 MG: 400 TABLET ORAL at 21:28

## 2024-08-04 RX ADMIN — INSULIN LISPRO 3 UNITS: 100 INJECTION, SOLUTION INTRAVENOUS; SUBCUTANEOUS at 12:21

## 2024-08-04 RX ADMIN — FAMOTIDINE 20 MG: 20 TABLET, FILM COATED ORAL at 17:49

## 2024-08-04 RX ADMIN — SODIUM BICARBONATE 150 ML/HR: 84 INJECTION, SOLUTION INTRAVENOUS at 23:30

## 2024-08-04 RX ADMIN — INSULIN LISPRO 8 UNITS: 100 INJECTION, SOLUTION INTRAVENOUS; SUBCUTANEOUS at 21:28

## 2024-08-04 RX ADMIN — INSULIN LISPRO 10 UNITS: 100 INJECTION, SOLUTION INTRAVENOUS; SUBCUTANEOUS at 12:22

## 2024-08-04 NOTE — PROGRESS NOTES
[facility] GROUP INPATIENT PROGRESS NOTE    Length of Stay:  2 days    CHIEF COMPLAINT:  Primary CNS diffuse large B cell non-Hodgkin's lymphoma, steroid-induced diabetes, hepatitis C    SUBJECTIVE:   Patient with no complaints today, initiating high-dose methotrexate    ROS:  Review of Systems   A comprehensive review of systems was obtained with pertinent positive findings as noted in the interval history above.  All other systems negative.    OBJECTIVE:  Vitals:    08/03/24 1704 08/03/24 1900 08/04/24 0339 08/04/24 0556   BP: 114/75 104/68 142/99    BP Location: Right arm Right arm Right arm    Patient Position: Lying Lying Lying    Pulse: 73 103 64    Resp: 16 16 18    Temp: 98 °F (36.7 °C) 98.1 °F (36.7 °C) 97.3 °F (36.3 °C)    TempSrc: Oral Oral Oral    SpO2: 97% 97% 96%    Weight:    97.9 kg (215 lb 13.3 oz)   Height:             PHYSICAL EXAMINATION:  General: Alert and orient x 3 no distress  Chest/Lungs: Clear to auscultation bilaterally  Heart: Regular rate and rhythm  Abdomen/GI: Soft nontender nondistended bowel sounds present  Extremities: No edema    Patient was examined today, unchanged from above    DIAGNOSTIC DATA:  Results Review:     I reviewed the patient's new clinical results.    Results from last 7 days   Lab Units 08/04/24 0425 08/03/24 0338 08/02/24  1436   WBC 10*3/mm3 4.36 4.44 5.45   HEMOGLOBIN g/dL 12.8* 13.3 14.7   HEMATOCRIT % 38.0 39.2 42.7   PLATELETS 10*3/mm3 128* 142 172      Results from last 7 days   Lab Units 08/04/24  0425 08/03/24  0338 08/02/24  1436 08/02/24  0803   SODIUM mmol/L 137 136 135* 139   POTASSIUM mmol/L 3.6 3.6 3.8 4.0   CHLORIDE mmol/L 103 100 101 105   CO2 mmol/L 25.4 26.0  --  21.9*   BUN mg/dL 25* 18 22 23   CREATININE mg/dL 0.60* 0.68* 0.72* 0.64*   CALCIUM mg/dL 8.8 8.7 9.2 9.2   BILIRUBIN mg/dL 0.5 1.0 1.1 1.2   ALK PHOS U/L 167* 126* 135* 151*   ALT (SGPT) U/L 304* 344* 394* 470*   AST (SGOT) U/L 78* 102* 119* 106*   GLUCOSE mg/dL 258* 184*  239* 193*      Lab Results   Component Value Date    NEUTROABS 2.95 08/04/2024     Results from last 7 days   Lab Units 08/02/24  0820   INR  1.05   APTT seconds 25.0     Results from last 7 days   Lab Units 08/03/24  0338   MAGNESIUM mg/dL 2.1           Assessment & Plan   ASSESSMENT/PLAN:  This is a 58 y.o. 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  Associated vasogenic edema extending across the midline into the corpus callosum with mass effect and 1.4 cm left shift.  CT chest abdomen pelvis 6/16/2024 with borderline enlarged carley hepatis lymph nodes, indeterminate.  Brain biopsy on 6/19/2024 with diffuse large B cell non-Hodgkin's lymphoma with non-germinal center phenotype, VANIA negative, Ki-67 80%, FISH negative for Bcl-2, BCL6, MYC rearrangements.  HIV 1/2 negative on 6/23/2024  LDH borderline elevated at 305 on 6/22/2024  Patient unable to perform outpatient PET scan due to hyperglycemia  Patient initiated treatment with high-dose methotrexate and rituximab.  Initial 25% dose reduction in high-dose methotrexate from 8 g/m² down to 6 g/m² with cycle 1 due to baseline elevated LFTs.  Treatment initiated 7/15/2024.  Significant elevation in transaminases following cycle 1 high-dose methotrexate.  Treated with ursodiol.  Subsequent improvement.  Patient continuing on chronic steroids due to cerebral edema, Decadron 2 mg twice daily  Patient admitted 8/2/2024 for hydration/urine alkalinization prior to initiation of cycle 2 rituximab and high-dose methotrexate   On 8/4/2024 initiated cycle 2 high-dose methotrexate and rituximab.  Methotrexate dose maintained at previous 25% dose reduction, 6 g/m² over 4 hours.  Rituximab due on day 3.  Patient is continuing on hydration/urine alkalinization with bicarbonate drip 150 cc/h.  Urine pH today is 8.0 today.  LFTs are  stable today at patient's apparent baseline with AST 78, , total bilirubin normal at 0.5.  LFTs were in this range when patient initiated cycle 1.  Proceed today as planned.  Leucovorin rescue to begin on 8/6/2024, 24 hours after initiation of methotrexate at dose of 25 mg IV every 6 hours.  Methotrexate levels to be monitored, first level to be drawn on 8/6/2024, 24 hours after initiation of methotrexate.    *Steroid-induced diabetes  Patient with diabetes exacerbated by steroids  [facility] consulted to assist with diabetic management  Patient currently receiving Lantus 20 units daily and Humalog 8 units 3 times daily with meals in addition to sliding scale 4 times daily   Glucose has been in the 200-300 range    *Hepatitis C, elevated LFTs  Patient with hepatitis C, genotype Ia  HCVRNA 7,590,000 on 1/30/2024  HCVRNA 7,220,000 on 5/16/2024  Patient was preparing for treatment through hepatology at [facility] with Mavyret x 8 weeks in June 2024 however was not able to begin treatment due to cost issues and subsequent diagnosis of CNS lymphoma  Treatment with high-dose methotrexate for CNS lymphoma complicated by significant escalation of transaminases  Patient treated with ursodiol with improvement, ursodiol subsequently held with return of LFTs to baseline range.  Concern regarding untreated hepatitis C in the setting of current immunosuppressive treatment including rituximab.  On 8/6/2024 we will need to contact patient's hepatologist at [facility] to discuss recommendations regarding hepatitis C management as patient continues on treatment for lymphoma  LFTs today continue at patient's apparent baseline with AST 78, , total bilirubin 0.5.  Plan to closely monitor with initiation of high-dose methotrexate today.    *Polysubstance use  History of significant alcohol use, decreased following diagnosis with hepatitis C in early 2024  UDS positive for THC on  6/15/2024    *Hypertension  Losartan 50 mg daily, HCTZ 12.5 mg daily    *Seizure prophylaxis  Keppra 500 mg every 12 hours    *PCP prophylaxis  Bactrim DS 1 p.o. Monday Wednesday Friday as outpatient  Bactrim currently on hold due to expected transaminitis from methotrexate    *Viral prophylaxis  Acyclovir 400 mg twice daily    *GI prophylaxis  Pepcid 20 mg twice daily    *DVT prophylaxis  Lovenox 40 mg daily    *Insomnia  Ambien 10 mg nightly    *Pain control  Hydrocodone 10/325 every 4 hours as needed    *Nausea  Zofran 4 mg IV every 6 hours as needed  Compazine 10 mg every 6 hours as needed      Plan:  Continue sodium bicarbonate drip 150 cc/h for urine alkalinization prior to high-dose methotrexate  Proceed today with cycle 2 high-dose methotrexate and rituximab.  Methotrexate 6 g/m² (maintaining previous 25% dose reduction) over 4 hours.  Patient will begin leucovorin rescue 24 hours after initiation of methotrexate at 50 mg IV every 6 hours  Patient will begin every 24-hour methotrexate level monitoring, first level to be sent 24 hours after initiation of methotrexate  Rituximab will be due on day 3  Close monitoring of LFTs following methotrexate administration.  Consider use of ursodiol again if needed.  Continue dexamethasone 2 mg twice daily  Continue Keppra 500 mg twice daily  [facility] consulted for assistance with management of steroid-induced diabetes during hospitalization  Continue viral prophylaxis with acyclovir 400 mg twice daily  PCP prophylaxis with Bactrim on hold during high-dose methotrexate treatment  On 8/6/2024 we will need to contact patient's hepatologist (was seen by NP initially) at [facility] to discuss recommendations for management of hepatitis C in light of ongoing treatment for CNS lymphoma with high-dose methotrexate/rituximab  Daily CBC, CMP, urine pH    Discussed with patient at bedside           German Sanchez MD

## 2024-08-04 NOTE — OUTREACH NOTE
Medical Week 2 Survey      Flowsheet Row Responses   [facility] patient discharged from? [place]   Does the patient have one of the following disease processes/diagnoses(primary or secondary)? Other   Week 2 attempt successful? No   Unsuccessful attempts Attempt 2   Revoke Readmitted            KERMIT YAÑEZ - Registered Nurse

## 2024-08-04 NOTE — NURSING NOTE
"Nursing Chemotherapy Verification    Chemotherapy Regimen:   Treatment Plans       Name Type Plan Dates Plan Provider         Active    IP CNS High Dose Methotrexate / Leucovorin / RiTUXimab (Induction) ONCOLOGY TREATMENT  7/15/2024 - Present Krista Fernandez MD PhD                       Current height and weight: 177.8 cm (70\") 97.9 kg (215 lb 13.3 oz)  Calculated BSA from current height and weight (Campos): 2.16    Relevant Labs  Results from last 7 days   Lab Units 08/04/24  0425 08/03/24  0338 08/02/24  1436   WBC 10*3/mm3 4.36 4.44 5.45   HEMOGLOBIN g/dL 12.8* 13.3 14.7   HEMATOCRIT % 38.0 39.2 42.7   PLATELETS 10*3/mm3 128* 142 172     Lab Results   Component Value Date    NEUTROABS 2.95 08/04/2024       Results from last 7 days   Lab Units 08/04/24  0425 08/03/24  0338 08/02/24  1436   CREATININE mg/dL 0.60* 0.68* 0.72*       Serum creatinine: 0.6 mg/dL (L) 08/04/24 0425  Estimated creatinine clearance: 153.7 mL/min (A)    Lab Results   Component Value Date    HAV Negative 02/28/2024    HEPBCAB Negative 07/15/2024       Verification attestation:  I have personally reviewed the planned regimen and its administration and dosing. I understand the potential side effects.The patient has been instructed on the regimen, potential side effects, and self care measures; the consent form has been completed. I have confirmed that the appropriate premedication, prehydration, post medication and/or emergency medications are ordered in addition to the chemotherapy.    I have independently verified that the height and weight is current and calculated the BSA. I have verified the doses with the planned regimen. I have confirmed the route of administration and patient IV access.    Nurse: Shawna Perez RN  2nd verification Nurse: Johana Thakkar RN  "

## 2024-08-04 NOTE — PROGRESS NOTES
Name: Iain Christina ADMIT: 2024   : 1965  PCP: Scott Medina MD    MRN: [id number] LOS: 2 days   AGE/SEX: 58 y.o. male  ROOM: [facility]     Subjective   Subjective   No complaints.    Objective   Objective   Vital Signs  Temp:  [97.3 °F (36.3 °C)-98.1 °F (36.7 °C)] 97.5 °F (36.4 °C)  Heart Rate:  [] 52  Resp:  [16-18] 18  BP: (104-152)/(68-99) 147/95  SpO2:  [94 %-97 %] 95 %  on   ;   Device (Oxygen Therapy): room air  Body mass index is 30.97 kg/m².  Physical Exam  Constitutional:       Appearance: He is ill-appearing.   Pulmonary:      Effort: Pulmonary effort is normal. No respiratory distress.      Breath sounds: No stridor.   Skin:     Coloration: Skin is not pale.   Neurological:      Mental Status: He is alert and oriented to person, place, and time.         Results Review     I reviewed the patient's new clinical results.  Results from last 7 days   Lab Units 24  0803   WBC 10*3/mm3 4.36 4.44 5.45 5.76   HEMOGLOBIN g/dL 12.8* 13.3 14.7 15.3   PLATELETS 10*3/mm3 128* 142 172 208     Results from last 7 days   Lab Units 24  0803   SODIUM mmol/L 137 136 135* 139   POTASSIUM mmol/L 3.6 3.6 3.8 4.0   CHLORIDE mmol/L 103 100 101 105   CO2 mmol/L 25.4 26.0  --  21.9*   BUN mg/dL 25* 18 22 23   CREATININE mg/dL 0.60* 0.68* 0.72* 0.64*   GLUCOSE mg/dL 258* 184* 239* 193*   EGFR mL/min/1.73 110.5 106.4 104.6 108.4     Results from last 7 days   Lab Units 24/24  0803   ALBUMIN g/dL 3.0* 3.4* 3.6 3.9   BILIRUBIN mg/dL 0.5 1.0 1.1 1.2   ALK PHOS U/L 167* 126* 135* 151*   AST (SGOT) U/L 78* 102* 119* 106*   ALT (SGPT) U/L 304* 344* 394* 470*     Results from last 7 days   Lab Units 24  0425 24  0338 24  1436 24  0803   CALCIUM mg/dL 8.8 8.7 9.2 9.2   ALBUMIN g/dL 3.0* 3.4* 3.6 3.9   MAGNESIUM mg/dL  --  2.1  --   --     PHOSPHORUS mg/dL  --  2.8  --   --        Glucose   Date/Time Value Ref Range Status   08/04/2024 1217 185 (H) 70 - 130 mg/dL Final   08/04/2024 0553 269 (H) 70 - 130 mg/dL Final   08/03/2024 2009 276 (H) 70 - 130 mg/dL Final   08/03/2024 1710 318 (H) 70 - 130 mg/dL Final   08/03/2024 1223 174 (H) 70 - 130 mg/dL Final   08/03/2024 0551 186 (H) 70 - 130 mg/dL Final   08/02/2024 2043 244 (H) 70 - 130 mg/dL Final       No radiology results for the last day  Scheduled Medications  acyclovir, 400 mg, Oral, BID  dexAMETHasone, 2 mg, Oral, BID With Meals  enoxaparin, 40 mg, Subcutaneous, Daily  famotidine, 20 mg, Oral, BID AC  losartan, 50 mg, Oral, Q24H   And  hydroCHLOROthiazide, 12.5 mg, Oral, Q24H  insulin glargine, 25 Units, Subcutaneous, Daily  insulin lispro, 10 Units, Subcutaneous, TID With Meals  insulin lispro, 3-14 Units, Subcutaneous, 4x Daily AC & at Bedtime  [START ON 8/5/2024] leucovorin 50 mg in sodium chloride 0.9 % 55 mL IVPB, 50 mg, Intravenous, Q6H  levETIRAcetam, 500 mg, Oral, Q12H  methotrexate PF 12,780 mg in sodium chloride 0.9 % 1,011.2 mL IVPB, 6,000 mg/m2 (Treatment Plan Recorded), Intravenous, Once  Pharmacy monitoring - high dose methotrexate active, 1 each, Intravenous, Daily  [Held by provider] sulfamethoxazole-trimethoprim, 1 tablet, Oral, Once per day on Monday Wednesday Friday  zolpidem, 10 mg, Oral, Nightly    Infusions  sodium bicarbonate 8.4 % 100 mEq in sterile water (preservative free) 1,000 mL infusion, 150 mL/hr, Last Rate: 150 mL/hr (08/04/24 1000)    Diet  Diet: Diabetic; Consistent Carbohydrate; Fluid Consistency: Thin (IDDSI 0)       Assessment/Plan     Active Hospital Problems    Diagnosis  POA    Transaminitis [R74.01]  Yes    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       58 y.o. male  admitted with <principal problem not specified>.      08/04/24  BG elevated, increase insulin.     Steroid induced hyperglycemia  -home regimen: Tresiba 30 units, aspart 10 units TIDAC + SSI  -glargine 25 units, lispro 10 units TIDAC + SSI  -POC glucose, monitor     Primary CNS diffuse large B-cell lymphoma  -per Oncology  -Keppra, Bactrim PPx     Elevated transaminases  -likely 2/2 CTX  -prior liver US unremarkable  -down trending     HTN  -HCTZ, losartan            DVT prophylaxis: SCDs  Discussed with patient.  Anticipated discharge home,  per Onc            Bull Simon MD  [facility]  08/04/24  13:14 EDT

## 2024-08-04 NOTE — PLAN OF CARE
Goal Outcome Evaluation:              Outcome Evaluation: alert & oriented x4, calm and cooperative, VSS on room air, tolerating CC diet, blood sugars treated, urine pH = 8, methotrexate infusing now--pt tolerating well, urinal at bedside

## 2024-08-05 LAB
ALBUMIN SERPL-MCNC: 3.2 G/DL (ref 3.5–5.2)
ALBUMIN/GLOB SERPL: 1.5 G/DL
ALP SERPL-CCNC: 124 U/L (ref 39–117)
ALT SERPL W P-5'-P-CCNC: 308 U/L (ref 1–41)
ANION GAP SERPL CALCULATED.3IONS-SCNC: 7.5 MMOL/L (ref 5–15)
AST SERPL-CCNC: 76 U/L (ref 1–40)
BASOPHILS # BLD AUTO: 0.01 10*3/MM3 (ref 0–0.2)
BASOPHILS NFR BLD AUTO: 0.1 % (ref 0–1.5)
BILIRUB SERPL-MCNC: 1.5 MG/DL (ref 0–1.2)
BUN SERPL-MCNC: 15 MG/DL (ref 8–23)
BUN/CREAT SERPL: 27.3 (ref 7–25)
CALCIUM SPEC-SCNC: 8.8 MG/DL (ref 8.6–10.5)
CHLORIDE SERPL-SCNC: 103 MMOL/L (ref 98–107)
CO2 SERPL-SCNC: 29.5 MMOL/L (ref 22–29)
CREAT SERPL-MCNC: 0.55 MG/DL (ref 0.76–1.27)
DEPRECATED RDW RBC AUTO: 44.9 FL (ref 37–54)
EGFRCR SERPLBLD CKD-EPI 2021: 113.5 ML/MIN/1.73
EOSINOPHIL # BLD AUTO: 0.01 10*3/MM3 (ref 0–0.4)
EOSINOPHIL NFR BLD AUTO: 0.1 % (ref 0.3–6.2)
ERYTHROCYTE [DISTWIDTH] IN BLOOD BY AUTOMATED COUNT: 14.7 % (ref 12.3–15.4)
GLOBULIN UR ELPH-MCNC: 2.1 GM/DL
GLUCOSE BLDC GLUCOMTR-MCNC: 130 MG/DL (ref 70–130)
GLUCOSE BLDC GLUCOMTR-MCNC: 135 MG/DL (ref 70–130)
GLUCOSE BLDC GLUCOMTR-MCNC: 145 MG/DL (ref 70–130)
GLUCOSE BLDC GLUCOMTR-MCNC: 166 MG/DL (ref 70–130)
GLUCOSE SERPL-MCNC: 130 MG/DL (ref 65–99)
HCT VFR BLD AUTO: 39.3 % (ref 37.5–51)
HGB BLD-MCNC: 13.8 G/DL (ref 13–17.7)
IMM GRANULOCYTES # BLD AUTO: 0.4 10*3/MM3 (ref 0–0.05)
IMM GRANULOCYTES NFR BLD AUTO: 4.6 % (ref 0–0.5)
LYMPHOCYTES # BLD AUTO: 1 10*3/MM3 (ref 0.7–3.1)
LYMPHOCYTES NFR BLD AUTO: 11.4 % (ref 19.6–45.3)
MCH RBC QN AUTO: 30.9 PG (ref 26.6–33)
MCHC RBC AUTO-ENTMCNC: 35.1 G/DL (ref 31.5–35.7)
MCV RBC AUTO: 88.1 FL (ref 79–97)
MONOCYTES # BLD AUTO: 0.75 10*3/MM3 (ref 0.1–0.9)
MONOCYTES NFR BLD AUTO: 8.5 % (ref 5–12)
NEUTROPHILS NFR BLD AUTO: 6.62 10*3/MM3 (ref 1.7–7)
NEUTROPHILS NFR BLD AUTO: 75.3 % (ref 42.7–76)
NRBC BLD AUTO-RTO: 0.2 /100 WBC (ref 0–0.2)
PH UR STRIP.AUTO: 8.5 [PH] (ref 5–8)
PLATELET # BLD AUTO: 149 10*3/MM3 (ref 140–450)
PMV BLD AUTO: 11.1 FL (ref 6–12)
POTASSIUM SERPL-SCNC: 3.4 MMOL/L (ref 3.5–5.2)
PROT SERPL-MCNC: 5.3 G/DL (ref 6–8.5)
RBC # BLD AUTO: 4.46 10*6/MM3 (ref 4.14–5.8)
SODIUM SERPL-SCNC: 140 MMOL/L (ref 136–145)
WBC NRBC COR # BLD AUTO: 8.79 10*3/MM3 (ref 3.4–10.8)

## 2024-08-05 PROCEDURE — 25010000002 LEUCOVORIN 200 MG RECONSTITUTED SOLUTION 200 MG VIAL: Performed by: INTERNAL MEDICINE

## 2024-08-05 PROCEDURE — 63710000001 INSULIN LISPRO (HUMAN) PER 5 UNITS: Performed by: INTERNAL MEDICINE

## 2024-08-05 PROCEDURE — 25810000003 SODIUM CHLORIDE 0.9 % SOLUTION: Performed by: INTERNAL MEDICINE

## 2024-08-05 PROCEDURE — 25010000002 RITUXIMAB-ARRX 500 MG/50ML SOLUTION 50 ML VIAL: Performed by: INTERNAL MEDICINE

## 2024-08-05 PROCEDURE — 25010000002 LEUCOVORIN CALCIUM PER 50 MG: Performed by: INTERNAL MEDICINE

## 2024-08-05 PROCEDURE — 25010000002 RITUXIMAB-ARRX 100 MG/10ML SOLUTION 10 ML VIAL: Performed by: INTERNAL MEDICINE

## 2024-08-05 PROCEDURE — 80204 DRUG ASSAY METHOTREXATE: CPT | Performed by: INTERNAL MEDICINE

## 2024-08-05 PROCEDURE — 25810000003 SODIUM CHLORIDE 0.9 % SOLUTION 250 ML FLEX CONT: Performed by: INTERNAL MEDICINE

## 2024-08-05 PROCEDURE — 82948 REAGENT STRIP/BLOOD GLUCOSE: CPT

## 2024-08-05 PROCEDURE — 99233 SBSQ HOSP IP/OBS HIGH 50: CPT | Performed by: INTERNAL MEDICINE

## 2024-08-05 PROCEDURE — 25010000002 DIPHENHYDRAMINE PER 50 MG: Performed by: INTERNAL MEDICINE

## 2024-08-05 PROCEDURE — 80053 COMPREHEN METABOLIC PANEL: CPT | Performed by: INTERNAL MEDICINE

## 2024-08-05 PROCEDURE — 85025 COMPLETE CBC W/AUTO DIFF WBC: CPT | Performed by: INTERNAL MEDICINE

## 2024-08-05 PROCEDURE — 63710000001 INSULIN GLARGINE PER 5 UNITS: Performed by: STUDENT IN AN ORGANIZED HEALTH CARE EDUCATION/TRAINING PROGRAM

## 2024-08-05 PROCEDURE — 81003 URINALYSIS AUTO W/O SCOPE: CPT | Performed by: INTERNAL MEDICINE

## 2024-08-05 PROCEDURE — 63710000001 INSULIN LISPRO (HUMAN) PER 5 UNITS: Performed by: STUDENT IN AN ORGANIZED HEALTH CARE EDUCATION/TRAINING PROGRAM

## 2024-08-05 RX ORDER — ACETAMINOPHEN 325 MG/1
650 TABLET ORAL EVERY 4 HOURS PRN
Status: DISCONTINUED | OUTPATIENT
Start: 2024-08-05 | End: 2024-08-10 | Stop reason: HOSPADM

## 2024-08-05 RX ORDER — URSODIOL 300 MG/1
300 CAPSULE ORAL 2 TIMES DAILY
Status: DISCONTINUED | OUTPATIENT
Start: 2024-08-05 | End: 2024-08-10 | Stop reason: HOSPADM

## 2024-08-05 RX ORDER — MEPERIDINE HYDROCHLORIDE 25 MG/ML
25 INJECTION INTRAMUSCULAR; INTRAVENOUS; SUBCUTANEOUS
Status: DISCONTINUED | OUTPATIENT
Start: 2024-08-05 | End: 2024-08-10 | Stop reason: HOSPADM

## 2024-08-05 RX ORDER — FAMOTIDINE 10 MG/ML
20 INJECTION, SOLUTION INTRAVENOUS AS NEEDED
Status: DISCONTINUED | OUTPATIENT
Start: 2024-08-05 | End: 2024-08-10 | Stop reason: HOSPADM

## 2024-08-05 RX ORDER — DIPHENHYDRAMINE HYDROCHLORIDE 50 MG/ML
50 INJECTION INTRAMUSCULAR; INTRAVENOUS AS NEEDED
Status: DISCONTINUED | OUTPATIENT
Start: 2024-08-05 | End: 2024-08-10 | Stop reason: HOSPADM

## 2024-08-05 RX ORDER — POTASSIUM CHLORIDE 750 MG/1
40 TABLET, FILM COATED, EXTENDED RELEASE ORAL EVERY 4 HOURS
Status: COMPLETED | OUTPATIENT
Start: 2024-08-05 | End: 2024-08-05

## 2024-08-05 RX ORDER — ACETAMINOPHEN 325 MG/1
650 TABLET ORAL ONCE
Status: COMPLETED | OUTPATIENT
Start: 2024-08-05 | End: 2024-08-05

## 2024-08-05 RX ORDER — ALPRAZOLAM 0.25 MG/1
0.25 TABLET ORAL ONCE
Status: COMPLETED | OUTPATIENT
Start: 2024-08-05 | End: 2024-08-05

## 2024-08-05 RX ORDER — SODIUM CHLORIDE 9 MG/ML
20 INJECTION, SOLUTION INTRAVENOUS ONCE
Status: COMPLETED | OUTPATIENT
Start: 2024-08-05 | End: 2024-08-05

## 2024-08-05 RX ORDER — INSULIN LISPRO 100 [IU]/ML
12 INJECTION, SOLUTION INTRAVENOUS; SUBCUTANEOUS
Status: DISCONTINUED | OUTPATIENT
Start: 2024-08-05 | End: 2024-08-09

## 2024-08-05 RX ADMIN — FAMOTIDINE 20 MG: 20 TABLET, FILM COATED ORAL at 17:13

## 2024-08-05 RX ADMIN — ACYCLOVIR 400 MG: 400 TABLET ORAL at 09:06

## 2024-08-05 RX ADMIN — SODIUM BICARBONATE 150 ML/HR: 84 INJECTION, SOLUTION INTRAVENOUS at 06:55

## 2024-08-05 RX ADMIN — LEUCOVORIN CALCIUM 50 MG: 350 INJECTION, POWDER, LYOPHILIZED, FOR SUSPENSION INTRAMUSCULAR; INTRAVENOUS at 20:33

## 2024-08-05 RX ADMIN — DIPHENHYDRAMINE HYDROCHLORIDE 25 MG: 50 INJECTION, SOLUTION INTRAMUSCULAR; INTRAVENOUS at 14:24

## 2024-08-05 RX ADMIN — URSODIOL 300 MG: 300 CAPSULE ORAL at 21:50

## 2024-08-05 RX ADMIN — RITUXIMAB-ARRX 800 MG: 500 INJECTION, SOLUTION INTRAVENOUS at 15:14

## 2024-08-05 RX ADMIN — POTASSIUM CHLORIDE 40 MEQ: 750 TABLET, EXTENDED RELEASE ORAL at 17:13

## 2024-08-05 RX ADMIN — POTASSIUM CHLORIDE 40 MEQ: 750 TABLET, EXTENDED RELEASE ORAL at 12:28

## 2024-08-05 RX ADMIN — SODIUM BICARBONATE 150 ML/HR: 84 INJECTION, SOLUTION INTRAVENOUS at 16:12

## 2024-08-05 RX ADMIN — LOSARTAN POTASSIUM 50 MG: 50 TABLET, FILM COATED ORAL at 09:06

## 2024-08-05 RX ADMIN — INSULIN GLARGINE 25 UNITS: 100 INJECTION, SOLUTION SUBCUTANEOUS at 09:07

## 2024-08-05 RX ADMIN — DEXAMETHASONE 2 MG: 2 TABLET ORAL at 17:13

## 2024-08-05 RX ADMIN — INSULIN LISPRO 3 UNITS: 100 INJECTION, SOLUTION INTRAVENOUS; SUBCUTANEOUS at 09:07

## 2024-08-05 RX ADMIN — INSULIN LISPRO 12 UNITS: 100 INJECTION, SOLUTION INTRAVENOUS; SUBCUTANEOUS at 17:13

## 2024-08-05 RX ADMIN — FAMOTIDINE 20 MG: 20 TABLET, FILM COATED ORAL at 06:55

## 2024-08-05 RX ADMIN — DEXAMETHASONE 2 MG: 2 TABLET ORAL at 09:06

## 2024-08-05 RX ADMIN — HYDROCHLOROTHIAZIDE 12.5 MG: 12.5 TABLET ORAL at 09:06

## 2024-08-05 RX ADMIN — LEVETIRACETAM 500 MG: 500 TABLET, FILM COATED ORAL at 21:50

## 2024-08-05 RX ADMIN — INSULIN LISPRO 12 UNITS: 100 INJECTION, SOLUTION INTRAVENOUS; SUBCUTANEOUS at 09:06

## 2024-08-05 RX ADMIN — INSULIN LISPRO 12 UNITS: 100 INJECTION, SOLUTION INTRAVENOUS; SUBCUTANEOUS at 12:28

## 2024-08-05 RX ADMIN — ALPRAZOLAM 0.25 MG: 0.25 TABLET ORAL at 09:17

## 2024-08-05 RX ADMIN — ACYCLOVIR 400 MG: 400 TABLET ORAL at 21:50

## 2024-08-05 RX ADMIN — LEVETIRACETAM 500 MG: 500 TABLET, FILM COATED ORAL at 09:06

## 2024-08-05 RX ADMIN — LEUCOVORIN CALCIUM 50 MG: 350 INJECTION, POWDER, LYOPHILIZED, FOR SUSPENSION INTRAMUSCULAR; INTRAVENOUS at 16:12

## 2024-08-05 RX ADMIN — SODIUM BICARBONATE 150 ML/HR: 84 INJECTION, SOLUTION INTRAVENOUS at 14:27

## 2024-08-05 RX ADMIN — ACETAMINOPHEN 325MG 650 MG: 325 TABLET ORAL at 09:17

## 2024-08-05 RX ADMIN — ZOLPIDEM TARTRATE 10 MG: 5 TABLET ORAL at 21:50

## 2024-08-05 RX ADMIN — SODIUM CHLORIDE 20 ML/HR: 9 INJECTION, SOLUTION INTRAVENOUS at 14:25

## 2024-08-05 RX ADMIN — LEUCOVORIN CALCIUM 50 MG: 200 INJECTION, POWDER, LYOPHILIZED, FOR SOLUTION INTRAMUSCULAR; INTRAVENOUS at 11:11

## 2024-08-05 RX ADMIN — ACETAMINOPHEN 325MG 650 MG: 325 TABLET ORAL at 14:24

## 2024-08-05 NOTE — NURSING NOTE
IV team called to assess pt for piv for incompatible meds. Pt anxious due to needing a new iv. Pt requesting lidocaine be applied before iv placed, I also requested  med be given to decrease pt's anxiety.

## 2024-08-05 NOTE — PLAN OF CARE
Goal Outcome Evaluation:           Progress: improving  Outcome Evaluation: alert & oriented x4, vitals stable on room air, tylenol given x1 for headache, rituxan given-- pt tolerated with no complications, insulin orders adjusted and blood sugars treated, sodium bicarb and leucovorin orders adjusted as well, refusing lovenox, refusing shower/CHG at this time, urinal at bedside

## 2024-08-05 NOTE — PROGRESS NOTES
[facility]  PROGRESS NOTE    Patient Name: Iain Christina  : 1965  MRN: [id number]    Date of Admission: 2024  Primary Care Physician: Scott Medina MD    Subjective   Subjective     CC:  Follow-up diabetes and medical management    Subjective:  Patient resting comfortably in bed this morning.  Patient is having a significant anxiety regarding need to get IV access.  He is requesting some Xanax so he can manage his anxiety with his IV.  We discussed diabetes management and overall treatment plan.    Review of Systems  No current fevers or chills  No current shortness of breath or cough  No current nausea, vomiting, or diarrhea  No current chest pain or palpitations       Objective   Objective     Vital Signs:   Temp:  [97.5 °F (36.4 °C)-98.2 °F (36.8 °C)] 97.7 °F (36.5 °C)  Heart Rate:  [48-58] 48  Resp:  [18-19] 18  BP: (121-147)/(77-95) 136/78        Physical Exam:  Constitutional:Awake, alert  HENT: NCAT, mucous membranes moist, neck supple  Respiratory: No cough or wheezes, normal respirations, nonlabored breathing   Cardiovascular: Pulse rate is normal, palpable radial pulses  Gastrointestinal:  soft, nontender, nondistended  Musculoskeletal: Normal musculature for age, no lower extremity edema, BMI 31  Psychiatric: Appropriate affect, cooperative, conversational  Neurologic: No slurred speech or facial droop, follows commands  Skin: No rashes or jaundice, warm      Results Reviewed:  Results from last 7 days   Lab Units 24  0654 24  0425 24  0338 24  1436 24  0820   WBC 10*3/mm3 8.79 4.36 4.44   < >  --    HEMOGLOBIN g/dL 13.8 12.8* 13.3   < >  --    HEMATOCRIT % 39.3 38.0 39.2   < >  --    PLATELETS 10*3/mm3 149 128* 142   < >  --    INR   --   --   --   --  1.05    < > = values in this interval not displayed.     Results from last 7 days   Lab Units 24  0654 24  0425 24  0338   SODIUM mmol/L 140 137 136    POTASSIUM mmol/L 3.4* 3.6 3.6   CHLORIDE mmol/L 103 103 100   CO2 mmol/L 29.5* 25.4 26.0   BUN mg/dL 15 25* 18   CREATININE mg/dL 0.55* 0.60* 0.68*   GLUCOSE mg/dL 130* 258* 184*   CALCIUM mg/dL 8.8 8.8 8.7   ALK PHOS U/L 124* 167* 126*   ALT (SGPT) U/L 308* 304* 344*   AST (SGOT) U/L 76* 78* 102*     Estimated Creatinine Clearance: 167.7 mL/min (A) (by C-G formula based on SCr of 0.55 mg/dL (L)).    Microbiology Results Abnormal       None            Imaging Results (Last 24 Hours)       ** No results found for the last 24 hours. **            Results for orders placed during the hospital encounter of 06/15/24    Adult Transthoracic Echo Complete w/ Color, Spectral and Contrast if Necessary Per Protocol    Interpretation Summary    Left ventricular systolic function is normal. Calculated left ventricular EF = 60.6% Septal wall motion is normal. Normal global longitudinal LV strain (GLS) = -22.7%    Left ventricular diastolic function was normal.    Saline test results are negative for right to left atrial level shunt.      I have reviewed the medications:  Scheduled Meds:acyclovir, 400 mg, Oral, BID  dexAMETHasone, 2 mg, Oral, BID With Meals  enoxaparin, 40 mg, Subcutaneous, Daily  famotidine, 20 mg, Oral, BID AC  losartan, 50 mg, Oral, Q24H   And  hydroCHLOROthiazide, 12.5 mg, Oral, Q24H  insulin glargine, 25 Units, Subcutaneous, Daily  insulin lispro, 12 Units, Subcutaneous, TID With Meals  insulin lispro, 3-14 Units, Subcutaneous, 4x Daily AC & at Bedtime  leucovorin 50 mg in sodium chloride 0.9 % 55 mL IVPB, 50 mg, Intravenous, Q6H  levETIRAcetam, 500 mg, Oral, Q12H  Pharmacy monitoring - high dose methotrexate active, 1 each, Intravenous, Daily  [Held by provider] sulfamethoxazole-trimethoprim, 1 tablet, Oral, Once per day on Monday Wednesday Friday  zolpidem, 10 mg, Oral, Nightly      Continuous Infusions:sodium bicarbonate 8.4 % 100 mEq in sterile water (preservative free) 1,000 mL infusion, 150 mL/hr,  Last Rate: 150 mL/hr (08/05/24 0655)      PRN Meds:.  acetaminophen    HYDROcodone-acetaminophen    ondansetron    prochlorperazine    Assessment & Plan   Assessment & Plan     Active Hospital Problems    Diagnosis  POA    Transaminitis [R74.01]  Yes    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        Brief Hospital Course to date:  Iain Christina is a 58 y.o. male     Discussion/plan for today:  Glucose reviewed and postprandial glucose trending up increase mealtime insulin, adjusted.  Plan to monitor throughout the day and may need to adjust further depending on glucose trend.  Patient with severe anxiety regarding IV access needed for today.  Patient requesting one-time Xanax so he can manage his anxiety with the IV.  I will order one-time Xanax and monitor response.  Add Tylenol for mild headache today and monitor response.  Hypokalemia noted.  Plan to replete potassium today.      Steroid induced hyperglycemia, A1c 6.6  -home regimen: Tresiba 30 units, aspart 10 units TIDAC + SSI  -glargine , lispro TIDAC + SSI  -POC glucose, monitor     Primary CNS diffuse large B-cell lymphoma  -per Oncology  -Keppra, Bactrim PPx     Elevated transaminases  -likely 2/2 CTX  -prior liver US unremarkable  -down trending     HTN  -HCTZ, losartan     Hypokalemia: Replete as needed per protocol    Insomnia: Ambien, tolerating.  Niraj reviewed.     DVT prophylaxis: SCDs  Discussed with patient..    CODE STATUS:   Code Status and Medical Interventions: CPR (Attempt to Resuscitate); Full Support   Ordered at: 08/03/24 0758     Code Status (Patient has no pulse and is not breathing):    CPR (Attempt to Resuscitate)     Medical Interventions (Patient has pulse or is breathing):    Full Support       Blane Lea MD  08/05/24

## 2024-08-05 NOTE — PROGRESS NOTES
[facility] INPATIENT PROGRESS NOTE    Length of Stay:  3 days    CHIEF COMPLAINT:  Primary CNS diffuse large B cell non-Hodgkin's lymphoma, steroid-induced diabetes, hepatitis C    SUBJECTIVE:   Patient with no complaints today, tolerated high-dose methotrexate well.  Labs/vital stable.  Tolerating IV fluids well.  Plan to administer leucovorin and rituximab today    ROS:  Review of Systems   A comprehensive review of systems was obtained with pertinent positive findings as noted in the interval history above.  All other systems negative.    OBJECTIVE:  Vitals:    08/04/24 1554 08/04/24 1942 08/05/24 0407 08/05/24 0735   BP: 130/83 121/77 125/84 136/78   BP Location: Right arm Right arm Right arm Right arm   Patient Position: Lying Lying Lying Lying   Pulse: 58 53 53 (!) 48   Resp: 18 18 19 18   Temp: 97.5 °F (36.4 °C) 97.7 °F (36.5 °C) 98.2 °F (36.8 °C) 97.7 °F (36.5 °C)   TempSrc: Oral Oral Oral Oral   SpO2: 95% 96% 97% 95%   Weight:       Height:             PHYSICAL EXAMINATION:  General: Alert and orient x 3 no distress  Chest/Lungs: Normal respiratory effort  Heart: Heart rate  Abdomen/GI: Soft not distended  Extremities: No edema    Patient was examined today, unchanged from above    DIAGNOSTIC DATA:  Results Review:     I reviewed the patient's new clinical results.    Results from last 7 days   Lab Units 08/05/24  0654 08/04/24  0425 08/03/24  0338   WBC 10*3/mm3 8.79 4.36 4.44   HEMOGLOBIN g/dL 13.8 12.8* 13.3   HEMATOCRIT % 39.3 38.0 39.2   PLATELETS 10*3/mm3 149 128* 142      Results from last 7 days   Lab Units 08/05/24  0654 08/04/24  0425 08/03/24  0338   SODIUM mmol/L 140 137 136   POTASSIUM mmol/L 3.4* 3.6 3.6   CHLORIDE mmol/L 103 103 100   CO2 mmol/L 29.5* 25.4 26.0   BUN mg/dL 15 25* 18   CREATININE mg/dL 0.55* 0.60* 0.68*   CALCIUM mg/dL 8.8 8.8 8.7   BILIRUBIN mg/dL 1.5* 0.5 1.0   ALK PHOS U/L 124* 167* 126*   ALT (SGPT) U/L 308* 304* 344*   AST (SGOT) U/L 76* 78* 102*   GLUCOSE  mg/dL 130* 258* 184*      Lab Results   Component Value Date    NEUTROABS 6.62 08/05/2024     Results from last 7 days   Lab Units 08/02/24  0820   INR  1.05   APTT seconds 25.0     Results from last 7 days   Lab Units 08/03/24  0338   MAGNESIUM mg/dL 2.1           Assessment & Plan   ASSESSMENT/PLAN:  This is a 58 y.o. 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  Associated vasogenic edema extending across the midline into the corpus callosum with mass effect and 1.4 cm left shift.  CT chest abdomen pelvis 6/16/2024 with borderline enlarged carley hepatis lymph nodes, indeterminate.  Brain biopsy on 6/19/2024 with diffuse large B cell non-Hodgkin's lymphoma with non-germinal center phenotype, VANIA negative, Ki-67 80%, FISH negative for Bcl-2, BCL6, MYC rearrangements.  HIV 1/2 negative on 6/23/2024  LDH borderline elevated at 305 on 6/22/2024  Patient unable to perform outpatient PET scan due to hyperglycemia  Patient initiated treatment with high-dose methotrexate and rituximab.  Initial 25% dose reduction in high-dose methotrexate from 8 g/m² down to 6 g/m² with cycle 1 due to baseline elevated LFTs.  Treatment initiated 7/15/2024.  Significant elevation in transaminases following cycle 1 high-dose methotrexate.  Treated with ursodiol.  Subsequent improvement.  Patient continuing on chronic steroids due to cerebral edema, Decadron 2 mg twice daily  Patient admitted 8/2/2024 for hydration/urine alkalinization prior to initiation of cycle 2 rituximab and high-dose methotrexate   On 8/4/2024 initiated cycle 2 high-dose methotrexate and rituximab.  Methotrexate dose maintained at previous 25% dose reduction, 6 g/m² over 4 hours.  Rituximab due on day 3.  Patient is continuing on hydration/urine alkalinization with bicarbonate drip 150 cc/h.  Urine pH today is 8.0 today.   LFTs are stable today at patient's apparent baseline with AST 78, , total bilirubin normal at 0.5.  LFTs were in this range when patient initiated cycle 1.  Proceed today as planned.  Leucovorin rescue to begin on 8/6/2024, 24 hours after initiation of methotrexate at dose of 25 mg IV every 6 hours.  Methotrexate levels to be monitored, first level to be drawn on 8/6/2024, 24 hours after initiation of methotrexate.  8/5/2024: Patient is continuing on hydration/urine alkalization with bicarbonate drip.  Methotrexate level is above goal at 6.9 today (goal <5 at 24 hours).  Will increase leucovorin to 50 mg every 4 hours and increase bicarb drip to 150 meq at 150ml/hr.  Repeat methotrexate level tomorrow a.m.  LFTs remain elevated with AST 76,  and total bilirubin 1.5.  Add Actigall to help with LFTs.  CBC within normal range.  Proceed with rituximab dose today.    *Steroid-induced diabetes  Patient with diabetes exacerbated by steroids  [facility] consulted to assist with diabetic management  Patient currently receiving Lantus 20 units daily and Humalog 8 units 3 times daily with meals in addition to sliding scale 4 times daily   Glucose has been in the 200-300 range    *Hepatitis C, elevated LFTs  Patient with hepatitis C, genotype Ia  HCVRNA 7,590,000 on 1/30/2024  HCVRNA 7,220,000 on 5/16/2024  Patient was preparing for treatment through hepatology at [facility] with Mavyret x 8 weeks in June 2024 however was not able to begin treatment due to cost issues and subsequent diagnosis of CNS lymphoma  Treatment with high-dose methotrexate for CNS lymphoma complicated by significant escalation of transaminases  Patient treated with ursodiol with improvement, ursodiol subsequently held with return of LFTs to baseline range.  Concern regarding untreated hepatitis C in the setting of current immunosuppressive treatment including rituximab.  On 8/6/2024 we will need to contact patient's hepatologist at  [facility] to discuss recommendations regarding hepatitis C management as patient continues on treatment for lymphoma  8/4/2024: LFTs today continue at patient's apparent baseline with AST 78, , total bilirubin 0.5.  Plan to closely monitor with initiation of high-dose methotrexate today.  8/5/24: LFTs remain elevated with total bilirubin up to 1.5 today.  Add Actigall.  Will need to clarify hepatitis C management plan with GI at [facility]    *Polysubstance use  History of significant alcohol use, decreased following diagnosis with hepatitis C in early 2024  UDS positive for THC on 6/15/2024    *Hypertension  Losartan 50 mg daily, HCTZ 12.5 mg daily    *Seizure prophylaxis  Keppra 500 mg every 12 hours    *PCP prophylaxis  Bactrim DS 1 p.o. Monday Wednesday Friday as outpatient  Bactrim currently on hold due to expected transaminitis from methotrexate    *Viral prophylaxis  Acyclovir 400 mg twice daily    *GI prophylaxis  Pepcid 20 mg twice daily    *DVT prophylaxis  Lovenox 40 mg daily    *Insomnia  Ambien 10 mg nightly    *Pain control  Hydrocodone 10/325 every 4 hours as needed    *Nausea  Zofran 4 mg IV every 6 hours as needed  Compazine 10 mg every 6 hours as needed      Plan:  Received methotrexate 6 g/m² (maintaining previous 25% dose reduction) over 4 hours on 8/4/24.    Methotrexate level higher than goal today.  Will increase sodium bicarbonate drip to 150 meq at 150 cc/h for urine alkalinization.  Increase leucovorin rescue to 50 mg every 4 hours   Repeat methotrexate levels every 24 hours  Proceed today with cycle 2 rituximab today.    Start actigall to help with LFTs  Close monitoring of LFTs following methotrexate administration.   Continue dexamethasone 2 mg twice daily  Continue Keppra 500 mg twice daily  [facility] consulted for assistance with management of steroid-induced diabetes during hospitalization  Continue viral prophylaxis with acyclovir 400 mg twice daily  PCP prophylaxis with  Bactrim on hold during high-dose methotrexate treatment  On 8/6/2024 we will need to contact patient's hepatologist (was seen by NP initially) at [facility] to discuss recommendations for management of hepatitis C in light of ongoing treatment for CNS lymphoma with high-dose methotrexate/rituximab  Daily CBC, CMP, urine pH    Discussed with patient at bedside  I spent 52 minutes on this encounter, before, during & after the visit evaluating the patient, reviewing records and writing orders.

## 2024-08-05 NOTE — CASE MANAGEMENT/SOCIAL WORK
Continued Stay Note  [facility]     Patient Name: Iain Christina  MRN: [id number]  Today's Date: 8/5/2024    Admit Date: 8/2/2024    Plan: Home with family   Discharge Plan       Row Name 08/05/24 1309       Plan    Plan Home with family    Patient/Family in Agreement with Plan yes    Plan Comments Plan is home with family. Patient cycles through for chemotherapy. Will return home when medically ready.                   Discharge Codes    No documentation.                 Expected Discharge Date and Time       Expected Discharge Date Expected Discharge Time    Aug 9, 2024

## 2024-08-05 NOTE — PROGRESS NOTES
"Nutrition Services    Patient Name:  Iain Christina  YOB: 1965  MRN: [id number]  Admit Date:  8/2/2024    Assessment Date:  08/05/24    Summary: MST    Pt is a 58 y.o. male with recent diagnosis of CNS lymphoma B-cell. History of hepatitis C, sleep apnea and HTN admitted for chemo. Pt reports a good appetite. Pt states he is eating well and has had no change in eating habits. Pt reports an approximate 20 lb wt loss x 2 months. This is an 8.2% wt loss. NFPE completed, no significant muscle/fat wasting noted at this time. Education pt on increased energy needs. Pt agreeable to Boost Glucose Control daily (vanilla) to support additional energy/protein needs. Encouraged pt to drink 1 Boost per day at home as well.  Labs reviewed; K 3.4, Glu 272/130/166, Cr 0.55, , Albumin 3.2  Skin reviewed; incision  Last BM; 8/4    Plan/Recommend:  Addition of Boost Glucose Control daily with lunch (vanilla)  Encourage good po intake  Will continue to monitor intake, labs, wt    RD to follow.    CLINICAL NUTRITION ASSESSMENT      Reason for Assessment MST score 2+     Diagnosis/Problem   Steroid induced hyperglycemia   Primary CNS diffuse large B-cell lymphoma   Transaminitis   HTN   Medical/Surgical History Past Medical History:   Diagnosis Date    Diabetes mellitus     Hepatitis C     Hyperthyroidism     Sleep apnea        Past Surgical History:   Procedure Laterality Date    CRANIOTOMY FOR TUMOR Right 6/19/2024    Procedure: Right frontal craniotomy for tumor;  Surgeon: Rosendo Bello MD;  Location: [facility];  Service: Neurosurgery;  Laterality: Right;    NASAL SEPTAL RECONSTRUCTION      VENOUS ACCESS DEVICE (PORT) INSERTION N/A 6/24/2024    Procedure: INSERTION VENOUS ACCESS DEVICE;  Surgeon: Linda Coates MD;  Location: [facility];  Service: General;  Laterality: N/A;        Anthropometrics        Current Height  Current Weight  BMI kg/m2 Height: 177.8 cm (70\")  Weight: 97.9 kg " (215 lb 13.3 oz) (08/04/24 0556)  Body mass index is 30.97 kg/m².   Adjusted BMI (if applicable)    BMI Category Overweight (25 - 29.9)   Ideal Body Weight (IBW) 160 lb   Usual Body Weight (UBW) 220-230 lb   Weight Trend Loss   Weight History Wt Readings from Last 30 Encounters:   08/04/24 0556 97.9 kg (215 lb 13.3 oz)   08/03/24 0535 95.3 kg (210 lb)   08/02/24 1114 92.7 kg (204 lb 5.9 oz)   08/02/24 0829 92.8 kg (204 lb 8 oz)   07/23/24 0415 94.6 kg (208 lb 8.9 oz)   07/22/24 0444 93.3 kg (205 lb 11 oz)   07/21/24 0618 93.1 kg (205 lb 4 oz)   07/19/24 0516 94.7 kg (208 lb 12.4 oz)   07/18/24 0537 95 kg (209 lb 7 oz)   07/17/24 0444 93.4 kg (205 lb 14.6 oz)   07/15/24 0928 94.6 kg (208 lb 8 oz)   07/15/24 0920 94.6 kg (208 lb 8 oz)   07/15/24 0802 94.6 kg (208 lb 8 oz)   07/09/24 1537 95 kg (209 lb 6.4 oz)   07/01/24 1000 98 kg (216 lb)   06/22/24 1056 104 kg (230 lb)   06/22/24 0040 105 kg (230 lb 9.6 oz)   06/21/24 0600 105 kg (231 lb 0.7 oz)   06/17/24 0007 99.6 kg (219 lb 9.3 oz)   06/16/24 0008 99.6 kg (219 lb 9.3 oz)   06/15/24 1445 99.8 kg (220 lb)   06/15/24 0930 99.8 kg (220 lb)   03/06/24 1104 102 kg (225 lb 6.4 oz)   01/15/24 0955 103 kg (226 lb)   01/13/23 1124 103 kg (226 lb)   12/13/21 0757 97.1 kg (214 lb)   07/30/20 1255 97.7 kg (215 lb 6.4 oz)   01/06/20 1601 97.1 kg (214 lb)   11/21/19 1050 97.1 kg (214 lb)   09/12/14 0955 96.3 kg (212 lb 4.1 oz)   12/23/13 1548 101 kg (222 lb 15.9 oz)   12/13/13 0820 101 kg (223 lb 2 oz)      --  Labs       Pertinent Labs    Results from last 7 days   Lab Units 08/05/24  0654 08/04/24  0425 08/03/24  0338   SODIUM mmol/L 140 137 136   POTASSIUM mmol/L 3.4* 3.6 3.6   CHLORIDE mmol/L 103 103 100   CO2 mmol/L 29.5* 25.4 26.0   BUN mg/dL 15 25* 18   CREATININE mg/dL 0.55* 0.60* 0.68*   CALCIUM mg/dL 8.8 8.8 8.7   BILIRUBIN mg/dL 1.5* 0.5 1.0   ALK PHOS U/L 124* 167* 126*   ALT (SGPT) U/L 308* 304* 344*   AST (SGOT) U/L 76* 78* 102*   GLUCOSE mg/dL 130* 258* 184*  "    Results from last 7 days   Lab Units 08/05/24  0654 08/04/24  0425 08/03/24  0338   MAGNESIUM mg/dL  --   --  2.1   PHOSPHORUS mg/dL  --   --  2.8   HEMOGLOBIN g/dL 13.8   < > 13.3   HEMATOCRIT % 39.3   < > 39.2   WBC 10*3/mm3 8.79   < > 4.44   ALBUMIN g/dL 3.2*   < > 3.4*    < > = values in this interval not displayed.     Results from last 7 days   Lab Units 08/05/24  0654 08/04/24  0425 08/03/24  0338 08/02/24  1436 08/02/24  0820 08/02/24  0803   INR   --   --   --   --  1.05  --    APTT seconds  --   --   --   --  25.0  --    PLATELETS 10*3/mm3 149 128* 142 172  --  208     No results found for: \"COVID19\"  Lab Results   Component Value Date    HGBA1C 6.60 (H) 06/16/2024          Medications           Scheduled Medications acyclovir, 400 mg, Oral, BID  dexAMETHasone, 2 mg, Oral, BID With Meals  enoxaparin, 40 mg, Subcutaneous, Daily  famotidine, 20 mg, Oral, BID AC  losartan, 50 mg, Oral, Q24H   And  hydroCHLOROthiazide, 12.5 mg, Oral, Q24H  insulin glargine, 25 Units, Subcutaneous, Daily  insulin lispro, 12 Units, Subcutaneous, TID With Meals  insulin lispro, 3-14 Units, Subcutaneous, 4x Daily AC & at Bedtime  leucovorin 50 mg in sodium chloride 0.9 % 55 mL IVPB, 50 mg, Intravenous, Q6H  levETIRAcetam, 500 mg, Oral, Q12H  Pharmacy monitoring - high dose methotrexate active, 1 each, Intravenous, Daily  [Held by provider] sulfamethoxazole-trimethoprim, 1 tablet, Oral, Once per day on Monday Wednesday Friday  zolpidem, 10 mg, Oral, Nightly       Infusions sodium bicarbonate 8.4 % 100 mEq in sterile water (preservative free) 1,000 mL infusion, 150 mL/hr, Last Rate: 150 mL/hr (08/05/24 0655)       PRN Medications   acetaminophen    HYDROcodone-acetaminophen    ondansetron    prochlorperazine     Physical Findings          General Findings alert, oriented, room air   Oral/Mouth Cavity WDL   Edema  no edema   Gastrointestinal last bowel movement: 8/4   Skin  surgical incision: head   Tubes/Drains/Lines implantable " port   NFPE No clinical signs of muscle wasting or fat loss   --  Current Nutrition Orders & Evaluation of Intake       Oral Nutrition     Food Allergies Peanuts, Tree nuts   Current PO Diet Diet: Diabetic; Consistent Carbohydrate; Fluid Consistency: Thin (IDDSI 0)   Supplement n/a   PO Evaluation     % PO Intake 100%    Factors Affecting Intake: No factors at this time     Estimated Requirements         Weight used  97.9 kg    Calories  2448 kcals  (25 kcal/kg)    Protein  98 - 117 g (1.0 - 1.2 gm/kg)    Fluid   (1 mL/kcal)     PES STATEMENT / NUTRITION DIAGNOSIS      Nutrition Dx Problem  Problem: Unintentional Weight Loss  Etiology: Medical Diagnosis - CNS lymphoma B-cell    Signs/Symptoms: Unintended Weight Change and Report/Observation     NUTRITION INTERVENTION / PLAN OF CARE      Intervention Goal(s) Meet estimated needs, Disease management/therapy, Maintain intake, Accepts oral nutrition supplement, No significant weight loss, and PO intake goal %: 75%         RD Intervention/Action Interview for preferences, Supplement offered/declined, Encourage intake, and Continue to monitor   --      Prescription/Orders:       PO Diet       Supplements Boost Glucose Control daily with lunch (vanilla)      Enteral Nutrition       Parenteral Nutrition    New Prescription Ordered? Yes   --      Monitor/Evaluation Per protocol   Discharge Plan/Needs Pending clinical course   --    RD to follow per protocol.      Electronically signed by:  Janet Kellogg  08/05/24 08:57 EDT

## 2024-08-05 NOTE — NURSING NOTE
"Nursing Chemotherapy Verification    Chemotherapy Regimen:   Treatment Plans       Name Type Plan Dates Plan Provider         Active    IP CNS High Dose Methotrexate / Leucovorin / RiTUXimab (Induction) ONCOLOGY TREATMENT  7/15/2024 - Present Krista Fernandez MD PhD                       Current height and weight: 177.8 cm (70\") 97.9 kg (215 lb 13.3 oz)  Calculated BSA from current height and weight (Campos): 2.16      Relevant Labs  Results from last 7 days   Lab Units 08/05/24  0654 08/04/24  0425 08/03/24  0338   WBC 10*3/mm3 8.79 4.36 4.44   HEMOGLOBIN g/dL 13.8 12.8* 13.3   HEMATOCRIT % 39.3 38.0 39.2   PLATELETS 10*3/mm3 149 128* 142     Lab Results   Component Value Date    NEUTROABS 6.62 08/05/2024       Results from last 7 days   Lab Units 08/05/24  0654 08/04/24  0425 08/03/24  0338   CREATININE mg/dL 0.55* 0.60* 0.68*       Serum creatinine: 0.55 mg/dL (L) 08/05/24 0654  Estimated creatinine clearance: 167.7 mL/min (A)    Lab Results   Component Value Date    HAV Negative 02/28/2024    HEPBCAB Negative 07/15/2024       Verification attestation:  I have personally reviewed the planned regimen and its administration and dosing. I understand the potential side effects.The patient has been instructed on the regimen, potential side effects, and self care measures; the consent form has been completed. I have confirmed that the appropriate premedication, prehydration, post medication and/or emergency medications are ordered in addition to the chemotherapy.    I have independently verified that the height and weight is current and calculated the BSA. I have verified the doses with the planned regimen. I have confirmed the route of administration and patient IV access.     Patient tolerated infusion today with no complications.     Nurse: Shawna Perez RN  2nd verification Nurse: Jena Carmona RN    "

## 2024-08-05 NOTE — PROGRESS NOTES
Pharmacy Consult - HD Methotrexate monitoring    Iain Christina is a 58 y.o. YO male starting therapy with HD-MTX. Pharmacy consulted to monitor and adjust leucovorin.    Patient to have methotrexate levels monitored per protocol every 24 hours, starting 24 hours after methotrexate infusion started. For this patient the first level will be drawn at 8/5 on 1130.    Goal levels are:  24 hr post infusion <5  48 hr post infusion <1  72 hr post infusion <0.1     Discussed plan with Dr. Sanchez and Dr. Bello, recommend the following dose adjustments to be conducted based on levels at the following times.     Date Current leucovorin dose Actual level Goal level Adjustment    8/5 @ 1130  Begin leucovorin at 50 mg Q6H 6.9 <5 If level 5-10, increase to 50 mg Q4   8/6     If level >10 increase to 100 mg Q6   8/7 8/8 8/9         Plan:  1. Increase leucovorin to 50mg q4h  2. Increase sodium bicarb infusion to 150mEq @ 150ml/hr      Pharmacy will continue to monitor and update as levels are available.     Thank you for this opportunity to review.     Sean Montalvo, PharmD

## 2024-08-06 ENCOUNTER — APPOINTMENT (OUTPATIENT)
Dept: GENERAL RADIOLOGY | Facility: HOSPITAL | Age: 59
DRG: 840 | End: 2024-08-06
Payer: COMMERCIAL

## 2024-08-06 LAB
ALBUMIN SERPL-MCNC: 3.1 G/DL (ref 3.5–5.2)
ALBUMIN/GLOB SERPL: 1.6 G/DL
ALP SERPL-CCNC: 110 U/L (ref 39–117)
ALT SERPL W P-5'-P-CCNC: 381 U/L (ref 1–41)
ANION GAP SERPL CALCULATED.3IONS-SCNC: 7 MMOL/L (ref 5–15)
AST SERPL-CCNC: 137 U/L (ref 1–40)
BASOPHILS # BLD AUTO: 0.01 10*3/MM3 (ref 0–0.2)
BASOPHILS NFR BLD AUTO: 0.1 % (ref 0–1.5)
BILIRUB SERPL-MCNC: 1 MG/DL (ref 0–1.2)
BUN SERPL-MCNC: 19 MG/DL (ref 8–23)
BUN/CREAT SERPL: 26 (ref 7–25)
CALCIUM SPEC-SCNC: 8.9 MG/DL (ref 8.6–10.5)
CHLORIDE SERPL-SCNC: 105 MMOL/L (ref 98–107)
CO2 SERPL-SCNC: 29 MMOL/L (ref 22–29)
CREAT SERPL-MCNC: 0.73 MG/DL (ref 0.76–1.27)
DEPRECATED RDW RBC AUTO: 45.4 FL (ref 37–54)
EGFRCR SERPLBLD CKD-EPI 2021: 104.2 ML/MIN/1.73
EOSINOPHIL # BLD AUTO: 0.02 10*3/MM3 (ref 0–0.4)
EOSINOPHIL NFR BLD AUTO: 0.3 % (ref 0.3–6.2)
ERYTHROCYTE [DISTWIDTH] IN BLOOD BY AUTOMATED COUNT: 15 % (ref 12.3–15.4)
GLOBULIN UR ELPH-MCNC: 2 GM/DL
GLUCOSE BLDC GLUCOMTR-MCNC: 119 MG/DL (ref 70–130)
GLUCOSE BLDC GLUCOMTR-MCNC: 155 MG/DL (ref 70–130)
GLUCOSE BLDC GLUCOMTR-MCNC: 177 MG/DL (ref 70–130)
GLUCOSE BLDC GLUCOMTR-MCNC: 323 MG/DL (ref 70–130)
GLUCOSE SERPL-MCNC: 166 MG/DL (ref 65–99)
HCT VFR BLD AUTO: 40 % (ref 37.5–51)
HGB BLD-MCNC: 13.8 G/DL (ref 13–17.7)
IMM GRANULOCYTES # BLD AUTO: 0.07 10*3/MM3 (ref 0–0.05)
IMM GRANULOCYTES NFR BLD AUTO: 1 % (ref 0–0.5)
LYMPHOCYTES # BLD AUTO: 0.83 10*3/MM3 (ref 0.7–3.1)
LYMPHOCYTES NFR BLD AUTO: 11.7 % (ref 19.6–45.3)
MCH RBC QN AUTO: 30.6 PG (ref 26.6–33)
MCHC RBC AUTO-ENTMCNC: 34.5 G/DL (ref 31.5–35.7)
MCV RBC AUTO: 88.7 FL (ref 79–97)
MONOCYTES # BLD AUTO: 0.39 10*3/MM3 (ref 0.1–0.9)
MONOCYTES NFR BLD AUTO: 5.5 % (ref 5–12)
MTX SERPL-SCNC: NORMAL UMOL/L
NEUTROPHILS NFR BLD AUTO: 5.78 10*3/MM3 (ref 1.7–7)
NEUTROPHILS NFR BLD AUTO: 81.4 % (ref 42.7–76)
NRBC BLD AUTO-RTO: 0 /100 WBC (ref 0–0.2)
PH UR STRIP.AUTO: >=9 [PH] (ref 5–8)
PLATELET # BLD AUTO: 141 10*3/MM3 (ref 140–450)
PMV BLD AUTO: 11 FL (ref 6–12)
POTASSIUM SERPL-SCNC: 3.9 MMOL/L (ref 3.5–5.2)
PROT SERPL-MCNC: 5.1 G/DL (ref 6–8.5)
RBC # BLD AUTO: 4.51 10*6/MM3 (ref 4.14–5.8)
SODIUM SERPL-SCNC: 141 MMOL/L (ref 136–145)
WBC NRBC COR # BLD AUTO: 7.1 10*3/MM3 (ref 3.4–10.8)

## 2024-08-06 PROCEDURE — 85025 COMPLETE CBC W/AUTO DIFF WBC: CPT | Performed by: INTERNAL MEDICINE

## 2024-08-06 PROCEDURE — 63710000001 INSULIN LISPRO (HUMAN) PER 5 UNITS: Performed by: STUDENT IN AN ORGANIZED HEALTH CARE EDUCATION/TRAINING PROGRAM

## 2024-08-06 PROCEDURE — 25010000002 LEUCOVORIN CALCIUM PER 50 MG: Performed by: INTERNAL MEDICINE

## 2024-08-06 PROCEDURE — 99233 SBSQ HOSP IP/OBS HIGH 50: CPT | Performed by: INTERNAL MEDICINE

## 2024-08-06 PROCEDURE — 63710000001 INSULIN GLARGINE PER 5 UNITS: Performed by: STUDENT IN AN ORGANIZED HEALTH CARE EDUCATION/TRAINING PROGRAM

## 2024-08-06 PROCEDURE — 81003 URINALYSIS AUTO W/O SCOPE: CPT | Performed by: INTERNAL MEDICINE

## 2024-08-06 PROCEDURE — 80053 COMPREHEN METABOLIC PANEL: CPT | Performed by: INTERNAL MEDICINE

## 2024-08-06 PROCEDURE — 71045 X-RAY EXAM CHEST 1 VIEW: CPT

## 2024-08-06 PROCEDURE — 80204 DRUG ASSAY METHOTREXATE: CPT | Performed by: INTERNAL MEDICINE

## 2024-08-06 PROCEDURE — 82948 REAGENT STRIP/BLOOD GLUCOSE: CPT

## 2024-08-06 PROCEDURE — 63710000001 INSULIN LISPRO (HUMAN) PER 5 UNITS: Performed by: INTERNAL MEDICINE

## 2024-08-06 RX ORDER — POTASSIUM CHLORIDE 750 MG/1
20 TABLET, FILM COATED, EXTENDED RELEASE ORAL ONCE
Status: COMPLETED | OUTPATIENT
Start: 2024-08-06 | End: 2024-08-06

## 2024-08-06 RX ADMIN — INSULIN LISPRO 12 UNITS: 100 INJECTION, SOLUTION INTRAVENOUS; SUBCUTANEOUS at 12:21

## 2024-08-06 RX ADMIN — SODIUM BICARBONATE 150 ML/HR: 84 INJECTION, SOLUTION INTRAVENOUS at 00:45

## 2024-08-06 RX ADMIN — ACYCLOVIR 400 MG: 400 TABLET ORAL at 20:42

## 2024-08-06 RX ADMIN — FAMOTIDINE 20 MG: 20 TABLET, FILM COATED ORAL at 06:53

## 2024-08-06 RX ADMIN — LEUCOVORIN CALCIUM 50 MG: 350 INJECTION, POWDER, LYOPHILIZED, FOR SUSPENSION INTRAMUSCULAR; INTRAVENOUS at 12:23

## 2024-08-06 RX ADMIN — LEUCOVORIN CALCIUM 50 MG: 350 INJECTION, POWDER, LYOPHILIZED, FOR SUSPENSION INTRAMUSCULAR; INTRAVENOUS at 00:45

## 2024-08-06 RX ADMIN — URSODIOL 300 MG: 300 CAPSULE ORAL at 20:42

## 2024-08-06 RX ADMIN — LEVETIRACETAM 500 MG: 500 TABLET, FILM COATED ORAL at 20:41

## 2024-08-06 RX ADMIN — LEUCOVORIN CALCIUM 50 MG: 350 INJECTION, POWDER, LYOPHILIZED, FOR SUSPENSION INTRAMUSCULAR; INTRAVENOUS at 09:12

## 2024-08-06 RX ADMIN — LEVETIRACETAM 500 MG: 500 TABLET, FILM COATED ORAL at 09:08

## 2024-08-06 RX ADMIN — SODIUM BICARBONATE 150 ML/HR: 84 INJECTION, SOLUTION INTRAVENOUS at 22:29

## 2024-08-06 RX ADMIN — INSULIN LISPRO 3 UNITS: 100 INJECTION, SOLUTION INTRAVENOUS; SUBCUTANEOUS at 20:52

## 2024-08-06 RX ADMIN — DEXAMETHASONE 2 MG: 2 TABLET ORAL at 09:18

## 2024-08-06 RX ADMIN — FAMOTIDINE 20 MG: 20 TABLET, FILM COATED ORAL at 18:11

## 2024-08-06 RX ADMIN — DEXAMETHASONE 2 MG: 2 TABLET ORAL at 18:11

## 2024-08-06 RX ADMIN — INSULIN GLARGINE 25 UNITS: 100 INJECTION, SOLUTION SUBCUTANEOUS at 09:10

## 2024-08-06 RX ADMIN — INSULIN LISPRO 10 UNITS: 100 INJECTION, SOLUTION INTRAVENOUS; SUBCUTANEOUS at 18:10

## 2024-08-06 RX ADMIN — POTASSIUM CHLORIDE 20 MEQ: 750 TABLET, EXTENDED RELEASE ORAL at 16:52

## 2024-08-06 RX ADMIN — URSODIOL 300 MG: 300 CAPSULE ORAL at 09:09

## 2024-08-06 RX ADMIN — LEUCOVORIN CALCIUM 50 MG: 350 INJECTION, POWDER, LYOPHILIZED, FOR SUSPENSION INTRAMUSCULAR; INTRAVENOUS at 04:24

## 2024-08-06 RX ADMIN — INSULIN LISPRO 3 UNITS: 100 INJECTION, SOLUTION INTRAVENOUS; SUBCUTANEOUS at 12:21

## 2024-08-06 RX ADMIN — HYDROCHLOROTHIAZIDE 12.5 MG: 12.5 TABLET ORAL at 09:08

## 2024-08-06 RX ADMIN — SODIUM BICARBONATE 150 ML/HR: 84 INJECTION, SOLUTION INTRAVENOUS at 08:01

## 2024-08-06 RX ADMIN — INSULIN LISPRO 12 UNITS: 100 INJECTION, SOLUTION INTRAVENOUS; SUBCUTANEOUS at 18:11

## 2024-08-06 RX ADMIN — INSULIN LISPRO 12 UNITS: 100 INJECTION, SOLUTION INTRAVENOUS; SUBCUTANEOUS at 09:10

## 2024-08-06 RX ADMIN — ACYCLOVIR 400 MG: 400 TABLET ORAL at 09:09

## 2024-08-06 RX ADMIN — SODIUM BICARBONATE 150 ML/HR: 84 INJECTION, SOLUTION INTRAVENOUS at 14:55

## 2024-08-06 RX ADMIN — LEUCOVORIN CALCIUM 50 MG: 350 INJECTION, POWDER, LYOPHILIZED, FOR SUSPENSION INTRAMUSCULAR; INTRAVENOUS at 16:52

## 2024-08-06 RX ADMIN — LEUCOVORIN CALCIUM 50 MG: 350 INJECTION, POWDER, LYOPHILIZED, FOR SUSPENSION INTRAMUSCULAR; INTRAVENOUS at 20:42

## 2024-08-06 RX ADMIN — LOSARTAN POTASSIUM 50 MG: 50 TABLET, FILM COATED ORAL at 09:08

## 2024-08-06 RX ADMIN — ZOLPIDEM TARTRATE 10 MG: 5 TABLET ORAL at 20:42

## 2024-08-06 NOTE — PROGRESS NOTES
Pharmacy Consult - HD Methotrexate monitoring    Iain Christina is a 58 y.o. YO male starting therapy with HD-MTX. Pharmacy consulted to monitor and adjust leucovorin.    Patient to have methotrexate levels monitored per protocol every 24 hours, starting 24 hours after methotrexate infusion started. For this patient the first level will be drawn at 8/5 on 1130.    Goal levels are:  24 hr post infusion <5  48 hr post infusion <1  72 hr post infusion <0.1     Discussed plan with Dr. Sanchez and Dr. Bello, recommend the following dose adjustments to be conducted based on levels at the following times.     Date Current leucovorin dose Actual level Goal level Adjustment    8/5 @ 1130  Begin leucovorin at 50 mg Q6H 6.9 <5 If level 5-10, increase to 50 mg Q4   8/6 @ 1136 Leucovorin 50 mg Q4H 0.46 <1 If level >1 increase to 100 mg Q6   8/7    <0.1    8/8 8/9         Plan:  1. Level came back at goal - continuing Leucovorin 50 mg Q4H, no changes at this time  2. Continuing sodium bicarb infusion to 150mEq @ 150ml/hr        Pharmacy will continue to monitor and update as levels are available.     Thank you for this opportunity to review.     Laurence Logan, YanaD

## 2024-08-06 NOTE — PLAN OF CARE
Goal Outcome Evaluation:         Patient is a very pleasant gentleman who is alert and oriented x 4, on room air, ambulates in his room and spent the majority of the day in his recliner.    Methotrexate level was 0.46 and neither the bi-carb or leucovorin changed.    WCTM

## 2024-08-06 NOTE — PROGRESS NOTES
[facility] INPATIENT PROGRESS NOTE    Length of Stay:  4 days    CHIEF COMPLAINT:  Primary CNS diffuse large B cell non-Hodgkin's lymphoma, steroid-induced diabetes, hepatitis C    SUBJECTIVE:   Patient with no complaints today, tolerating treatment well.  Labs/vital stable.  Tolerating IV fluids well.      ROS:  Review of Systems   A comprehensive review of systems was obtained with pertinent positive findings as noted in the interval history above.  All other systems negative.    OBJECTIVE:  Vitals:    08/05/24 2325 08/06/24 0318 08/06/24 0600 08/06/24 0740   BP: 109/70 140/92  120/91   BP Location: Right arm Right arm  Right arm   Patient Position: Lying Lying  Lying   Pulse: 73 56  65   Resp: 18 16  16   Temp: 97.7 °F (36.5 °C) 97.3 °F (36.3 °C)  97.5 °F (36.4 °C)   TempSrc: Oral Oral  Oral   SpO2: 97% 96%  96%   Weight:   95.5 kg (210 lb 8.6 oz)    Height:             PHYSICAL EXAMINATION:  General: Alert and orient x 3 no distress  Chest/Lungs: Normal respiratory effort  Heart: Heart rate  Abdomen/GI: Soft not distended  Extremities: No edema    Patient was examined today, unchanged from above    DIAGNOSTIC DATA:  Results Review:     I reviewed the patient's new clinical results.    Results from last 7 days   Lab Units 08/06/24 0539 08/05/24  0654 08/04/24  0425   WBC 10*3/mm3 7.10 8.79 4.36   HEMOGLOBIN g/dL 13.8 13.8 12.8*   HEMATOCRIT % 40.0 39.3 38.0   PLATELETS 10*3/mm3 141 149 128*      Results from last 7 days   Lab Units 08/06/24 0539 08/05/24  0654 08/04/24  0425   SODIUM mmol/L 141 140 137   POTASSIUM mmol/L 3.9 3.4* 3.6   CHLORIDE mmol/L 105 103 103   CO2 mmol/L 29.0 29.5* 25.4   BUN mg/dL 19 15 25*   CREATININE mg/dL 0.73* 0.55* 0.60*   CALCIUM mg/dL 8.9 8.8 8.8   BILIRUBIN mg/dL 1.0 1.5* 0.5   ALK PHOS U/L 110 124* 167*   ALT (SGPT) U/L 381* 308* 304*   AST (SGOT) U/L 137* 76* 78*   GLUCOSE mg/dL 166* 130* 258*      Lab Results   Component Value Date    NEUTROABS 5.78 08/06/2024      Results from last 7 days   Lab Units 08/02/24  0820   INR  1.05   APTT seconds 25.0     Results from last 7 days   Lab Units 08/03/24  0338   MAGNESIUM mg/dL 2.1           Assessment & Plan   ASSESSMENT/PLAN:  This is a 58 y.o. 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  Associated vasogenic edema extending across the midline into the corpus callosum with mass effect and 1.4 cm left shift.  CT chest abdomen pelvis 6/16/2024 with borderline enlarged carley hepatis lymph nodes, indeterminate.  Brain biopsy on 6/19/2024 with diffuse large B cell non-Hodgkin's lymphoma with non-germinal center phenotype, VANIA negative, Ki-67 80%, FISH negative for Bcl-2, BCL6, MYC rearrangements.  HIV 1/2 negative on 6/23/2024  LDH borderline elevated at 305 on 6/22/2024  Patient unable to perform outpatient PET scan due to hyperglycemia  Patient initiated treatment with high-dose methotrexate and rituximab.  Initial 25% dose reduction in high-dose methotrexate from 8 g/m² down to 6 g/m² with cycle 1 due to baseline elevated LFTs.  Treatment initiated 7/15/2024.  Significant elevation in transaminases following cycle 1 high-dose methotrexate.  Treated with ursodiol.  Subsequent improvement.  Patient continuing on chronic steroids due to cerebral edema, Decadron 2 mg twice daily  Patient admitted 8/2/2024 for hydration/urine alkalinization prior to initiation of cycle 2 rituximab and high-dose methotrexate   On 8/4/2024 initiated cycle 2 high-dose methotrexate and rituximab.  Methotrexate dose maintained at previous 25% dose reduction, 6 g/m² over 4 hours.  Rituximab due on day 3.  Patient is continuing on hydration/urine alkalinization with bicarbonate drip 150 cc/h.  Urine pH today is 8.0 today.  LFTs are stable today at patient's apparent baseline with AST 78, , total bilirubin  normal at 0.5.  LFTs were in this range when patient initiated cycle 1.  Proceed today as planned.  Leucovorin rescue to begin on 8/6/2024, 24 hours after initiation of methotrexate at dose of 25 mg IV every 6 hours.  Methotrexate levels to be monitored, first level to be drawn on 8/6/2024, 24 hours after initiation of methotrexate.  8/5/2024: Patient is continuing on hydration/urine alkalization with bicarbonate drip.  Methotrexate level is above goal at 6.9 today (goal <5 at 24 hours).  Will increase leucovorin to 50 mg every 4 hours and increase bicarb drip to 150 meq at 150ml/hr.  Repeat methotrexate level tomorrow a.m.  LFTs remain elevated with AST 76,  and total bilirubin 1.5.  Add Actigall to help with LFTs.  CBC within normal range.  Proceed with rituximab dose today.  8/6/2024: Methotrexate levels from today pending.  Will adjust leucovorin and IV fluids based on the level.    *Steroid-induced diabetes  Patient with diabetes exacerbated by steroids  [facility] consulted to assist with diabetic management  Patient currently receiving Lantus 20 units daily and Humalog 8 units 3 times daily with meals in addition to sliding scale 4 times daily   Glucose has been in the 200-300 range    *Hepatitis C, elevated LFTs  Patient with hepatitis C, genotype Ia  HCVRNA 7,590,000 on 1/30/2024  HCVRNA 7,220,000 on 5/16/2024  Patient was preparing for treatment through hepatology at [facility] with Mavyret x 8 weeks in June 2024 however was not able to begin treatment due to cost issues and subsequent diagnosis of CNS lymphoma  Treatment with high-dose methotrexate for CNS lymphoma complicated by significant escalation of transaminases  Patient treated with ursodiol with improvement, ursodiol subsequently held with return of LFTs to baseline range.  Concern regarding untreated hepatitis C in the setting of current immunosuppressive treatment including rituximab.  On 8/6/2024 we will need to contact patient's  hepatologist at [facility] to discuss recommendations regarding hepatitis C management as patient continues on treatment for lymphoma  8/4/2024: LFTs today continue at patient's apparent baseline with AST 78, , total bilirubin 0.5.  Plan to closely monitor with initiation of high-dose methotrexate today.  8/5/24: LFTs remain elevated with total bilirubin up to 1.5 today.  Add Actigall.  Will need to clarify hepatitis C management plan with GI at [facility]  8/6/2024: Our pharmacist, Sean spoke with the [facility] hepatitis C clinic pharmacist regarding his hepatitis treatment.  We were told that his treatment has been put on hold in view of ongoing CNS lymphoma treatment.  They are planning to see him back in early September for a follow-up.  Clinic phone number- [phone]    *Polysubstance use  History of significant alcohol use, decreased following diagnosis with hepatitis C in early 2024  UDS positive for THC on 6/15/2024    *Hypertension  Losartan 50 mg daily, HCTZ 12.5 mg daily    *Seizure prophylaxis  Keppra 500 mg every 12 hours    *PCP prophylaxis  Bactrim DS 1 p.o. Monday Wednesday Friday as outpatient  Bactrim currently on hold due to expected transaminitis from methotrexate    *Viral prophylaxis  Acyclovir 400 mg twice daily    *GI prophylaxis  Pepcid 20 mg twice daily    *DVT prophylaxis  Lovenox 40 mg daily    *Insomnia  Ambien 10 mg nightly    *Pain control  Hydrocodone 10/325 every 4 hours as needed    *Nausea  Zofran 4 mg IV every 6 hours as needed  Compazine 10 mg every 6 hours as needed    Plan:  Received methotrexate 6 g/m² (maintaining previous 25% dose reduction) over 4 hours on 8/4/24.  Rituximab on 8/5/2024  Methotrexate level from today pending.  In the interim, continue sodium bicarbonate drip to 150 meq at 150 cc/h for urine alkalinization.  Continue leucovorin rescue to 50 mg every 4 hours   Repeat methotrexate levels every 24 hours  Continue actigall to help with  LFTs  Close monitoring of LFTs following methotrexate administration.   Continue dexamethasone 2 mg twice daily  Continue Keppra 500 mg twice daily  [facility] consulted for assistance with management of steroid-induced diabetes during hospitalization  Continue viral prophylaxis with acyclovir 400 mg twice daily  PCP prophylaxis with Bactrim on hold during high-dose methotrexate treatment  Spoke with the [facility] hepatitis C clinic ([phone]).  They plan to hold active hepatitis C treatment and follow-up with patient in early September  Daily CBC, CMP, urine pH    Discussed with patient at bedside    I spent 52 minutes on this encounter, before, during & after the visit evaluating the patient, reviewing records and writing orders.

## 2024-08-06 NOTE — PROGRESS NOTES
[facility] Medicine Services  PROGRESS NOTE    Patient Name: Iain Christina  : 1965  MRN: [id number]    Date of Admission: 2024  Primary Care Physician: Scott Medina MD    Subjective   Subjective     CC:  Follow-up diabetes and medical management    Subjective:  Patient says he is doing decently today.  He denies any new symptoms.    Review of Systems  No current fevers or chills  No current shortness of breath or cough  No current chest pain or palpitations       Objective   Objective     Vital Signs:   Temp:  [97.3 °F (36.3 °C)-98.1 °F (36.7 °C)] 98.1 °F (36.7 °C)  Heart Rate:  [54-92] 92  Resp:  [16-18] 16  BP: (109-140)/(70-92) 125/85        Physical Exam:  Constitutional:Awake, alert  HENT: NCAT, mucous membranes moist, neck supple  Respiratory: No cough or wheezes, normal respirations, nonlabored breathing   Cardiovascular: Pulse rate is normal, palpable radial pulses  Gastrointestinal:  soft, nontender, nondistended  Musculoskeletal: Normal musculature for age, no lower extremity edema, BMI 31  Psychiatric: Appropriate affect, cooperative, conversational  Neurologic: No slurred speech or facial droop, follows commands  Skin: No rashes or jaundice, warm      Results Reviewed:  Results from last 7 days   Lab Units 2439 24  0654 24  0425 24  1436 24  0820   WBC 10*3/mm3 7.10 8.79 4.36   < >  --    HEMOGLOBIN g/dL 13.8 13.8 12.8*   < >  --    HEMATOCRIT % 40.0 39.3 38.0   < >  --    PLATELETS 10*3/mm3 141 149 128*   < >  --    INR   --   --   --   --  1.05    < > = values in this interval not displayed.     Results from last 7 days   Lab Units 24  0539 24  0654 24  0425   SODIUM mmol/L 141 140 137   POTASSIUM mmol/L 3.9 3.4* 3.6   CHLORIDE mmol/L 105 103 103   CO2 mmol/L 29.0 29.5* 25.4   BUN mg/dL 19 15 25*   CREATININE mg/dL 0.73* 0.55* 0.60*   GLUCOSE mg/dL 166* 130* 258*   CALCIUM mg/dL 8.9 8.8 8.8   ALK PHOS U/L 110 124*  167*   ALT (SGPT) U/L 381* 308* 304*   AST (SGOT) U/L 137* 76* 78*     Estimated Creatinine Clearance: 124.8 mL/min (A) (by C-G formula based on SCr of 0.73 mg/dL (L)).    Microbiology Results Abnormal       None            Imaging Results (Last 24 Hours)       Procedure Component Value Units Date/Time    XR Chest 1 View [[id number]] Resulted: 08/06/24 1320     Updated: 08/06/24 1244            Results for orders placed during the hospital encounter of 06/15/24    Adult Transthoracic Echo Complete w/ Color, Spectral and Contrast if Necessary Per Protocol    Interpretation Summary    Left ventricular systolic function is normal. Calculated left ventricular EF = 60.6% Septal wall motion is normal. Normal global longitudinal LV strain (GLS) = -22.7%    Left ventricular diastolic function was normal.    Saline test results are negative for right to left atrial level shunt.      I have reviewed the medications:  Scheduled Meds:acyclovir, 400 mg, Oral, BID  dexAMETHasone, 2 mg, Oral, BID With Meals  enoxaparin, 40 mg, Subcutaneous, Daily  famotidine, 20 mg, Oral, BID AC  losartan, 50 mg, Oral, Q24H   And  hydroCHLOROthiazide, 12.5 mg, Oral, Q24H  insulin glargine, 25 Units, Subcutaneous, Daily  insulin lispro, 12 Units, Subcutaneous, TID With Meals  insulin lispro, 3-14 Units, Subcutaneous, 4x Daily AC & at Bedtime  leucovorin 50 mg in sodium chloride 0.9 % 55 mL IVPB, 50 mg, Intravenous, Q4H  levETIRAcetam, 500 mg, Oral, Q12H  Pharmacy monitoring - high dose methotrexate active, 1 each, Intravenous, Daily  [Held by provider] sulfamethoxazole-trimethoprim, 1 tablet, Oral, Once per day on Monday Wednesday Friday  ursodiol, 300 mg, Oral, BID  zolpidem, 10 mg, Oral, Nightly      Continuous Infusions:sodium bicarbonate 8.4 % 150 mEq in sterile water (preservative free) 1,000 mL infusion, 150 mL/hr, Last Rate: 150 mL/hr (08/06/24 0801)      PRN Meds:.  acetaminophen    Calcium Replacement - Follow Nurse / BPA Driven Protocol     diphenhydrAMINE    famotidine    HYDROcodone-acetaminophen    Hydrocortisone Sod Suc (PF)    Magnesium Standard Dose Replacement - Follow Nurse / BPA Driven Protocol    meperidine    ondansetron    Phosphorus Replacement - Follow Nurse / BPA Driven Protocol    Potassium Replacement - Follow Nurse / BPA Driven Protocol    prochlorperazine    Assessment & Plan   Assessment & Plan     Active Hospital Problems    Diagnosis  POA    Transaminitis [R74.01]  Yes    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        Brief Hospital Course to date:  Iain Christina is a 58 y.o. male     Discussion/plan for today:  Glucose reviewed and we are making improvements on glucose control.  Lantus dose adjusted.  Monitor glucose today and further adjusting as needed.  Continue current correction scale.  If steroid dose is adjusted we will likely need to make further adjustments  Added Tylenol for mild headache today and improved  Hypokalemia improved    Steroid induced hyperglycemia, A1c 6.6  -home regimen: Tresiba 30 units, aspart 10 units TIDAC + SSI  -glargine , lispro TIDAC + SSI, monitor glucose and adjusting as needed  -POC glucose, monitor     Primary CNS diffuse large B-cell lymphoma  -per Oncology  -Keppra, Bactrim PPx     Elevated transaminases  -likely 2/2 CTX  -prior liver US unremarkable  -trending     HTN  -HCTZ, losartan, monitor for low blood pressures     Hypokalemia: Replete as needed per protocol    Insomnia: Ambien, tolerating.  Niraj reviewed.     DVT prophylaxis: SCDs  Discussed with patient..    CODE STATUS:   Code Status and Medical Interventions: CPR (Attempt to Resuscitate); Full Support   Ordered at: 08/03/24 0758     Code Status (Patient has no pulse and is not breathing):    CPR (Attempt to Resuscitate)      Medical Interventions (Patient has pulse or is breathing):    Full Support       Blane Lea MD  08/06/24

## 2024-08-07 LAB
ALBUMIN SERPL-MCNC: 3.1 G/DL (ref 3.5–5.2)
ALBUMIN/GLOB SERPL: 1.8 G/DL
ALP SERPL-CCNC: 109 U/L (ref 39–117)
ALT SERPL W P-5'-P-CCNC: 404 U/L (ref 1–41)
ANION GAP SERPL CALCULATED.3IONS-SCNC: 6 MMOL/L (ref 5–15)
AST SERPL-CCNC: 141 U/L (ref 1–40)
BASOPHILS # BLD AUTO: 0.01 10*3/MM3 (ref 0–0.2)
BASOPHILS NFR BLD AUTO: 0.2 % (ref 0–1.5)
BILIRUB SERPL-MCNC: 1.2 MG/DL (ref 0–1.2)
BUN SERPL-MCNC: 21 MG/DL (ref 8–23)
BUN/CREAT SERPL: 36.2 (ref 7–25)
CALCIUM SPEC-SCNC: 8.6 MG/DL (ref 8.6–10.5)
CHLORIDE SERPL-SCNC: 104 MMOL/L (ref 98–107)
CO2 SERPL-SCNC: 28 MMOL/L (ref 22–29)
CREAT SERPL-MCNC: 0.58 MG/DL (ref 0.76–1.27)
DEPRECATED RDW RBC AUTO: 46.8 FL (ref 37–54)
EGFRCR SERPLBLD CKD-EPI 2021: 111.6 ML/MIN/1.73
EOSINOPHIL # BLD AUTO: 0.03 10*3/MM3 (ref 0–0.4)
EOSINOPHIL NFR BLD AUTO: 0.5 % (ref 0.3–6.2)
ERYTHROCYTE [DISTWIDTH] IN BLOOD BY AUTOMATED COUNT: 15.1 % (ref 12.3–15.4)
GLOBULIN UR ELPH-MCNC: 1.7 GM/DL
GLUCOSE BLDC GLUCOMTR-MCNC: 147 MG/DL (ref 70–130)
GLUCOSE BLDC GLUCOMTR-MCNC: 171 MG/DL (ref 70–130)
GLUCOSE BLDC GLUCOMTR-MCNC: 177 MG/DL (ref 70–130)
GLUCOSE SERPL-MCNC: 142 MG/DL (ref 65–99)
HCT VFR BLD AUTO: 38.1 % (ref 37.5–51)
HGB BLD-MCNC: 13 G/DL (ref 13–17.7)
IMM GRANULOCYTES # BLD AUTO: 0.04 10*3/MM3 (ref 0–0.05)
IMM GRANULOCYTES NFR BLD AUTO: 0.6 % (ref 0–0.5)
LYMPHOCYTES # BLD AUTO: 0.96 10*3/MM3 (ref 0.7–3.1)
LYMPHOCYTES NFR BLD AUTO: 14.6 % (ref 19.6–45.3)
MCH RBC QN AUTO: 30.5 PG (ref 26.6–33)
MCHC RBC AUTO-ENTMCNC: 34.1 G/DL (ref 31.5–35.7)
MCV RBC AUTO: 89.4 FL (ref 79–97)
MONOCYTES # BLD AUTO: 0.14 10*3/MM3 (ref 0.1–0.9)
MONOCYTES NFR BLD AUTO: 2.1 % (ref 5–12)
MTX SERPL-SCNC: NORMAL UMOL/L
NEUTROPHILS NFR BLD AUTO: 5.39 10*3/MM3 (ref 1.7–7)
NEUTROPHILS NFR BLD AUTO: 82 % (ref 42.7–76)
PH UR STRIP.AUTO: >=9 [PH] (ref 5–8)
PLATELET # BLD AUTO: 139 10*3/MM3 (ref 140–450)
PMV BLD AUTO: 10 FL (ref 6–12)
POTASSIUM SERPL-SCNC: 3.8 MMOL/L (ref 3.5–5.2)
PROT SERPL-MCNC: 4.8 G/DL (ref 6–8.5)
RBC # BLD AUTO: 4.26 10*6/MM3 (ref 4.14–5.8)
SODIUM SERPL-SCNC: 138 MMOL/L (ref 136–145)
WBC NRBC COR # BLD AUTO: 6.57 10*3/MM3 (ref 3.4–10.8)

## 2024-08-07 PROCEDURE — 80053 COMPREHEN METABOLIC PANEL: CPT | Performed by: INTERNAL MEDICINE

## 2024-08-07 PROCEDURE — 81003 URINALYSIS AUTO W/O SCOPE: CPT | Performed by: INTERNAL MEDICINE

## 2024-08-07 PROCEDURE — 80204 DRUG ASSAY METHOTREXATE: CPT | Performed by: INTERNAL MEDICINE

## 2024-08-07 PROCEDURE — 99233 SBSQ HOSP IP/OBS HIGH 50: CPT | Performed by: INTERNAL MEDICINE

## 2024-08-07 PROCEDURE — 85025 COMPLETE CBC W/AUTO DIFF WBC: CPT | Performed by: INTERNAL MEDICINE

## 2024-08-07 PROCEDURE — 63710000001 INSULIN LISPRO (HUMAN) PER 5 UNITS: Performed by: STUDENT IN AN ORGANIZED HEALTH CARE EDUCATION/TRAINING PROGRAM

## 2024-08-07 PROCEDURE — 25010000002 LEUCOVORIN CALCIUM PER 50 MG: Performed by: INTERNAL MEDICINE

## 2024-08-07 PROCEDURE — 63710000001 INSULIN LISPRO (HUMAN) PER 5 UNITS: Performed by: INTERNAL MEDICINE

## 2024-08-07 PROCEDURE — 63710000001 INSULIN GLARGINE PER 5 UNITS: Performed by: INTERNAL MEDICINE

## 2024-08-07 PROCEDURE — 82948 REAGENT STRIP/BLOOD GLUCOSE: CPT

## 2024-08-07 PROCEDURE — 25010000002 LEUCOVORIN 200 MG RECONSTITUTED SOLUTION 200 MG VIAL: Performed by: INTERNAL MEDICINE

## 2024-08-07 RX ADMIN — INSULIN LISPRO 3 UNITS: 100 INJECTION, SOLUTION INTRAVENOUS; SUBCUTANEOUS at 20:28

## 2024-08-07 RX ADMIN — INSULIN GLARGINE 24 UNITS: 100 INJECTION, SOLUTION SUBCUTANEOUS at 08:19

## 2024-08-07 RX ADMIN — ACYCLOVIR 400 MG: 400 TABLET ORAL at 08:17

## 2024-08-07 RX ADMIN — SODIUM BICARBONATE 150 ML/HR: 84 INJECTION, SOLUTION INTRAVENOUS at 11:12

## 2024-08-07 RX ADMIN — LEUCOVORIN CALCIUM 50 MG: 350 INJECTION, POWDER, LYOPHILIZED, FOR SUSPENSION INTRAMUSCULAR; INTRAVENOUS at 20:21

## 2024-08-07 RX ADMIN — URSODIOL 300 MG: 300 CAPSULE ORAL at 20:20

## 2024-08-07 RX ADMIN — LEUCOVORIN CALCIUM 50 MG: 350 INJECTION, POWDER, LYOPHILIZED, FOR SUSPENSION INTRAMUSCULAR; INTRAVENOUS at 12:25

## 2024-08-07 RX ADMIN — URSODIOL 300 MG: 300 CAPSULE ORAL at 08:17

## 2024-08-07 RX ADMIN — LEUCOVORIN CALCIUM 50 MG: 350 INJECTION, POWDER, LYOPHILIZED, FOR SUSPENSION INTRAMUSCULAR; INTRAVENOUS at 08:24

## 2024-08-07 RX ADMIN — ACYCLOVIR 400 MG: 400 TABLET ORAL at 20:20

## 2024-08-07 RX ADMIN — LEVETIRACETAM 500 MG: 500 TABLET, FILM COATED ORAL at 08:17

## 2024-08-07 RX ADMIN — LEUCOVORIN CALCIUM 50 MG: 350 INJECTION, POWDER, LYOPHILIZED, FOR SUSPENSION INTRAMUSCULAR; INTRAVENOUS at 00:29

## 2024-08-07 RX ADMIN — FAMOTIDINE 20 MG: 20 TABLET, FILM COATED ORAL at 08:20

## 2024-08-07 RX ADMIN — LEVETIRACETAM 500 MG: 500 TABLET, FILM COATED ORAL at 20:20

## 2024-08-07 RX ADMIN — INSULIN LISPRO 12 UNITS: 100 INJECTION, SOLUTION INTRAVENOUS; SUBCUTANEOUS at 08:19

## 2024-08-07 RX ADMIN — ZOLPIDEM TARTRATE 10 MG: 5 TABLET ORAL at 20:20

## 2024-08-07 RX ADMIN — LEUCOVORIN CALCIUM 50 MG: 350 INJECTION, POWDER, LYOPHILIZED, FOR SUSPENSION INTRAMUSCULAR; INTRAVENOUS at 16:25

## 2024-08-07 RX ADMIN — SODIUM BICARBONATE 150 ML/HR: 84 INJECTION, SOLUTION INTRAVENOUS at 04:26

## 2024-08-07 RX ADMIN — INSULIN LISPRO 12 UNITS: 100 INJECTION, SOLUTION INTRAVENOUS; SUBCUTANEOUS at 12:22

## 2024-08-07 RX ADMIN — FAMOTIDINE 20 MG: 20 TABLET, FILM COATED ORAL at 17:12

## 2024-08-07 RX ADMIN — DEXAMETHASONE 2 MG: 2 TABLET ORAL at 08:18

## 2024-08-07 RX ADMIN — INSULIN LISPRO 12 UNITS: 100 INJECTION, SOLUTION INTRAVENOUS; SUBCUTANEOUS at 17:12

## 2024-08-07 RX ADMIN — LOSARTAN POTASSIUM 50 MG: 50 TABLET, FILM COATED ORAL at 08:17

## 2024-08-07 RX ADMIN — LEUCOVORIN CALCIUM 50 MG: 350 INJECTION, POWDER, LYOPHILIZED, FOR SUSPENSION INTRAMUSCULAR; INTRAVENOUS at 04:28

## 2024-08-07 RX ADMIN — INSULIN LISPRO 3 UNITS: 100 INJECTION, SOLUTION INTRAVENOUS; SUBCUTANEOUS at 12:22

## 2024-08-07 RX ADMIN — DEXAMETHASONE 2 MG: 2 TABLET ORAL at 17:12

## 2024-08-07 RX ADMIN — HYDROCHLOROTHIAZIDE 12.5 MG: 12.5 TABLET ORAL at 08:17

## 2024-08-07 RX ADMIN — SODIUM BICARBONATE 150 ML/HR: 84 INJECTION, SOLUTION INTRAVENOUS at 18:08

## 2024-08-07 NOTE — PLAN OF CARE
Goal Outcome Evaluation:               Patient is a very pleasant gentleman who is alert and oriented x 4, but sometimes forgetful.  He is tolerating his diet, ambulates to the bathroom, but no further.  He told me that he really did feel like doing anything today.    WCTM.

## 2024-08-07 NOTE — PROGRESS NOTES
[facility] INPATIENT PROGRESS NOTE    Length of Stay:  5 days    CHIEF COMPLAINT:  Primary CNS diffuse large B cell non-Hodgkin's lymphoma, steroid-induced diabetes, hepatitis C    SUBJECTIVE:   Patient with no complaints today, tolerating treatment well.  Labs/vital stable.  Tolerating IV fluids well.  Intermittent headaches, controlled with Tylenol.    ROS:  Review of Systems   A comprehensive review of systems was obtained with pertinent positive findings as noted in the interval history above.  All other systems negative.    OBJECTIVE:  Vitals:    08/06/24 1931 08/07/24 0339 08/07/24 0606 08/07/24 0740   BP: 108/65 137/87  148/78   BP Location: Right arm Right arm  Right arm   Patient Position: Lying Lying  Lying   Pulse: 80 53  51   Resp: 18 18  18   Temp: 97.4 °F (36.3 °C) 97.7 °F (36.5 °C)  97.2 °F (36.2 °C)   TempSrc: Oral Oral  Oral   SpO2: 95% 97%  96%   Weight:   96.8 kg (213 lb 6.5 oz)    Height:             PHYSICAL EXAMINATION:  General: Alert and orient x 3 no distress  Chest/Lungs: Normal respiratory effort  Heart: Heart rate  Abdomen/GI: Soft not distended  Extremities: No edema    Patient was examined today, unchanged from above    DIAGNOSTIC DATA:  Results Review:     I reviewed the patient's new clinical results.    Results from last 7 days   Lab Units 08/07/24  0650 08/06/24  0539 08/05/24  0654   WBC 10*3/mm3 6.57 7.10 8.79   HEMOGLOBIN g/dL 13.0 13.8 13.8   HEMATOCRIT % 38.1 40.0 39.3   PLATELETS 10*3/mm3 139* 141 149      Results from last 7 days   Lab Units 08/07/24  0650 08/06/24  0539 08/05/24  0654   SODIUM mmol/L 138 141 140   POTASSIUM mmol/L 3.8 3.9 3.4*   CHLORIDE mmol/L 104 105 103   CO2 mmol/L 28.0 29.0 29.5*   BUN mg/dL 21 19 15   CREATININE mg/dL 0.58* 0.73* 0.55*   CALCIUM mg/dL 8.6 8.9 8.8   BILIRUBIN mg/dL 1.2 1.0 1.5*   ALK PHOS U/L 109 110 124*   ALT (SGPT) U/L 404* 381* 308*   AST (SGOT) U/L 141* 137* 76*   GLUCOSE mg/dL 142* 166* 130*      Lab Results    Component Value Date    NEUTROABS 5.39 08/07/2024     Results from last 7 days   Lab Units 08/02/24  0820   INR  1.05   APTT seconds 25.0     Results from last 7 days   Lab Units 08/03/24  0338   MAGNESIUM mg/dL 2.1           Assessment & Plan   ASSESSMENT/PLAN:  This is a 58 y.o. 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  Associated vasogenic edema extending across the midline into the corpus callosum with mass effect and 1.4 cm left shift.  CT chest abdomen pelvis 6/16/2024 with borderline enlarged carley hepatis lymph nodes, indeterminate.  Brain biopsy on 6/19/2024 with diffuse large B cell non-Hodgkin's lymphoma with non-germinal center phenotype, VANIA negative, Ki-67 80%, FISH negative for Bcl-2, BCL6, MYC rearrangements.  HIV 1/2 negative on 6/23/2024  LDH borderline elevated at 305 on 6/22/2024  Patient unable to perform outpatient PET scan due to hyperglycemia  Patient initiated treatment with high-dose methotrexate and rituximab.  Initial 25% dose reduction in high-dose methotrexate from 8 g/m² down to 6 g/m² with cycle 1 due to baseline elevated LFTs.  Treatment initiated 7/15/2024.  Significant elevation in transaminases following cycle 1 high-dose methotrexate.  Treated with ursodiol.  Subsequent improvement.  Patient continuing on chronic steroids due to cerebral edema, Decadron 2 mg twice daily  Patient admitted 8/2/2024 for hydration/urine alkalinization prior to initiation of cycle 2 rituximab and high-dose methotrexate   On 8/4/2024 initiated cycle 2 high-dose methotrexate and rituximab.  Methotrexate dose maintained at previous 25% dose reduction, 6 g/m² over 4 hours.  Rituximab due on day 3.  Patient is continuing on hydration/urine alkalinization with bicarbonate drip 150 cc/h.  Urine pH today is 8.0 today.  LFTs are stable today at patient's  apparent baseline with AST 78, , total bilirubin normal at 0.5.  LFTs were in this range when patient initiated cycle 1.  Proceed today as planned.  Leucovorin rescue to begin on 8/6/2024, 24 hours after initiation of methotrexate at dose of 25 mg IV every 6 hours.  Methotrexate levels to be monitored, first level to be drawn on 8/6/2024, 24 hours after initiation of methotrexate.  8/5/2024: Patient is continuing on hydration/urine alkalization with bicarbonate drip.  Methotrexate level is above goal at 6.9 today (goal <5 at 24 hours).  Will increase leucovorin to 50 mg every 4 hours and increase bicarb drip to 150 meq at 150ml/hr.  Repeat methotrexate level tomorrow a.m.  LFTs remain elevated with AST 76,  and total bilirubin 1.5.  Add Actigall to help with LFTs.  CBC within normal range.  Proceed with rituximab dose today.  8/6/2024: Methotrexate levels from today within range.  Continue leucovorin and IV fluids same as yesterday.  8/7/2024: Methotrexate levels from today pending.    *Steroid-induced diabetes  Patient with diabetes exacerbated by steroids  [facility] consulted to assist with diabetic management  Patient currently receiving Lantus 20 units daily and Humalog 8 units 3 times daily with meals in addition to sliding scale 4 times daily   Glucose has been in the 200-300 range    *Hepatitis C, elevated LFTs  Patient with hepatitis C, genotype Ia  HCVRNA 7,590,000 on 1/30/2024  HCVRNA 7,220,000 on 5/16/2024  Patient was preparing for treatment through hepatology at [facility] with Mavyret x 8 weeks in June 2024 however was not able to begin treatment due to cost issues and subsequent diagnosis of CNS lymphoma  Treatment with high-dose methotrexate for CNS lymphoma complicated by significant escalation of transaminases  Patient treated with ursodiol with improvement, ursodiol subsequently held with return of LFTs to baseline range.  Concern regarding untreated hepatitis C in the  setting of current immunosuppressive treatment including rituximab.  On 8/6/2024 we will need to contact patient's hepatologist at [facility] to discuss recommendations regarding hepatitis C management as patient continues on treatment for lymphoma  8/4/2024: LFTs today continue at patient's apparent baseline with AST 78, , total bilirubin 0.5.  Plan to closely monitor with initiation of high-dose methotrexate today.  8/5/24: LFTs remain elevated with total bilirubin up to 1.5 today.  Add Actigall.  Will need to clarify hepatitis C management plan with GI at [facility]  8/6/2024: Our pharmacist, Sean spoke with the [facility] hepatitis C clinic pharmacist regarding his hepatitis treatment.  We were told that his treatment has been put on hold in view of ongoing CNS lymphoma treatment.  They are planning to see him back in early September for a follow-up.  Clinic phone number- [phone]  8/7/2024: Slight worsening in LFTs.  Patient remains on Actigall.  Monitor    *Polysubstance use  History of significant alcohol use, decreased following diagnosis with hepatitis C in early 2024  UDS positive for THC on 6/15/2024    *Hypertension  Losartan 50 mg daily, HCTZ 12.5 mg daily    *Seizure prophylaxis  Keppra 500 mg every 12 hours    *PCP prophylaxis  Bactrim DS 1 p.o. Monday Wednesday Friday as outpatient  Bactrim currently on hold due to expected transaminitis from methotrexate    *Viral prophylaxis  Acyclovir 400 mg twice daily    *GI prophylaxis  Pepcid 20 mg twice daily    *DVT prophylaxis  Lovenox 40 mg daily    *Insomnia  Ambien 10 mg nightly    *Pain control  Hydrocodone 10/325 every 4 hours as needed    *Nausea  Zofran 4 mg IV every 6 hours as needed  Compazine 10 mg every 6 hours as needed    Plan:  Received methotrexate 6 g/m² (maintaining previous 25% dose reduction) over 4 hours on 8/4/24.  Rituximab on 8/5/2024  Methotrexate level from today pending.  In the interim, continue sodium  bicarbonate drip to 150 meq at 150 cc/h for urine alkalinization.  Continue leucovorin rescue to 50 mg every 4 hours   Repeat methotrexate levels every 24 hours  Continue actigall to help with LFTs  Close monitoring of LFTs following methotrexate administration.   Continue dexamethasone 2 mg twice daily  Continue Keppra 500 mg twice daily  [facility] consulted for assistance with management of steroid-induced diabetes during hospitalization  Continue viral prophylaxis with acyclovir 400 mg twice daily  PCP prophylaxis with Bactrim on hold during high-dose methotrexate treatment  Spoke with the [facility] hepatitis C clinic ([phone]).  They plan to hold active hepatitis C treatment and follow-up with patient in early September  Daily CBC, CMP, urine pH    Discussed with patient at bedside    I spent 53 minutes on this encounter, before, during & after the visit evaluating the patient, reviewing records and writing orders.

## 2024-08-07 NOTE — PROGRESS NOTES
Pharmacy Consult - HD Methotrexate monitoring    Iain Christina is a 58 y.o. YO male starting therapy with HD-MTX. Pharmacy consulted to monitor and adjust leucovorin.    Patient to have methotrexate levels monitored per protocol every 24 hours, starting 24 hours after methotrexate infusion started. For this patient the first level will be drawn at 8/5 on 1130.    Goal levels are:  24 hr post infusion <5  48 hr post infusion <1  72 hr post infusion <0.1     Discussed plan with Dr. Sanchez and Dr. Bello, recommend the following dose adjustments to be conducted based on levels at the following times.     Date Current leucovorin dose Actual level Goal level Adjustment    8/5 @ 1130  Leucovorin 50 mg Q6H 6.9 <5 If level 5-10, increase to 50 mg Q4   8/6 @ 1136 Leucovorin 50 mg Q4H 0.46 <1 If level >1 increase to 100 mg Q6   8/7 @ 1100 Leucovorin 50 mg Q4H 0.09 <0.1 If level >0.1 increase to 100 mg Q6                   Plan:  1. Level came back at goal - continuing Leucovorin 50 mg Q4H, no changes at this time  2. Continuing sodium bicarb infusion to 150mEq @ 150ml/hr  3. Next level scheduled for 8/8 at 1100        Pharmacy will continue to monitor and update as levels are available.      Laurence Logan, YanaD

## 2024-08-07 NOTE — PLAN OF CARE
Goal Outcome Evaluation:    Patient is A & O X 4. On RA. Medicated per MAR. VSS. ACHS. Voiding well, uses urinal. Denies any pain. Urine sample sent to lab this AM. R chest wall port noted, flushed and + blood return noted, infusing. Denies any pain. Bed low, locked and call light within reach. No issues overnight.

## 2024-08-07 NOTE — PROGRESS NOTES
[facility] Medicine Services  PROGRESS NOTE    Patient Name: Iain Christina  : 1965  MRN: [id number]    Date of Admission: 2024  Primary Care Physician: Scott Medina MD    Subjective   Subjective     CC:  Follow-up diabetes and medical management    Subjective:  No specific complaints.  Blood sugars overall well-controlled.  He admits to ordering a pizza from outside restaurant yesterday he thinks the cause of sugar spike yesterday afternoon.    Objective   Objective     Vital Signs:   Temp:  [97.2 °F (36.2 °C)-97.7 °F (36.5 °C)] 97.5 °F (36.4 °C)  Heart Rate:  [51-99] 70  Resp:  [16-18] 18  BP: (102-148)/(65-87) 117/76        Physical Exam:  Constitutional:Awake, alert  Respiratory: No cough or wheezes, normal respirations, nonlabored breathing   Cardiovascular: Pulse rate is normal, palpable radial pulses  Gastrointestinal:  soft, nontender, nondistended  Musculoskeletal: Normal musculature for age, no lower extremity edema      Results Reviewed:  Results from last 7 days   Lab Units 24  0650 24  0539 24  0654 24  1436 24  0820   WBC 10*3/mm3 6.57 7.10 8.79   < >  --    HEMOGLOBIN g/dL 13.0 13.8 13.8   < >  --    HEMATOCRIT % 38.1 40.0 39.3   < >  --    PLATELETS 10*3/mm3 139* 141 149   < >  --    INR   --   --   --   --  1.05    < > = values in this interval not displayed.     Results from last 7 days   Lab Units 24  0650 24  0539 24  0654   SODIUM mmol/L 138 141 140   POTASSIUM mmol/L 3.8 3.9 3.4*   CHLORIDE mmol/L 104 105 103   CO2 mmol/L 28.0 29.0 29.5*   BUN mg/dL 21 19 15   CREATININE mg/dL 0.58* 0.73* 0.55*   GLUCOSE mg/dL 142* 166* 130*   CALCIUM mg/dL 8.6 8.9 8.8   ALK PHOS U/L 109 110 124*   ALT (SGPT) U/L 404* 381* 308*   AST (SGOT) U/L 141* 137* 76*     Glucose   Date/Time Value Ref Range Status   2024 1106 177 (H) 70 - 130 mg/dL Final   2024 2030 177 (H) 70 - 130 mg/dL Final   2024 1634 323 (H) 70 - 130  mg/dL Final   08/06/2024 1131 155 (H) 70 - 130 mg/dL Final   08/06/2024 0741 119 70 - 130 mg/dL Final   08/05/2024 2049 135 (H) 70 - 130 mg/dL Final   08/05/2024 1638 130 70 - 130 mg/dL Final       I have reviewed the medications:  Scheduled Meds:acyclovir, 400 mg, Oral, BID  dexAMETHasone, 2 mg, Oral, BID With Meals  enoxaparin, 40 mg, Subcutaneous, Daily  famotidine, 20 mg, Oral, BID AC  losartan, 50 mg, Oral, Q24H   And  hydroCHLOROthiazide, 12.5 mg, Oral, Q24H  insulin glargine, 24 Units, Subcutaneous, Daily  insulin lispro, 12 Units, Subcutaneous, TID With Meals  insulin lispro, 3-14 Units, Subcutaneous, 4x Daily AC & at Bedtime  leucovorin 50 mg in sodium chloride 0.9 % 55 mL IVPB, 50 mg, Intravenous, Q4H  levETIRAcetam, 500 mg, Oral, Q12H  Pharmacy monitoring - high dose methotrexate active, 1 each, Intravenous, Daily  [Held by provider] sulfamethoxazole-trimethoprim, 1 tablet, Oral, Once per day on Monday Wednesday Friday  ursodiol, 300 mg, Oral, BID  zolpidem, 10 mg, Oral, Nightly      Continuous Infusions:sodium bicarbonate 8.4 % 150 mEq in sterile water (preservative free) 1,000 mL infusion, 150 mL/hr, Last Rate: 150 mL/hr (08/07/24 1112)      PRN Meds:.  acetaminophen    Calcium Replacement - Follow Nurse / BPA Driven Protocol    diphenhydrAMINE    famotidine    HYDROcodone-acetaminophen    Hydrocortisone Sod Suc (PF)    Magnesium Standard Dose Replacement - Follow Nurse / BPA Driven Protocol    meperidine    ondansetron    Phosphorus Replacement - Follow Nurse / BPA Driven Protocol    Potassium Replacement - Follow Nurse / BPA Driven Protocol    prochlorperazine    Assessment & Plan   Assessment & Plan     Active Hospital Problems    Diagnosis  POA    Transaminitis [R74.01]  Yes    Primary CNS lymphoma [C85.89]  Yes    Abnormal liver function tests [R79.89]  Yes    Steroid-induced hyperglycemia [R73.9, T38.0X5A]  Yes    Hypertension [I10]  Yes      Resolved Hospital Problems   No resolved problems to  display.        Brief Hospital Course to date:  Iain Christina is a 58 y.o. male     Discussion/plan for today:  Plans per primary.  Following for hyperglycemia primarily exacerbated by steroid use.  Patient also having some dietary indiscretions.  Continue current insulin regimen.      Steroid induced hyperglycemia, A1c 6.6  -home regimen: Tresiba 30 units, aspart 10 units TIDAC + SSI  -glargine , lispro TIDAC + SSI, monitor glucose and adjusting as needed  -POC glucose, monitor     Primary CNS diffuse large B-cell lymphoma  -per Oncology  -Keppra, Bactrim PPx     Elevated transaminases  -likely 2/2 CTX  -prior liver US unremarkable  -trending     HTN  -HCTZ, losartan  -/76     Hypokalemia: Replete as needed per protocol    Insomnia: Ambien, tolerating.  Niraj reviewed.      Pb Gerber MD  [facility]  08/07/24  14:50 EDT

## 2024-08-08 LAB
ALBUMIN SERPL-MCNC: 3 G/DL (ref 3.5–5.2)
ALBUMIN/GLOB SERPL: 1.4 G/DL
ALP SERPL-CCNC: 88 U/L (ref 39–117)
ALT SERPL W P-5'-P-CCNC: 412 U/L (ref 1–41)
ANION GAP SERPL CALCULATED.3IONS-SCNC: 6.3 MMOL/L (ref 5–15)
AST SERPL-CCNC: 133 U/L (ref 1–40)
BASOPHILS # BLD AUTO: 0.01 10*3/MM3 (ref 0–0.2)
BASOPHILS NFR BLD AUTO: 0.2 % (ref 0–1.5)
BILIRUB SERPL-MCNC: 1.4 MG/DL (ref 0–1.2)
BUN SERPL-MCNC: 15 MG/DL (ref 8–23)
BUN/CREAT SERPL: 24.2 (ref 7–25)
CALCIUM SPEC-SCNC: 8.8 MG/DL (ref 8.6–10.5)
CHLORIDE SERPL-SCNC: 104 MMOL/L (ref 98–107)
CO2 SERPL-SCNC: 28.7 MMOL/L (ref 22–29)
CREAT SERPL-MCNC: 0.62 MG/DL (ref 0.76–1.27)
DEPRECATED RDW RBC AUTO: 48 FL (ref 37–54)
EGFRCR SERPLBLD CKD-EPI 2021: 109.4 ML/MIN/1.73
EOSINOPHIL # BLD AUTO: 0.03 10*3/MM3 (ref 0–0.4)
EOSINOPHIL NFR BLD AUTO: 0.5 % (ref 0.3–6.2)
ERYTHROCYTE [DISTWIDTH] IN BLOOD BY AUTOMATED COUNT: 15.1 % (ref 12.3–15.4)
GLOBULIN UR ELPH-MCNC: 2.1 GM/DL
GLUCOSE BLDC GLUCOMTR-MCNC: 116 MG/DL (ref 70–130)
GLUCOSE BLDC GLUCOMTR-MCNC: 126 MG/DL (ref 70–130)
GLUCOSE BLDC GLUCOMTR-MCNC: 149 MG/DL (ref 70–130)
GLUCOSE BLDC GLUCOMTR-MCNC: 228 MG/DL (ref 70–130)
GLUCOSE SERPL-MCNC: 118 MG/DL (ref 65–99)
HCT VFR BLD AUTO: 37.3 % (ref 37.5–51)
HGB BLD-MCNC: 12.9 G/DL (ref 13–17.7)
IMM GRANULOCYTES # BLD AUTO: 0.05 10*3/MM3 (ref 0–0.05)
IMM GRANULOCYTES NFR BLD AUTO: 0.8 % (ref 0–0.5)
LYMPHOCYTES # BLD AUTO: 1.25 10*3/MM3 (ref 0.7–3.1)
LYMPHOCYTES NFR BLD AUTO: 19.7 % (ref 19.6–45.3)
MCH RBC QN AUTO: 30.9 PG (ref 26.6–33)
MCHC RBC AUTO-ENTMCNC: 34.6 G/DL (ref 31.5–35.7)
MCV RBC AUTO: 89.4 FL (ref 79–97)
MONOCYTES # BLD AUTO: 0.09 10*3/MM3 (ref 0.1–0.9)
MONOCYTES NFR BLD AUTO: 1.4 % (ref 5–12)
MTX SERPL-SCNC: NORMAL UMOL/L
NEUTROPHILS NFR BLD AUTO: 4.91 10*3/MM3 (ref 1.7–7)
NEUTROPHILS NFR BLD AUTO: 77.4 % (ref 42.7–76)
PH UR STRIP.AUTO: >=9 [PH] (ref 5–8)
PLATELET # BLD AUTO: 145 10*3/MM3 (ref 140–450)
PMV BLD AUTO: 10.3 FL (ref 6–12)
POTASSIUM SERPL-SCNC: 3.5 MMOL/L (ref 3.5–5.2)
PROT SERPL-MCNC: 5.1 G/DL (ref 6–8.5)
RBC # BLD AUTO: 4.17 10*6/MM3 (ref 4.14–5.8)
SODIUM SERPL-SCNC: 139 MMOL/L (ref 136–145)
WBC NRBC COR # BLD AUTO: 6.34 10*3/MM3 (ref 3.4–10.8)

## 2024-08-08 PROCEDURE — 25010000002 LEUCOVORIN 200 MG RECONSTITUTED SOLUTION 1 EACH VIAL: Performed by: INTERNAL MEDICINE

## 2024-08-08 PROCEDURE — 63710000001 INSULIN LISPRO (HUMAN) PER 5 UNITS: Performed by: INTERNAL MEDICINE

## 2024-08-08 PROCEDURE — 63710000001 INSULIN GLARGINE PER 5 UNITS: Performed by: INTERNAL MEDICINE

## 2024-08-08 PROCEDURE — 81003 URINALYSIS AUTO W/O SCOPE: CPT | Performed by: INTERNAL MEDICINE

## 2024-08-08 PROCEDURE — 82948 REAGENT STRIP/BLOOD GLUCOSE: CPT

## 2024-08-08 PROCEDURE — 85025 COMPLETE CBC W/AUTO DIFF WBC: CPT | Performed by: INTERNAL MEDICINE

## 2024-08-08 PROCEDURE — 80053 COMPREHEN METABOLIC PANEL: CPT | Performed by: INTERNAL MEDICINE

## 2024-08-08 PROCEDURE — 80204 DRUG ASSAY METHOTREXATE: CPT | Performed by: INTERNAL MEDICINE

## 2024-08-08 PROCEDURE — 99233 SBSQ HOSP IP/OBS HIGH 50: CPT | Performed by: INTERNAL MEDICINE

## 2024-08-08 PROCEDURE — 63710000001 INSULIN LISPRO (HUMAN) PER 5 UNITS: Performed by: STUDENT IN AN ORGANIZED HEALTH CARE EDUCATION/TRAINING PROGRAM

## 2024-08-08 RX ORDER — POTASSIUM CHLORIDE 750 MG/1
40 TABLET, FILM COATED, EXTENDED RELEASE ORAL EVERY 4 HOURS
Status: COMPLETED | OUTPATIENT
Start: 2024-08-08 | End: 2024-08-08

## 2024-08-08 RX ADMIN — LOSARTAN POTASSIUM 50 MG: 50 TABLET, FILM COATED ORAL at 08:38

## 2024-08-08 RX ADMIN — LEUCOVORIN CALCIUM 50 MG: 350 INJECTION, POWDER, LYOPHILIZED, FOR SUSPENSION INTRAMUSCULAR; INTRAVENOUS at 00:16

## 2024-08-08 RX ADMIN — SODIUM BICARBONATE 150 ML/HR: 84 INJECTION, SOLUTION INTRAVENOUS at 20:11

## 2024-08-08 RX ADMIN — LEUCOVORIN CALCIUM 50 MG: 350 INJECTION, POWDER, LYOPHILIZED, FOR SUSPENSION INTRAMUSCULAR; INTRAVENOUS at 12:33

## 2024-08-08 RX ADMIN — INSULIN LISPRO 5 UNITS: 100 INJECTION, SOLUTION INTRAVENOUS; SUBCUTANEOUS at 21:01

## 2024-08-08 RX ADMIN — INSULIN LISPRO 12 UNITS: 100 INJECTION, SOLUTION INTRAVENOUS; SUBCUTANEOUS at 08:44

## 2024-08-08 RX ADMIN — LEUCOVORIN CALCIUM 50 MG: 350 INJECTION, POWDER, LYOPHILIZED, FOR SUSPENSION INTRAMUSCULAR; INTRAVENOUS at 16:33

## 2024-08-08 RX ADMIN — HYDROCHLOROTHIAZIDE 12.5 MG: 12.5 TABLET ORAL at 08:38

## 2024-08-08 RX ADMIN — URSODIOL 300 MG: 300 CAPSULE ORAL at 20:14

## 2024-08-08 RX ADMIN — LEVETIRACETAM 500 MG: 500 TABLET, FILM COATED ORAL at 20:14

## 2024-08-08 RX ADMIN — LEUCOVORIN CALCIUM 50 MG: 350 INJECTION, POWDER, LYOPHILIZED, FOR SUSPENSION INTRAMUSCULAR; INTRAVENOUS at 04:26

## 2024-08-08 RX ADMIN — URSODIOL 300 MG: 300 CAPSULE ORAL at 08:39

## 2024-08-08 RX ADMIN — ACYCLOVIR 400 MG: 400 TABLET ORAL at 08:39

## 2024-08-08 RX ADMIN — DEXAMETHASONE 2 MG: 2 TABLET ORAL at 08:38

## 2024-08-08 RX ADMIN — FAMOTIDINE 20 MG: 20 TABLET, FILM COATED ORAL at 16:32

## 2024-08-08 RX ADMIN — POTASSIUM CHLORIDE 40 MEQ: 750 TABLET, EXTENDED RELEASE ORAL at 16:33

## 2024-08-08 RX ADMIN — LEUCOVORIN CALCIUM 50 MG: 350 INJECTION, POWDER, LYOPHILIZED, FOR SUSPENSION INTRAMUSCULAR; INTRAVENOUS at 20:16

## 2024-08-08 RX ADMIN — POTASSIUM CHLORIDE 40 MEQ: 750 TABLET, EXTENDED RELEASE ORAL at 11:55

## 2024-08-08 RX ADMIN — DEXAMETHASONE 2 MG: 2 TABLET ORAL at 17:17

## 2024-08-08 RX ADMIN — LEUCOVORIN CALCIUM 50 MG: 350 INJECTION, POWDER, LYOPHILIZED, FOR SUSPENSION INTRAMUSCULAR; INTRAVENOUS at 08:36

## 2024-08-08 RX ADMIN — SODIUM BICARBONATE 150 ML/HR: 84 INJECTION, SOLUTION INTRAVENOUS at 00:16

## 2024-08-08 RX ADMIN — HYDROCODONE BITARTRATE AND ACETAMINOPHEN 1 TABLET: 5; 325 TABLET ORAL at 11:55

## 2024-08-08 RX ADMIN — ZOLPIDEM TARTRATE 10 MG: 5 TABLET ORAL at 20:14

## 2024-08-08 RX ADMIN — FAMOTIDINE 20 MG: 20 TABLET, FILM COATED ORAL at 08:38

## 2024-08-08 RX ADMIN — LEVETIRACETAM 500 MG: 500 TABLET, FILM COATED ORAL at 08:38

## 2024-08-08 RX ADMIN — SODIUM BICARBONATE 150 ML/HR: 84 INJECTION, SOLUTION INTRAVENOUS at 13:34

## 2024-08-08 RX ADMIN — INSULIN LISPRO 12 UNITS: 100 INJECTION, SOLUTION INTRAVENOUS; SUBCUTANEOUS at 17:18

## 2024-08-08 RX ADMIN — ACYCLOVIR 400 MG: 400 TABLET ORAL at 20:14

## 2024-08-08 RX ADMIN — INSULIN GLARGINE 24 UNITS: 100 INJECTION, SOLUTION SUBCUTANEOUS at 08:39

## 2024-08-08 RX ADMIN — SODIUM BICARBONATE 150 ML/HR: 84 INJECTION, SOLUTION INTRAVENOUS at 06:28

## 2024-08-08 RX ADMIN — INSULIN LISPRO 12 UNITS: 100 INJECTION, SOLUTION INTRAVENOUS; SUBCUTANEOUS at 11:56

## 2024-08-08 NOTE — PLAN OF CARE
Goal Outcome Evaluation:  Plan of Care Reviewed With: patient        Progress: improving     Pt AxOx4, calm and cooperative, on RA. VSS. Takes pills whole with water, see MAR. IV Leucovorin given. Potassium replaced per protocol. IVF infusing at 150 ml/hr. PRN Norco given for pain, with good effect. Methotrexate level timed, collected and send to lab. Pt's refused Lovenox, education provided. Pt tolerating diet. Assist x1 to the bathroom. Plan of care ongoing.

## 2024-08-08 NOTE — PROGRESS NOTES
Pharmacy Consult - HD Methotrexate monitoring    Iain Christina is a 58 y.o. YO male starting therapy with HD-MTX. Pharmacy consulted to monitor and adjust leucovorin.    Patient to have methotrexate levels monitored per protocol every 24 hours, starting 24 hours after methotrexate infusion started. For this patient the first level will be drawn at 8/5 on 1130.    Goal levels are:  24 hr post infusion <5  48 hr post infusion <1  72 hr post infusion <0.1     Discussed plan with Dr. Sanchez and Dr. Bello, recommend the following dose adjustments to be conducted based on levels at the following times.     Date Current leucovorin dose Actual level Goal level Adjustment    8/5 @ 1130  Begin leucovorin at 50 mg Q6H 6.9 <5 If level 5-10, increase to 50 mg Q4   8/6 @ 1136 Leucovorin 50 mg Q4H 0.46 <1 If level >1 increase to 100 mg Q6   8/7 @ 1100 Leucovorin 50 mg Q4H 0.09 <0.1 If level >0.1 increase to 100 mg Q6   8/8 @ 1105 Leucovorin 50 mg Q4H  <0.05     8/9         Plan:  1. Level came back at goal - continuing Leucovorin 50 mg Q4H, no changes at this time  2. Continuing sodium bicarb infusion to 150mEq @ 150ml/hr  3. Next level scheduled for 8/8 at 1100        Pharmacy will continue to monitor and update as levels are available.      Laurence Logan, YanaD

## 2024-08-08 NOTE — PROGRESS NOTES
"Nutrition Services    Patient Name:  Iain Christina  YOB: 1965  MRN: [id number]  Admit Date:  8/2/2024    Nutrition Services    Patient Name: Iain Christina  YOB: 1965  MRN: [id number]  Admission date: 8/2/2024    PROGRESS NOTE      Encounter Information: Follow up to monitor po intakes.       PO Diet: Diet: Diabetic; Consistent Carbohydrate; Fluid Consistency: Thin (IDDSI 0)   PO Supplements: Boost Glucose Control daily with breakfast - vanilla   PO Intake:  % per EMR - tolerating diet       Current nutrition support: N/a   Nutrition support review: N/a       Labs (reviewed below): Glu 171/118/116, Cr 0.62, Albumin 3.0        GI Function:  Last recorded BM 8/6       Nutrition Intervention Updates: Plan/Recommend:  Continue Boost Glucose Control daily with lunch (vanilla)  Encourage good po intake  May consider addition of bowel regimen PRN  Will continue to monitor intake, labs, wt       Results from last 7 days   Lab Units 08/08/24  0620 08/07/24  0650 08/06/24  0539   SODIUM mmol/L 139 138 141   POTASSIUM mmol/L 3.5 3.8 3.9   CHLORIDE mmol/L 104 104 105   CO2 mmol/L 28.7 28.0 29.0   BUN mg/dL 15 21 19   CREATININE mg/dL 0.62* 0.58* 0.73*   CALCIUM mg/dL 8.8 8.6 8.9   BILIRUBIN mg/dL 1.4* 1.2 1.0   ALK PHOS U/L 88 109 110   ALT (SGPT) U/L 412* 404* 381*   AST (SGOT) U/L 133* 141* 137*   GLUCOSE mg/dL 118* 142* 166*     Results from last 7 days   Lab Units 08/08/24  0620 08/04/24  0425 08/03/24  0338   MAGNESIUM mg/dL  --   --  2.1   PHOSPHORUS mg/dL  --   --  2.8   HEMOGLOBIN g/dL 12.9*   < > 13.3   HEMATOCRIT % 37.3*   < > 39.2    < > = values in this interval not displayed.     No results found for: \"COVID19\"  Lab Results   Component Value Date    HGBA1C 6.60 (H) 06/16/2024     RD to follow up per protocol.    Electronically signed by:  Janet Kellogg  08/08/24 08:36 EDT  "

## 2024-08-08 NOTE — PROGRESS NOTES
[facility] Medicine Services  PROGRESS NOTE    Patient Name: Iain Christina  : 1965  MRN: [id number]    Date of Admission: 2024  Primary Care Physician: Scott Medina MD    Subjective   Subjective     CC:  Follow-up diabetes and medical management    Subjective:  No new complaints.  Doing well in general.    Objective   Objective     Vital Signs:   Temp:  [97.5 °F (36.4 °C)-97.9 °F (36.6 °C)] 97.7 °F (36.5 °C)  Heart Rate:  [51-65] 51  Resp:  [18] 18  BP: (118-131)/(76-94) 120/85        Physical Exam:  Constitutional:Awake, alert  Respiratory: No cough or wheezes, normal respirations, nonlabored breathing   Cardiovascular: Pulse rate is normal, palpable radial pulses  Gastrointestinal:  soft, nontender, nondistended  Musculoskeletal: Normal musculature for age, no lower extremity edema      Results Reviewed:  Results from last 7 days   Lab Units 24  0620 24  0650 24  0539 24  1436 24  0820   WBC 10*3/mm3 6.34 6.57 7.10   < >  --    HEMOGLOBIN g/dL 12.9* 13.0 13.8   < >  --    HEMATOCRIT % 37.3* 38.1 40.0   < >  --    PLATELETS 10*3/mm3 145 139* 141   < >  --    INR   --   --   --   --  1.05    < > = values in this interval not displayed.     Results from last 7 days   Lab Units 24  0620 24  0650 24  0539   SODIUM mmol/L 139 138 141   POTASSIUM mmol/L 3.5 3.8 3.9   CHLORIDE mmol/L 104 104 105   CO2 mmol/L 28.7 28.0 29.0   BUN mg/dL 15 21 19   CREATININE mg/dL 0.62* 0.58* 0.73*   GLUCOSE mg/dL 118* 142* 166*   CALCIUM mg/dL 8.8 8.6 8.9   ALK PHOS U/L 88 109 110   ALT (SGPT) U/L 412* 404* 381*   AST (SGOT) U/L 133* 141* 137*     Glucose   Date/Time Value Ref Range Status   2024 0739 116 70 - 130 mg/dL Final   2024 171 (H) 70 - 130 mg/dL Final   2024 1706 147 (H) 70 - 130 mg/dL Final   2024 1106 177 (H) 70 - 130 mg/dL Final   2024 2030 177 (H) 70 - 130 mg/dL Final   2024 1634 323 (H) 70 - 130 mg/dL  Final   08/06/2024 1131 155 (H) 70 - 130 mg/dL Final       I have reviewed the medications:  Scheduled Meds:acyclovir, 400 mg, Oral, BID  dexAMETHasone, 2 mg, Oral, BID With Meals  enoxaparin, 40 mg, Subcutaneous, Daily  famotidine, 20 mg, Oral, BID AC  losartan, 50 mg, Oral, Q24H   And  hydroCHLOROthiazide, 12.5 mg, Oral, Q24H  insulin glargine, 24 Units, Subcutaneous, Daily  insulin lispro, 12 Units, Subcutaneous, TID With Meals  insulin lispro, 3-14 Units, Subcutaneous, 4x Daily AC & at Bedtime  leucovorin 50 mg in sodium chloride 0.9 % 55 mL IVPB, 50 mg, Intravenous, Q4H  levETIRAcetam, 500 mg, Oral, Q12H  Pharmacy monitoring - high dose methotrexate active, 1 each, Intravenous, Daily  potassium chloride ER, 40 mEq, Oral, Q4H  [Held by provider] sulfamethoxazole-trimethoprim, 1 tablet, Oral, Once per day on Monday Wednesday Friday  ursodiol, 300 mg, Oral, BID  zolpidem, 10 mg, Oral, Nightly      Continuous Infusions:sodium bicarbonate 8.4 % 150 mEq in sterile water (preservative free) 1,000 mL infusion, 150 mL/hr, Last Rate: 150 mL/hr (08/08/24 0628)      PRN Meds:.  acetaminophen    Calcium Replacement - Follow Nurse / BPA Driven Protocol    diphenhydrAMINE    famotidine    HYDROcodone-acetaminophen    Hydrocortisone Sod Suc (PF)    Magnesium Standard Dose Replacement - Follow Nurse / BPA Driven Protocol    meperidine    ondansetron    Phosphorus Replacement - Follow Nurse / BPA Driven Protocol    Potassium Replacement - Follow Nurse / BPA Driven Protocol    prochlorperazine    Assessment & Plan   Assessment & Plan     Active Hospital Problems    Diagnosis  POA    Transaminitis [R74.01]  Yes    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        Brief Hospital Course to date:  Iain Christina is a 58 y.o. male      Discussion/plan for today:  Blood sugars reasonably controlled.  Continue current insulin regimen while in the hospital.  Will review home regimen and make adjustments at time of discharge as indicated.       Steroid induced hyperglycemia, A1c 6.6  -home regimen: Tresiba 30 units, aspart 10 units TIDAC + SSI  -glargine , lispro TIDAC + SSI, monitor glucose and adjusting as needed  -POC glucose, monitor     Primary CNS diffuse large B-cell lymphoma  -per Oncology  -Keppra, Bactrim PPx     Elevated transaminases  -likely 2/2 CTX  -prior liver US unremarkable  -trending     HTN  -HCTZ, losartan  -/80       Pb Gerber MD  [facility]  08/08/24  11:43 EDT

## 2024-08-08 NOTE — PLAN OF CARE
Goal Outcome Evaluation:    Patient is A & O X 4. On RA. Medicated per MAR. VSS. ACHS. Voiding well, uses urinal. Denies any pain. Urine sample sent to lab this AM. R chest wall port noted, flushed and + blood return noted, infusing. Denies any pain. Bed low, locked and call light within reach. Slept throughout most of the night.

## 2024-08-08 NOTE — PROGRESS NOTES
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  Labs/vital stable.  Tolerating IV fluids well.  Intermittent headaches, controlled with Tylenol.    ROS:  Review of Systems   A comprehensive review of systems was obtained with pertinent positive findings as noted in the interval history above.  All other systems negative.    OBJECTIVE:  Vitals:    08/07/24 1525 08/07/24 2023 08/08/24 0549 08/08/24 0740   BP: 131/81 127/76 118/94 120/85   BP Location: Right arm Right arm Right arm Right arm   Patient Position: Lying Lying Lying Lying   Pulse: 65 57 56 51   Resp: 18 18 18 18   Temp: 97.7 °F (36.5 °C) 97.9 °F (36.6 °C) 97.5 °F (36.4 °C) 97.7 °F (36.5 °C)   TempSrc: Oral Oral Oral Oral   SpO2:  96% 97% 96%   Weight:   95.6 kg (210 lb 12.2 oz)    Height:             PHYSICAL EXAMINATION:  General: Alert and orient x 3 no distress  Chest/Lungs: Normal respiratory effort  Heart: Heart rate  Abdomen/GI: Soft not distended  Extremities: No edema    Patient was examined today, unchanged from above    DIAGNOSTIC DATA:  Results Review:     I reviewed the patient's new clinical results.    Results from last 7 days   Lab Units 08/08/24  0620 08/07/24  0650 08/06/24  0539   WBC 10*3/mm3 6.34 6.57 7.10   HEMOGLOBIN g/dL 12.9* 13.0 13.8   HEMATOCRIT % 37.3* 38.1 40.0   PLATELETS 10*3/mm3 145 139* 141      Results from last 7 days   Lab Units 08/08/24  0620 08/07/24  0650 08/06/24  0539   SODIUM mmol/L 139 138 141   POTASSIUM mmol/L 3.5 3.8 3.9   CHLORIDE mmol/L 104 104 105   CO2 mmol/L 28.7 28.0 29.0   BUN mg/dL 15 21 19   CREATININE mg/dL 0.62* 0.58* 0.73*   CALCIUM mg/dL 8.8 8.6 8.9   BILIRUBIN mg/dL 1.4* 1.2 1.0   ALK PHOS U/L 88 109 110   ALT (SGPT) U/L 412* 404* 381*   AST (SGOT) U/L 133* 141* 137*   GLUCOSE  mg/dL 118* 142* 166*      Lab Results   Component Value Date    NEUTROABS 4.91 08/08/2024     Results from last 7 days   Lab Units 08/02/24  0820   INR  1.05   APTT seconds 25.0     Results from last 7 days   Lab Units 08/03/24  0338   MAGNESIUM mg/dL 2.1           Assessment & Plan   ASSESSMENT/PLAN:  This is a 58 y.o. 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  Associated vasogenic edema extending across the midline into the corpus callosum with mass effect and 1.4 cm left shift.  CT chest abdomen pelvis 6/16/2024 with borderline enlarged carley hepatis lymph nodes, indeterminate.  Brain biopsy on 6/19/2024 with diffuse large B cell non-Hodgkin's lymphoma with non-germinal center phenotype, VANIA negative, Ki-67 80%, FISH negative for Bcl-2, BCL6, MYC rearrangements.  HIV 1/2 negative on 6/23/2024  LDH borderline elevated at 305 on 6/22/2024  Patient unable to perform outpatient PET scan due to hyperglycemia  Patient initiated treatment with high-dose methotrexate and rituximab.  Initial 25% dose reduction in high-dose methotrexate from 8 g/m² down to 6 g/m² with cycle 1 due to baseline elevated LFTs.  Treatment initiated 7/15/2024.  Significant elevation in transaminases following cycle 1 high-dose methotrexate.  Treated with ursodiol.  Subsequent improvement.  Patient continuing on chronic steroids due to cerebral edema, Decadron 2 mg twice daily  Patient admitted 8/2/2024 for hydration/urine alkalinization prior to initiation of cycle 2 rituximab and high-dose methotrexate   On 8/4/2024 initiated cycle 2 high-dose methotrexate and rituximab.  Methotrexate dose maintained at previous 25% dose reduction, 6 g/m² over 4 hours.  Rituximab due on day 3.  Patient is continuing on hydration/urine alkalinization with bicarbonate drip 150 cc/h.  Urine pH today is 8.0 today.   LFTs are stable today at patient's apparent baseline with AST 78, , total bilirubin normal at 0.5.  LFTs were in this range when patient initiated cycle 1.  Proceed today as planned.  Leucovorin rescue to begin on 8/6/2024, 24 hours after initiation of methotrexate at dose of 25 mg IV every 6 hours.  Methotrexate levels to be monitored, first level to be drawn on 8/6/2024, 24 hours after initiation of methotrexate.  8/5/2024: Patient is continuing on hydration/urine alkalization with bicarbonate drip.  Methotrexate level is above goal at 6.9 today (goal <5 at 24 hours).  Will increase leucovorin to 50 mg every 4 hours and increase bicarb drip to 150 meq at 150ml/hr.  Repeat methotrexate level tomorrow a.m.  LFTs remain elevated with AST 76,  and total bilirubin 1.5.  Add Actigall to help with LFTs.  CBC within normal range.  Proceed with rituximab dose today.  8/6/2024 and 8/7/2024: Methotrexate levels from today within range.  Continue leucovorin and IV fluids same as yesterday.  8/8/2024: Methotrexate levels from today pending.    *Steroid-induced diabetes  Patient with diabetes exacerbated by steroids  [facility] consulted to assist with diabetic management  Patient currently receiving Lantus 20 units daily and Humalog 8 units 3 times daily with meals in addition to sliding scale 4 times daily   Glucose has been in the 200-300 range    *Hepatitis C, elevated LFTs  Patient with hepatitis C, genotype Ia  HCVRNA 7,590,000 on 1/30/2024  HCVRNA 7,220,000 on 5/16/2024  Patient was preparing for treatment through hepatology at [facility] with Mavyret x 8 weeks in June 2024 however was not able to begin treatment due to cost issues and subsequent diagnosis of CNS lymphoma  Treatment with high-dose methotrexate for CNS lymphoma complicated by significant escalation of transaminases  Patient treated with ursodiol with improvement, ursodiol subsequently held with return of LFTs to baseline  range.  Concern regarding untreated hepatitis C in the setting of current immunosuppressive treatment including rituximab.  On 8/6/2024 we will need to contact patient's hepatologist at [facility] to discuss recommendations regarding hepatitis C management as patient continues on treatment for lymphoma  8/4/2024: LFTs today continue at patient's apparent baseline with AST 78, , total bilirubin 0.5.  Plan to closely monitor with initiation of high-dose methotrexate today.  8/5/24: LFTs remain elevated with total bilirubin up to 1.5 today.  Add Actigall.  Will need to clarify hepatitis C management plan with GI at [facility]  8/6/2024: Our pharmacist, Sean spoke with the [facility] hepatitis C clinic pharmacist regarding his hepatitis treatment.  We were told that his treatment has been put on hold in view of ongoing CNS lymphoma treatment.  They are planning to see him back in early September for a follow-up.  Clinic phone number- [phone]  8/7/2024: Slight worsening in LFTs.  Patient remains on Actigall.  Monitor    *Polysubstance use  History of significant alcohol use, decreased following diagnosis with hepatitis C in early 2024  UDS positive for THC on 6/15/2024    *Hypertension  Losartan 50 mg daily, HCTZ 12.5 mg daily    *Seizure prophylaxis  Keppra 500 mg every 12 hours    *PCP prophylaxis  Bactrim DS 1 p.o. Monday Wednesday Friday as outpatient  Bactrim currently on hold due to expected transaminitis from methotrexate    *Viral prophylaxis  Acyclovir 400 mg twice daily    *GI prophylaxis  Pepcid 20 mg twice daily    *DVT prophylaxis  Lovenox 40 mg daily    *Insomnia  Ambien 10 mg nightly    *Pain control  Hydrocodone 10/325 every 4 hours as needed    *Nausea  Zofran 4 mg IV every 6 hours as needed  Compazine 10 mg every 6 hours as needed    Plan:  Received methotrexate 6 g/m² (maintaining previous 25% dose reduction) over 4 hours on 8/4/24.  Rituximab on 8/5/2024  Methotrexate level from  today pending.  In the interim, continue sodium bicarbonate drip to 150 meq at 150 cc/h for urine alkalinization.  Continue leucovorin rescue to 50 mg every 4 hours   Repeat methotrexate levels every 24 hours  Continue actigall to help with LFTs  Close monitoring of LFTs following methotrexate administration.   Continue dexamethasone 2 mg twice daily  Continue Keppra 500 mg twice daily  [facility] consulted for assistance with management of steroid-induced diabetes during hospitalization  Continue viral prophylaxis with acyclovir 400 mg twice daily  PCP prophylaxis with Bactrim on hold during high-dose methotrexate treatment  Spoke with the [facility] hepatitis C clinic ([phone]).  They plan to hold active hepatitis C treatment and follow-up with patient in early September  Daily CBC, CMP, urine pH  Tentative plan to discharge home on Saturday, 8/10/2024    Discussed with patient at bedside    I spent 51 minutes on this encounter, before, during & after the visit evaluating the patient, reviewing records and writing orders.

## 2024-08-09 LAB
ALBUMIN SERPL-MCNC: 3.3 G/DL (ref 3.5–5.2)
ALBUMIN/GLOB SERPL: 1.8 G/DL
ALP SERPL-CCNC: 88 U/L (ref 39–117)
ALT SERPL W P-5'-P-CCNC: 411 U/L (ref 1–41)
ANION GAP SERPL CALCULATED.3IONS-SCNC: 8 MMOL/L (ref 5–15)
AST SERPL-CCNC: 117 U/L (ref 1–40)
BASOPHILS # BLD AUTO: 0.02 10*3/MM3 (ref 0–0.2)
BASOPHILS NFR BLD AUTO: 0.3 % (ref 0–1.5)
BILIRUB SERPL-MCNC: 1.3 MG/DL (ref 0–1.2)
BUN SERPL-MCNC: 14 MG/DL (ref 8–23)
BUN/CREAT SERPL: 19.7 (ref 7–25)
CALCIUM SPEC-SCNC: 8.7 MG/DL (ref 8.6–10.5)
CHLORIDE SERPL-SCNC: 106 MMOL/L (ref 98–107)
CO2 SERPL-SCNC: 26 MMOL/L (ref 22–29)
CREAT SERPL-MCNC: 0.71 MG/DL (ref 0.76–1.27)
DEPRECATED RDW RBC AUTO: 44.1 FL (ref 37–54)
EGFRCR SERPLBLD CKD-EPI 2021: 105 ML/MIN/1.73
EOSINOPHIL # BLD AUTO: 0.04 10*3/MM3 (ref 0–0.4)
EOSINOPHIL NFR BLD AUTO: 0.6 % (ref 0.3–6.2)
ERYTHROCYTE [DISTWIDTH] IN BLOOD BY AUTOMATED COUNT: 14.7 % (ref 12.3–15.4)
GLOBULIN UR ELPH-MCNC: 1.8 GM/DL
GLUCOSE BLDC GLUCOMTR-MCNC: 141 MG/DL (ref 70–130)
GLUCOSE BLDC GLUCOMTR-MCNC: 180 MG/DL (ref 70–130)
GLUCOSE BLDC GLUCOMTR-MCNC: 206 MG/DL (ref 70–130)
GLUCOSE BLDC GLUCOMTR-MCNC: 69 MG/DL (ref 70–130)
GLUCOSE SERPL-MCNC: 69 MG/DL (ref 65–99)
HCT VFR BLD AUTO: 37.5 % (ref 37.5–51)
HGB BLD-MCNC: 13.1 G/DL (ref 13–17.7)
IMM GRANULOCYTES # BLD AUTO: 0.05 10*3/MM3 (ref 0–0.05)
IMM GRANULOCYTES NFR BLD AUTO: 0.8 % (ref 0–0.5)
LYMPHOCYTES # BLD AUTO: 1.62 10*3/MM3 (ref 0.7–3.1)
LYMPHOCYTES NFR BLD AUTO: 25.3 % (ref 19.6–45.3)
MCH RBC QN AUTO: 30.4 PG (ref 26.6–33)
MCHC RBC AUTO-ENTMCNC: 34.9 G/DL (ref 31.5–35.7)
MCV RBC AUTO: 87 FL (ref 79–97)
MONOCYTES # BLD AUTO: 0.17 10*3/MM3 (ref 0.1–0.9)
MONOCYTES NFR BLD AUTO: 2.7 % (ref 5–12)
MTX SERPL-SCNC: NORMAL UMOL/L
NEUTROPHILS NFR BLD AUTO: 4.51 10*3/MM3 (ref 1.7–7)
NEUTROPHILS NFR BLD AUTO: 70.3 % (ref 42.7–76)
NRBC BLD AUTO-RTO: 0.3 /100 WBC (ref 0–0.2)
PH UR STRIP.AUTO: >=9 [PH] (ref 5–8)
PLATELET # BLD AUTO: 141 10*3/MM3 (ref 140–450)
PMV BLD AUTO: 10.5 FL (ref 6–12)
POTASSIUM SERPL-SCNC: 3.4 MMOL/L (ref 3.5–5.2)
POTASSIUM SERPL-SCNC: 3.4 MMOL/L (ref 3.5–5.2)
PROT SERPL-MCNC: 5.1 G/DL (ref 6–8.5)
RBC # BLD AUTO: 4.31 10*6/MM3 (ref 4.14–5.8)
SODIUM SERPL-SCNC: 140 MMOL/L (ref 136–145)
WBC NRBC COR # BLD AUTO: 6.41 10*3/MM3 (ref 3.4–10.8)

## 2024-08-09 PROCEDURE — 25010000002 LEUCOVORIN CALCIUM PER 50 MG: Performed by: INTERNAL MEDICINE

## 2024-08-09 PROCEDURE — 63710000001 INSULIN LISPRO (HUMAN) PER 5 UNITS: Performed by: STUDENT IN AN ORGANIZED HEALTH CARE EDUCATION/TRAINING PROGRAM

## 2024-08-09 PROCEDURE — 81003 URINALYSIS AUTO W/O SCOPE: CPT | Performed by: INTERNAL MEDICINE

## 2024-08-09 PROCEDURE — 84132 ASSAY OF SERUM POTASSIUM: CPT | Performed by: HOSPITALIST

## 2024-08-09 PROCEDURE — 63710000001 INSULIN LISPRO (HUMAN) PER 5 UNITS: Performed by: HOSPITALIST

## 2024-08-09 PROCEDURE — 25010000002 LEUCOVORIN 200 MG RECONSTITUTED SOLUTION 1 EACH VIAL: Performed by: INTERNAL MEDICINE

## 2024-08-09 PROCEDURE — 25010000002 HEPARIN LOCK FLUSH PER 10 UNITS: Performed by: INTERNAL MEDICINE

## 2024-08-09 PROCEDURE — 85025 COMPLETE CBC W/AUTO DIFF WBC: CPT | Performed by: INTERNAL MEDICINE

## 2024-08-09 PROCEDURE — 63710000001 INSULIN GLARGINE PER 5 UNITS: Performed by: INTERNAL MEDICINE

## 2024-08-09 PROCEDURE — 82948 REAGENT STRIP/BLOOD GLUCOSE: CPT

## 2024-08-09 PROCEDURE — 80053 COMPREHEN METABOLIC PANEL: CPT | Performed by: INTERNAL MEDICINE

## 2024-08-09 PROCEDURE — 99233 SBSQ HOSP IP/OBS HIGH 50: CPT | Performed by: INTERNAL MEDICINE

## 2024-08-09 RX ORDER — POTASSIUM CHLORIDE 750 MG/1
40 TABLET, FILM COATED, EXTENDED RELEASE ORAL EVERY 4 HOURS
Status: COMPLETED | OUTPATIENT
Start: 2024-08-09 | End: 2024-08-09

## 2024-08-09 RX ORDER — INSULIN LISPRO 100 [IU]/ML
10 INJECTION, SOLUTION INTRAVENOUS; SUBCUTANEOUS
Status: DISCONTINUED | OUTPATIENT
Start: 2024-08-09 | End: 2024-08-10 | Stop reason: HOSPADM

## 2024-08-09 RX ORDER — HEPARIN SODIUM (PORCINE) LOCK FLUSH IV SOLN 100 UNIT/ML 100 UNIT/ML
500 SOLUTION INTRAVENOUS ONCE
Status: COMPLETED | OUTPATIENT
Start: 2024-08-09 | End: 2024-08-09

## 2024-08-09 RX ADMIN — POTASSIUM CHLORIDE 40 MEQ: 750 TABLET, EXTENDED RELEASE ORAL at 12:50

## 2024-08-09 RX ADMIN — LEUCOVORIN CALCIUM 50 MG: 350 INJECTION, POWDER, LYOPHILIZED, FOR SUSPENSION INTRAMUSCULAR; INTRAVENOUS at 12:41

## 2024-08-09 RX ADMIN — FAMOTIDINE 20 MG: 20 TABLET, FILM COATED ORAL at 08:16

## 2024-08-09 RX ADMIN — LEUCOVORIN CALCIUM 50 MG: 350 INJECTION, POWDER, LYOPHILIZED, FOR SUSPENSION INTRAMUSCULAR; INTRAVENOUS at 04:17

## 2024-08-09 RX ADMIN — HYDROCHLOROTHIAZIDE 12.5 MG: 12.5 TABLET ORAL at 08:17

## 2024-08-09 RX ADMIN — POTASSIUM CHLORIDE 40 MEQ: 750 TABLET, EXTENDED RELEASE ORAL at 08:16

## 2024-08-09 RX ADMIN — LOSARTAN POTASSIUM 50 MG: 50 TABLET, FILM COATED ORAL at 08:17

## 2024-08-09 RX ADMIN — Medication 1 EACH: at 10:07

## 2024-08-09 RX ADMIN — DEXAMETHASONE 2 MG: 2 TABLET ORAL at 08:17

## 2024-08-09 RX ADMIN — LEUCOVORIN CALCIUM 50 MG: 350 INJECTION, POWDER, LYOPHILIZED, FOR SUSPENSION INTRAMUSCULAR; INTRAVENOUS at 20:35

## 2024-08-09 RX ADMIN — ZOLPIDEM TARTRATE 10 MG: 5 TABLET ORAL at 20:55

## 2024-08-09 RX ADMIN — INSULIN LISPRO 10 UNITS: 100 INJECTION, SOLUTION INTRAVENOUS; SUBCUTANEOUS at 17:42

## 2024-08-09 RX ADMIN — LEUCOVORIN CALCIUM 50 MG: 350 INJECTION, POWDER, LYOPHILIZED, FOR SUSPENSION INTRAMUSCULAR; INTRAVENOUS at 08:21

## 2024-08-09 RX ADMIN — INSULIN LISPRO 5 UNITS: 100 INJECTION, SOLUTION INTRAVENOUS; SUBCUTANEOUS at 12:05

## 2024-08-09 RX ADMIN — SODIUM BICARBONATE 150 ML/HR: 84 INJECTION, SOLUTION INTRAVENOUS at 17:02

## 2024-08-09 RX ADMIN — LEUCOVORIN CALCIUM 50 MG: 350 INJECTION, POWDER, LYOPHILIZED, FOR SUSPENSION INTRAMUSCULAR; INTRAVENOUS at 00:21

## 2024-08-09 RX ADMIN — FAMOTIDINE 20 MG: 20 TABLET, FILM COATED ORAL at 17:42

## 2024-08-09 RX ADMIN — DEXAMETHASONE 2 MG: 2 TABLET ORAL at 17:42

## 2024-08-09 RX ADMIN — SODIUM BICARBONATE 150 ML/HR: 84 INJECTION, SOLUTION INTRAVENOUS at 02:31

## 2024-08-09 RX ADMIN — INSULIN LISPRO 3 UNITS: 100 INJECTION, SOLUTION INTRAVENOUS; SUBCUTANEOUS at 21:20

## 2024-08-09 RX ADMIN — HEPARIN 500 UNITS: 100 SYRINGE at 17:04

## 2024-08-09 RX ADMIN — URSODIOL 300 MG: 300 CAPSULE ORAL at 08:17

## 2024-08-09 RX ADMIN — ACYCLOVIR 400 MG: 400 TABLET ORAL at 08:17

## 2024-08-09 RX ADMIN — ACYCLOVIR 400 MG: 400 TABLET ORAL at 20:36

## 2024-08-09 RX ADMIN — LEVETIRACETAM 500 MG: 500 TABLET, FILM COATED ORAL at 20:36

## 2024-08-09 RX ADMIN — LEUCOVORIN CALCIUM 50 MG: 350 INJECTION, POWDER, LYOPHILIZED, FOR SUSPENSION INTRAMUSCULAR; INTRAVENOUS at 16:34

## 2024-08-09 RX ADMIN — INSULIN LISPRO 10 UNITS: 100 INJECTION, SOLUTION INTRAVENOUS; SUBCUTANEOUS at 12:05

## 2024-08-09 RX ADMIN — SODIUM BICARBONATE 150 ML/HR: 84 INJECTION, SOLUTION INTRAVENOUS at 09:56

## 2024-08-09 RX ADMIN — LEVETIRACETAM 500 MG: 500 TABLET, FILM COATED ORAL at 08:17

## 2024-08-09 RX ADMIN — INSULIN GLARGINE 24 UNITS: 100 INJECTION, SOLUTION SUBCUTANEOUS at 08:18

## 2024-08-09 RX ADMIN — URSODIOL 300 MG: 300 CAPSULE ORAL at 20:36

## 2024-08-09 NOTE — CASE MANAGEMENT/SOCIAL WORK
Continued Stay Note  [facility]     Patient Name: Iain Christina  MRN: [id number]  Today's Date: 8/9/2024    Admit Date: 8/2/2024    Plan: Home with family   Discharge Plan       Row Name 08/09/24 1206       Plan    Plan Home with family    Patient/Family in Agreement with Plan yes    Plan Comments Plan is home with family once medically ready. Family to transport. CCP following                   Discharge Codes    No documentation.                 Expected Discharge Date and Time       Expected Discharge Date Expected Discharge Time    Aug 10, 2024

## 2024-08-09 NOTE — PLAN OF CARE
Goal Outcome Evaluation:  AOX4. VSS. Port de-accessed per patient request.  IVF infusing.  Potassium replaced today.  Patient requires ongoing encouragement to increase activity.

## 2024-08-09 NOTE — PROGRESS NOTES
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  Labs/vital stable.  Tolerating IV fluids well.  Intermittent headaches, controlled with Tylenol.    ROS:  Review of Systems   A comprehensive review of systems was obtained with pertinent positive findings as noted in the interval history above.  All other systems negative.    OBJECTIVE:  Vitals:    08/08/24 1555 08/08/24 2028 08/09/24 0529 08/09/24 0749   BP: 111/82 115/82 126/93 101/76   BP Location: Right arm Right arm Right arm Right arm   Patient Position: Lying Lying Lying Lying   Pulse: 82 62 66 58   Resp: 18 18 18 18   Temp: 97.9 °F (36.6 °C) 97.9 °F (36.6 °C) 97.5 °F (36.4 °C) 97.5 °F (36.4 °C)   TempSrc: Oral Oral Oral Oral   SpO2: 96% 93% 97% 95%   Weight:   95.3 kg (210 lb 1.6 oz)    Height:             PHYSICAL EXAMINATION:  General: Alert and orient x 3 no distress  Chest/Lungs: Normal respiratory effort  Heart: Heart rate  Abdomen/GI: Soft not distended  Extremities: No edema    Patient was examined today, unchanged from above    DIAGNOSTIC DATA:  Results Review:     I reviewed the patient's new clinical results.    Results from last 7 days   Lab Units 08/09/24  0615 08/08/24  0620 08/07/24  0650   WBC 10*3/mm3 6.41 6.34 6.57   HEMOGLOBIN g/dL 13.1 12.9* 13.0   HEMATOCRIT % 37.5 37.3* 38.1   PLATELETS 10*3/mm3 141 145 139*      Results from last 7 days   Lab Units 08/09/24  0615 08/08/24  0620 08/07/24  0650   SODIUM mmol/L 140 139 138   POTASSIUM mmol/L 3.4*  3.4* 3.5 3.8   CHLORIDE mmol/L 106 104 104   CO2 mmol/L 26.0 28.7 28.0   BUN mg/dL 14 15 21   CREATININE mg/dL 0.71* 0.62* 0.58*   CALCIUM mg/dL 8.7 8.8 8.6   BILIRUBIN mg/dL 1.3* 1.4* 1.2   ALK PHOS U/L 88 88 109   ALT (SGPT) U/L 411* 412* 404*   AST (SGOT) U/L 117* 133* 141*    GLUCOSE mg/dL 69 118* 142*      Lab Results   Component Value Date    NEUTROABS 4.51 08/09/2024           Results from last 7 days   Lab Units 08/03/24  0338   MAGNESIUM mg/dL 2.1           Assessment & Plan   ASSESSMENT/PLAN:  This is a 58 y.o. 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  Associated vasogenic edema extending across the midline into the corpus callosum with mass effect and 1.4 cm left shift.  CT chest abdomen pelvis 6/16/2024 with borderline enlarged carley hepatis lymph nodes, indeterminate.  Brain biopsy on 6/19/2024 with diffuse large B cell non-Hodgkin's lymphoma with non-germinal center phenotype, VANIA negative, Ki-67 80%, FISH negative for Bcl-2, BCL6, MYC rearrangements.  HIV 1/2 negative on 6/23/2024  LDH borderline elevated at 305 on 6/22/2024  Patient unable to perform outpatient PET scan due to hyperglycemia  Patient initiated treatment with high-dose methotrexate and rituximab.  Initial 25% dose reduction in high-dose methotrexate from 8 g/m² down to 6 g/m² with cycle 1 due to baseline elevated LFTs.  Treatment initiated 7/15/2024.  Significant elevation in transaminases following cycle 1 high-dose methotrexate.  Treated with ursodiol.  Subsequent improvement.  Patient continuing on chronic steroids due to cerebral edema, Decadron 2 mg twice daily  Patient admitted 8/2/2024 for hydration/urine alkalinization prior to initiation of cycle 2 rituximab and high-dose methotrexate   On 8/4/2024 initiated cycle 2 high-dose methotrexate and rituximab.  Methotrexate dose maintained at previous 25% dose reduction, 6 g/m² over 4 hours.  Rituximab due on day 3.  Patient is continuing on hydration/urine alkalinization with bicarbonate drip 150 cc/h.  Urine pH today is 8.0 today.  LFTs are stable today at patient's apparent baseline with AST 78, ,  total bilirubin normal at 0.5.  LFTs were in this range when patient initiated cycle 1.  Proceed today as planned.  Leucovorin rescue to begin on 8/6/2024, 24 hours after initiation of methotrexate at dose of 25 mg IV every 6 hours.  Methotrexate levels to be monitored, first level to be drawn on 8/6/2024, 24 hours after initiation of methotrexate.  8/5/2024: Patient is continuing on hydration/urine alkalization with bicarbonate drip.  Methotrexate level is above goal at 6.9 today (goal <5 at 24 hours).  Will increase leucovorin to 50 mg every 4 hours and increase bicarb drip to 150 meq at 150ml/hr.  Repeat methotrexate level tomorrow a.m.  LFTs remain elevated with AST 76,  and total bilirubin 1.5.  Add Actigall to help with LFTs.  CBC within normal range.  Proceed with rituximab dose today.  8/6/2024, 8/7/2024 and 8/8/2024: Methotrexate levels within range.  Continue leucovorin and IV fluids per protocol  8/9/2024: As methotrexate levels have been within goal.  Will discontinue further methotrexate levels check.  Continue IV fluids with bicarbonate and leucovorin for now.  Likely stop tomorrow.  If labs stable, tentative plan to discharge home tomorrow    *Steroid-induced diabetes  Patient with diabetes exacerbated by steroids  LHA consulted to assist with diabetic management  Patient currently receiving Lantus 20 units daily and Humalog 8 units 3 times daily with meals in addition to sliding scale 4 times daily   Glucose has been in the 200-300 range    *Hepatitis C, elevated LFTs  Patient with hepatitis C, genotype Ia  HCVRNA 7,590,000 on 1/30/2024  HCVRNA 7,220,000 on 5/16/2024  Patient was preparing for treatment through hepatology at [facility] with Mavyret x 8 weeks in June 2024 however was not able to begin treatment due to cost issues and subsequent diagnosis of CNS lymphoma  Treatment with high-dose methotrexate for CNS lymphoma complicated by significant escalation of  transaminases  Patient treated with ursodiol with improvement, ursodiol subsequently held with return of LFTs to baseline range.  Concern regarding untreated hepatitis C in the setting of current immunosuppressive treatment including rituximab.  On 8/6/2024 we will need to contact patient's hepatologist at [facility] to discuss recommendations regarding hepatitis C management as patient continues on treatment for lymphoma  8/4/2024: LFTs today continue at patient's apparent baseline with AST 78, , total bilirubin 0.5.  Plan to closely monitor with initiation of high-dose methotrexate today.  8/5/24: LFTs remain elevated with total bilirubin up to 1.5 today.  Add Actigall.  Will need to clarify hepatitis C management plan with GI at [facility]  8/6/2024: Our pharmacist, Sean spoke with the [facility] hepatitis C clinic pharmacist regarding his hepatitis treatment.  We were told that his treatment has been put on hold in view of ongoing CNS lymphoma treatment.  They are planning to see him back in early September for a follow-up.  Clinic phone number- [phone]  8/7/2024: Slight worsening in LFTs.  Patient remains on Actigall.  Monitor    *Polysubstance use  History of significant alcohol use, decreased following diagnosis with hepatitis C in early 2024  UDS positive for THC on 6/15/2024    *Hypertension  Losartan 50 mg daily, HCTZ 12.5 mg daily    *Seizure prophylaxis  Keppra 500 mg every 12 hours    *PCP prophylaxis  Bactrim DS 1 p.o. Monday Wednesday Friday as outpatient  Bactrim currently on hold due to expected transaminitis from methotrexate    *Viral prophylaxis  Acyclovir 400 mg twice daily    *GI prophylaxis  Pepcid 20 mg twice daily    *DVT prophylaxis  Lovenox 40 mg daily    *Insomnia  Ambien 10 mg nightly    *Pain control  Hydrocodone 10/325 every 4 hours as needed    *Nausea  Zofran 4 mg IV every 6 hours as needed  Compazine 10 mg every 6 hours as needed    Plan:  Received methotrexate  6 g/m² (maintaining previous 25% dose reduction) over 4 hours on 8/4/24.  Rituximab on 8/5/2024  Will discontinue further methotrexate levels check.    continue sodium bicarbonate drip to 150 meq at 150 cc/h for urine alkalinization.  Continue leucovorin rescue to 50 mg every 4 hours   If labs stable, tentative plan to discharge home tomorrow    Continue actigall to help with LFTs  Close monitoring of LFTs following methotrexate administration.   Continue dexamethasone 2 mg twice daily  Continue Keppra 500 mg twice daily  LHA consulted for assistance with management of steroid-induced diabetes during hospitalization  Continue viral prophylaxis with acyclovir 400 mg twice daily  PCP prophylaxis with Bactrim on hold during high-dose methotrexate treatment  Spoke with the [facility] hepatitis C clinic ([phone]).  They plan to hold active hepatitis C treatment and follow-up with patient in early September  Daily CBC, CMP, urine pH  Tentative plan to discharge home on Saturday, 8/10/2024    Discussed with patient at bedside    I spent 52 minutes on this encounter, before, during & after the visit evaluating the patient, reviewing records and writing orders.

## 2024-08-09 NOTE — PLAN OF CARE
Goal Outcome Evaluation:    Patient is A & O X 4. On RA. Medicated per MAR. VSS. ACHS. Voiding well, uses urinal. Denies any pain. Urine sample sent to lab this AM. R chest wall port noted, flushed and + blood return noted, infusing. Bed low, locked and call light within reach.

## 2024-08-09 NOTE — PROGRESS NOTES
[facility] Medicine Services  PROGRESS NOTE    Patient Name: Iain Christina  : 1965  MRN: [id number]    Date of Admission: 2024  Primary Care Physician: Scott Medina MD    Subjective   Subjective     CC:  Follow-up diabetes and medical management    Subjective:  No new complaints.  Doing well in general.    Objective   Objective     Vital Signs:   Temp:  [97.5 °F (36.4 °C)-97.9 °F (36.6 °C)] 97.5 °F (36.4 °C)  Heart Rate:  [58-82] 58  Resp:  [18] 18  BP: (101-126)/(76-93) 101/76        Physical Exam:  Constitutional:Awake, alert  Respiratory: No cough or wheezes, normal respirations, nonlabored breathing   Cardiovascular: Pulse rate is normal, palpable radial pulses  Gastrointestinal:  soft, nontender, nondistended  Musculoskeletal: Normal musculature for age, no lower extremity edema      Results Reviewed:  Results from last 7 days   Lab Units 24  0615 24  0620 24  0650   WBC 10*3/mm3 6.41 6.34 6.57   HEMOGLOBIN g/dL 13.1 12.9* 13.0   HEMATOCRIT % 37.5 37.3* 38.1   PLATELETS 10*3/mm3 141 145 139*     Results from last 7 days   Lab Units 24  0615 24  0620 24  0650   SODIUM mmol/L 140 139 138   POTASSIUM mmol/L 3.4*  3.4* 3.5 3.8   CHLORIDE mmol/L 106 104 104   CO2 mmol/L 26.0 28.7 28.0   BUN mg/dL 14 15 21   CREATININE mg/dL 0.71* 0.62* 0.58*   GLUCOSE mg/dL 69 118* 142*   CALCIUM mg/dL 8.7 8.8 8.6   ALK PHOS U/L 88 88 109   ALT (SGPT) U/L 411* 412* 404*   AST (SGOT) U/L 117* 133* 141*     Glucose   Date/Time Value Ref Range Status   2024 0751 69 (L) 70 - 130 mg/dL Final   2024 2030 228 (H) 70 - 130 mg/dL Final   2024 1554 149 (H) 70 - 130 mg/dL Final   2024 1148 126 70 - 130 mg/dL Final   2024 0739 116 70 - 130 mg/dL Final   2024 171 (H) 70 - 130 mg/dL Final   2024 1706 147 (H) 70 - 130 mg/dL Final       I have reviewed the medications:  Scheduled Meds:acyclovir, 400 mg, Oral, BID  dexAMETHasone, 2  mg, Oral, BID With Meals  enoxaparin, 40 mg, Subcutaneous, Daily  famotidine, 20 mg, Oral, BID AC  losartan, 50 mg, Oral, Q24H   And  hydroCHLOROthiazide, 12.5 mg, Oral, Q24H  insulin glargine, 24 Units, Subcutaneous, Daily  insulin lispro, 12 Units, Subcutaneous, TID With Meals  insulin lispro, 3-14 Units, Subcutaneous, 4x Daily AC & at Bedtime  leucovorin 50 mg in sodium chloride 0.9 % 55 mL IVPB, 50 mg, Intravenous, Q4H  levETIRAcetam, 500 mg, Oral, Q12H  Pharmacy monitoring - high dose methotrexate active, 1 each, Intravenous, Daily  potassium chloride ER, 40 mEq, Oral, Q4H  [Held by provider] sulfamethoxazole-trimethoprim, 1 tablet, Oral, Once per day on Monday Wednesday Friday  ursodiol, 300 mg, Oral, BID  zolpidem, 10 mg, Oral, Nightly      Continuous Infusions:sodium bicarbonate 8.4 % 150 mEq in sterile water (preservative free) 1,000 mL infusion, 150 mL/hr, Last Rate: 150 mL/hr (08/09/24 0231)      PRN Meds:.  acetaminophen    Calcium Replacement - Follow Nurse / BPA Driven Protocol    diphenhydrAMINE    famotidine    HYDROcodone-acetaminophen    Hydrocortisone Sod Suc (PF)    Magnesium Standard Dose Replacement - Follow Nurse / BPA Driven Protocol    meperidine    ondansetron    Phosphorus Replacement - Follow Nurse / BPA Driven Protocol    Potassium Replacement - Follow Nurse / BPA Driven Protocol    prochlorperazine    Assessment & Plan   Assessment & Plan     Active Hospital Problems    Diagnosis  POA    Transaminitis [R74.01]  Yes    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        Brief Hospital Course to date:  Iain Tesfaye Madhuriginny is a 58 y.o. male     Discussion/plan for today:  Blood sugar little high last night for which she received 5 units before bed.  Blood sugar slightly low this morning.  At home he  was taking 10 units of short acting insulin with his meals along with 30 units of Lantus.  Will keep Lantus dose same for now and decrease mealtime insulin to 10 units.  Continue close monitoring.      Steroid induced hyperglycemia, A1c 6.6  -home regimen: Tresiba 30 units, aspart 10 units TIDAC + SSI  -glargine , lispro TIDAC + SSI, monitor glucose and adjusting as needed  -POC glucose, monitor     Primary CNS diffuse large B-cell lymphoma  -per Oncology  -Keppra, Bactrim PPx     Elevated transaminases  -likely 2/2 CTX  -prior liver US unremarkable  -trending     HTN  -HCTZ, losartan  -/80       Pb Gerber MD  [facility]  08/09/24  08:34 EDT

## 2024-08-10 ENCOUNTER — READMISSION MANAGEMENT (OUTPATIENT)
Dept: CALL CENTER | Facility: HOSPITAL | Age: 59
End: 2024-08-10
Payer: COMMERCIAL

## 2024-08-10 VITALS
OXYGEN SATURATION: 95 % | WEIGHT: 211.2 LBS | HEIGHT: 70 IN | BODY MASS INDEX: 30.24 KG/M2 | SYSTOLIC BLOOD PRESSURE: 123 MMHG | HEART RATE: 68 BPM | DIASTOLIC BLOOD PRESSURE: 81 MMHG | TEMPERATURE: 98.1 F | RESPIRATION RATE: 18 BRPM

## 2024-08-10 LAB
ALBUMIN SERPL-MCNC: 3.3 G/DL (ref 3.5–5.2)
ALBUMIN/GLOB SERPL: 1.6 G/DL
ALP SERPL-CCNC: 102 U/L (ref 39–117)
ALT SERPL W P-5'-P-CCNC: 384 U/L (ref 1–41)
ANION GAP SERPL CALCULATED.3IONS-SCNC: 8.9 MMOL/L (ref 5–15)
AST SERPL-CCNC: 80 U/L (ref 1–40)
BASOPHILS # BLD AUTO: 0.01 10*3/MM3 (ref 0–0.2)
BASOPHILS NFR BLD AUTO: 0.2 % (ref 0–1.5)
BILIRUB SERPL-MCNC: 1 MG/DL (ref 0–1.2)
BUN SERPL-MCNC: 14 MG/DL (ref 8–23)
BUN/CREAT SERPL: 23 (ref 7–25)
CALCIUM SPEC-SCNC: 8.7 MG/DL (ref 8.6–10.5)
CHLORIDE SERPL-SCNC: 105 MMOL/L (ref 98–107)
CO2 SERPL-SCNC: 25.1 MMOL/L (ref 22–29)
CREAT SERPL-MCNC: 0.61 MG/DL (ref 0.76–1.27)
DEPRECATED RDW RBC AUTO: 44.1 FL (ref 37–54)
EGFRCR SERPLBLD CKD-EPI 2021: 110 ML/MIN/1.73
EOSINOPHIL # BLD AUTO: 0.03 10*3/MM3 (ref 0–0.4)
EOSINOPHIL NFR BLD AUTO: 0.6 % (ref 0.3–6.2)
ERYTHROCYTE [DISTWIDTH] IN BLOOD BY AUTOMATED COUNT: 14.5 % (ref 12.3–15.4)
GLOBULIN UR ELPH-MCNC: 2.1 GM/DL
GLUCOSE BLDC GLUCOMTR-MCNC: 119 MG/DL (ref 70–130)
GLUCOSE BLDC GLUCOMTR-MCNC: 142 MG/DL (ref 70–130)
GLUCOSE SERPL-MCNC: 141 MG/DL (ref 65–99)
HCT VFR BLD AUTO: 38.3 % (ref 37.5–51)
HGB BLD-MCNC: 13.2 G/DL (ref 13–17.7)
IMM GRANULOCYTES # BLD AUTO: 0.19 10*3/MM3 (ref 0–0.05)
IMM GRANULOCYTES NFR BLD AUTO: 3.8 % (ref 0–0.5)
LYMPHOCYTES # BLD AUTO: 1.32 10*3/MM3 (ref 0.7–3.1)
LYMPHOCYTES NFR BLD AUTO: 26.5 % (ref 19.6–45.3)
MCH RBC QN AUTO: 30.1 PG (ref 26.6–33)
MCHC RBC AUTO-ENTMCNC: 34.5 G/DL (ref 31.5–35.7)
MCV RBC AUTO: 87.2 FL (ref 79–97)
MONOCYTES # BLD AUTO: 0.27 10*3/MM3 (ref 0.1–0.9)
MONOCYTES NFR BLD AUTO: 5.4 % (ref 5–12)
NEUTROPHILS NFR BLD AUTO: 3.17 10*3/MM3 (ref 1.7–7)
NEUTROPHILS NFR BLD AUTO: 63.5 % (ref 42.7–76)
PH UR STRIP.AUTO: >=9 [PH] (ref 5–8)
PLATELET # BLD AUTO: 136 10*3/MM3 (ref 140–450)
PMV BLD AUTO: 9.9 FL (ref 6–12)
POTASSIUM SERPL-SCNC: 3.7 MMOL/L (ref 3.5–5.2)
PROT SERPL-MCNC: 5.4 G/DL (ref 6–8.5)
RBC # BLD AUTO: 4.39 10*6/MM3 (ref 4.14–5.8)
SODIUM SERPL-SCNC: 139 MMOL/L (ref 136–145)
WBC NRBC COR # BLD AUTO: 4.99 10*3/MM3 (ref 3.4–10.8)

## 2024-08-10 PROCEDURE — 82948 REAGENT STRIP/BLOOD GLUCOSE: CPT

## 2024-08-10 PROCEDURE — 80053 COMPREHEN METABOLIC PANEL: CPT | Performed by: INTERNAL MEDICINE

## 2024-08-10 PROCEDURE — 25010000002 LEUCOVORIN CALCIUM PER 50 MG: Performed by: INTERNAL MEDICINE

## 2024-08-10 PROCEDURE — 63710000001 INSULIN GLARGINE PER 5 UNITS: Performed by: INTERNAL MEDICINE

## 2024-08-10 PROCEDURE — 81003 URINALYSIS AUTO W/O SCOPE: CPT | Performed by: INTERNAL MEDICINE

## 2024-08-10 PROCEDURE — 99232 SBSQ HOSP IP/OBS MODERATE 35: CPT | Performed by: INTERNAL MEDICINE

## 2024-08-10 PROCEDURE — 85025 COMPLETE CBC W/AUTO DIFF WBC: CPT | Performed by: INTERNAL MEDICINE

## 2024-08-10 PROCEDURE — 63710000001 INSULIN LISPRO (HUMAN) PER 5 UNITS: Performed by: HOSPITALIST

## 2024-08-10 RX ORDER — IRBESARTAN AND HYDROCHLOROTHIAZIDE 150; 12.5 MG/1; MG/1
1 TABLET, FILM COATED ORAL DAILY
Start: 2024-08-10

## 2024-08-10 RX ORDER — ACYCLOVIR 400 MG/1
400 TABLET ORAL 2 TIMES DAILY
Qty: 60 TABLET | Refills: 3 | Status: SHIPPED | OUTPATIENT
Start: 2024-08-10

## 2024-08-10 RX ADMIN — SODIUM BICARBONATE 150 ML/HR: 84 INJECTION, SOLUTION INTRAVENOUS at 01:17

## 2024-08-10 RX ADMIN — LEUCOVORIN CALCIUM 50 MG: 350 INJECTION, POWDER, LYOPHILIZED, FOR SUSPENSION INTRAMUSCULAR; INTRAVENOUS at 00:54

## 2024-08-10 RX ADMIN — INSULIN GLARGINE 24 UNITS: 100 INJECTION, SOLUTION SUBCUTANEOUS at 08:37

## 2024-08-10 RX ADMIN — URSODIOL 300 MG: 300 CAPSULE ORAL at 08:36

## 2024-08-10 RX ADMIN — LEUCOVORIN CALCIUM 50 MG: 350 INJECTION, POWDER, LYOPHILIZED, FOR SUSPENSION INTRAMUSCULAR; INTRAVENOUS at 04:43

## 2024-08-10 RX ADMIN — HYDROCHLOROTHIAZIDE 12.5 MG: 12.5 TABLET ORAL at 08:36

## 2024-08-10 RX ADMIN — LEVETIRACETAM 500 MG: 500 TABLET, FILM COATED ORAL at 08:36

## 2024-08-10 RX ADMIN — INSULIN LISPRO 10 UNITS: 100 INJECTION, SOLUTION INTRAVENOUS; SUBCUTANEOUS at 08:37

## 2024-08-10 RX ADMIN — LOSARTAN POTASSIUM 50 MG: 50 TABLET, FILM COATED ORAL at 08:36

## 2024-08-10 RX ADMIN — DEXAMETHASONE 2 MG: 2 TABLET ORAL at 08:35

## 2024-08-10 RX ADMIN — ACYCLOVIR 400 MG: 400 TABLET ORAL at 08:35

## 2024-08-10 RX ADMIN — FAMOTIDINE 20 MG: 20 TABLET, FILM COATED ORAL at 07:03

## 2024-08-10 RX ADMIN — LEUCOVORIN CALCIUM 50 MG: 350 INJECTION, POWDER, LYOPHILIZED, FOR SUSPENSION INTRAMUSCULAR; INTRAVENOUS at 09:25

## 2024-08-10 RX ADMIN — SODIUM BICARBONATE 150 ML/HR: 84 INJECTION, SOLUTION INTRAVENOUS at 09:23

## 2024-08-10 NOTE — PROGRESS NOTES
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  Labs/vital stable.  Tolerating IV fluids well.  Intermittent headaches, controlled with Tylenol.    ROS:  Review of Systems   A comprehensive review of systems was obtained with pertinent positive findings as noted in the interval history above.  All other systems negative.    OBJECTIVE:  Vitals:    08/09/24 1550 08/09/24 1920 08/10/24 0435 08/10/24 0739   BP: 128/94 121/82 122/91 156/87   BP Location: Right arm Right arm Right arm Right arm   Patient Position: Lying Lying Lying Lying   Pulse: 66 58 73 50   Resp: 18 18 18 18   Temp: 98.1 °F (36.7 °C) 98.1 °F (36.7 °C) 98 °F (36.7 °C) 97.5 °F (36.4 °C)   TempSrc: Oral Oral Oral Oral   SpO2: 96% 95% 98% 96%   Weight:   95.8 kg (211 lb 3.2 oz)    Height:             PHYSICAL EXAMINATION:  General: Alert and orient x 3 no distress  Chest/Lungs: Normal respiratory effort  Heart: Heart rate  Abdomen/GI: Soft not distended  Extremities: No edema    Patient was examined today, unchanged from above    DIAGNOSTIC DATA:  Results Review:     I reviewed the patient's new clinical results.    Results from last 7 days   Lab Units 08/10/24  0645 08/09/24  0615 08/08/24  0620   WBC 10*3/mm3 4.99 6.41 6.34   HEMOGLOBIN g/dL 13.2 13.1 12.9*   HEMATOCRIT % 38.3 37.5 37.3*   PLATELETS 10*3/mm3 136* 141 145      Results from last 7 days   Lab Units 08/10/24  0645 08/09/24  0615 08/08/24  0620   SODIUM mmol/L 139 140 139   POTASSIUM mmol/L 3.7 3.4*  3.4* 3.5   CHLORIDE mmol/L 105 106 104   CO2 mmol/L 25.1 26.0 28.7   BUN mg/dL 14 14 15   CREATININE mg/dL 0.61* 0.71* 0.62*   CALCIUM mg/dL 8.7 8.7 8.8   BILIRUBIN mg/dL 1.0 1.3* 1.4*   ALK PHOS U/L 102 88 88   ALT (SGPT) U/L 384* 411* 412*   AST (SGOT) U/L 80* 117* 133*    GLUCOSE mg/dL 141* 69 118*      Lab Results   Component Value Date    NEUTROABS 3.17 08/10/2024                       Assessment & Plan   ASSESSMENT/PLAN:  This is a 58 y.o. 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  Associated vasogenic edema extending across the midline into the corpus callosum with mass effect and 1.4 cm left shift.  CT chest abdomen pelvis 6/16/2024 with borderline enlarged carley hepatis lymph nodes, indeterminate.  Brain biopsy on 6/19/2024 with diffuse large B cell non-Hodgkin's lymphoma with non-germinal center phenotype, VANIA negative, Ki-67 80%, FISH negative for Bcl-2, BCL6, MYC rearrangements.  HIV 1/2 negative on 6/23/2024  LDH borderline elevated at 305 on 6/22/2024  Patient unable to perform outpatient PET scan due to hyperglycemia  Patient initiated treatment with high-dose methotrexate and rituximab.  Initial 25% dose reduction in high-dose methotrexate from 8 g/m² down to 6 g/m² with cycle 1 due to baseline elevated LFTs.  Treatment initiated 7/15/2024.  Significant elevation in transaminases following cycle 1 high-dose methotrexate.  Treated with ursodiol.  Subsequent improvement.  Patient continuing on chronic steroids due to cerebral edema, Decadron 2 mg twice daily  Patient admitted 8/2/2024 for hydration/urine alkalinization prior to initiation of cycle 2 rituximab and high-dose methotrexate   On 8/4/2024 initiated cycle 2 high-dose methotrexate and rituximab.  Methotrexate dose maintained at previous 25% dose reduction, 6 g/m² over 4 hours.  Rituximab due on day 3.  Patient is continuing on hydration/urine alkalinization with bicarbonate drip 150 cc/h.  Urine pH today is 8.0 today.  LFTs are stable today at patient's apparent baseline with AST 78, , total bilirubin normal at 0.5.  LFTs were in this range when patient  initiated cycle 1.  Proceed today as planned.  Leucovorin rescue to begin on 8/6/2024, 24 hours after initiation of methotrexate at dose of 25 mg IV every 6 hours.  Methotrexate levels to be monitored, first level to be drawn on 8/6/2024, 24 hours after initiation of methotrexate.  8/5/2024: Patient is continuing on hydration/urine alkalization with bicarbonate drip.  Methotrexate level is above goal at 6.9 today (goal <5 at 24 hours).  Will increase leucovorin to 50 mg every 4 hours and increase bicarb drip to 150 meq at 150ml/hr.  Repeat methotrexate level tomorrow a.m.  LFTs remain elevated with AST 76,  and total bilirubin 1.5.  Add Actigall to help with LFTs.  CBC within normal range.  Proceed with rituximab dose today.  8/6/2024, 8/7/2024 and 8/8/2024: Methotrexate levels within range.  Continue leucovorin and IV fluids per protocol  8/9/2024: As methotrexate levels have been within goal.  Will discontinue further methotrexate levels check.  Continue IV fluids with bicarbonate and leucovorin for now.  Likely stop tomorrow.  If labs stable, tentative plan to discharge home tomorrow  8/10/2024: Clinically stable.  Vital stable.  LFTs show some improvement.  Plan to discharge home today.  Will follow-up closely as outpatient    *Steroid-induced diabetes  Patient with diabetes exacerbated by steroids  LHA consulted to assist with diabetic management  Patient currently receiving Lantus 20 units daily and Humalog 8 units 3 times daily with meals in addition to sliding scale 4 times daily   Glucose has been in the 200-300 range    *Hepatitis C, elevated LFTs  Patient with hepatitis C, genotype Ia  HCVRNA 7,590,000 on 1/30/2024  HCVRNA 7,220,000 on 5/16/2024  Patient was preparing for treatment through hepatology at [facility] with Mavyret x 8 weeks in June 2024 however was not able to begin treatment due to cost issues and subsequent diagnosis of CNS lymphoma  Treatment with high-dose  methotrexate for CNS lymphoma complicated by significant escalation of transaminases  Patient treated with ursodiol with improvement, ursodiol subsequently held with return of LFTs to baseline range.  Concern regarding untreated hepatitis C in the setting of current immunosuppressive treatment including rituximab.  On 8/6/2024 we will need to contact patient's hepatologist at [facility] to discuss recommendations regarding hepatitis C management as patient continues on treatment for lymphoma  8/4/2024: LFTs today continue at patient's apparent baseline with AST 78, , total bilirubin 0.5.  Plan to closely monitor with initiation of high-dose methotrexate today.  8/5/24: LFTs remain elevated with total bilirubin up to 1.5 today.  Add Actigall.  Will need to clarify hepatitis C management plan with GI at [facility]  8/6/2024: Our pharmacist, Sean spoke with the [facility] hepatitis C clinic pharmacist regarding his hepatitis treatment.  We were told that his treatment has been put on hold in view of ongoing CNS lymphoma treatment.  They are planning to see him back in early September for a follow-up.  Clinic phone number- [phone]  8/7/2024: Slight worsening in LFTs.  Patient remains on Actigall.  Monitor    *Polysubstance use  History of significant alcohol use, decreased following diagnosis with hepatitis C in early 2024  UDS positive for THC on 6/15/2024    *Hypertension  Losartan 50 mg daily, HCTZ 12.5 mg daily    *Seizure prophylaxis  Keppra 500 mg every 12 hours    *PCP prophylaxis  Bactrim DS 1 p.o. Monday Wednesday Friday as outpatient  Bactrim currently on hold due to expected transaminitis from methotrexate    *Viral prophylaxis  Acyclovir 400 mg twice daily    *GI prophylaxis  Pepcid 20 mg twice daily    *DVT prophylaxis  Lovenox 40 mg daily    *Insomnia  Ambien 10 mg nightly    *Pain control  Hydrocodone 10/325 every 4 hours as needed    *Nausea  Zofran 4 mg IV every 6 hours as  needed  Compazine 10 mg every 6 hours as needed    Plan:  Received methotrexate 6 g/m² (maintaining previous 25% dose reduction) over 4 hours on 8/4/24.  Rituximab on 8/5/2024  Received leucovorin rescue and bicarbonate drip per protocol.  Methotrexate levels remained within goal range  Continue actigall to help with LFTs  Continue dexamethasone 2 mg twice daily  Continue Keppra 500 mg twice daily  LHA was consulted for assistance with management of steroid-induced diabetes during hospitalization.  Being discharged home on insulin Lantus 24 units daily along with short acting insulin 10 units 3 times daily and sliding scale  Continue viral prophylaxis with acyclovir 400 mg twice daily  PCP prophylaxis with Bactrim on hold during high-dose methotrexate treatment  Spoke with the [facility] hepatitis C clinic ([phone]).  They plan to hold active hepatitis C treatment and follow-up with patient in early September  plan to discharge home on Saturday, 8/10/2024    Discussed with patient at bedside.  Plan to follow-up closely as outpatient

## 2024-08-10 NOTE — OUTREACH NOTE
Prep Survey      Flowsheet Row Responses   [facility]oration[facility] facility patient discharged from? [place]   Is LACE score < 7 ? No   Eligibility [facility]   Date of Admission 08/02/24   Date of Discharge 08/10/24   Discharge Disposition Home or Self Care   Discharge diagnosis Transaminitis   Does the patient have one of the following disease processes/diagnoses(primary or secondary)? Other   Does the patient have Home health ordered? No   Is there a DME ordered? No   Prep survey completed? Yes            YODIT A - Registered Nurse

## 2024-08-10 NOTE — DISCHARGE SUMMARY
MRN:[id number]                      After Visit Summary   4/10/2017    Lady Carey    MRN: [id number]           Patient Information     Date Of Birth          2000        Designated Caregiver       Most Recent Value    Caregiver    Will someone help with your care after discharge? no      About your hospital stay     You were admitted on:  April 11, 2017 You last received care in the:  [facility]    You were discharged on:  April 13, 2017       Who to Call     For medical emergencies, please call 911.  For non-urgent questions about your medical care, please call your primary care provider or clinic, None          Attending Provider     Provider Specialty    Karolina Maynard MD Emergency Medicine    Mary Lou, Diana Perrin MD Emergency Medicine    Lesly, Suresh Martin MD Psychiatry       Primary Care Provider    Physician No Ref-Primary       No address on file        Further instructions from your care team       Behavioral Discharge Planning and Instructions      Summary: You were admitted on 4/10/2017 to [facility] [address] [place] [address] for Suicidal Ideations.  You were treated by Debra Naegele, APRN, CNS and discharged on 04/12/17.     Disposition: Discharged to home    Main Diagnosis:   1. Major depressive disorder, recurrent, severe.   2. Cluster B personality traits.   3. Suicidal ideation.   4. Possible eating disorder.     Health Care Follow-up Appointments:   Medication Management  Date: Wednesday, May 3rd, 2017   Time:  8:45 AM    Provider: Jennifer Coleman NP @ [facility].  Address:  [address] [place] [address]  Phone:  [phone]  [facility] has faxed the Discharge Summary and AVS to this provider at Fax: [phone]     DBT Therapy Appointment   Mental Health Services (DBT):  Provider: Agustín  Date:Thursday, April 27th, 2017  Time:  1:30 PM  [address], [address]  [place] [address]  Phone: [phone]  Other Referrals:  [facility]    Date of Discharge:  8/10/2024    Discharge Diagnosis: Primary CNS lymphoma.  Admitted for cycle 2 high-dose methotrexate and rituximab chemotherapy    Presenting Problem/History of Present Illness  Active Hospital Problems    Diagnosis  POA    Transaminitis [R74.01]  Yes    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        Hospital Course  Mr. Christina is a 58 y.o. male with a history of primary CNS diffuse large B-cell lymphoma who presented to [facility] for cycle 2 of high-dose methotrexate chemotherapy.  Please see the admitting history and physical for further details.     Patient was started on cycle 2 of high-dose methotrexate plus Rituxan on 8//24.  Patient was given leucovorin with the methotrexate and received fluids for urine alkalinization.  His methotrexate levels were above goal (6.9) at 24 hours for which leucovorin and bicarbonate fluid rate was adjusted.  Other than that, his methotrexate levels remained under goal.  Hospital course was complicated by transaminitis and hyperbilirubinemia thought to be secondary to chemotherapy.  LFTs steadily increased.  The patient was started on Actigall for hyperbilirubinemia.   LFTs ultimately began trending downward.  Patient also had some hyperglycemia secondary to steroids.  [facility] was consulted and insulin was adjusted as needed.  He will be discharged on a reduced dose of Decadron at 2 mg twice daily    Consults:   Consults       Date and Time Order Name Status Description    8/2/2024 11:34 AM Inpatient Hospitalist Consult Completed     7/15/2024  4:05 PM Hematology & Oncology Inpatient Consult Completed             Pertinent Test Results:     Reviewed and stable    Condition on Discharge: Stable    Vital Signs  Temp:  [97.5 °F (36.4 °C)-98.1 °F (36.7 °C)]  98.1 °F (36.7 °C)  Heart Rate:  [50-73] 68  Resp:  [18] 18  BP: (121-156)/(81-94) 123/81    Physical Exam:   Stable    Discharge Disposition  Home or Self Care    Discharge Medications     Discharge Medications        New Medications        Instructions Start Date   acyclovir 400 MG tablet  Commonly known as: ZOVIRAX   400 mg, Oral, 2 Times Daily, Take no more than 5 doses a day.             Continue These Medications        Instructions Start Date   dexAMETHasone 2 MG tablet  Commonly known as: DECADRON   2 mg, Oral, 2 Times Daily With Meals      famotidine 20 MG tablet  Commonly known as: PEPCID   20 mg, Oral, 2 Times Daily Before Meals      HYDROcodone-acetaminophen 5-325 MG per tablet  Commonly known as: NORCO   1 tablet, Oral, Every 4 Hours PRN      Insulin Pen Needle 32G X 4 MM misc   Use to inject insulin SQ via pens up to 4 times daily      irbesartan-hydrochlorothiazide 150-12.5 MG tablet  Commonly known as: AVALIDE   1 tablet, Oral, Daily      levETIRAcetam 500 MG tablet  Commonly known as: KEPPRA   500 mg, Oral, Every 12 Hours Scheduled      lidocaine-prilocaine 2.5-2.5 % cream  Commonly known as: EMLA   1 Application, Topical, Every 2 Hours PRN      NovoLOG FlexPen 100 UNIT/ML solution pen-injector sc pen  Generic drug: insulin aspart   5 Units, Subcutaneous, 3 Times Daily With Meals      sulfamethoxazole-trimethoprim 800-160 MG per tablet  Commonly known as: BACTRIM DS,SEPTRA DS   1 tablet, Oral, 3 Times Weekly      Tresiba FlexTouch 100 UNIT/ML solution pen-injector injection  Generic drug: insulin degludec   25 Units, Subcutaneous, Daily      ursodiol 300 MG capsule  Commonly known as: ACTIGALL   300 mg, Oral, 2 Times Daily      zolpidem CR 12.5 MG CR tablet  Commonly known as: Ambien CR   12.5 mg, Oral, Nightly PRN               Discharge Diet: Regular    Activity at Discharge: As tolerated    Follow-up Appointments  Future Appointments   Date Time Provider Department Center   8/13/2024  9:15 AM INFU  [place] [facility]:  [address] [address]  Phone: [phone]  Fax:  [phone] (Attention Mable Kirit)  Attend all scheduled appointments with your outpatient providers. Call at least 24 hours in advance if you need to reschedule an appointment to ensure continued access to your outpatient providers.   Major Treatments, Procedures and Findings: You were provided with: a psychiatric assessment, assessed for medical stability, medication evaluation and/or management, group therapy, art therapy, milieu management, medical interventions and skills/OT groups.    Symptoms to Report: If you experience any of the following symptoms please report them right away to your provider or to family/friends; feeling more aggressive, increased confusion, losing more sleep, mood getting worse or thoughts of suicide.    Early warning signs can include: Early warning signs that could signal a potential relapse could include but not limited to the following; increased depression or anxiety sleep disturbances increased thoughts or behaviors of suicide or self-harm  increased unusual thinking, such as paranoia or hearing voices.    Safety and Wellness: The patient should take medications as prescribed.  Patient's caregivers are highly encouraged to supervise administering of medications and follow treatment recommendations.     Patient's caregivers should ensure patient does not have access to:    Firearms  Medicines (both prescribed and over-the-counter)  Knives and other sharp objects  Ropes and like materials  Alcohol  Car keys  If there is a concern for safety, call 911..    Resources:    Crisis Intervention: 708.372.8887 or 271-262-6828 (TTY: 709.393.6062).  Call anytime for help.  National [place] on Mental Illness (www.[place].cleopatra.org): 805.162.6523 or 772-424-2128.  National Suicide Prevention Line (www.mentalhealthmn.org): 227-715-YNAQ (2064)  [place]  "[place] Crisis (COPE) Response - Adult 517 771-4353  Text 4 Life: txt \"LIFE\" to 67321 for immediate support and crisis intervention  Crisis text line: Text \"START\" to 588-729. Free, confidential, .  Crisis Intervention: 143.317.7440 or 854-639-3040. Call anytime for help.     The treatment team has appreciated the opportunity to work with you. Lady,  please take care and make your recovery a daily recovery. If you have any questions or concerns our unit number is [phone]. You will be receiving a follow-up phone call within the next three days from a representative from behavioral health. You have identified the best phone number to reach you as [phone] (home) .      Pending Results     No orders found from 2017 to 2017.            Admission Information     Date & Time Provider Department Dept. Phone    4/10/2017 Suresh Grace MD [facility] [phone]      Your Vitals Were     Blood Pressure Pulse Temperature Respirations Height Weight    119/72 77 96.6  F (35.9  C) (Oral) 16 1.613 m (5' 3.5\") 52.2 kg (115 lb 1.3 oz)    Last Period Pulse Oximetry BMI (Body Mass Index)             2017 (Exact Date) 98% 20.06 kg/m2         [employer] Information     [employer] lets you send messages to your doctor, view your test results, renew your prescriptions, schedule appointments and more. To sign up, go to [email or web address] . Click on \"Log in\" on the left side of the screen, which will take you to the Welcome page. Then click on \"Sign up Now\" on the right side of the page.     You will be asked to enter the access code listed below, as well as some personal information. Please follow the directions to create your username and password.     Your access code is: [email or web address]  Expires: 2017  9:46 PM     Your access code will  in 90 days. If you need help or a new code, please call your [facility] clinic or [phone].        Care EveryWhere ID     This is your " CBC KRE PORT CHAIR BH INFUS KRE LAG   8/13/2024  9:30 AM RN REV 1 CBC KRE BH INFUS KRE LAG   8/15/2024  9:00 AM INFU CBC KRE PORT CHAIR  INFUS KRE LAG   8/15/2024  9:30 AM RN REV 1 CBC KRE  INFUS KRE LAG   8/19/2024  8:30 AM JESSICA UCE MRI 2  JESSICA MRI E UCE   8/20/2024  9:00 AM INFU CBC KRE PORT CHAIR  INFUS KRE LAG   8/20/2024  9:30 AM RN REV 1 CBC KRE  INFUS KRE LAG   8/23/2024  9:00 AM INFU CBC KRE PORT CHAIR  INFUS KRE LAG   8/23/2024  9:20 AM Krista Fernandez MD PhD MGK CBC KRES Lou[facility]   1/16/2025 11:15 AM Scott Medina MD [facility] PC BLKBR JESSICA         Test Results Pending at Discharge       Arturo Bello MD  08/10/24  12:13 EDT    Time: Discharge 42 min           Care EveryWhere ID. This could be used by other organizations to access your [facility] medical records  [id number]           Review of your medicines      START taking        Dose / Directions    ARIPiprazole 2 MG tablet   Commonly known as:  ABILIFY   Used for:  Severe single current episode of major depressive disorder, without psychotic features (H)        Dose:  2.5 mg   Take 1.5 tablets (3 mg) by mouth daily   Quantity:  45 tablet   Refills:  1       hydrOXYzine 25 MG tablet   Commonly known as:  ATARAX   Used for:  Anxiety        Dose:  25-50 mg   Take 1-2 tablets (25-50 mg) by mouth every 4 hours as needed for anxiety   Quantity:  120 tablet   Refills:  1       traZODone 50 MG tablet   Commonly known as:  DESYREL   Used for:  Insomnia due to other mental disorder        Dose:  50 mg   Take 1 tablet (50 mg) by mouth At Bedtime   Quantity:  90 tablet   Refills:  1         CONTINUE these medicines which may have CHANGED, or have new prescriptions. If we are uncertain of the size of tablets/capsules you have at home, strength may be listed as something that might have changed.        Dose / Directions    fluvoxaMINE 100 MG tablet   Commonly known as:  LUVOX   Indication:  Depression   This may have changed:    - medication strength  - how much to take   Used for:  Severe single current episode of major depressive disorder, without psychotic features (H)        Dose:  100 mg   Take 1 tablet (100 mg) by mouth At Bedtime   Quantity:  30 tablet   Refills:  1         STOP taking     SEROQUEL PO                Where to get your medicines      Some of these will need a paper prescription and others can be bought over the counter. Ask your nurse if you have questions.     Bring a paper prescription for each of these medications     ARIPiprazole 2 MG tablet    fluvoxaMINE 100 MG tablet    hydrOXYzine 25 MG tablet    traZODone 50 MG tablet                Protect others around you: Learn how to safely use, store and throw  away your medicines at www.disposemymeds.org.             Medication List: This is a list of all your medications and when to take them. Check marks below indicate your daily home schedule. Keep this list as a reference.      Medications           Morning Afternoon Evening Bedtime As Needed    ARIPiprazole 2 MG tablet   Commonly known as:  ABILIFY   Take 1.5 tablets (3 mg) by mouth daily   Last time this was given:  2.5 mg on 4/13/2017  7:35 AM                                fluvoxaMINE 100 MG tablet   Commonly known as:  LUVOX   Take 1 tablet (100 mg) by mouth At Bedtime   Last time this was given:  100 mg on 4/12/2017  9:43 PM                                hydrOXYzine 25 MG tablet   Commonly known as:  ATARAX   Take 1-2 tablets (25-50 mg) by mouth every 4 hours as needed for anxiety                                traZODone 50 MG tablet   Commonly known as:  DESYREL   Take 1 tablet (50 mg) by mouth At Bedtime   Last time this was given:  50 mg on 4/12/2017  9:43 PM

## 2024-08-10 NOTE — PROGRESS NOTES
"DAILY PROGRESS NOTE  [facility]    Patient Identification:  Name: Iain Christina  Age: 58 y.o.  Sex: male  :  1965  MRN: [id number]         Primary Care Physician: Scott Medina MD    Subjective:  Interval History: He has no new complaints and feels well enough to go home today.    Objective:    Scheduled Meds:acyclovir, 400 mg, Oral, BID  dexAMETHasone, 2 mg, Oral, BID With Meals  enoxaparin, 40 mg, Subcutaneous, Daily  famotidine, 20 mg, Oral, BID AC  losartan, 50 mg, Oral, Q24H   And  hydroCHLOROthiazide, 12.5 mg, Oral, Q24H  insulin glargine, 24 Units, Subcutaneous, Daily  insulin lispro, 10 Units, Subcutaneous, TID With Meals  insulin lispro, 3-14 Units, Subcutaneous, 4x Daily AC & at Bedtime  levETIRAcetam, 500 mg, Oral, Q12H  Pharmacy monitoring - high dose methotrexate active, 1 each, Intravenous, Daily  [Held by provider] sulfamethoxazole-trimethoprim, 1 tablet, Oral, Once per day on   ursodiol, 300 mg, Oral, BID  zolpidem, 10 mg, Oral, Nightly      Continuous Infusions:sodium bicarbonate 8.4 % 150 mEq in sterile water (preservative free) 1,000 mL infusion, 150 mL/hr, Last Rate: 150 mL/hr (08/10/24 0923)        Vital signs in last 24 hours:  Temp:  [97.5 °F (36.4 °C)-98.1 °F (36.7 °C)] 98.1 °F (36.7 °C)  Heart Rate:  [50-73] 68  Resp:  [18] 18  BP: (121-156)/(81-94) 123/81    Intake/Output:    Intake/Output Summary (Last 24 hours) at 8/10/2024 1201  Last data filed at 8/10/2024 0649  Gross per 24 hour   Intake 1360 ml   Output 5700 ml   Net -4340 ml       Exam:  /81 (BP Location: Right arm, Patient Position: Lying)   Pulse 68   Temp 98.1 °F (36.7 °C) (Oral)   Resp 18   Ht 177.8 cm (70\")   Wt 95.8 kg (211 lb 3.2 oz)   SpO2 95%   BMI 30.30 kg/m²     General Appearance:    Alert, cooperative, no distress   Head:    Normocephalic, without obvious abnormality, atraumatic   Eyes:       Throat:   Lips, tongue, gums normal   Neck:   Supple, " symmetrical, trachea midline, no JVD   Lungs:     Clear to auscultation bilaterally, respirations unlabored   Chest Wall:    No tenderness or deformity    Heart:    Regular rate and rhythm, S1 and S2 normal, no murmur,no  rub or gallop   Abdomen:     Soft, nontender, bowel sounds active, no masses, no organomegaly    Extremities:   Extremities normal, atraumatic, no cyanosis or edema   Pulses:      Skin:   Skin is warm and dry,  no rashes or palpable lesions   Neurologic:   no focal deficits noted      Lab Results (last 72 hours)       Procedure Component Value Units Date/Time    POC Glucose Once [[id number]]  (Abnormal) Collected: 08/10/24 1135    Specimen: Blood Updated: 08/10/24 1137     Glucose 142 mg/dL     Comprehensive Metabolic Panel [[id number]]  (Abnormal) Collected: 08/10/24 0645    Specimen: Blood Updated: 08/10/24 0746     Glucose 141 mg/dL      BUN 14 mg/dL      Creatinine 0.61 mg/dL      Sodium 139 mmol/L      Potassium 3.7 mmol/L      Chloride 105 mmol/L      CO2 25.1 mmol/L      Calcium 8.7 mg/dL      Total Protein 5.4 g/dL      Albumin 3.3 g/dL      ALT (SGPT) 384 U/L      AST (SGOT) 80 U/L      Alkaline Phosphatase 102 U/L      Total Bilirubin 1.0 mg/dL      Globulin 2.1 gm/dL      A/G Ratio 1.6 g/dL      BUN/Creatinine Ratio 23.0     Anion Gap 8.9 mmol/L      eGFR 110.0 mL/min/1.73     Narrative:      GFR Normal >60  Chronic Kidney Disease <60  Kidney Failure <15      POC Glucose Once [[id number]]  (Normal) Collected: 08/10/24 0740    Specimen: Blood Updated: 08/10/24 0741     Glucose 119 mg/dL     CBC & Differential [[id number]]  (Abnormal) Collected: 08/10/24 0645    Specimen: Blood Updated: 08/10/24 0710    Narrative:      The following orders were created for panel order CBC & Differential.  Procedure                               Abnormality         Status                     ---------                               -----------         ------                     CBC Auto Differential[[id number]]         Abnormal            Final result                 Please view results for these tests on the individual orders.    CBC Auto Differential [[id number]]  (Abnormal) Collected: 08/10/24 0645    Specimen: Blood Updated: 08/10/24 0710     WBC 4.99 10*3/mm3      RBC 4.39 10*6/mm3      Hemoglobin 13.2 g/dL      Hematocrit 38.3 %      MCV 87.2 fL      MCH 30.1 pg      MCHC 34.5 g/dL      RDW 14.5 %      RDW-SD 44.1 fl      MPV 9.9 fL      Platelets 136 10*3/mm3      Neutrophil % 63.5 %      Lymphocyte % 26.5 %      Monocyte % 5.4 %      Eosinophil % 0.6 %      Basophil % 0.2 %      Immature Grans % 3.8 %      Neutrophils, Absolute 3.17 10*3/mm3      Lymphocytes, Absolute 1.32 10*3/mm3      Monocytes, Absolute 0.27 10*3/mm3      Eosinophils, Absolute 0.03 10*3/mm3      Basophils, Absolute 0.01 10*3/mm3      Immature Grans, Absolute 0.19 10*3/mm3     pH, Urine - Urine, Clean Catch [[id number]]  (Abnormal) Collected: 08/10/24 0438    Specimen: Urine, Clean Catch Updated: 08/10/24 0451     pH, UA >=9.0    POC Glucose Once [[id number]]  (Abnormal) Collected: 08/09/24 2023    Specimen: Blood Updated: 08/09/24 2029     Glucose 180 mg/dL     POC Glucose Once [[id number]]  (Abnormal) Collected: 08/09/24 1634    Specimen: Blood Updated: 08/09/24 1636     Glucose 141 mg/dL     POC Glucose Once [[id number]]  (Abnormal) Collected: 08/09/24 1125    Specimen: Blood Updated: 08/09/24 1127     Glucose 206 mg/dL     Methotrexate Level [[id number]] Collected: 08/08/24 1105    Specimen: Blood Updated: 08/09/24 0935     Methotrexate See Attached Form    POC Glucose Once [[id number]]  (Abnormal) Collected: 08/09/24 0751    Specimen: Blood Updated: 08/09/24 0752     Glucose 69 mg/dL     Potassium [[id number]]  (Abnormal) Collected: 08/09/24 0615    Specimen: Blood, Central Line Updated: 08/09/24 0727     Potassium 3.4 mmol/L     Comprehensive Metabolic Panel [[id number]]  (Abnormal) Collected: 08/09/24 0615    Specimen: Blood, Central Line Updated:  08/09/24 0727     Glucose 69 mg/dL      BUN 14 mg/dL      Creatinine 0.71 mg/dL      Sodium 140 mmol/L      Potassium 3.4 mmol/L      Chloride 106 mmol/L      CO2 26.0 mmol/L      Calcium 8.7 mg/dL      Total Protein 5.1 g/dL      Albumin 3.3 g/dL      ALT (SGPT) 411 U/L      AST (SGOT) 117 U/L      Alkaline Phosphatase 88 U/L      Total Bilirubin 1.3 mg/dL      Globulin 1.8 gm/dL      A/G Ratio 1.8 g/dL      BUN/Creatinine Ratio 19.7     Anion Gap 8.0 mmol/L      eGFR 105.0 mL/min/1.73     Narrative:      GFR Normal >60  Chronic Kidney Disease <60  Kidney Failure <15      pH, Urine - Urine, Clean Catch [[id number]]  (Abnormal) Collected: 08/09/24 0614    Specimen: Urine, Clean Catch Updated: 08/09/24 0716     pH, UA >=9.0    CBC & Differential [[id number]]  (Abnormal) Collected: 08/09/24 0615    Specimen: Blood, Central Line Updated: 08/09/24 0713    Narrative:      The following orders were created for panel order CBC & Differential.  Procedure                               Abnormality         Status                     ---------                               -----------         ------                     CBC Auto Differential[[id number]]        Abnormal            Final result                 Please view results for these tests on the individual orders.    CBC Auto Differential [[id number]]  (Abnormal) Collected: 08/09/24 0615    Specimen: Blood, Central Line Updated: 08/09/24 0713     WBC 6.41 10*3/mm3      RBC 4.31 10*6/mm3      Hemoglobin 13.1 g/dL      Hematocrit 37.5 %      MCV 87.0 fL      MCH 30.4 pg      MCHC 34.9 g/dL      RDW 14.7 %      RDW-SD 44.1 fl      MPV 10.5 fL      Platelets 141 10*3/mm3      Neutrophil % 70.3 %      Lymphocyte % 25.3 %      Monocyte % 2.7 %      Eosinophil % 0.6 %      Basophil % 0.3 %      Immature Grans % 0.8 %      Neutrophils, Absolute 4.51 10*3/mm3      Lymphocytes, Absolute 1.62 10*3/mm3      Monocytes, Absolute 0.17 10*3/mm3      Eosinophils, Absolute 0.04 10*3/mm3       Basophils, Absolute 0.02 10*3/mm3      Immature Grans, Absolute 0.05 10*3/mm3      nRBC 0.3 /100 WBC     POC Glucose Once [[id number]]  (Abnormal) Collected: 08/08/24 2030    Specimen: Blood Updated: 08/08/24 2031     Glucose 228 mg/dL     POC Glucose Once [[id number]]  (Abnormal) Collected: 08/08/24 1554    Specimen: Blood Updated: 08/08/24 1555     Glucose 149 mg/dL     POC Glucose Once [[id number]]  (Normal) Collected: 08/08/24 1148    Specimen: Blood Updated: 08/08/24 1151     Glucose 126 mg/dL     Methotrexate Level [[id number]] Collected: 08/07/24 1100    Specimen: Blood Updated: 08/08/24 1111     Methotrexate See Attached Form For Resuls    POC Glucose Once [[id number]]  (Normal) Collected: 08/08/24 0739    Specimen: Blood Updated: 08/08/24 0740     Glucose 116 mg/dL     CBC & Differential [[id number]]  (Abnormal) Collected: 08/08/24 0620    Specimen: Blood, Central Line Updated: 08/08/24 0737    Narrative:      The following orders were created for panel order CBC & Differential.  Procedure                               Abnormality         Status                     ---------                               -----------         ------                     CBC Auto Differential[[id number]]        Abnormal            Final result                 Please view results for these tests on the individual orders.    CBC Auto Differential [[id number]]  (Abnormal) Collected: 08/08/24 0620    Specimen: Blood, Central Line Updated: 08/08/24 0737     WBC 6.34 10*3/mm3      RBC 4.17 10*6/mm3      Hemoglobin 12.9 g/dL      Hematocrit 37.3 %      MCV 89.4 fL      MCH 30.9 pg      MCHC 34.6 g/dL      RDW 15.1 %      RDW-SD 48.0 fl      MPV 10.3 fL      Platelets 145 10*3/mm3      Neutrophil % 77.4 %      Lymphocyte % 19.7 %      Monocyte % 1.4 %      Eosinophil % 0.5 %      Basophil % 0.2 %      Immature Grans % 0.8 %      Neutrophils, Absolute 4.91 10*3/mm3      Lymphocytes, Absolute 1.25 10*3/mm3      Monocytes, Absolute 0.09 10*3/mm3   "    Eosinophils, Absolute 0.03 10*3/mm3      Basophils, Absolute 0.01 10*3/mm3      Immature Grans, Absolute 0.05 10*3/mm3     Comprehensive Metabolic Panel [[id number]]  (Abnormal) Collected: 08/08/24 0620    Specimen: Blood, Central Line Updated: 08/08/24 0735     Glucose 118 mg/dL      BUN 15 mg/dL      Creatinine 0.62 mg/dL      Sodium 139 mmol/L      Potassium 3.5 mmol/L      Chloride 104 mmol/L      CO2 28.7 mmol/L      Calcium 8.8 mg/dL      Total Protein 5.1 g/dL      Albumin 3.0 g/dL      ALT (SGPT) 412 U/L      AST (SGOT) 133 U/L      Alkaline Phosphatase 88 U/L      Total Bilirubin 1.4 mg/dL      Globulin 2.1 gm/dL      A/G Ratio 1.4 g/dL      BUN/Creatinine Ratio 24.2     Anion Gap 6.3 mmol/L      eGFR 109.4 mL/min/1.73     Narrative:      GFR Normal >60  Chronic Kidney Disease <60  Kidney Failure <15      pH, Urine - Urine, Clean Catch [[id number]]  (Abnormal) Collected: 08/08/24 0545    Specimen: Urine, Clean Catch Updated: 08/08/24 0600     pH, UA >=9.0    POC Glucose Once [[id number]]  (Abnormal) Collected: 08/07/24 2025    Specimen: Blood Updated: 08/07/24 2026     Glucose 171 mg/dL     POC Glucose Once [[id number]]  (Abnormal) Collected: 08/07/24 1706    Specimen: Blood Updated: 08/07/24 1708     Glucose 147 mg/dL           Data Review:  Results from last 7 days   Lab Units 08/10/24  0645 08/09/24  0615 08/08/24 0620   SODIUM mmol/L 139 140 139   POTASSIUM mmol/L 3.7 3.4*  3.4* 3.5   CHLORIDE mmol/L 105 106 104   CO2 mmol/L 25.1 26.0 28.7   BUN mg/dL 14 14 15   CREATININE mg/dL 0.61* 0.71* 0.62*   GLUCOSE mg/dL 141* 69 118*   CALCIUM mg/dL 8.7 8.7 8.8     Results from last 7 days   Lab Units 08/10/24  0645 08/09/24  0615 08/08/24  0620   WBC 10*3/mm3 4.99 6.41 6.34   HEMOGLOBIN g/dL 13.2 13.1 12.9*   HEMATOCRIT % 38.3 37.5 37.3*   PLATELETS 10*3/mm3 136* 141 145             No results found for: \"TROPONINT\"      Results from last 7 days   Lab Units 08/10/24  0645 08/09/24  0615 08/08/24  0620   ALK " PHOS U/L 102 88 88   BILIRUBIN mg/dL 1.0 1.3* 1.4*   ALT (SGPT) U/L 384* 411* 412*   AST (SGOT) U/L 80* 117* 133*             Glucose   Date/Time Value Ref Range Status   08/10/2024 1135 142 (H) 70 - 130 mg/dL Final   08/10/2024 0740 119 70 - 130 mg/dL Final   08/09/2024 2023 180 (H) 70 - 130 mg/dL Final   08/09/2024 1634 141 (H) 70 - 130 mg/dL Final   08/09/2024 1125 206 (H) 70 - 130 mg/dL Final   08/09/2024 0751 69 (L) 70 - 130 mg/dL Final   08/08/2024 2030 228 (H) 70 - 130 mg/dL Final   08/08/2024 1554 149 (H) 70 - 130 mg/dL Final           Past Medical History:   Diagnosis Date    Diabetes mellitus     Hepatitis C     Hyperthyroidism     Sleep apnea        Assessment:  Active Hospital Problems    Diagnosis  POA    Transaminitis [R74.01]  Yes    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       Plan:  Okay with discharge plan.  He has long-acting and short acting insulin at home and has had instruction on how to monitor his sugars and adjust insulin as needed.  He also has follow-up with his primary care physician.  Med rec completed.  Discussed with the nurse and discharge per oncology.    Samuel Farley MD  8/10/2024  12:01 EDT

## 2024-08-11 NOTE — CASE MANAGEMENT/SOCIAL WORK
Case Management Discharge Note      Final Note: Home         Selected Continued Care - Discharged on 8/10/2024 Admission date: 8/2/2024 - Discharge disposition: Home or Self Care      Destination    No services have been selected for the patient.                Durable Medical Equipment    No services have been selected for the patient.                Dialysis/Infusion    No services have been selected for the patient.                Home Medical Care    No services have been selected for the patient.                Therapy    No services have been selected for the patient.                Community Resources    No services have been selected for the patient.                Community & DME    No services have been selected for the patient.                         Final Discharge Disposition Code: 01 - home or self-care

## 2024-08-12 ENCOUNTER — TRANSITIONAL CARE MANAGEMENT TELEPHONE ENCOUNTER (OUTPATIENT)
Dept: CALL CENTER | Facility: HOSPITAL | Age: 59
End: 2024-08-12
Payer: COMMERCIAL

## 2024-08-12 NOTE — OUTREACH NOTE
Call Center TCM Note      Flowsheet Row Responses   [facility] patient discharged from? [place]   Does the patient have one of the following disease processes/diagnoses(primary or secondary)? Other   TCM attempt successful? Yes   Call start time 0830   Call end time 0833   Discharge diagnosis Transaminitis   Is patient permission given to speak with other caregiver? Yes   Person spoke with today (if not patient) and relationship Brother in law and patient were both on speaker phone.   Meds reviewed with patient/caregiver? Yes   Is the patient having any side effects they believe may be caused by any medication additions or changes? No   Does the patient have all medications ordered at discharge? Yes   Is the patient taking all medications as directed (includes completed medication regime)? Yes   Does the patient have an appointment with their PCP within 7-14 days of discharge? No   Nursing Interventions Patient declined scheduling/rescheduling appointment at this time   Has home health visited the patient within 72 hours of discharge? N/A   What DME was ordered? [facility]   Psychosocial issues? No   Did the patient receive a copy of their discharge instructions? Yes   Nursing interventions Reviewed instructions with patient   What is the patient's perception of their health status since discharge? Improving   Is the patient/caregiver able to teach back signs and symptoms related to disease process for when to call PCP? Yes   Is the patient/caregiver able to teach back signs and symptoms related to disease process for when to call 911? Yes   Is the patient/caregiver able to teach back the hierarchy of who to call/visit for symptoms/problems? PCP, Specialist, Home health nurse, Urgent Care, ED, 911 Yes   If the patient is a current smoker, are they able to teach back resources for cessation? Not a smoker   TCM call completed? Yes   Call end time 0833   Would this patient benefit from a Referral to Amb  Social Work? No   Is the patient interested in additional calls from an ambulatory ? No            Alana Restrepo LPN    8/12/2024, 08:39 EDT

## 2024-08-13 ENCOUNTER — APPOINTMENT (OUTPATIENT)
Dept: ONCOLOGY | Facility: HOSPITAL | Age: 59
End: 2024-08-13
Payer: COMMERCIAL

## 2024-08-13 ENCOUNTER — TELEPHONE (OUTPATIENT)
Dept: ONCOLOGY | Facility: CLINIC | Age: 59
End: 2024-08-13
Payer: COMMERCIAL

## 2024-08-13 ENCOUNTER — INFUSION (OUTPATIENT)
Dept: ONCOLOGY | Facility: HOSPITAL | Age: 59
End: 2024-08-13
Payer: COMMERCIAL

## 2024-08-13 DIAGNOSIS — C85.89 PRIMARY CNS LYMPHOMA: ICD-10-CM

## 2024-08-13 DIAGNOSIS — C85.10 LARGE B-CELL LYMPHOMA: ICD-10-CM

## 2024-08-13 DIAGNOSIS — C79.31 METASTASIS TO BRAIN: ICD-10-CM

## 2024-08-13 DIAGNOSIS — Z45.2 FITTING AND ADJUSTMENT OF VASCULAR CATHETER: Primary | ICD-10-CM

## 2024-08-13 LAB
ALBUMIN SERPL-MCNC: 3.8 G/DL (ref 3.5–5.2)
ALBUMIN/GLOB SERPL: 1.7 G/DL
ALP SERPL-CCNC: 107 U/L (ref 39–117)
ALT SERPL W P-5'-P-CCNC: 320 U/L (ref 1–41)
ANION GAP SERPL CALCULATED.3IONS-SCNC: 12.4 MMOL/L (ref 5–15)
AST SERPL-CCNC: 74 U/L (ref 1–40)
BASOPHILS # BLD AUTO: 0.03 10*3/MM3 (ref 0–0.2)
BASOPHILS NFR BLD AUTO: 0.3 % (ref 0–1.5)
BILIRUB SERPL-MCNC: 1.5 MG/DL (ref 0–1.2)
BUN SERPL-MCNC: 20 MG/DL (ref 8–23)
BUN/CREAT SERPL: 31.3 (ref 7–25)
CALCIUM SPEC-SCNC: 9.2 MG/DL (ref 8.6–10.5)
CHLORIDE SERPL-SCNC: 106 MMOL/L (ref 98–107)
CO2 SERPL-SCNC: 21.6 MMOL/L (ref 22–29)
CREAT SERPL-MCNC: 0.64 MG/DL (ref 0.76–1.27)
DEPRECATED RDW RBC AUTO: 48.9 FL (ref 37–54)
EGFRCR SERPLBLD CKD-EPI 2021: 108.4 ML/MIN/1.73
EOSINOPHIL # BLD AUTO: 0.03 10*3/MM3 (ref 0–0.4)
EOSINOPHIL NFR BLD AUTO: 0.3 % (ref 0.3–6.2)
ERYTHROCYTE [DISTWIDTH] IN BLOOD BY AUTOMATED COUNT: 16.5 % (ref 12.3–15.4)
GLOBULIN UR ELPH-MCNC: 2.3 GM/DL
GLUCOSE SERPL-MCNC: 122 MG/DL (ref 65–99)
HCT VFR BLD AUTO: 40.2 % (ref 37.5–51)
HGB BLD-MCNC: 13.5 G/DL (ref 13–17.7)
IMM GRANULOCYTES # BLD AUTO: 0.16 10*3/MM3 (ref 0–0.05)
IMM GRANULOCYTES NFR BLD AUTO: 1.7 % (ref 0–0.5)
LYMPHOCYTES # BLD AUTO: 1.28 10*3/MM3 (ref 0.7–3.1)
LYMPHOCYTES NFR BLD AUTO: 13.4 % (ref 19.6–45.3)
MCH RBC QN AUTO: 30.2 PG (ref 26.6–33)
MCHC RBC AUTO-ENTMCNC: 33.6 G/DL (ref 31.5–35.7)
MCV RBC AUTO: 89.9 FL (ref 79–97)
MONOCYTES # BLD AUTO: 0.76 10*3/MM3 (ref 0.1–0.9)
MONOCYTES NFR BLD AUTO: 8 % (ref 5–12)
NEUTROPHILS NFR BLD AUTO: 7.27 10*3/MM3 (ref 1.7–7)
NEUTROPHILS NFR BLD AUTO: 76.3 % (ref 42.7–76)
NRBC BLD AUTO-RTO: 0.7 /100 WBC (ref 0–0.2)
PLATELET # BLD AUTO: 150 10*3/MM3 (ref 140–450)
PMV BLD AUTO: 10.2 FL (ref 6–12)
POTASSIUM SERPL-SCNC: 3.6 MMOL/L (ref 3.5–5.2)
PROT SERPL-MCNC: 6.1 G/DL (ref 6–8.5)
RBC # BLD AUTO: 4.47 10*6/MM3 (ref 4.14–5.8)
SODIUM SERPL-SCNC: 140 MMOL/L (ref 136–145)
WBC NRBC COR # BLD AUTO: 9.53 10*3/MM3 (ref 3.4–10.8)

## 2024-08-13 PROCEDURE — 85025 COMPLETE CBC W/AUTO DIFF WBC: CPT

## 2024-08-13 PROCEDURE — 80053 COMPREHEN METABOLIC PANEL: CPT

## 2024-08-13 PROCEDURE — 25010000002 HEPARIN LOCK FLUSH PER 10 UNITS: Performed by: INTERNAL MEDICINE

## 2024-08-13 PROCEDURE — 36591 DRAW BLOOD OFF VENOUS DEVICE: CPT

## 2024-08-13 RX ORDER — HEPARIN SODIUM (PORCINE) LOCK FLUSH IV SOLN 100 UNIT/ML 100 UNIT/ML
500 SOLUTION INTRAVENOUS AS NEEDED
Status: CANCELLED | OUTPATIENT
Start: 2024-08-13

## 2024-08-13 RX ORDER — SODIUM CHLORIDE 0.9 % (FLUSH) 0.9 %
10 SYRINGE (ML) INJECTION AS NEEDED
Status: DISCONTINUED | OUTPATIENT
Start: 2024-08-13 | End: 2024-08-13 | Stop reason: HOSPADM

## 2024-08-13 RX ORDER — SODIUM CHLORIDE 0.9 % (FLUSH) 0.9 %
10 SYRINGE (ML) INJECTION AS NEEDED
Status: CANCELLED | OUTPATIENT
Start: 2024-08-13

## 2024-08-13 RX ORDER — HEPARIN SODIUM (PORCINE) LOCK FLUSH IV SOLN 100 UNIT/ML 100 UNIT/ML
500 SOLUTION INTRAVENOUS AS NEEDED
Status: DISCONTINUED | OUTPATIENT
Start: 2024-08-13 | End: 2024-08-13 | Stop reason: HOSPADM

## 2024-08-13 RX ADMIN — Medication 10 ML: at 09:29

## 2024-08-13 RX ADMIN — Medication 500 UNITS: at 09:32

## 2024-08-13 NOTE — TELEPHONE ENCOUNTER
Patient contacted via telephone for RN Review. CMP reviewed with Dr Fernandez, counts are stable for this patient at this time. Per Dr Fernandez due to patient's labs are better this week patient does not have to return on 8/15/24. Also patient will have Port flush and RN review after MRI Brain on Monday 8/19/24. Patient's brother in law called and informed.  Patient has no complaints. Follow up appointment reviewed. Patient is instructed to call the office with any concerns or new symptoms prior to next visit. Patient verbalized understanding.     Lab Results   Component Value Date    GLUCOSE 122 (H) 08/13/2024    BUN 20 08/13/2024    CREATININE 0.64 (L) 08/13/2024     08/13/2024    K 3.6 08/13/2024     08/13/2024    CALCIUM 9.2 08/13/2024    PROTEINTOT 6.1 08/13/2024    ALBUMIN 3.8 08/13/2024     (C) 08/13/2024    AST 74 (C) 08/13/2024    ALKPHOS 107 08/13/2024    BILITOT 1.5 (H) 08/13/2024    GLOB 2.3 08/13/2024    AGRATIO 1.7 08/13/2024    BCR 31.3 (H) 08/13/2024    ANIONGAP 12.4 08/13/2024    EGFR 108.4 08/13/2024

## 2024-08-19 ENCOUNTER — HOSPITAL ENCOUNTER (OUTPATIENT)
Dept: MRI IMAGING | Facility: HOSPITAL | Age: 59
Discharge: HOME | End: 2024-08-19
Admitting: INTERNAL MEDICINE
Payer: COMMERCIAL

## 2024-08-19 ENCOUNTER — CLINICAL SUPPORT (OUTPATIENT)
Dept: ONCOLOGY | Facility: HOSPITAL | Age: 59
End: 2024-08-19
Payer: COMMERCIAL

## 2024-08-19 ENCOUNTER — INFUSION (OUTPATIENT)
Dept: ONCOLOGY | Facility: HOSPITAL | Age: 59
End: 2024-08-19
Payer: COMMERCIAL

## 2024-08-19 VITALS
RESPIRATION RATE: 15 BRPM | BODY MASS INDEX: 29.95 KG/M2 | TEMPERATURE: 97.8 F | OXYGEN SATURATION: 99 % | HEIGHT: 70 IN | DIASTOLIC BLOOD PRESSURE: 85 MMHG | WEIGHT: 209.2 LBS | HEART RATE: 71 BPM | SYSTOLIC BLOOD PRESSURE: 123 MMHG

## 2024-08-19 DIAGNOSIS — C85.89 PRIMARY CNS LYMPHOMA: ICD-10-CM

## 2024-08-19 DIAGNOSIS — C79.31 METASTASIS TO BRAIN: ICD-10-CM

## 2024-08-19 DIAGNOSIS — C85.89 PRIMARY CNS LYMPHOMA: Primary | ICD-10-CM

## 2024-08-19 DIAGNOSIS — C85.10 LARGE B-CELL LYMPHOMA: ICD-10-CM

## 2024-08-19 DIAGNOSIS — Z45.2 FITTING AND ADJUSTMENT OF VASCULAR CATHETER: Primary | ICD-10-CM

## 2024-08-19 LAB
ALBUMIN SERPL-MCNC: 3.8 G/DL (ref 3.5–5.2)
ALBUMIN/GLOB SERPL: 1.6 G/DL
ALP SERPL-CCNC: 123 U/L (ref 39–117)
ALT SERPL W P-5'-P-CCNC: 275 U/L (ref 1–41)
ANION GAP SERPL CALCULATED.3IONS-SCNC: 9.9 MMOL/L (ref 5–15)
AST SERPL-CCNC: 89 U/L (ref 1–40)
BASOPHILS # BLD AUTO: 0.02 10*3/MM3 (ref 0–0.2)
BASOPHILS NFR BLD AUTO: 0.4 % (ref 0–1.5)
BILIRUB SERPL-MCNC: 1.6 MG/DL (ref 0–1.2)
BUN SERPL-MCNC: 26 MG/DL (ref 8–23)
BUN/CREAT SERPL: 35.1 (ref 7–25)
CALCIUM SPEC-SCNC: 9.2 MG/DL (ref 8.6–10.5)
CHLORIDE SERPL-SCNC: 110 MMOL/L (ref 98–107)
CO2 SERPL-SCNC: 23.1 MMOL/L (ref 22–29)
CREAT SERPL-MCNC: 0.74 MG/DL (ref 0.76–1.27)
DEPRECATED RDW RBC AUTO: 61.6 FL (ref 37–54)
EGFRCR SERPLBLD CKD-EPI 2021: 103.7 ML/MIN/1.73
EOSINOPHIL # BLD AUTO: 0.09 10*3/MM3 (ref 0–0.4)
EOSINOPHIL NFR BLD AUTO: 1.7 % (ref 0.3–6.2)
ERYTHROCYTE [DISTWIDTH] IN BLOOD BY AUTOMATED COUNT: 19 % (ref 12.3–15.4)
GLOBULIN UR ELPH-MCNC: 2.4 GM/DL
GLUCOSE SERPL-MCNC: 90 MG/DL (ref 65–99)
HCT VFR BLD AUTO: 41.8 % (ref 37.5–51)
HGB BLD-MCNC: 13.5 G/DL (ref 13–17.7)
IMM GRANULOCYTES # BLD AUTO: 0.07 10*3/MM3 (ref 0–0.05)
IMM GRANULOCYTES NFR BLD AUTO: 1.3 % (ref 0–0.5)
LYMPHOCYTES # BLD AUTO: 1.72 10*3/MM3 (ref 0.7–3.1)
LYMPHOCYTES NFR BLD AUTO: 32.8 % (ref 19.6–45.3)
MCH RBC QN AUTO: 30.5 PG (ref 26.6–33)
MCHC RBC AUTO-ENTMCNC: 32.3 G/DL (ref 31.5–35.7)
MCV RBC AUTO: 94.6 FL (ref 79–97)
MONOCYTES # BLD AUTO: 0.53 10*3/MM3 (ref 0.1–0.9)
MONOCYTES NFR BLD AUTO: 10.1 % (ref 5–12)
NEUTROPHILS NFR BLD AUTO: 2.82 10*3/MM3 (ref 1.7–7)
NEUTROPHILS NFR BLD AUTO: 53.7 % (ref 42.7–76)
NRBC BLD AUTO-RTO: 1 /100 WBC (ref 0–0.2)
PLATELET # BLD AUTO: 140 10*3/MM3 (ref 140–450)
PMV BLD AUTO: 10.7 FL (ref 6–12)
POTASSIUM SERPL-SCNC: 3.3 MMOL/L (ref 3.5–5.2)
PROT SERPL-MCNC: 6.2 G/DL (ref 6–8.5)
RBC # BLD AUTO: 4.42 10*6/MM3 (ref 4.14–5.8)
SODIUM SERPL-SCNC: 143 MMOL/L (ref 136–145)
WBC NRBC COR # BLD AUTO: 5.25 10*3/MM3 (ref 3.4–10.8)

## 2024-08-19 PROCEDURE — A9577 INJ MULTIHANCE: HCPCS | Performed by: INTERNAL MEDICINE

## 2024-08-19 PROCEDURE — 85025 COMPLETE CBC W/AUTO DIFF WBC: CPT | Performed by: INTERNAL MEDICINE

## 2024-08-19 PROCEDURE — 0 GADOBENATE DIMEGLUMINE 529 MG/ML SOLUTION: Performed by: INTERNAL MEDICINE

## 2024-08-19 PROCEDURE — 80053 COMPREHEN METABOLIC PANEL: CPT | Performed by: INTERNAL MEDICINE

## 2024-08-19 PROCEDURE — 70553 MRI BRAIN STEM W/O & W/DYE: CPT

## 2024-08-19 RX ORDER — POTASSIUM CHLORIDE 1500 MG/1
20 TABLET, FILM COATED, EXTENDED RELEASE ORAL DAILY
Qty: 30 TABLET | Refills: 0 | Status: CANCELLED | OUTPATIENT
Start: 2024-08-19

## 2024-08-19 RX ORDER — HEPARIN SODIUM (PORCINE) LOCK FLUSH IV SOLN 100 UNIT/ML 100 UNIT/ML
500 SOLUTION INTRAVENOUS AS NEEDED
Status: CANCELLED | OUTPATIENT
Start: 2024-08-19

## 2024-08-19 RX ORDER — SODIUM CHLORIDE 0.9 % (FLUSH) 0.9 %
10 SYRINGE (ML) INJECTION AS NEEDED
Status: DISCONTINUED | OUTPATIENT
Start: 2024-08-19 | End: 2024-08-19 | Stop reason: HOSPADM

## 2024-08-19 RX ORDER — SODIUM CHLORIDE 0.9 % (FLUSH) 0.9 %
10 SYRINGE (ML) INJECTION AS NEEDED
Status: CANCELLED | OUTPATIENT
Start: 2024-08-19

## 2024-08-19 RX ORDER — HEPARIN SODIUM (PORCINE) LOCK FLUSH IV SOLN 100 UNIT/ML 100 UNIT/ML
500 SOLUTION INTRAVENOUS AS NEEDED
Status: DISCONTINUED | OUTPATIENT
Start: 2024-08-19 | End: 2024-08-19 | Stop reason: HOSPADM

## 2024-08-19 RX ORDER — POTASSIUM CHLORIDE 20 MEQ/1
20 TABLET, EXTENDED RELEASE ORAL DAILY
Qty: 30 TABLET | Refills: 0 | Status: SHIPPED | OUTPATIENT
Start: 2024-08-19

## 2024-08-19 RX ADMIN — GADOBENATE DIMEGLUMINE 20 ML: 529 INJECTION, SOLUTION INTRAVENOUS at 09:38

## 2024-08-19 NOTE — PROGRESS NOTES
Iain Christina is a 58 y.o. 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 Fernandez, and is scheduled with Clinical Review offered by [facility]. Patient's visit was held in clinic today.     Therapy plan  Methotrexate / Leucovorin / Rituximab     Current Medication List  Unable to review comprehensive medication list as patient/family do not have full medication list with them at this time.   Medication Sig Start Date End Date Taking? Authorizing Provider   acyclovir (ZOVIRAX) 400 MG tablet Take 1 tablet by mouth 2 (Two) Times a Day. Take no more than 5 doses a day.  Indications: PROPHYLAXIS 8/10/24   Arturo Bello MD   dexAMETHasone (DECADRON) 2 MG tablet Take 1 tablet by mouth 2 (Two) Times a Day With Meals. 7/23/24   Livier Arizmendi MD   famotidine (PEPCID) 20 MG tablet Take 1 tablet by mouth 2 (Two) Times a Day Before Meals for 180 days. 7/9/24 1/5/25  Krista Fernandez MD PhD   HYDROcodone-acetaminophen (NORCO) 5-325 MG per tablet Take 1 tablet by mouth Every 4 (Four) Hours As Needed for Moderate Pain. 7/23/24   Livier Arizmendi MD   insulin aspart (NovoLOG FlexPen) 100 UNIT/ML solution pen-injector sc pen Inject 5 Units under the skin into the appropriate area as directed 3 (Three) Times a Day With Meals.  Patient taking differently: Inject 10 Units under the skin into the appropriate area as directed 3 (Three) Times a Day With Meals. 6/24/24   Frank Maravilla MD   insulin degludec (Tresiba FlexTouch) 100 UNIT/ML solution pen-injector injection Inject 25 Units under the skin into the appropriate area as directed Daily.  Patient taking differently: Inject 30 Units under the skin into the appropriate area as directed Daily. 6/24/24   Frank Maravilla MD   Insulin Pen Needle 32G X 4 MM misc Use to inject insulin SQ via pens up to 4 times daily 6/24/24   Frank Maravilla MD   irbesartan-hydrochlorothiazide (AVALIDE) 150-12.5 MG tablet Take 1 tablet by  mouth Daily. 8/10/24   Arturo Bello MD   levETIRAcetam (KEPPRA) 500 MG tablet Take 1 tablet by mouth Every 12 (Twelve) Hours for 180 days. 7/9/24 1/5/25  Krista Fernandez MD PhD   lidocaine-prilocaine (EMLA) 2.5-2.5 % cream Apply 1 Application topically to the appropriate area as directed Every 2 (Two) Hours As Needed for Mild Pain. 7/26/24   Krista Fernandez MD PhD   sulfamethoxazole-trimethoprim (BACTRIM DS,SEPTRA DS) 800-160 MG per tablet Take 1 tablet by mouth 3 (Three) Times a Week for 78 doses. Indications: PROPHYLAXIS 7/10/24 1/7/25  Krista Fernandez MD PhD   ursodiol (ACTIGALL) 300 MG capsule Take 1 capsule by mouth 2 (Two) Times a Day. 7/23/24   Livier Arizmendi MD   zolpidem CR (Ambien CR) 12.5 MG CR tablet Take 1 tablet by mouth At Night As Needed for Sleep. 7/1/24   cSott Medina MD       Relevant Laboratory Values  Lab Results   Component Value Date    GLUCOSE 90 08/19/2024    CALCIUM 9.2 08/19/2024     08/19/2024    K 3.3 (L) 08/19/2024    CO2 23.1 08/19/2024     (H) 08/19/2024    BUN 26 (H) 08/19/2024    CREATININE 0.74 (L) 08/19/2024    EGFRIFAFRI 109 12/13/2021    EGFRIFNONA 94 12/13/2021    BCR 35.1 (H) 08/19/2024    ANIONGAP 9.9 08/19/2024     Lab Results   Component Value Date    WBC 5.25 08/19/2024    RBC 4.42 08/19/2024    HGB 13.5 08/19/2024    HCT 41.8 08/19/2024    MCV 94.6 08/19/2024    MCH 30.5 08/19/2024    MCHC 32.3 08/19/2024    RDW 19.0 (H) 08/19/2024    RDWSD 61.6 (H) 08/19/2024    MPV 10.7 08/19/2024     08/19/2024    NEUTRORELPCT 53.7 08/19/2024    LYMPHORELPCT 32.8 08/19/2024    MONORELPCT 10.1 08/19/2024    EOSRELPCT 1.7 08/19/2024    BASORELPCT 0.4 08/19/2024    AUTOIGPER 1.3 (H) 08/19/2024    NEUTROABS 2.82 08/19/2024    LYMPHSABS 1.72 08/19/2024    MONOSABS 0.53 08/19/2024    EOSABS 0.09 08/19/2024    BASOSABS 0.02 08/19/2024    AUTOIGNUM 0.07 (H) 08/19/2024    NRBC 1.0 (H) 08/19/2024       Clinical Review  Patient reports feeling well overall.  Patient voices no complaints or notable adverse reactions to recent methotrexate and rituximab infusions.  Patient denies any symptoms of shortness of breath, fatigue, and/or light-headedness. Patient denies any signs/symptoms of bleeding or bruising. Patient denies any sign/symptoms of infection, and is regularly checking temperature. Patient reports adherence to acyclovir, bactrim, dexamethasone 2 mg daily, and ursodiol.    Potassium 3.3; patient/family report already adhering to a increased potassium diet     Glucose 90; patient has not yet had a full meal today     Creatinine 0.74    AST 89      Hemoglobin 13.5  Platelets 140,000  WBC 5,250  ANC 2,820    The patient's current therapy plan and above labs have been reviewed with ILYA Rico.     Plan  CBC and Comprehensive Metabolic Panel reviewed - see above in Laboratory Results  Will instruct Iain Christina to start potassium 20 meq daily (sent to patient's preferred pharmacy) until instructed otherwise per ILYA Rico.  Follow up on Friday when scheduled for admission. Next scheduled appointment(s) reviewed with patient.  Patient is instructed to call the office with any concerns or new symptoms prior to next visit.  Verbal and written information provided. Patient expresses understanding and has no further questions at this time.            Shanique Bernal, PharmD  Clinical Pharmacy Services   [facility]  8/19/2024  09:36 EDT

## 2024-08-20 ENCOUNTER — READMISSION MANAGEMENT (OUTPATIENT)
Dept: CALL CENTER | Facility: HOSPITAL | Age: 59
End: 2024-08-20
Payer: COMMERCIAL

## 2024-08-20 NOTE — OUTREACH NOTE
Medical Week 2 Survey      Flowsheet Row Responses   [facility] patient discharged from? [place]   Does the patient have one of the following disease processes/diagnoses(primary or secondary)? Other   Week 2 attempt successful? Yes   Call start time 1400   Discharge diagnosis Transaminitis   Call end time 1406   Person spoke with today (if not patient) and relationship Patient   Meds reviewed with patient/caregiver? Yes   Is the patient having any side effects they believe may be caused by any medication additions or changes? No   Does the patient have all medications ordered at discharge? Yes   Is the patient taking all medications as directed (includes completed medication regime)? Yes   Comments regarding appointments Oncology f/u appt on 8/23/24 at 9:20 AM with Dr. Krista Fernandez.   Does the patient have a primary care provider?  Yes   Comments regarding PCP PCP--Dr. Scott Medina   Has the patient kept scheduled appointments due by today? Yes   Has home health visited the patient within 72 hours of discharge? N/A   What DME was ordered? [employer]   Has all DME been delivered? Yes   Psychosocial issues? No   Comments Patient states he is doing ok. He gets admitted again on 8/23/24 for chemotherapy.   Did the patient receive a copy of their discharge instructions? Yes   Nursing interventions Reviewed instructions with patient   What is the patient's perception of their health status since discharge? Improving   Is the patient/caregiver able to teach back signs and symptoms related to disease process for when to call PCP? Yes   Is the patient/caregiver able to teach back signs and symptoms related to disease process for when to call 911? Yes   Is the patient/caregiver able to teach back the hierarchy of who to call/visit for symptoms/problems? PCP, Specialist, Home health nurse, Urgent Care, ED, 911 Yes   If the patient is a current smoker, are they able to teach back resources for cessation? Not a  smoker   Week 2 Call Completed? Yes   Graduated Yes   Is the patient interested in additional calls from an ambulatory ? No   Would this patient benefit from a Referral to [facility]? No   Graduated/Revoked comments Patient denies any needs or concerns.   Call end time 1401            Mahi LOWERY - Registered Nurse

## 2024-08-23 ENCOUNTER — OFFICE VISIT (OUTPATIENT)
Dept: ONCOLOGY | Facility: CLINIC | Age: 59
End: 2024-08-23
Payer: COMMERCIAL

## 2024-08-23 ENCOUNTER — INFUSION (OUTPATIENT)
Dept: ONCOLOGY | Facility: HOSPITAL | Age: 59
End: 2024-08-23
Payer: COMMERCIAL

## 2024-08-23 VITALS
BODY MASS INDEX: 30.19 KG/M2 | RESPIRATION RATE: 20 BRPM | HEIGHT: 70 IN | DIASTOLIC BLOOD PRESSURE: 80 MMHG | TEMPERATURE: 98.1 F | OXYGEN SATURATION: 97 % | SYSTOLIC BLOOD PRESSURE: 122 MMHG | HEART RATE: 76 BPM | WEIGHT: 210.9 LBS

## 2024-08-23 DIAGNOSIS — D69.6 THROMBOCYTOPENIA: ICD-10-CM

## 2024-08-23 DIAGNOSIS — C85.89 PRIMARY CNS LYMPHOMA: Primary | ICD-10-CM

## 2024-08-23 DIAGNOSIS — R74.01 TRANSAMINITIS: ICD-10-CM

## 2024-08-23 DIAGNOSIS — C79.31 METASTASIS TO BRAIN: ICD-10-CM

## 2024-08-23 DIAGNOSIS — R73.9 STEROID-INDUCED HYPERGLYCEMIA: ICD-10-CM

## 2024-08-23 DIAGNOSIS — C85.89 PRIMARY CNS LYMPHOMA: ICD-10-CM

## 2024-08-23 DIAGNOSIS — Z45.2 FITTING AND ADJUSTMENT OF VASCULAR CATHETER: Primary | ICD-10-CM

## 2024-08-23 DIAGNOSIS — T38.0X5A STEROID-INDUCED HYPERGLYCEMIA: ICD-10-CM

## 2024-08-23 DIAGNOSIS — C85.10 LARGE B-CELL LYMPHOMA: ICD-10-CM

## 2024-08-23 LAB
ALBUMIN SERPL-MCNC: 4 G/DL (ref 3.5–5.2)
ALBUMIN/GLOB SERPL: 1.7 G/DL
ALP SERPL-CCNC: 145 U/L (ref 39–117)
ALT SERPL W P-5'-P-CCNC: 281 U/L (ref 1–41)
ANION GAP SERPL CALCULATED.3IONS-SCNC: 11.5 MMOL/L (ref 5–15)
AST SERPL-CCNC: 68 U/L (ref 1–40)
BASOPHILS # BLD AUTO: 0.05 10*3/MM3 (ref 0–0.2)
BASOPHILS NFR BLD AUTO: 0.8 % (ref 0–1.5)
BILIRUB SERPL-MCNC: 1.1 MG/DL (ref 0–1.2)
BUN SERPL-MCNC: 25 MG/DL (ref 8–23)
BUN/CREAT SERPL: 39.1 (ref 7–25)
CALCIUM SPEC-SCNC: 9.4 MG/DL (ref 8.6–10.5)
CHLORIDE SERPL-SCNC: 108 MMOL/L (ref 98–107)
CO2 SERPL-SCNC: 23.5 MMOL/L (ref 22–29)
CREAT SERPL-MCNC: 0.64 MG/DL (ref 0.76–1.27)
DEPRECATED RDW RBC AUTO: 62.4 FL (ref 37–54)
EGFRCR SERPLBLD CKD-EPI 2021: 108.4 ML/MIN/1.73
EOSINOPHIL # BLD AUTO: 0.03 10*3/MM3 (ref 0–0.4)
EOSINOPHIL NFR BLD AUTO: 0.5 % (ref 0.3–6.2)
ERYTHROCYTE [DISTWIDTH] IN BLOOD BY AUTOMATED COUNT: 18.7 % (ref 12.3–15.4)
GLOBULIN UR ELPH-MCNC: 2.3 GM/DL
GLUCOSE SERPL-MCNC: 169 MG/DL (ref 65–99)
HCT VFR BLD AUTO: 42.9 % (ref 37.5–51)
HGB BLD-MCNC: 14.4 G/DL (ref 13–17.7)
IMM GRANULOCYTES # BLD AUTO: 0.25 10*3/MM3 (ref 0–0.05)
IMM GRANULOCYTES NFR BLD AUTO: 3.8 % (ref 0–0.5)
LYMPHOCYTES # BLD AUTO: 1.07 10*3/MM3 (ref 0.7–3.1)
LYMPHOCYTES NFR BLD AUTO: 16.2 % (ref 19.6–45.3)
MCH RBC QN AUTO: 31.4 PG (ref 26.6–33)
MCHC RBC AUTO-ENTMCNC: 33.6 G/DL (ref 31.5–35.7)
MCV RBC AUTO: 93.5 FL (ref 79–97)
MONOCYTES # BLD AUTO: 0.74 10*3/MM3 (ref 0.1–0.9)
MONOCYTES NFR BLD AUTO: 11.2 % (ref 5–12)
NEUTROPHILS NFR BLD AUTO: 4.45 10*3/MM3 (ref 1.7–7)
NEUTROPHILS NFR BLD AUTO: 67.5 % (ref 42.7–76)
NRBC BLD AUTO-RTO: 0.9 /100 WBC (ref 0–0.2)
PLATELET # BLD AUTO: 184 10*3/MM3 (ref 140–450)
PMV BLD AUTO: 10.4 FL (ref 6–12)
POTASSIUM SERPL-SCNC: 3.9 MMOL/L (ref 3.5–5.2)
PROT SERPL-MCNC: 6.3 G/DL (ref 6–8.5)
RBC # BLD AUTO: 4.59 10*6/MM3 (ref 4.14–5.8)
SODIUM SERPL-SCNC: 143 MMOL/L (ref 136–145)
WBC NRBC COR # BLD AUTO: 6.59 10*3/MM3 (ref 3.4–10.8)

## 2024-08-23 PROCEDURE — 80053 COMPREHEN METABOLIC PANEL: CPT

## 2024-08-23 PROCEDURE — 36591 DRAW BLOOD OFF VENOUS DEVICE: CPT

## 2024-08-23 PROCEDURE — 25010000002 HEPARIN LOCK FLUSH PER 10 UNITS: Performed by: INTERNAL MEDICINE

## 2024-08-23 PROCEDURE — 85025 COMPLETE CBC W/AUTO DIFF WBC: CPT

## 2024-08-23 RX ORDER — DEXAMETHASONE 2 MG/1
2 TABLET ORAL
Qty: 120 TABLET | Refills: 1 | Status: SHIPPED | OUTPATIENT
Start: 2024-08-23

## 2024-08-23 RX ORDER — HEPARIN SODIUM (PORCINE) LOCK FLUSH IV SOLN 100 UNIT/ML 100 UNIT/ML
500 SOLUTION INTRAVENOUS AS NEEDED
OUTPATIENT
Start: 2024-08-23

## 2024-08-23 RX ORDER — HEPARIN SODIUM (PORCINE) LOCK FLUSH IV SOLN 100 UNIT/ML 100 UNIT/ML
500 SOLUTION INTRAVENOUS AS NEEDED
Status: DISCONTINUED | OUTPATIENT
Start: 2024-08-23 | End: 2024-08-23 | Stop reason: HOSPADM

## 2024-08-23 RX ORDER — SODIUM CHLORIDE 0.9 % (FLUSH) 0.9 %
10 SYRINGE (ML) INJECTION AS NEEDED
OUTPATIENT
Start: 2024-08-23

## 2024-08-23 RX ORDER — GLIPIZIDE 10 MG/1
10 TABLET, FILM COATED, EXTENDED RELEASE ORAL DAILY
Qty: 30 TABLET | Refills: 2 | Status: SHIPPED | OUTPATIENT
Start: 2024-08-23

## 2024-08-23 RX ORDER — SODIUM CHLORIDE 0.9 % (FLUSH) 0.9 %
10 SYRINGE (ML) INJECTION AS NEEDED
Status: DISCONTINUED | OUTPATIENT
Start: 2024-08-23 | End: 2024-08-23 | Stop reason: HOSPADM

## 2024-08-23 RX ADMIN — Medication 10 ML: at 10:15

## 2024-08-23 RX ADMIN — Medication 500 UNITS: at 10:14

## 2024-08-23 NOTE — PROGRESS NOTES
.     REASON FOR FOLLOW-UP:     *. Provide an opinion on any further workup or treatment primary CNS lymphoma.  Negative for BCL2, BCL6, and MYC by FISH.  Patient was admitted to [facility] on 7/15/2024, and was given first cycle of high-dose methotrexate.                                  HISTORY OF PRESENT ILLNESS:  The patient is a 58 y.o. year old male who is here for follow-up with the above-mentioned history.    History of Present Illness    The patient presents for evaluation of multiple medical concerns.    He is currently on a regimen of dexamethasone, taking 2 mg in the morning and 2 mg in the afternoon. His current medication also includes Keppra. He reports no headaches or changes in vision.    His blood sugar level was recorded as 306 this morning at home. He is managing his diabetes with insulin therapy.    However, he has experienced weakness in his legs, resulting in difficulty climbing stairs. He also mentions needing to pause for a few minutes after standing up from a seated position due to dizziness and is unable to stand up quickly.  Patient reports he had episodes of falling few days ago, due to weakness in his leg, fortunately did not suffer significant injury.  Did not hit his hand.          Results  Laboratory Studies  CBC: HB 14.4, platelets 184,000, WBC 6590, neutrophils 4450, immature granulocytes 250. Chemistry lab: , AST 68, total bilirubin 1.1, alkaline phosphatase 145, creatinine 0.64, BUN 25, chloride 108, sodium 143, potassium 3.9, glucose 169. Morning glucose level was 307.    Imaging  Brain MRI examination obtained 8/19/2024 reported disease progression with the right frontal lesion measuring 5.7 x 4.8 x 4.6 cm, an increase from previous 3.9x3.4, x3.3 cm MRI from 6/20/2024.            Past Medical History:   Diagnosis Date    Diabetes mellitus     Hepatitis C     Hyperthyroidism     Sleep apnea      Past Surgical History:   Procedure Laterality Date     CRANIOTOMY FOR TUMOR Right 6/19/2024    Procedure: Right frontal craniotomy for tumor;  Surgeon: Rosendo Bello MD;  [facility];  Service: Neurosurgery;  Laterality: Right;    NASAL SEPTAL RECONSTRUCTION      VENOUS ACCESS DEVICE (PORT) INSERTION N/A 6/24/2024    Procedure: INSERTION VENOUS ACCESS DEVICE;  Surgeon: Linda Coates MD;  Location: [facility] MAIN OR;  Service: General;  Laterality: N/A;     MEDICAL ONCOLOGY HISTORY: The patient is a 58 y.o. year old male whom we are consulted for evaluation and management of newly diagnosed CNS diffuse large B-cell lymphoma.     This patient has hypertension, hepatitis C, sleep apnea, however he denies diabetes, also previous history of alcohol use who stopped drinking 6 months ago, who presented to [facility] emergency room on 06/15/2024 because of worsening memory as well as episodes of fall. This patient is a long term . He initially came to [facility] ER and CT scan shows brain mets. He was given IV steroids and brought to [facility] for further evaluation and management. The patient reports no fever, sweating, chills, no weight loss. He does not smoke cigarettes.      The CT scan of the head without contrast on 06/15/2024 at [facility] reported a right frontal lobe mass measuring 4.8 x 3.3 cm with large amount of associated vasogenic edema and so called effacement. There is 8 mm right to left midline shift. There was significant mass effect.     After he was admitted to [facility] further imaging studies with the brain MRI with and without contrast on 06/15/2024 confirmed an enhancing mass 4 x 4 x 4 cm in the deep white matter of the right frontal lobe and with internal region of cystic changes. There was intramural micro hemorrhage. There was extensive surrounding vasogenic edema extends across the midline to the corpus collosum. There is leftward midline shift measuring  14 mm.      The patient further had workup with imaging studies for chest, abdomen and pelvis with IV contrast obtained on 06/16/2024 and this study reported no mediastinal adenopathy in the chest. There was trace pericardial effusion. No pulmonary lesions, pleural effusions or pneumothorax. In the abdomen there was chronic diverticulosis, otherwise unremarkable. There was borderline enlarged carley hepatic lymph nodes 1.2 cm. Otherwise unremarkable. No bone lesions.      This patient was seen by, Rosendo Bello MD, Neurosurgeon, who performed right frontal craniotomy and the intraoperative frozen section reported undifferentiated malignancy and the possibility of lymphoma. The patient has excisional biopsy for the right frontal lobe mass.     Pathology evaluation reported atypical lymphoproliferative consistent with diffuse large B-cell lymphoma pending outside consultation. The gross description reported specimen measuring 1.4 x 1.0 x 0.4 cm. The 2nd specimen consisted of 3 x 2 x 1 cm rubbery soft tissue.      The patient presents for evaluation of primary CNS B-cell lymphoma.    I saw him recently in on 6/21/2024 during his hospitalization for newly diagnosed primary CNS lymphoma.    Genetic study of tumor sample: All negative for the BCL2, BCL6, and MYC. Tumor is strongly positive for CD20. Blood glucose level between 120 and 150. A1c is 5.6.    The patient is here 7/15/2024 for evaluation prior to his scheduled high-dose methotrexate chemotherapy for primary CNS lymphoma. He is accompanied by his brother-in-law who helped her with history and discussion.    He was last seen on 07/09/2024 and was scheduled for a PET scan examination for further evaluation. However, his glucose level was significantly elevated at 355 on 07/12/2024 on the day he was scheduled for the PET scan, which was subsequently cancelled due to hyperglycemia.     Today's lab results for hepatitis B, and hepatitis C are negative. Had elevated  liver enzymes on 7/9/2024 including , , and total bilirubin 1.3.  Because of that we decided to a 25% dose reduction of the high-dose methotrexate at 6 g/m² instead of 8 g/m².     According to his brother-in-law, his blood sugar level was 500 this morning. He has been taking insulin, sometimes twice a day. After returning home about a week after his surgery, he was prescribed insulin. However, he admits to not receiving the insulin for the past 4 to 5 days due to forgetfulness and memory issues. He sleeps a lot, so he often only takes insulin once a day, not three times a day. The lowest his blood sugar level this week has been about 350. He was scheduled for a PET scan on Friday, but it was cancelled due to his blood sugar level of 350 and was told it should be 200 or less.         MEDICATIONS    Current Outpatient Medications:     acyclovir (ZOVIRAX) 400 MG tablet, Take 1 tablet by mouth 2 (Two) Times a Day. Take no more than 5 doses a day.  Indications: PROPHYLAXIS, Disp: 60 tablet, Rfl: 3    dexAMETHasone (DECADRON) 2 MG tablet, Take 1 tablet by mouth 4 (Four) Times a Day With Meals & at Bedtime., Disp: 120 tablet, Rfl: 1    famotidine (PEPCID) 20 MG tablet, Take 1 tablet by mouth 2 (Two) Times a Day Before Meals for 180 days., Disp: 60 tablet, Rfl: 5    HYDROcodone-acetaminophen (NORCO) 5-325 MG per tablet, Take 1 tablet by mouth Every 4 (Four) Hours As Needed for Moderate Pain., Disp: 18 tablet, Rfl: 0    insulin aspart (NovoLOG FlexPen) 100 UNIT/ML solution pen-injector sc pen, Inject 5 Units under the skin into the appropriate area as directed 3 (Three) Times a Day With Meals. (Patient taking differently: Inject 10 Units under the skin into the appropriate area as directed 3 (Three) Times a Day With Meals.), Disp: 15 mL, Rfl: 1    insulin degludec (Tresiba FlexTouch) 100 UNIT/ML solution pen-injector injection, Inject 25 Units under the skin into the appropriate area as directed Daily. (Patient  taking differently: Inject 30 Units under the skin into the appropriate area as directed Daily.), Disp: 15 mL, Rfl: 1    Insulin Pen Needle 32G X 4 MM misc, Use to inject insulin SQ via pens up to 4 times daily, Disp: 100 each, Rfl: 1    irbesartan-hydrochlorothiazide (AVALIDE) 150-12.5 MG tablet, Take 1 tablet by mouth Daily., Disp: , Rfl:     levETIRAcetam (KEPPRA) 500 MG tablet, Take 1 tablet by mouth Every 12 (Twelve) Hours for 180 days., Disp: 60 tablet, Rfl: 5    lidocaine-prilocaine (EMLA) 2.5-2.5 % cream, Apply 1 Application topically to the appropriate area as directed Every 2 (Two) Hours As Needed for Mild Pain., Disp: 5 g, Rfl: 2    potassium chloride (KLOR-CON M20) 20 MEQ CR tablet, Take 1 tablet by mouth Daily. Until instructed otherwise., Disp: 30 tablet, Rfl: 0    sulfamethoxazole-trimethoprim (BACTRIM DS,SEPTRA DS) 800-160 MG per tablet, Take 1 tablet by mouth 3 (Three) Times a Week for 78 doses. Indications: PROPHYLAXIS, Disp: 13 tablet, Rfl: 5    ursodiol (ACTIGALL) 300 MG capsule, Take 1 capsule by mouth 2 (Two) Times a Day., Disp: 60 capsule, Rfl: 0    zolpidem CR (Ambien CR) 12.5 MG CR tablet, Take 1 tablet by mouth At Night As Needed for Sleep., Disp: 30 tablet, Rfl: 2    glipizide (GLUCOTROL XL) 10 MG 24 hr tablet, Take 1 tablet by mouth Daily., Disp: 30 tablet, Rfl: 2  No current facility-administered medications for this visit.    Facility-Administered Medications Ordered in Other Visits:     heparin injection 500 Units, 500 Units, Intravenous, PRN, Krista Fernandez MD PhD    sodium chloride 0.9 % flush 10 mL, 10 mL, Intravenous, PRN, Krista Fernandez MD PhD    ALLERGIES:     Allergies   Allergen Reactions    Cashew Nut Anaphylaxis    Peanut (Diagnostic) Anaphylaxis    Peanut-Containing Drug Products Anaphylaxis    Bee Pollen Other (See Comments)     eyes itching, running nose       SOCIAL HISTORY:       Social History     Socioeconomic History    Marital status: Unknown   Tobacco Use     "Smoking status: Never    Smokeless tobacco: Never   Vaping Use    Vaping status: Never Used   Substance and Sexual Activity    Alcohol use: Not Currently     Comment: STATED HE QUIT IN JANUARY    Drug use: Never    Sexual activity: Yes         FAMILY HISTORY:  Family History   Problem Relation Age of Onset    COPD Mother     Cancer Sister     No Known Problems Brother        REVIEW OF SYSTEMS:  Review of Systems  See HPI.           Vitals:    08/23/24 0941   BP: 122/80   Pulse: 76   Resp: 20   Temp: 98.1 °F (36.7 °C)   TempSrc: Oral   SpO2: 97%   Weight: 95.7 kg (210 lb 14.4 oz)   Height: 177.8 cm (70\")   PainSc: 0-No pain     ECOG 1    Physical Exam  GENERAL:  Well-developed, well-nourished male, in no acute distress.    SKIN:  Warm, dry without rashes, purpura or petechiae.  Right frontal scalp with surgical scar present.  HEENT:  Normocephalic.   LYMPHATICS:  No cervical, supraclavicular or axillary adenopathy.  CHEST: Normal respiratory effort.  Lungs clear to auscultation. Good airflow.  CARDIAC:  Regular rate and rhythm. Normal S1,S2.  ABDOMEN:  Soft, no tender.  Bowel sounds normal.  EXTREMITIES:  No lower extremity edema.            RECENT LABS:    Lab Results   Component Value Date    WBC 6.59 08/23/2024    HGB 14.4 08/23/2024    HCT 42.9 08/23/2024    MCV 93.5 08/23/2024     08/23/2024     Lab Results   Component Value Date    NEUTROABS 4.45 08/23/2024     Lab Results   Component Value Date    GLUCOSE 169 (H) 08/23/2024    BUN 25 (H) 08/23/2024    CREATININE 0.64 (L) 08/23/2024     08/23/2024    K 3.9 08/23/2024     (H) 08/23/2024    CALCIUM 9.4 08/23/2024    PROTEINTOT 6.3 08/23/2024    ALBUMIN 4.0 08/23/2024     (C) 08/23/2024    AST 68 (H) 08/23/2024    ALKPHOS 145 (H) 08/23/2024    BILITOT 1.1 08/23/2024    GLOB 2.3 08/23/2024    AGRATIO 1.7 08/23/2024    BCR 39.1 (H) 08/23/2024    ANIONGAP 11.5 08/23/2024    EGFR 108.4 08/23/2024         Assessment & Plan     Assessment & " Plan      *.  Primary CNS diffuse large B-cell lymphoma.    Patient had a craniotomy on 6/19/2024.  Pathology evaluation reported diffuse large B-cell lymphoma, Ki-67 at the 70%.  Further pathology evaluation is triple negative for Bcl-2, BCL6 and MYC.   I discussed with the patient today on 7/9/2024, recommending to have a PET scan examination for further evaluation.  Also discussed with the patient for high-dose methotrexate per NCCN guideline.   Will also obtain CSF on day of cycle 1 day 1 of chemotherapy for flow cytometry study, and also give intra thecal methotrexate. The frequency of chemotherapy will be adjusted no later than every 3 weeks, contingent on his recovery and tolerance. If his recovery is satisfactory, the frequency may be reduced to less than every 3 weeks. Maintenance therapy will be initiated for high-dose methotrexate monthly. A referral will be made for stem cell transplant service at the [facility] in [facility].  Patient could not have PET scan examination on 7/12/2024 due to significant elevated glucose level.  7/15/2024 patient will be admitted to hospital for cycle 1 day 1 chemotherapy with high-dose methotrexate.  This includes a lumbar puncture with cerebrospinal fluid analysis and intrathecal chemotherapy with methotrexate. Depending on the results, a decision regarding methotrexate administration will be determined. Additional oral tablets of leucovorin will be administered during the hospital stay to mitigate the side effects of the methotrexate.    Patient presented 8/2/2024 for evaluation prior to cycle 2 high-dose methotrexate.  He reports back to baseline condition today. Laboratory studies showed elevated AST and ALT, both slightly higher than the level on 07/15/2024. I recommended to admit the patient to hospital today with IV hydration with alkalinization with bicarb for 2 days and start him on cycle 2 high-dose methotrexate in 2 days on 08/04/2024  with same dose reduction 25% at 6 g/m² same as the first cycle.. The methotrexate will be given per protocol with the leucovorin rescue.     The recent brain MRI examination obtained on 08/19/2024 reported disease progression, with the right frontal lesion increasing in size.   Today I discussed with the patient and his brother, they were informed about the MRI results, and the images were shared with him and his brother.  He will be referred to the [facility] Stem Cell Transplantation Service to see Dr. Vazquez for a second opinion evaluation and management. Radiation therapy is recommended as the next step in treatment. We suggested that he receives all his treatment at the [facility], including radiation therapy, for logistical convenience. The patient will continue Keppra 500 mg twice a day.  Because of worsening primary CNS lymphoma, and I will also increase his dexamethasone from 2 mg twice a day to 4 mg 4 times a day.      *.  Hyperglycemia secondary to steroids.  The patient's highest recorded glucose level was 290 yesterday morning on 7/8/2024, and his daily glucose levels range from 120 to 150.   Patient was not diabetic before diagnosis of CNS lymphoma.  Clearly his hyperglycemia is caused by steroids.  He currently is on dexamethasone 6 mg twice a day.  Today on 7/9/2024 I recommended to decrease dexamethasone to 4 mg twice a day.  He will continue insulin prescribed by his primary care physician.  7/12/2024 glucose 355, PET scan was not able to be carried out.  7/15/2024 elevated glucose 316.  Patient will be admitted to hospital for scheduled high-dose methotrexate.  Will consult internal medicine team to help with management of steroids induced hyperglycemia.    Patient presented to the 8/2/2024 prior to cycle 2 chemotherapy.  Glucose level is not well controlled due to steroids. I did decrease his dexamethasone to 2 mg twice a day at the time of  discharge 10 days ago and the patient also has been using insulin under the guidance of his primary care physician. His brother reports that the best glucose level he had was about 160. We will request the internal medicine team for help with the management of diabetes during his hospital stay.  8/23/2024 patient reports his glucose level this morning was 306, and he has been on insulin. Due to the progression of his primary CNS lymphoma, we recommended to increase his oral dexamethasone from 2 mg twice a day to 2 mg four times a day. This increase in dexamethasone is likely to make diabetes management more challenging. To address this, I recommended glipizide extended release 10 mg daily is added to his regimen. He will continue with insulin injections and follow up with his PCP for diabetes management.       *. Thrombocytopenia secondary to chemotherapy.  This patient had thrombocytopenia secondary to chemotherapy with pepe platelets 78,000, but it has now completely resolved with the platelets 208,000 today 82 2024. Expecting thrombocytopenia will happen again during hospitalization. We will monitor labs daily.  Today on 8/23/2024 platelets 184,000.    *.  Transaminitis.    This is pre-existing prior to starting chemotherapy.  However transaminitis was significant worse shortly after chemotherapy and then gradually improved to baseline level.  Post 1st cycle of high-dose methotrexate, peak results on 7/20/2024 ALT 1166, , total bilirubin 1.5 and alk phosphatase 100.  Today 8/23/2024  2 AST 68 alk phosphatase 145 and a total bilirubin 1.1.      PLAN:    Cancel prescheduled admission to [facility] today because of disease progression.     Increase dexamethasone to 2 mg 4 times a day instead of twice a day.  I sent a new prescription to his pharmacy.  Start long-acting glipizide 10 mg daily to help with hypoglycemia.  Continue insulin for hyperglycemia, and continue follow-up with the  primary care physician Dr. Medina.   Continue Keppra 500 mg twice a day for prophylaxis of seizure.  Patient will continue Bactrim and for PJP infection.  Will refer patient to Dr. Vazquez at stem cell transplantation center, [facility] at [facility] .    Will ask radiology department to copy the brain MRI images to the CD disc and to be brought to Dr. Vazquez for review.  Patient will call when he is ready to come back to follow-up with us.        I discussed with the patient and his brother-in-law for the management plan and they voiced understanding.     I shared the images of brain MRI examination with patient and his brother.    I discussed with my colleague Dr. Sanchez.      I communicated with Dr. Vazquez today.   Dr. Vazquez will see patient next week.       TRINO SMILEY M.D., Ph.D.          CC:  Mohit Vazquez MD, BMT, [facility], [facility]  Scott Medina MD

## 2024-08-26 DIAGNOSIS — C85.89 PRIMARY CNS LYMPHOMA: ICD-10-CM

## 2024-08-26 RX ORDER — HYDROCODONE BITARTRATE AND ACETAMINOPHEN 5; 325 MG/1; MG/1
1 TABLET ORAL EVERY 4 HOURS PRN
Qty: 18 TABLET | Refills: 0 | Status: SHIPPED | OUTPATIENT
Start: 2024-08-26

## 2024-08-26 NOTE — TELEPHONE ENCOUNTER
"Caller: MadhurinoraIain gaines \"Rohit\"    Relationship: Self    Best call back number: [phone]    Requested Prescriptions:   Requested Prescriptions     Pending Prescriptions Disp Refills    HYDROcodone-acetaminophen (NORCO) 5-325 MG per tablet 18 tablet 0     Sig: Take 1 tablet by mouth Every 4 (Four) Hours As Needed for Moderate Pain.        Pharmacy where request should be sent:  [facility]    Last office visit with prescribing clinician: 8/23/2024   Last telemedicine visit with prescribing clinician: Visit date not found   Next office visit with prescribing clinician: Visit date not found     Additional details provided by patient: NA    Does the patient have less than a 3 day supply:  [x] Yes  [] No    Would you like a call back once the refill request has been completed: [] Yes [] No    If the office needs to give you a call back, can they leave a voicemail: [] Yes [] No    Rubina Harris Rep   08/26/24 10:42 EDT       "

## 2024-08-28 RX ORDER — INSULIN DEGLUDEC 100 U/ML
30 INJECTION, SOLUTION SUBCUTANEOUS DAILY
Qty: 2 ML | OUTPATIENT
Start: 2024-08-28

## 2024-08-28 RX ORDER — INSULIN ASPART 100 [IU]/ML
10 INJECTION, SOLUTION INTRAVENOUS; SUBCUTANEOUS
Qty: 2 ML | OUTPATIENT
Start: 2024-08-28

## 2024-08-28 NOTE — TELEPHONE ENCOUNTER
"Caller: Iain Christina \"MERLIN\"    Relationship: Self    Best call back number: [phone]    Requested Prescriptions:   Requested Prescriptions     Pending Prescriptions Disp Refills    insulin degludec (Tresiba FlexTouch) 100 UNIT/ML solution pen-injector injection 2 mL      Sig: Inject 30 Units under the skin into the appropriate area as directed Daily.    insulin aspart (NovoLOG FlexPen) 100 UNIT/ML solution pen-injector sc pen 2 mL      Sig: Inject 10 Units under the skin into the appropriate area as directed 3 (Three) Times a Day With Meals.        Pharmacy where request should be sent: [id number] - [facility] [place] - [address] [phone] [facility] [phone] FX     Last office visit with prescribing clinician: 8/23/2024   Last telemedicine visit with prescribing clinician: Visit date not found   Next office visit with prescribing clinician: Visit date not found     Additional details provided by patient: REQUESTING 2 PENS FOR EACH    Does the patient have less than a 3 day supply:  [x] Yes  [] No    Would you like a call back once the refill request has been completed: [] Yes [x] No    If the office needs to give you a call back, can they leave a voicemail: [] Yes [x] No          "

## 2024-09-09 DIAGNOSIS — C85.89 PRIMARY CNS LYMPHOMA: ICD-10-CM

## 2024-09-09 RX ORDER — HYDROCODONE BITARTRATE AND ACETAMINOPHEN 5; 325 MG/1; MG/1
1 TABLET ORAL EVERY 4 HOURS PRN
Qty: 30 TABLET | Refills: 0 | Status: SHIPPED | OUTPATIENT
Start: 2024-09-09 | End: 2024-09-09 | Stop reason: SDUPTHER

## 2024-09-09 RX ORDER — ACYCLOVIR 400 MG/1
400 TABLET ORAL 2 TIMES DAILY
Qty: 60 TABLET | Refills: 3 | OUTPATIENT
Start: 2024-09-09

## 2024-09-09 RX ORDER — GLIPIZIDE 10 MG/1
10 TABLET, FILM COATED, EXTENDED RELEASE ORAL DAILY
Qty: 30 TABLET | Refills: 2 | OUTPATIENT
Start: 2024-09-09

## 2024-09-09 RX ORDER — HYDROCODONE BITARTRATE AND ACETAMINOPHEN 5; 325 MG/1; MG/1
1 TABLET ORAL EVERY 4 HOURS PRN
Qty: 30 TABLET | Refills: 0 | Status: SHIPPED | OUTPATIENT
Start: 2024-09-09

## 2024-09-09 NOTE — TELEPHONE ENCOUNTER
"Caller: Iain Christina \"MERLIN\"    Relationship: Self    Best call back number: [phone]    Requested Prescriptions:   Requested Prescriptions     Pending Prescriptions Disp Refills    glipizide (GLUCOTROL XL) 10 MG 24 hr tablet 30 tablet 2     Sig: Take 1 tablet by mouth Daily.    acyclovir (ZOVIRAX) 400 MG tablet 60 tablet 3     Sig: Take 1 tablet by mouth 2 (Two) Times a Day. Take no more than 5 doses a day.  Indications: PROPHYLAXIS    HYDROcodone-acetaminophen (NORCO) 5-325 MG per tablet 18 tablet 0     Sig: Take 1 tablet by mouth Every 4 (Four) Hours As Needed for Moderate Pain.        Pharmacy where request should be sent: [facility]     Last office visit with prescribing clinician: 8/23/2024   Last telemedicine visit with prescribing clinician: Visit date not found   Next office visit with prescribing clinician: Visit date not found     Additional details provided by patient: NA    Does the patient have less than a 3 day supply:  [x] Yes  [] No    Would you like a call back once the refill request has been completed: [] Yes [] No    If the office needs to give you a call back, can they leave a voicemail: [] Yes [] No    Rubina Harris Rep   09/09/24 09:23 EDT         DELETE AFTER READING TO PATIENT: “Thank you for sharing this information with me. I will send a message to the clinical team. Please allow 48 hours for the clinical staff to follow up on this request.”   "

## 2024-09-26 ENCOUNTER — TELEPHONE (OUTPATIENT)
Dept: FAMILY MEDICINE CLINIC | Facility: CLINIC | Age: 59
End: 2024-09-26
Payer: COMMERCIAL

## 2024-10-02 ENCOUNTER — TELEPHONE (OUTPATIENT)
Dept: FAMILY MEDICINE CLINIC | Facility: CLINIC | Age: 59
End: 2024-10-02
Payer: COMMERCIAL

## 2024-10-02 NOTE — TELEPHONE ENCOUNTER
Caller: [facility]    Relationship:     Best call back number: [phone]       Who are you requesting to speak with (clinical staff, provider,  specific staff member): PCP        What was the call regarding: PATIENT'S INSURANCE STATES [facility] IS NOT IN NETWORK FOR PATIENT'S THERAPIES. THEY ARE ASKING TO SEEK OUT ANOTHER OPTION FOR PATIENT THAT ACCEPTS HIS INSURANCE. NO NEED TO CALL THEM BACK UNLESS YOU NEED INFORMATION FROM THEM

## 2024-10-23 DIAGNOSIS — I10 PRIMARY HYPERTENSION: ICD-10-CM

## 2024-10-23 RX ORDER — IRBESARTAN AND HYDROCHLOROTHIAZIDE 150; 12.5 MG/1; MG/1
1 TABLET, FILM COATED ORAL DAILY
Qty: 30 TABLET | Refills: 0
Start: 2024-10-23 | End: 2024-10-24 | Stop reason: SDUPTHER

## 2024-10-24 DIAGNOSIS — I10 PRIMARY HYPERTENSION: ICD-10-CM

## 2024-10-24 RX ORDER — IRBESARTAN AND HYDROCHLOROTHIAZIDE 150; 12.5 MG/1; MG/1
1 TABLET, FILM COATED ORAL DAILY
Qty: 30 TABLET | Refills: 0 | Status: SHIPPED | OUTPATIENT
Start: 2024-10-24

## 2024-10-24 NOTE — TELEPHONE ENCOUNTER
"  Caller: Iain Christina \"MERLIN\"    Relationship: Self    Best call back number: [phone]     Requested Prescriptions:   Requested Prescriptions     Pending Prescriptions Disp Refills    irbesartan-hydrochlorothiazide (AVALIDE) 150-12.5 MG tablet       Sig: Take 1 tablet by mouth Daily.        Pharmacy where request should be sent: [facility] [address] [place] [address] [place] - [phone]  - [phone]     Last office visit with prescribing clinician: 7/1/2024   Last telemedicine visit with prescribing clinician: Visit date not found   Next office visit with prescribing clinician: 1/16/2025     Additional details provided by patient: WILL NEED NEW PRESCRIPTION    Does the patient have less than a 3 day supply:  [x] Yes  [] No    Would you like a call back once the refill request has been completed: [] Yes [] No    If the office needs to give you a call back, can they leave a voicemail: [] Yes [] No    Rubina Dodge Rep   10/24/24 11:00 EDT              "
7

## 2024-11-19 RX ORDER — GLIPIZIDE 10 MG/1
10 TABLET, FILM COATED, EXTENDED RELEASE ORAL DAILY
Qty: 30 TABLET | Refills: 0 | OUTPATIENT
Start: 2024-11-19

## 2024-11-25 DIAGNOSIS — I10 PRIMARY HYPERTENSION: ICD-10-CM

## 2024-11-25 RX ORDER — IRBESARTAN AND HYDROCHLOROTHIAZIDE 150; 12.5 MG/1; MG/1
1 TABLET, FILM COATED ORAL DAILY
Qty: 30 TABLET | Refills: 0 | Status: SHIPPED | OUTPATIENT
Start: 2024-11-25

## 2024-11-25 NOTE — TELEPHONE ENCOUNTER
"Caller: Madhuriginny Iain Tesfaye \"MERLIN\"    Relationship: Self    Best call back number: [phone]     Requested Prescriptions:   Requested Prescriptions     Pending Prescriptions Disp Refills    irbesartan-hydrochlorothiazide (AVALIDE) 150-12.5 MG tablet 30 tablet 0     Sig: Take 1 tablet by mouth Daily.        Pharmacy where request should be sent: [facility] [address] [place], [place] [address]Y - [phone] [facility] [phone] FX     Last office visit with prescribing clinician: 7/1/2024   Last telemedicine visit with prescribing clinician: Visit date not found   Next office visit with prescribing clinician: 1/16/2025     Additional details provided by patient: PATIENT NEEDS REFILLS ON THIS MEDICATION    Does the patient have less than a 3 day supply:  [] Yes  [x] No    Would you like a call back once the refill request has been completed: [] Yes [x] No    If the office needs to give you a call back, can they leave a voicemail: [] Yes [x] No    Rubina Rowley Rep   11/25/24 09:58 EST       "

## 2024-12-27 DIAGNOSIS — I10 PRIMARY HYPERTENSION: ICD-10-CM

## 2024-12-27 RX ORDER — IRBESARTAN AND HYDROCHLOROTHIAZIDE 150; 12.5 MG/1; MG/1
1 TABLET, FILM COATED ORAL DAILY
Qty: 30 TABLET | Refills: 0 | Status: SHIPPED | OUTPATIENT
Start: 2024-12-27

## 2025-01-23 DIAGNOSIS — I10 PRIMARY HYPERTENSION: ICD-10-CM

## 2025-01-23 RX ORDER — IRBESARTAN AND HYDROCHLOROTHIAZIDE 150; 12.5 MG/1; MG/1
1 TABLET, FILM COATED ORAL DAILY
Qty: 30 TABLET | Refills: 0 | Status: SHIPPED | OUTPATIENT
Start: 2025-01-23

## 2025-02-17 DIAGNOSIS — I10 PRIMARY HYPERTENSION: ICD-10-CM

## 2025-02-17 RX ORDER — IRBESARTAN AND HYDROCHLOROTHIAZIDE 150; 12.5 MG/1; MG/1
1 TABLET, FILM COATED ORAL DAILY
Qty: 30 TABLET | Refills: 0 | Status: SHIPPED | OUTPATIENT
Start: 2025-02-17

## 2025-03-17 DIAGNOSIS — I10 PRIMARY HYPERTENSION: ICD-10-CM

## 2025-03-17 RX ORDER — IRBESARTAN AND HYDROCHLOROTHIAZIDE 150; 12.5 MG/1; MG/1
1 TABLET, FILM COATED ORAL DAILY
Qty: 30 TABLET | Refills: 0 | Status: SHIPPED | OUTPATIENT
Start: 2025-03-17

## 2025-03-18 ENCOUNTER — TELEPHONE (OUTPATIENT)
Dept: FAMILY MEDICINE CLINIC | Facility: CLINIC | Age: 60
End: 2025-03-18
Payer: COMMERCIAL

## 2025-03-18 NOTE — TELEPHONE ENCOUNTER
"Caller: Iain Christina \"MELRIN\"    Relationship to patient: Self    Best call back number:     Patient is needing: PATIENT STATES HE HAS TOE NAIL FUNGUS THAT IS KIND OF BAD, AND HE WOULD LIKE A CALLBACK TO ADVISE IF DR. KELLEY WILL CALL IN A PRESCRIPTION TO TREAT IT OR IF HE NEEDS TO GET A REFERRAL TO A [facility]ATRI[facility].  PLEASE ADVISE.        "

## 2025-03-24 ENCOUNTER — OFFICE VISIT (OUTPATIENT)
Dept: FAMILY MEDICINE CLINIC | Facility: CLINIC | Age: 60
End: 2025-03-24
Payer: COMMERCIAL

## 2025-03-24 VITALS
HEART RATE: 105 BPM | WEIGHT: 201 LBS | DIASTOLIC BLOOD PRESSURE: 76 MMHG | HEIGHT: 70 IN | RESPIRATION RATE: 18 BRPM | OXYGEN SATURATION: 99 % | TEMPERATURE: 98.2 F | BODY MASS INDEX: 28.77 KG/M2 | SYSTOLIC BLOOD PRESSURE: 102 MMHG

## 2025-03-24 DIAGNOSIS — G47.09 OTHER INSOMNIA: ICD-10-CM

## 2025-03-24 DIAGNOSIS — B35.1 ONYCHOMYCOSIS: Primary | ICD-10-CM

## 2025-03-24 PROCEDURE — 99213 OFFICE O/P EST LOW 20 MIN: CPT | Performed by: INTERNAL MEDICINE

## 2025-03-24 RX ORDER — ZOLPIDEM TARTRATE 12.5 MG/1
12.5 TABLET, FILM COATED, EXTENDED RELEASE ORAL NIGHTLY PRN
Qty: 30 TABLET | Refills: 5 | Status: SHIPPED | OUTPATIENT
Start: 2025-03-24

## 2025-03-24 NOTE — PROGRESS NOTES
Subjective   Iain Christina is a 59 y.o. male. Patient is here today for   Chief Complaint   Patient presents with    Nail Problem          Vitals:    03/24/25 0944   BP: 102/76   Pulse: 105   Resp: 18   Temp: 98.2 °F (36.8 °C)   SpO2: 99%     Body mass index is 28.84 kg/m².    The following portions of the patient's history were reviewed and updated as appropriate: allergies, current medications, past family history, past medical history, past social history, past surgical history and problem list.    Past Medical History:   Diagnosis Date    Diabetes mellitus     Hepatitis C     Hyperthyroidism     Sleep apnea       Allergies   Allergen Reactions    Cashew Nut Anaphylaxis    Peanut (Diagnostic) Anaphylaxis    Peanut-Containing Drug Products Anaphylaxis    Bee Pollen Other (See Comments)     eyes itching, running nose      Social History     Socioeconomic History    Marital status: Unknown   Tobacco Use    Smoking status: Never    Smokeless tobacco: Never   Vaping Use    Vaping status: Never Used   Substance and Sexual Activity    Alcohol use: Not Currently     Comment: STATED HE QUIT IN JANUARY    Drug use: Never    Sexual activity: Yes        Current Outpatient Medications:     zolpidem CR (Ambien CR) 12.5 MG CR tablet, Take 1 tablet by mouth At Night As Needed for Sleep., Disp: 30 tablet, Rfl: 5    acyclovir (ZOVIRAX) 400 MG tablet, Take 1 tablet by mouth 2 (Two) Times a Day. Take no more than 5 doses a day.  Indications: PROPHYLAXIS, Disp: 60 tablet, Rfl: 3    dexAMETHasone (DECADRON) 2 MG tablet, Take 1 tablet by mouth 4 (Four) Times a Day With Meals & at Bedtime., Disp: 120 tablet, Rfl: 1    glipizide (GLUCOTROL XL) 10 MG 24 hr tablet, Take 1 tablet by mouth Daily., Disp: 30 tablet, Rfl: 2    HYDROcodone-acetaminophen (NORCO) 5-325 MG per tablet, Take 1 tablet by mouth Every 4 (Four) Hours As Needed for Moderate Pain., Disp: 30 tablet, Rfl: 0    insulin aspart (NovoLOG FlexPen) 100 UNIT/ML solution  pen-injector sc pen, Inject 5 Units under the skin into the appropriate area as directed 3 (Three) Times a Day With Meals. (Patient taking differently: Inject 10 Units under the skin into the appropriate area as directed 3 (Three) Times a Day With Meals.), Disp: 15 mL, Rfl: 1    insulin degludec (Tresiba FlexTouch) 100 UNIT/ML solution pen-injector injection, Inject 25 Units under the skin into the appropriate area as directed Daily. (Patient taking differently: Inject 30 Units under the skin into the appropriate area as directed Daily.), Disp: 15 mL, Rfl: 1    Insulin Pen Needle 32G X 4 MM misc, Use to inject insulin SQ via pens up to 4 times daily, Disp: 100 each, Rfl: 1    irbesartan-hydrochlorothiazide (AVALIDE) 150-12.5 MG tablet, Take 1 tablet by mouth once daily, Disp: 30 tablet, Rfl: 0    levETIRAcetam (KEPPRA) 500 MG tablet, Take 1 tablet by mouth Every 12 (Twelve) Hours for 180 days., Disp: 60 tablet, Rfl: 5    lidocaine-prilocaine (EMLA) 2.5-2.5 % cream, Apply 1 Application topically to the appropriate area as directed Every 2 (Two) Hours As Needed for Mild Pain., Disp: 5 g, Rfl: 2    potassium chloride (KLOR-CON M20) 20 MEQ CR tablet, Take 1 tablet by mouth Daily. Until instructed otherwise., Disp: 30 tablet, Rfl: 0    ursodiol (ACTIGALL) 300 MG capsule, Take 1 capsule by mouth 2 (Two) Times a Day., Disp: 60 capsule, Rfl: 0     Objective     History of Present Illness   History of Present Illness  The patient is a 59-year-old male who presents for evaluation of onychomycosis and insomnia.    He reports a fungal infection affecting all his toenails, with one foot being more severely impacted than the other. The condition has been present for several months and worsened during a recent hospital stay. He expresses concern about potential pain associated with nail removal and inquires if the nail will detach naturally. He is interested in exploring oral medication as a treatment option.    He also requests a  prescription for sleep medication, as his previous prescription for zolpidem 12.5 mg was discontinued by Dr. Wu, although he does not recall the specific reason due to the passage of time. He continues to consult with Dr. Wu, with their most recent appointment occurring two weeks ago and the next scheduled for Thursday. His sleep is currently fragmented and inconsistent.    Supplemental Information  He had an appointment with the hepatology team less than a month ago and was informed that he is cured of hepatitis C. He does not need to return unless new issues arise. He was told that the medication can be hard on the liver. They checked his labs to assess his liver condition and determine if it is stage 3 cirrhosis or less. He was informed that he would not need to return if that was the case. He saw Dr. Drew from urology last week in [place]. He was referred to another physician for a potential bone marrow transplant, but this was deemed unnecessary following successful radiation treatment.    MEDICATIONS  Discontinued: zolpidem    Review of Systems   Constitutional: Negative.    Respiratory: Negative.     Cardiovascular: Negative.    Psychiatric/Behavioral:  Positive for sleep disturbance.        Physical Exam  Vitals reviewed.   Constitutional:       Appearance: Normal appearance.   Skin:     Comments: Toenails are thickened, discolored, and deformed   Neurological:      Mental Status: He is alert.   Psychiatric:         Mood and Affect: Mood normal.         Behavior: Behavior normal.         Thought Content: Thought content normal.         Judgment: Judgment normal.       Physical Exam  Vital Signs  Blood pressure is 102/76.    Results  Laboratory Studies  AST was slightly elevated.    Imaging  Liver ultrasound showed unremarkable postcontrast CT appearance.       Assessment    ASSESSMENT    Problems Addressed this Visit          Skin    Onychomycosis - Primary       Sleep    Other insomnia      Diagnoses         Codes Comments      Onychomycosis    -  Primary ICD-10-CM: B35.1  ICD-9-CM: 110.1       Other insomnia     ICD-10-CM: G47.09  ICD-9-CM: 780.52           Assessment & Plan  1. Onychomycosis.  The patient's AST level was elevated on most recent lab work, which is a concern given the potential hepatotoxicity of terbinafine. A prescription for terbinafine will not be issued at this time due to these concerns.    2. Insomnia.  A prescription for zolpidem 12.5 mg has been provided. He is advised to use GoodRx for cost-effective options as it is not on his formulary. The prescription will be sent to [facility] pharmacy in [place].    PLAN  There are no Patient Instructions on file for this visit.  No follow-ups on file.    Patient or patient representative verbalized consent for the use of Ambient Listening during the visit with  Scott Medina MD for chart documentation. 3/24/2025  12:29 EDT

## 2025-04-17 DIAGNOSIS — I10 PRIMARY HYPERTENSION: ICD-10-CM

## 2025-04-17 RX ORDER — IRBESARTAN AND HYDROCHLOROTHIAZIDE 150; 12.5 MG/1; MG/1
1 TABLET, FILM COATED ORAL DAILY
Qty: 30 TABLET | Refills: 1 | Status: SHIPPED | OUTPATIENT
Start: 2025-04-17

## (undated) DEVICE — NDL HYPO PRECISIONGLIDE REG 25G 1 1/2

## (undated) DEVICE — SUT MNCRYL PLS ANTIB UD 4/0 PS2 18IN

## (undated) DEVICE — ADHS SKIN SURG TISS VISC PREMIERPRO EXOFIN HI/VISC FAST/DRY

## (undated) DEVICE — Device

## (undated) DEVICE — 3M™ STERI-STRIP™ ANTIMICROBIAL SKIN CLOSURES 1/2 INCH X 4 INCHES 50/CARTON 4 CARTONS/CASE A1847: Brand: 3M™ STERI-STRIP™

## (undated) DEVICE — SUT NUROLON 4/0 TF18 CR8 I8IN C584D

## (undated) DEVICE — SHEPHERD'S HOOK GREENBERG: Brand: NICO BRAINPATH

## (undated) DEVICE — GLV SURG BIOGEL LTX PF 7

## (undated) DEVICE — TRAP FLD MINIVAC MEGADYNE 100ML

## (undated) DEVICE — DRAPE,TOWEL,LARGE,INVISISHIELD: Brand: MEDLINE

## (undated) DEVICE — SHEATH 13.5 MM X 75 MM: Brand: NICO BRAINPATH

## (undated) DEVICE — TOOL MR8-F2/7TA23 MR8 F2/7CM TAPER 2.3MM: Brand: MIDAS REX MR8

## (undated) DEVICE — HANDPIECE	13 GA X 13 CM: Brand: NICO MYRIAD

## (undated) DEVICE — DECANTER BAG 9": Brand: MEDLINE INDUSTRIES, INC.

## (undated) DEVICE — MAYFIELD® DISPOSABLE ADULT SKULL PIN (PLASTIC BASE): Brand: MAYFIELD®

## (undated) DEVICE — SOL IRR PHYSIOSOL BTL 1000ML

## (undated) DEVICE — LOU MINOR PROCEDURE: Brand: MEDLINE INDUSTRIES, INC.

## (undated) DEVICE — GLV SURG SENSICARE PI MIC PF SZ6.5 LF STRL

## (undated) DEVICE — PENCL SMOKE/EVAC MEGADYNE TELESCP 10FT

## (undated) DEVICE — SUT PROLN 3/0 SH D/A 36IN 8522H

## (undated) DEVICE — MARKER,SKIN,WI/RULER AND LABELS: Brand: MEDLINE

## (undated) DEVICE — SPECIMEN COLLECTOR WITH FILTER ELEMENT - CLAMSHELL: Brand: NICO MYRIAD

## (undated) DEVICE — PK CRANI 40

## (undated) DEVICE — DURAHOOK 1/4HOOK PK/6

## (undated) DEVICE — SPHR MARKR STEALTH STATION

## (undated) DEVICE — ANTIBACTERIAL UNDYED BRAIDED (POLYGLACTIN 910), SYNTHETIC ABSORBABLE SUTURE: Brand: COATED VICRYL

## (undated) DEVICE — STPLR SKIN VISISTAT WD 35CT

## (undated) DEVICE — CVR PROB 96IN LF STRL

## (undated) DEVICE — SMOKE EVACUATION TUBING WITH 7/8 IN TO 1/4 IN REDUCER: Brand: BUFFALO FILTER

## (undated) DEVICE — ELECTRD BLD EZ CLN MOD XLNG 2.75IN

## (undated) DEVICE — PATIENT RETURN ELECTRODE, SINGLE-USE, CONTACT QUALITY MONITORING, ADULT, WITH 9FT CORD, FOR PATIENTS WEIGING OVER 33LBS. (15KG): Brand: MEGADYNE

## (undated) DEVICE — ADHS LIQ MASTISOL 2/3ML

## (undated) DEVICE — PERFORATOR CDM WO/ DURAGUARD 14/11

## (undated) DEVICE — DRP MICROSCP LEICA 137X381CM

## (undated) DEVICE — LABEL SHEET CUSTOM 2X2 YELLOW: Brand: MEDLINE INDUSTRIES, INC.

## (undated) DEVICE — INTENDED FOR TISSUE SEPARATION, AND OTHER PROCEDURES THAT REQUIRE A SHARP SURGICAL BLADE TO PUNCTURE OR CUT.: Brand: BARD-PARKER ® CARBON RIB-BACK BLADES

## (undated) DEVICE — PAD,NON-ADHERENT,3X8,STERILE,LF,1/PK: Brand: MEDLINE

## (undated) DEVICE — COVER,C-ARM,41X74: Brand: MEDLINE

## (undated) DEVICE — DRSNG SURESITE123 6X8IN

## (undated) DEVICE — GLV SURG SENSICARE W/ALOE PF LF 7.5 STRL

## (undated) DEVICE — CONN TBG Y 5 IN 1 LF STRL

## (undated) DEVICE — DISPOSABLE IRRIGATION BIPOLAR CORD, M1000 TYPE: Brand: KIRWAN